# Patient Record
Sex: FEMALE | Race: WHITE
[De-identification: names, ages, dates, MRNs, and addresses within clinical notes are randomized per-mention and may not be internally consistent; named-entity substitution may affect disease eponyms.]

---

## 2021-06-25 ENCOUNTER — NON-APPOINTMENT (OUTPATIENT)
Age: 86
End: 2021-06-25

## 2021-06-25 ENCOUNTER — APPOINTMENT (OUTPATIENT)
Dept: DERMATOLOGY | Facility: CLINIC | Age: 86
End: 2021-06-25
Payer: MEDICARE

## 2021-06-25 DIAGNOSIS — D18.01 HEMANGIOMA OF SKIN AND SUBCUTANEOUS TISSUE: ICD-10-CM

## 2021-06-25 DIAGNOSIS — L82.1 OTHER SEBORRHEIC KERATOSIS: ICD-10-CM

## 2021-06-25 DIAGNOSIS — L81.4 OTHER MELANIN HYPERPIGMENTATION: ICD-10-CM

## 2021-06-25 PROCEDURE — 99204 OFFICE O/P NEW MOD 45 MIN: CPT

## 2021-06-25 RX ORDER — ROSUVASTATIN CALCIUM 5 MG/1
TABLET, FILM COATED ORAL
Refills: 0 | Status: ACTIVE | COMMUNITY

## 2021-06-25 RX ORDER — SITAGLIPTIN AND METFORMIN HYDROCHLORIDE 50; 1000 MG/1; MG/1
TABLET, FILM COATED ORAL
Refills: 0 | Status: ACTIVE | COMMUNITY

## 2021-06-25 NOTE — PHYSICAL EXAM
[Full Body Skin Exam Performed] : performed [FreeTextEntry3] : Skin examination performed of the face, neck, trunk, arms, legs; \par The patient is well, alert and oriented, pleasant and cooperative.\par Eyelids, conjunctivae, oral mucosa, digits and nails all normal.  \par No cervical adenopathy.\par \par Normal findings include:\par \par Seborrheic keratoses- multiple large lesions on back; also: prominent lesions Left lower cheek, one on R\par Angiomas\par Lentigines\par \par No lesions were suspicious for malignancy. \par \par \par

## 2021-06-25 NOTE — HISTORY OF PRESENT ILLNESS
[de-identified] : Pt. presents for skin check;\par c/o few spots of concern;  on face;  some appeared recently\par Severity:  mild  \par Modifying factors:  none\par Associated symptoms:  none\par Context:  no association with activity\par

## 2021-06-25 NOTE — ASSESSMENT
[FreeTextEntry1] : Complete skin examination is negative for malignancy; Multiple new concerns were addressed and discussed.\par Therapeutic options and their risks and benefits; along with multiple diagnostic possibilities were discussed at length;\par risks and benefits of skin biopsy and/or other further study were discussed;\par \par Continue regular exams;\par \par SKS;  discussed cosmetic removal R/B at length, including  scar, recurrence; \par \par f/u for cosmetic D&C;  Tx cheeks, @ RPP-TPP;  may add back lesions, total of YPP-CPP

## 2021-07-20 ENCOUNTER — APPOINTMENT (OUTPATIENT)
Dept: DERMATOLOGY | Facility: CLINIC | Age: 86
End: 2021-07-20
Payer: SELF-PAY

## 2021-07-20 PROCEDURE — D0052: CPT

## 2021-08-16 ENCOUNTER — APPOINTMENT (OUTPATIENT)
Dept: DERMATOLOGY | Facility: CLINIC | Age: 86
End: 2021-08-16

## 2021-09-08 ENCOUNTER — APPOINTMENT (OUTPATIENT)
Dept: DERMATOLOGY | Facility: CLINIC | Age: 86
End: 2021-09-08

## 2022-08-22 ENCOUNTER — APPOINTMENT (OUTPATIENT)
Dept: DERMATOLOGY | Facility: CLINIC | Age: 87
End: 2022-08-22

## 2022-08-22 DIAGNOSIS — Z41.1 ENCOUNTER FOR COSMETIC SURGERY: ICD-10-CM

## 2022-08-22 PROCEDURE — D0051: CPT

## 2024-01-01 ENCOUNTER — APPOINTMENT (OUTPATIENT)
Dept: ELECTROPHYSIOLOGY | Facility: CLINIC | Age: 89
End: 2024-01-01

## 2024-01-01 ENCOUNTER — INPATIENT (INPATIENT)
Facility: HOSPITAL | Age: 89
LOS: 16 days | Discharge: HOME HEALTH | DRG: 603 | End: 2024-05-02
Attending: FAMILY MEDICINE | Admitting: INTERNAL MEDICINE
Payer: MEDICARE

## 2024-01-01 ENCOUNTER — INPATIENT (INPATIENT)
Facility: HOSPITAL | Age: 89
LOS: 3 days | DRG: 871 | End: 2024-05-31
Attending: INTERNAL MEDICINE | Admitting: SURGERY
Payer: MEDICARE

## 2024-01-01 ENCOUNTER — TRANSCRIPTION ENCOUNTER (OUTPATIENT)
Age: 89
End: 2024-01-01

## 2024-01-01 ENCOUNTER — INPATIENT (INPATIENT)
Facility: HOSPITAL | Age: 89
LOS: 10 days | Discharge: HOME | DRG: 309 | End: 2024-04-08
Attending: STUDENT IN AN ORGANIZED HEALTH CARE EDUCATION/TRAINING PROGRAM | Admitting: INTERNAL MEDICINE
Payer: MEDICARE

## 2024-01-01 ENCOUNTER — APPOINTMENT (OUTPATIENT)
Dept: ELECTROPHYSIOLOGY | Facility: CLINIC | Age: 89
End: 2024-01-01
Payer: MEDICARE

## 2024-01-01 ENCOUNTER — RESULT REVIEW (OUTPATIENT)
Age: 89
End: 2024-01-01

## 2024-01-01 ENCOUNTER — APPOINTMENT (OUTPATIENT)
Dept: CARE COORDINATION | Facility: HOME HEALTH | Age: 89
End: 2024-01-01
Payer: MEDICARE

## 2024-01-01 ENCOUNTER — INPATIENT (INPATIENT)
Facility: HOSPITAL | Age: 89
LOS: 6 days | Discharge: HOME | DRG: 291 | End: 2024-03-25
Attending: HOSPITALIST | Admitting: STUDENT IN AN ORGANIZED HEALTH CARE EDUCATION/TRAINING PROGRAM
Payer: MEDICARE

## 2024-01-01 VITALS
HEART RATE: 50 BPM | SYSTOLIC BLOOD PRESSURE: 111 MMHG | WEIGHT: 145.06 LBS | RESPIRATION RATE: 18 BRPM | OXYGEN SATURATION: 98 % | HEIGHT: 63 IN | DIASTOLIC BLOOD PRESSURE: 68 MMHG | TEMPERATURE: 98 F

## 2024-01-01 VITALS
TEMPERATURE: 98 F | HEIGHT: 63 IN | SYSTOLIC BLOOD PRESSURE: 110 MMHG | WEIGHT: 139.99 LBS | DIASTOLIC BLOOD PRESSURE: 66 MMHG | HEART RATE: 89 BPM | RESPIRATION RATE: 18 BRPM | OXYGEN SATURATION: 100 %

## 2024-01-01 VITALS
HEIGHT: 63 IN | SYSTOLIC BLOOD PRESSURE: 141 MMHG | HEART RATE: 131 BPM | DIASTOLIC BLOOD PRESSURE: 88 MMHG | OXYGEN SATURATION: 100 % | RESPIRATION RATE: 12 BRPM

## 2024-01-01 VITALS
OXYGEN SATURATION: 99 % | RESPIRATION RATE: 19 BRPM | TEMPERATURE: 97 F | DIASTOLIC BLOOD PRESSURE: 45 MMHG | SYSTOLIC BLOOD PRESSURE: 102 MMHG | HEART RATE: 73 BPM

## 2024-01-01 VITALS
RESPIRATION RATE: 17 BRPM | SYSTOLIC BLOOD PRESSURE: 118 MMHG | DIASTOLIC BLOOD PRESSURE: 65 MMHG | HEART RATE: 66 BPM | HEIGHT: 63 IN | OXYGEN SATURATION: 100 % | TEMPERATURE: 94 F

## 2024-01-01 VITALS — SYSTOLIC BLOOD PRESSURE: 118 MMHG | HEART RATE: 90 BPM | DIASTOLIC BLOOD PRESSURE: 73 MMHG

## 2024-01-01 VITALS
OXYGEN SATURATION: 97 % | SYSTOLIC BLOOD PRESSURE: 128 MMHG | HEART RATE: 58 BPM | RESPIRATION RATE: 15 BRPM | DIASTOLIC BLOOD PRESSURE: 78 MMHG

## 2024-01-01 VITALS — OXYGEN SATURATION: 92 %

## 2024-01-01 VITALS — RESPIRATION RATE: 18 BRPM | OXYGEN SATURATION: 93 %
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
## 2024-01-01 DIAGNOSIS — E11.9 TYPE 2 DIABETES MELLITUS W/OUT COMPLICATIONS: ICD-10-CM

## 2024-01-01 DIAGNOSIS — I50.32 CHRONIC DIASTOLIC (CONGESTIVE) HEART FAILURE: ICD-10-CM

## 2024-01-01 DIAGNOSIS — I48.20 CHRONIC ATRIAL FIBRILLATION, UNSPECIFIED: ICD-10-CM

## 2024-01-01 DIAGNOSIS — I48.0 PAROXYSMAL ATRIAL FIBRILLATION: ICD-10-CM

## 2024-01-01 DIAGNOSIS — R53.1 WEAKNESS: ICD-10-CM

## 2024-01-01 DIAGNOSIS — E87.6 HYPOKALEMIA: ICD-10-CM

## 2024-01-01 DIAGNOSIS — R59.9 ENLARGED LYMPH NODES, UNSPECIFIED: ICD-10-CM

## 2024-01-01 DIAGNOSIS — M79.81 NONTRAUMATIC HEMATOMA OF SOFT TISSUE: ICD-10-CM

## 2024-01-01 DIAGNOSIS — L89.150 PRESSURE ULCER OF SACRAL REGION, UNSTAGEABLE: ICD-10-CM

## 2024-01-01 DIAGNOSIS — L98.429 NON-PRESSURE CHRONIC ULCER OF BACK WITH UNSPECIFIED SEVERITY: ICD-10-CM

## 2024-01-01 DIAGNOSIS — I11.0 HYPERTENSIVE HEART DISEASE WITH HEART FAILURE: ICD-10-CM

## 2024-01-01 DIAGNOSIS — Z66 DO NOT RESUSCITATE: ICD-10-CM

## 2024-01-01 DIAGNOSIS — M31.6 OTHER GIANT CELL ARTERITIS: ICD-10-CM

## 2024-01-01 DIAGNOSIS — I48.91 UNSPECIFIED ATRIAL FIBRILLATION: ICD-10-CM

## 2024-01-01 DIAGNOSIS — I48.92 UNSPECIFIED ATRIAL FLUTTER: ICD-10-CM

## 2024-01-01 DIAGNOSIS — Z86.718 PERSONAL HISTORY OF OTHER VENOUS THROMBOSIS AND EMBOLISM: ICD-10-CM

## 2024-01-01 DIAGNOSIS — J44.1 CHRONIC OBSTRUCTIVE PULMONARY DISEASE WITH (ACUTE) EXACERBATION: ICD-10-CM

## 2024-01-01 DIAGNOSIS — S31.000A UNSPECIFIED OPEN WOUND OF LOWER BACK AND PELVIS WITHOUT PENETRATION INTO RETROPERITONEUM, INITIAL ENCOUNTER: ICD-10-CM

## 2024-01-01 DIAGNOSIS — Z79.899 OTHER LONG TERM (CURRENT) DRUG THERAPY: ICD-10-CM

## 2024-01-01 DIAGNOSIS — K52.9 NONINFECTIVE GASTROENTERITIS AND COLITIS, UNSPECIFIED: ICD-10-CM

## 2024-01-01 DIAGNOSIS — Z11.52 ENCOUNTER FOR SCREENING FOR COVID-19: ICD-10-CM

## 2024-01-01 DIAGNOSIS — D69.59 OTHER SECONDARY THROMBOCYTOPENIA: ICD-10-CM

## 2024-01-01 DIAGNOSIS — A41.9 SEPSIS, UNSPECIFIED ORGANISM: ICD-10-CM

## 2024-01-01 DIAGNOSIS — E86.0 DEHYDRATION: ICD-10-CM

## 2024-01-01 DIAGNOSIS — Z79.52 LONG TERM (CURRENT) USE OF SYSTEMIC STEROIDS: ICD-10-CM

## 2024-01-01 DIAGNOSIS — Z79.01 LONG TERM (CURRENT) USE OF ANTICOAGULANTS: ICD-10-CM

## 2024-01-01 DIAGNOSIS — R62.7 ADULT FAILURE TO THRIVE: ICD-10-CM

## 2024-01-01 DIAGNOSIS — E78.5 HYPERLIPIDEMIA, UNSPECIFIED: ICD-10-CM

## 2024-01-01 DIAGNOSIS — R91.1 SOLITARY PULMONARY NODULE: ICD-10-CM

## 2024-01-01 DIAGNOSIS — E88.09 OTHER DISORDERS OF PLASMA-PROTEIN METABOLISM, NOT ELSEWHERE CLASSIFIED: ICD-10-CM

## 2024-01-01 DIAGNOSIS — R55 SYNCOPE AND COLLAPSE: ICD-10-CM

## 2024-01-01 DIAGNOSIS — T38.3X5A ADVERSE EFFECT OF INSULIN AND ORAL HYPOGLYCEMIC [ANTIDIABETIC] DRUGS, INITIAL ENCOUNTER: ICD-10-CM

## 2024-01-01 DIAGNOSIS — D72.828 OTHER ELEVATED WHITE BLOOD CELL COUNT: ICD-10-CM

## 2024-01-01 DIAGNOSIS — K52.1 TOXIC GASTROENTERITIS AND COLITIS: ICD-10-CM

## 2024-01-01 DIAGNOSIS — E43 UNSPECIFIED SEVERE PROTEIN-CALORIE MALNUTRITION: ICD-10-CM
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
## 2024-01-01 DIAGNOSIS — L03.116 CELLULITIS OF LEFT LOWER LIMB: ICD-10-CM

## 2024-01-01 DIAGNOSIS — S70.01XA CONTUSION OF RIGHT HIP, INITIAL ENCOUNTER: ICD-10-CM

## 2024-01-01 DIAGNOSIS — L89.012 PRESSURE ULCER OF RIGHT ELBOW, STAGE 2: ICD-10-CM

## 2024-01-01 DIAGNOSIS — I50.33 ACUTE ON CHRONIC DIASTOLIC (CONGESTIVE) HEART FAILURE: ICD-10-CM

## 2024-01-01 DIAGNOSIS — Z88.0 ALLERGY STATUS TO PENICILLIN: ICD-10-CM

## 2024-01-01 DIAGNOSIS — Z79.84 LONG TERM (CURRENT) USE OF ORAL HYPOGLYCEMIC DRUGS: ICD-10-CM

## 2024-01-01 DIAGNOSIS — I49.5 SICK SINUS SYNDROME: ICD-10-CM

## 2024-01-01 DIAGNOSIS — Z53.09 PROCEDURE AND TREATMENT NOT CARRIED OUT BECAUSE OF OTHER CONTRAINDICATION: ICD-10-CM

## 2024-01-01 DIAGNOSIS — I95.9 HYPOTENSION, UNSPECIFIED: ICD-10-CM

## 2024-01-01 DIAGNOSIS — I24.89 OTHER FORMS OF ACUTE ISCHEMIC HEART DISEASE: ICD-10-CM

## 2024-01-01 DIAGNOSIS — K21.9 GASTRO-ESOPHAGEAL REFLUX DISEASE WITHOUT ESOPHAGITIS: ICD-10-CM
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
## 2024-01-01 DIAGNOSIS — I50.9 HEART FAILURE, UNSPECIFIED: ICD-10-CM

## 2024-01-01 DIAGNOSIS — I96 GANGRENE, NOT ELSEWHERE CLASSIFIED: ICD-10-CM

## 2024-01-01 DIAGNOSIS — E11.9 TYPE 2 DIABETES MELLITUS WITHOUT COMPLICATIONS: ICD-10-CM

## 2024-01-01 DIAGNOSIS — D62 ACUTE POSTHEMORRHAGIC ANEMIA: ICD-10-CM

## 2024-01-01 DIAGNOSIS — K62.89 OTHER SPECIFIED DISEASES OF ANUS AND RECTUM: ICD-10-CM
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
## 2024-01-01 DIAGNOSIS — N17.9 ACUTE KIDNEY FAILURE, UNSPECIFIED: ICD-10-CM

## 2024-01-01 DIAGNOSIS — R91.8 OTHER NONSPECIFIC ABNORMAL FINDING OF LUNG FIELD: ICD-10-CM

## 2024-01-01 LAB
A1C WITH ESTIMATED AVERAGE GLUCOSE RESULT: 7.3 % — HIGH (ref 4–5.6)
A1C WITH ESTIMATED AVERAGE GLUCOSE RESULT: 8.6 % — HIGH (ref 4–5.6)
ABO RH CONFIRMATION: SIGNIFICANT CHANGE UP
ADD ON TEST-SPECIMEN IN LAB: SIGNIFICANT CHANGE UP
ALBUMIN SERPL ELPH-MCNC: 1.6 G/DL — LOW (ref 3.3–5)
ALBUMIN SERPL ELPH-MCNC: 1.6 G/DL — LOW (ref 3.3–5)
ALBUMIN SERPL ELPH-MCNC: 2 G/DL — LOW (ref 3.3–5)
ALBUMIN SERPL ELPH-MCNC: 2 G/DL — LOW (ref 3.3–5)
ALBUMIN SERPL ELPH-MCNC: 2.1 G/DL — LOW (ref 3.3–5)
ALBUMIN SERPL ELPH-MCNC: 2.4 G/DL — LOW (ref 3.3–5)
ALBUMIN SERPL ELPH-MCNC: 2.5 G/DL — LOW (ref 3.3–5)
ALBUMIN SERPL ELPH-MCNC: 2.7 G/DL — LOW (ref 3.3–5)
ALBUMIN SERPL ELPH-MCNC: 2.7 G/DL — LOW (ref 3.3–5)
ALBUMIN SERPL ELPH-MCNC: 3 G/DL — LOW (ref 3.3–5)
ALP SERPL-CCNC: 37 U/L — LOW (ref 40–120)
ALP SERPL-CCNC: 42 U/L — SIGNIFICANT CHANGE UP (ref 40–120)
ALP SERPL-CCNC: 49 U/L — SIGNIFICANT CHANGE UP (ref 40–120)
ALP SERPL-CCNC: 49 U/L — SIGNIFICANT CHANGE UP (ref 40–120)
ALP SERPL-CCNC: 51 U/L — SIGNIFICANT CHANGE UP (ref 40–120)
ALP SERPL-CCNC: 55 U/L — SIGNIFICANT CHANGE UP (ref 40–120)
ALP SERPL-CCNC: 66 U/L — SIGNIFICANT CHANGE UP (ref 40–120)
ALP SERPL-CCNC: 67 U/L — SIGNIFICANT CHANGE UP (ref 40–120)
ALP SERPL-CCNC: 69 U/L — SIGNIFICANT CHANGE UP (ref 40–120)
ALT FLD-CCNC: 23 U/L — SIGNIFICANT CHANGE UP (ref 12–78)
ALT FLD-CCNC: 32 U/L — SIGNIFICANT CHANGE UP (ref 12–78)
ALT FLD-CCNC: 34 U/L — SIGNIFICANT CHANGE UP (ref 12–78)
ALT FLD-CCNC: 35 U/L — SIGNIFICANT CHANGE UP (ref 12–78)
ALT FLD-CCNC: 36 U/L — SIGNIFICANT CHANGE UP (ref 12–78)
ALT FLD-CCNC: 41 U/L — SIGNIFICANT CHANGE UP (ref 12–78)
ALT FLD-CCNC: 43 U/L — SIGNIFICANT CHANGE UP (ref 12–78)
ALT FLD-CCNC: 46 U/L — SIGNIFICANT CHANGE UP (ref 12–78)
ALT FLD-CCNC: 48 U/L — SIGNIFICANT CHANGE UP (ref 12–78)
AMMONIA BLD-MCNC: 70 UMOL/L — HIGH (ref 11–32)
ANION GAP SERPL CALC-SCNC: 10 MMOL/L — SIGNIFICANT CHANGE UP (ref 5–17)
ANION GAP SERPL CALC-SCNC: 12 MMOL/L — SIGNIFICANT CHANGE UP (ref 5–17)
ANION GAP SERPL CALC-SCNC: 14 MMOL/L — SIGNIFICANT CHANGE UP (ref 5–17)
ANION GAP SERPL CALC-SCNC: 14 MMOL/L — SIGNIFICANT CHANGE UP (ref 5–17)
ANION GAP SERPL CALC-SCNC: 4 MMOL/L — LOW (ref 5–17)
ANION GAP SERPL CALC-SCNC: 5 MMOL/L — SIGNIFICANT CHANGE UP (ref 5–17)
ANION GAP SERPL CALC-SCNC: 5 MMOL/L — SIGNIFICANT CHANGE UP (ref 5–17)
ANION GAP SERPL CALC-SCNC: 6 MMOL/L — SIGNIFICANT CHANGE UP (ref 5–17)
ANION GAP SERPL CALC-SCNC: 7 MMOL/L — SIGNIFICANT CHANGE UP (ref 5–17)
ANION GAP SERPL CALC-SCNC: 8 MMOL/L — SIGNIFICANT CHANGE UP (ref 5–17)
ANION GAP SERPL CALC-SCNC: 9 MMOL/L — SIGNIFICANT CHANGE UP (ref 5–17)
ANISOCYTOSIS BLD QL: SIGNIFICANT CHANGE UP
ANISOCYTOSIS BLD QL: SLIGHT — SIGNIFICANT CHANGE UP
APPEARANCE UR: CLEAR — SIGNIFICANT CHANGE UP
APTT BLD: 25.3 SEC — SIGNIFICANT CHANGE UP (ref 24.5–35.6)
APTT BLD: 29.4 SEC — SIGNIFICANT CHANGE UP (ref 24.5–35.6)
APTT BLD: 31.7 SEC — SIGNIFICANT CHANGE UP (ref 24.5–35.6)
APTT BLD: 31.8 SEC — SIGNIFICANT CHANGE UP (ref 24.5–35.6)
APTT BLD: 45.5 SEC — HIGH (ref 24.5–35.6)
AST SERPL-CCNC: 15 U/L — SIGNIFICANT CHANGE UP (ref 15–37)
AST SERPL-CCNC: 18 U/L — SIGNIFICANT CHANGE UP (ref 15–37)
AST SERPL-CCNC: 34 U/L — SIGNIFICANT CHANGE UP (ref 15–37)
AST SERPL-CCNC: 36 U/L — SIGNIFICANT CHANGE UP (ref 15–37)
AST SERPL-CCNC: 38 U/L — HIGH (ref 15–37)
AST SERPL-CCNC: 44 U/L — HIGH (ref 15–37)
AST SERPL-CCNC: 49 U/L — HIGH (ref 15–37)
AST SERPL-CCNC: 63 U/L — HIGH (ref 15–37)
AST SERPL-CCNC: 90 U/L — HIGH (ref 15–37)
BACTERIA # UR AUTO: NEGATIVE /HPF — SIGNIFICANT CHANGE UP
BASE EXCESS BLDA CALC-SCNC: -8.1 MMOL/L — LOW (ref -2–3)
BASOPHILS # BLD AUTO: 0 K/UL — SIGNIFICANT CHANGE UP (ref 0–0.2)
BASOPHILS # BLD AUTO: 0.04 K/UL — SIGNIFICANT CHANGE UP (ref 0–0.2)
BASOPHILS # BLD AUTO: 0.07 K/UL — SIGNIFICANT CHANGE UP (ref 0–0.2)
BASOPHILS # BLD AUTO: 0.12 K/UL — SIGNIFICANT CHANGE UP (ref 0–0.2)
BASOPHILS # BLD AUTO: 0.16 K/UL — SIGNIFICANT CHANGE UP (ref 0–0.2)
BASOPHILS NFR BLD AUTO: 0 % — SIGNIFICANT CHANGE UP (ref 0–2)
BASOPHILS NFR BLD AUTO: 0.4 % — SIGNIFICANT CHANGE UP (ref 0–2)
BASOPHILS NFR BLD AUTO: 0.6 % — SIGNIFICANT CHANGE UP (ref 0–2)
BASOPHILS NFR BLD AUTO: 0.7 % — SIGNIFICANT CHANGE UP (ref 0–2)
BASOPHILS NFR BLD AUTO: 0.9 % — SIGNIFICANT CHANGE UP (ref 0–2)
BILIRUB DIRECT SERPL-MCNC: 0.1 MG/DL — SIGNIFICANT CHANGE UP (ref 0–0.3)
BILIRUB INDIRECT FLD-MCNC: 0.4 MG/DL — SIGNIFICANT CHANGE UP (ref 0.2–1)
BILIRUB SERPL-MCNC: 0.5 MG/DL — SIGNIFICANT CHANGE UP (ref 0.2–1.2)
BILIRUB SERPL-MCNC: 0.5 MG/DL — SIGNIFICANT CHANGE UP (ref 0.2–1.2)
BILIRUB SERPL-MCNC: 0.6 MG/DL — SIGNIFICANT CHANGE UP (ref 0.2–1.2)
BILIRUB SERPL-MCNC: 0.6 MG/DL — SIGNIFICANT CHANGE UP (ref 0.2–1.2)
BILIRUB SERPL-MCNC: 0.9 MG/DL — SIGNIFICANT CHANGE UP (ref 0.2–1.2)
BILIRUB SERPL-MCNC: 1 MG/DL — SIGNIFICANT CHANGE UP (ref 0.2–1.2)
BILIRUB SERPL-MCNC: 1.1 MG/DL — SIGNIFICANT CHANGE UP (ref 0.2–1.2)
BILIRUB SERPL-MCNC: 1.3 MG/DL — HIGH (ref 0.2–1.2)
BILIRUB SERPL-MCNC: 1.5 MG/DL — HIGH (ref 0.2–1.2)
BILIRUB UR-MCNC: NEGATIVE — SIGNIFICANT CHANGE UP
BIZARRE PLATELETS BLD QL SMEAR: PRESENT — SIGNIFICANT CHANGE UP
BIZARRE PLATELETS BLD QL SMEAR: PRESENT — SIGNIFICANT CHANGE UP
BLD GP AB SCN SERPL QL: SIGNIFICANT CHANGE UP
BLD GP AB SCN SERPL QL: SIGNIFICANT CHANGE UP
BLOOD GAS COMMENTS ARTERIAL: SIGNIFICANT CHANGE UP
BUN SERPL-MCNC: 27 MG/DL — HIGH (ref 7–23)
BUN SERPL-MCNC: 31 MG/DL — HIGH (ref 7–23)
BUN SERPL-MCNC: 31 MG/DL — HIGH (ref 7–23)
BUN SERPL-MCNC: 32 MG/DL — HIGH (ref 7–23)
BUN SERPL-MCNC: 32 MG/DL — HIGH (ref 7–23)
BUN SERPL-MCNC: 33 MG/DL — HIGH (ref 7–23)
BUN SERPL-MCNC: 34 MG/DL — HIGH (ref 7–23)
BUN SERPL-MCNC: 35 MG/DL — HIGH (ref 7–23)
BUN SERPL-MCNC: 36 MG/DL — HIGH (ref 7–23)
BUN SERPL-MCNC: 37 MG/DL — HIGH (ref 7–23)
BUN SERPL-MCNC: 38 MG/DL — HIGH (ref 7–23)
BUN SERPL-MCNC: 39 MG/DL — HIGH (ref 7–23)
BUN SERPL-MCNC: 39 MG/DL — HIGH (ref 7–23)
BUN SERPL-MCNC: 40 MG/DL — HIGH (ref 7–23)
BUN SERPL-MCNC: 40 MG/DL — HIGH (ref 7–23)
BUN SERPL-MCNC: 41 MG/DL — HIGH (ref 7–23)
BUN SERPL-MCNC: 42 MG/DL — HIGH (ref 7–23)
BUN SERPL-MCNC: 43 MG/DL — HIGH (ref 7–23)
BUN SERPL-MCNC: 44 MG/DL — HIGH (ref 7–23)
BUN SERPL-MCNC: 44 MG/DL — HIGH (ref 7–23)
BUN SERPL-MCNC: 45 MG/DL — HIGH (ref 7–23)
BUN SERPL-MCNC: 46 MG/DL — HIGH (ref 7–23)
BUN SERPL-MCNC: 48 MG/DL — HIGH (ref 7–23)
BUN SERPL-MCNC: 51 MG/DL — HIGH (ref 7–23)
BUN SERPL-MCNC: 51 MG/DL — HIGH (ref 7–23)
BUN SERPL-MCNC: 52 MG/DL — HIGH (ref 7–23)
BUN SERPL-MCNC: 53 MG/DL — HIGH (ref 7–23)
BUN SERPL-MCNC: 57 MG/DL — HIGH (ref 7–23)
BUN SERPL-MCNC: 59 MG/DL — HIGH (ref 7–23)
BUN SERPL-MCNC: 59 MG/DL — HIGH (ref 7–23)
BUN SERPL-MCNC: 68 MG/DL — HIGH (ref 7–23)
BURR CELLS BLD QL SMEAR: PRESENT — SIGNIFICANT CHANGE UP
BURR CELLS BLD QL SMEAR: PRESENT — SIGNIFICANT CHANGE UP
CALCIUM SERPL-MCNC: 7.6 MG/DL — LOW (ref 8.5–10.1)
CALCIUM SERPL-MCNC: 8 MG/DL — LOW (ref 8.5–10.1)
CALCIUM SERPL-MCNC: 8.2 MG/DL — LOW (ref 8.5–10.1)
CALCIUM SERPL-MCNC: 8.2 MG/DL — LOW (ref 8.5–10.1)
CALCIUM SERPL-MCNC: 8.3 MG/DL — LOW (ref 8.5–10.1)
CALCIUM SERPL-MCNC: 8.4 MG/DL — LOW (ref 8.5–10.1)
CALCIUM SERPL-MCNC: 8.4 MG/DL — LOW (ref 8.5–10.1)
CALCIUM SERPL-MCNC: 8.5 MG/DL — SIGNIFICANT CHANGE UP (ref 8.5–10.1)
CALCIUM SERPL-MCNC: 8.6 MG/DL — SIGNIFICANT CHANGE UP (ref 8.5–10.1)
CALCIUM SERPL-MCNC: 8.7 MG/DL — SIGNIFICANT CHANGE UP (ref 8.5–10.1)
CALCIUM SERPL-MCNC: 8.8 MG/DL — SIGNIFICANT CHANGE UP (ref 8.5–10.1)
CALCIUM SERPL-MCNC: 8.9 MG/DL — SIGNIFICANT CHANGE UP (ref 8.5–10.1)
CALCIUM SERPL-MCNC: 9 MG/DL — SIGNIFICANT CHANGE UP (ref 8.5–10.1)
CALCIUM SERPL-MCNC: 9 MG/DL — SIGNIFICANT CHANGE UP (ref 8.5–10.1)
CALCIUM SERPL-MCNC: 9.1 MG/DL — SIGNIFICANT CHANGE UP (ref 8.5–10.1)
CALCIUM SERPL-MCNC: 9.1 MG/DL — SIGNIFICANT CHANGE UP (ref 8.5–10.1)
CALCIUM SERPL-MCNC: 9.3 MG/DL — SIGNIFICANT CHANGE UP (ref 8.5–10.1)
CALCIUM SERPL-MCNC: 9.3 MG/DL — SIGNIFICANT CHANGE UP (ref 8.5–10.1)
CALCIUM SERPL-MCNC: 9.4 MG/DL — SIGNIFICANT CHANGE UP (ref 8.5–10.1)
CAST: 15 /LPF — HIGH (ref 0–4)
CAST: 2 /LPF — SIGNIFICANT CHANGE UP (ref 0–4)
CAST: 5 /LPF — HIGH (ref 0–4)
CAST: 6 /LPF — HIGH (ref 0–4)
CHLORIDE SERPL-SCNC: 103 MMOL/L — SIGNIFICANT CHANGE UP (ref 96–108)
CHLORIDE SERPL-SCNC: 103 MMOL/L — SIGNIFICANT CHANGE UP (ref 96–108)
CHLORIDE SERPL-SCNC: 104 MMOL/L — SIGNIFICANT CHANGE UP (ref 96–108)
CHLORIDE SERPL-SCNC: 105 MMOL/L — SIGNIFICANT CHANGE UP (ref 96–108)
CHLORIDE SERPL-SCNC: 106 MMOL/L — SIGNIFICANT CHANGE UP (ref 96–108)
CHLORIDE SERPL-SCNC: 107 MMOL/L — SIGNIFICANT CHANGE UP (ref 96–108)
CHLORIDE SERPL-SCNC: 108 MMOL/L — SIGNIFICANT CHANGE UP (ref 96–108)
CHLORIDE SERPL-SCNC: 109 MMOL/L — HIGH (ref 96–108)
CHLORIDE SERPL-SCNC: 110 MMOL/L — HIGH (ref 96–108)
CHLORIDE SERPL-SCNC: 110 MMOL/L — HIGH (ref 96–108)
CHLORIDE SERPL-SCNC: 111 MMOL/L — HIGH (ref 96–108)
CHLORIDE SERPL-SCNC: 112 MMOL/L — HIGH (ref 96–108)
CHLORIDE SERPL-SCNC: 113 MMOL/L — HIGH (ref 96–108)
CHLORIDE SERPL-SCNC: 113 MMOL/L — HIGH (ref 96–108)
CHOLEST SERPL-MCNC: 143 MG/DL — SIGNIFICANT CHANGE UP
CO2 SERPL-SCNC: 17 MMOL/L — LOW (ref 22–31)
CO2 SERPL-SCNC: 19 MMOL/L — LOW (ref 22–31)
CO2 SERPL-SCNC: 19 MMOL/L — LOW (ref 22–31)
CO2 SERPL-SCNC: 20 MMOL/L — LOW (ref 22–31)
CO2 SERPL-SCNC: 21 MMOL/L — LOW (ref 22–31)
CO2 SERPL-SCNC: 22 MMOL/L — SIGNIFICANT CHANGE UP (ref 22–31)
CO2 SERPL-SCNC: 23 MMOL/L — SIGNIFICANT CHANGE UP (ref 22–31)
CO2 SERPL-SCNC: 24 MMOL/L — SIGNIFICANT CHANGE UP (ref 22–31)
CO2 SERPL-SCNC: 25 MMOL/L — SIGNIFICANT CHANGE UP (ref 22–31)
CO2 SERPL-SCNC: 26 MMOL/L — SIGNIFICANT CHANGE UP (ref 22–31)
CO2 SERPL-SCNC: 26 MMOL/L — SIGNIFICANT CHANGE UP (ref 22–31)
CO2 SERPL-SCNC: 27 MMOL/L — SIGNIFICANT CHANGE UP (ref 22–31)
CO2 SERPL-SCNC: 27 MMOL/L — SIGNIFICANT CHANGE UP (ref 22–31)
CO2 SERPL-SCNC: 28 MMOL/L — SIGNIFICANT CHANGE UP (ref 22–31)
CO2 SERPL-SCNC: 28 MMOL/L — SIGNIFICANT CHANGE UP (ref 22–31)
CO2 SERPL-SCNC: 29 MMOL/L — SIGNIFICANT CHANGE UP (ref 22–31)
COLOR SPEC: SIGNIFICANT CHANGE UP
COLOR SPEC: YELLOW — SIGNIFICANT CHANGE UP
CREAT SERPL-MCNC: 0.7 MG/DL — SIGNIFICANT CHANGE UP (ref 0.5–1.3)
CREAT SERPL-MCNC: 0.72 MG/DL — SIGNIFICANT CHANGE UP (ref 0.5–1.3)
CREAT SERPL-MCNC: 0.75 MG/DL — SIGNIFICANT CHANGE UP (ref 0.5–1.3)
CREAT SERPL-MCNC: 0.8 MG/DL — SIGNIFICANT CHANGE UP (ref 0.5–1.3)
CREAT SERPL-MCNC: 0.8 MG/DL — SIGNIFICANT CHANGE UP (ref 0.5–1.3)
CREAT SERPL-MCNC: 0.81 MG/DL — SIGNIFICANT CHANGE UP (ref 0.5–1.3)
CREAT SERPL-MCNC: 0.81 MG/DL — SIGNIFICANT CHANGE UP (ref 0.5–1.3)
CREAT SERPL-MCNC: 0.84 MG/DL — SIGNIFICANT CHANGE UP (ref 0.5–1.3)
CREAT SERPL-MCNC: 0.85 MG/DL — SIGNIFICANT CHANGE UP (ref 0.5–1.3)
CREAT SERPL-MCNC: 0.88 MG/DL — SIGNIFICANT CHANGE UP (ref 0.5–1.3)
CREAT SERPL-MCNC: 0.9 MG/DL — SIGNIFICANT CHANGE UP (ref 0.5–1.3)
CREAT SERPL-MCNC: 0.91 MG/DL — SIGNIFICANT CHANGE UP (ref 0.5–1.3)
CREAT SERPL-MCNC: 0.93 MG/DL — SIGNIFICANT CHANGE UP (ref 0.5–1.3)
CREAT SERPL-MCNC: 0.94 MG/DL — SIGNIFICANT CHANGE UP (ref 0.5–1.3)
CREAT SERPL-MCNC: 0.96 MG/DL — SIGNIFICANT CHANGE UP (ref 0.5–1.3)
CREAT SERPL-MCNC: 0.97 MG/DL — SIGNIFICANT CHANGE UP (ref 0.5–1.3)
CREAT SERPL-MCNC: 0.97 MG/DL — SIGNIFICANT CHANGE UP (ref 0.5–1.3)
CREAT SERPL-MCNC: 0.99 MG/DL — SIGNIFICANT CHANGE UP (ref 0.5–1.3)
CREAT SERPL-MCNC: 1.03 MG/DL — SIGNIFICANT CHANGE UP (ref 0.5–1.3)
CREAT SERPL-MCNC: 1.04 MG/DL — SIGNIFICANT CHANGE UP (ref 0.5–1.3)
CREAT SERPL-MCNC: 1.07 MG/DL — SIGNIFICANT CHANGE UP (ref 0.5–1.3)
CREAT SERPL-MCNC: 1.09 MG/DL — SIGNIFICANT CHANGE UP (ref 0.5–1.3)
CREAT SERPL-MCNC: 1.11 MG/DL — SIGNIFICANT CHANGE UP (ref 0.5–1.3)
CREAT SERPL-MCNC: 1.13 MG/DL — SIGNIFICANT CHANGE UP (ref 0.5–1.3)
CREAT SERPL-MCNC: 1.13 MG/DL — SIGNIFICANT CHANGE UP (ref 0.5–1.3)
CREAT SERPL-MCNC: 1.15 MG/DL — SIGNIFICANT CHANGE UP (ref 0.5–1.3)
CREAT SERPL-MCNC: 1.2 MG/DL — SIGNIFICANT CHANGE UP (ref 0.5–1.3)
CREAT SERPL-MCNC: 1.21 MG/DL — SIGNIFICANT CHANGE UP (ref 0.5–1.3)
CREAT SERPL-MCNC: 1.33 MG/DL — HIGH (ref 0.5–1.3)
CREAT SERPL-MCNC: 1.4 MG/DL — HIGH (ref 0.5–1.3)
CREAT SERPL-MCNC: 1.43 MG/DL — HIGH (ref 0.5–1.3)
CREAT SERPL-MCNC: 1.49 MG/DL — HIGH (ref 0.5–1.3)
CREAT SERPL-MCNC: 1.5 MG/DL — HIGH (ref 0.5–1.3)
CREAT SERPL-MCNC: 1.51 MG/DL — HIGH (ref 0.5–1.3)
CREAT SERPL-MCNC: 1.61 MG/DL — HIGH (ref 0.5–1.3)
CREAT SERPL-MCNC: 1.63 MG/DL — HIGH (ref 0.5–1.3)
CREAT SERPL-MCNC: 1.85 MG/DL — HIGH (ref 0.5–1.3)
CRP SERPL-MCNC: 4 MG/L — SIGNIFICANT CHANGE UP
CRP SERPL-MCNC: <3 MG/L — SIGNIFICANT CHANGE UP
CULTURE RESULTS: SIGNIFICANT CHANGE UP
DIFF PNL FLD: ABNORMAL
DIGOXIN SERPL-MCNC: 1.23 NG/ML — SIGNIFICANT CHANGE UP (ref 0.8–2)
DIGOXIN SERPL-MCNC: 1.35 NG/ML — SIGNIFICANT CHANGE UP (ref 0.8–2)
DIGOXIN SERPL-MCNC: 2.32 NG/ML — HIGH (ref 0.8–2)
EGFR: 26 ML/MIN/1.73M2 — LOW
EGFR: 30 ML/MIN/1.73M2 — LOW
EGFR: 31 ML/MIN/1.73M2 — LOW
EGFR: 33 ML/MIN/1.73M2 — LOW
EGFR: 34 ML/MIN/1.73M2 — LOW
EGFR: 34 ML/MIN/1.73M2 — LOW
EGFR: 36 ML/MIN/1.73M2 — LOW
EGFR: 36 ML/MIN/1.73M2 — LOW
EGFR: 39 ML/MIN/1.73M2 — LOW
EGFR: 43 ML/MIN/1.73M2 — LOW
EGFR: 44 ML/MIN/1.73M2 — LOW
EGFR: 46 ML/MIN/1.73M2 — LOW
EGFR: 47 ML/MIN/1.73M2 — LOW
EGFR: 47 ML/MIN/1.73M2 — LOW
EGFR: 48 ML/MIN/1.73M2 — LOW
EGFR: 49 ML/MIN/1.73M2 — LOW
EGFR: 50 ML/MIN/1.73M2 — LOW
EGFR: 52 ML/MIN/1.73M2 — LOW
EGFR: 52 ML/MIN/1.73M2 — LOW
EGFR: 55 ML/MIN/1.73M2 — LOW
EGFR: 56 ML/MIN/1.73M2 — LOW
EGFR: 56 ML/MIN/1.73M2 — LOW
EGFR: 57 ML/MIN/1.73M2 — LOW
EGFR: 58 ML/MIN/1.73M2 — LOW
EGFR: 59 ML/MIN/1.73M2 — LOW
EGFR: 61 ML/MIN/1.73M2 — SIGNIFICANT CHANGE UP
EGFR: 61 ML/MIN/1.73M2 — SIGNIFICANT CHANGE UP
EGFR: 63 ML/MIN/1.73M2 — SIGNIFICANT CHANGE UP
EGFR: 66 ML/MIN/1.73M2 — SIGNIFICANT CHANGE UP
EGFR: 67 ML/MIN/1.73M2 — SIGNIFICANT CHANGE UP
EGFR: 70 ML/MIN/1.73M2 — SIGNIFICANT CHANGE UP
EGFR: 70 ML/MIN/1.73M2 — SIGNIFICANT CHANGE UP
EGFR: 71 ML/MIN/1.73M2 — SIGNIFICANT CHANGE UP
EGFR: 71 ML/MIN/1.73M2 — SIGNIFICANT CHANGE UP
EGFR: 77 ML/MIN/1.73M2 — SIGNIFICANT CHANGE UP
EGFR: 80 ML/MIN/1.73M2 — SIGNIFICANT CHANGE UP
EGFR: 83 ML/MIN/1.73M2 — SIGNIFICANT CHANGE UP
ELLIPTOCYTES BLD QL SMEAR: SIGNIFICANT CHANGE UP
ELLIPTOCYTES BLD QL SMEAR: SIGNIFICANT CHANGE UP
EOSINOPHIL # BLD AUTO: 0 K/UL — SIGNIFICANT CHANGE UP (ref 0–0.5)
EOSINOPHIL # BLD AUTO: 0.01 K/UL — SIGNIFICANT CHANGE UP (ref 0–0.5)
EOSINOPHIL # BLD AUTO: 0.13 K/UL — SIGNIFICANT CHANGE UP (ref 0–0.5)
EOSINOPHIL NFR BLD AUTO: 0 % — SIGNIFICANT CHANGE UP (ref 0–6)
EOSINOPHIL NFR BLD AUTO: 1 % — SIGNIFICANT CHANGE UP (ref 0–6)
ERYTHROCYTE [SEDIMENTATION RATE] IN BLOOD: 11 MM/HR — SIGNIFICANT CHANGE UP (ref 0–20)
ERYTHROCYTE [SEDIMENTATION RATE] IN BLOOD: 13 MM/HR — SIGNIFICANT CHANGE UP (ref 0–20)
ESTIMATED AVERAGE GLUCOSE: 163 MG/DL — HIGH (ref 68–114)
ESTIMATED AVERAGE GLUCOSE: 200 MG/DL — HIGH (ref 68–114)
FLUAV AG NPH QL: SIGNIFICANT CHANGE UP
FLUAV AG NPH QL: SIGNIFICANT CHANGE UP
FLUBV AG NPH QL: SIGNIFICANT CHANGE UP
FLUBV AG NPH QL: SIGNIFICANT CHANGE UP
GAS PNL BLDA: SIGNIFICANT CHANGE UP
GLUCOSE BLDC GLUCOMTR-MCNC: 101 MG/DL — HIGH (ref 70–99)
GLUCOSE BLDC GLUCOMTR-MCNC: 102 MG/DL — HIGH (ref 70–99)
GLUCOSE BLDC GLUCOMTR-MCNC: 104 MG/DL — HIGH (ref 70–99)
GLUCOSE BLDC GLUCOMTR-MCNC: 111 MG/DL — HIGH (ref 70–99)
GLUCOSE BLDC GLUCOMTR-MCNC: 112 MG/DL — HIGH (ref 70–99)
GLUCOSE BLDC GLUCOMTR-MCNC: 113 MG/DL — HIGH (ref 70–99)
GLUCOSE BLDC GLUCOMTR-MCNC: 115 MG/DL — HIGH (ref 70–99)
GLUCOSE BLDC GLUCOMTR-MCNC: 115 MG/DL — HIGH (ref 70–99)
GLUCOSE BLDC GLUCOMTR-MCNC: 117 MG/DL — HIGH (ref 70–99)
GLUCOSE BLDC GLUCOMTR-MCNC: 120 MG/DL — HIGH (ref 70–99)
GLUCOSE BLDC GLUCOMTR-MCNC: 124 MG/DL — HIGH (ref 70–99)
GLUCOSE BLDC GLUCOMTR-MCNC: 126 MG/DL — HIGH (ref 70–99)
GLUCOSE BLDC GLUCOMTR-MCNC: 127 MG/DL — HIGH (ref 70–99)
GLUCOSE BLDC GLUCOMTR-MCNC: 130 MG/DL — HIGH (ref 70–99)
GLUCOSE BLDC GLUCOMTR-MCNC: 131 MG/DL — HIGH (ref 70–99)
GLUCOSE BLDC GLUCOMTR-MCNC: 135 MG/DL — HIGH (ref 70–99)
GLUCOSE BLDC GLUCOMTR-MCNC: 135 MG/DL — HIGH (ref 70–99)
GLUCOSE BLDC GLUCOMTR-MCNC: 136 MG/DL — HIGH (ref 70–99)
GLUCOSE BLDC GLUCOMTR-MCNC: 138 MG/DL — HIGH (ref 70–99)
GLUCOSE BLDC GLUCOMTR-MCNC: 138 MG/DL — HIGH (ref 70–99)
GLUCOSE BLDC GLUCOMTR-MCNC: 140 MG/DL — HIGH (ref 70–99)
GLUCOSE BLDC GLUCOMTR-MCNC: 142 MG/DL — HIGH (ref 70–99)
GLUCOSE BLDC GLUCOMTR-MCNC: 142 MG/DL — HIGH (ref 70–99)
GLUCOSE BLDC GLUCOMTR-MCNC: 143 MG/DL — HIGH (ref 70–99)
GLUCOSE BLDC GLUCOMTR-MCNC: 143 MG/DL — HIGH (ref 70–99)
GLUCOSE BLDC GLUCOMTR-MCNC: 144 MG/DL — HIGH (ref 70–99)
GLUCOSE BLDC GLUCOMTR-MCNC: 145 MG/DL — HIGH (ref 70–99)
GLUCOSE BLDC GLUCOMTR-MCNC: 147 MG/DL — HIGH (ref 70–99)
GLUCOSE BLDC GLUCOMTR-MCNC: 148 MG/DL — HIGH (ref 70–99)
GLUCOSE BLDC GLUCOMTR-MCNC: 151 MG/DL — HIGH (ref 70–99)
GLUCOSE BLDC GLUCOMTR-MCNC: 152 MG/DL — HIGH (ref 70–99)
GLUCOSE BLDC GLUCOMTR-MCNC: 153 MG/DL — HIGH (ref 70–99)
GLUCOSE BLDC GLUCOMTR-MCNC: 154 MG/DL — HIGH (ref 70–99)
GLUCOSE BLDC GLUCOMTR-MCNC: 154 MG/DL — HIGH (ref 70–99)
GLUCOSE BLDC GLUCOMTR-MCNC: 156 MG/DL — HIGH (ref 70–99)
GLUCOSE BLDC GLUCOMTR-MCNC: 157 MG/DL — HIGH (ref 70–99)
GLUCOSE BLDC GLUCOMTR-MCNC: 158 MG/DL — HIGH (ref 70–99)
GLUCOSE BLDC GLUCOMTR-MCNC: 161 MG/DL — HIGH (ref 70–99)
GLUCOSE BLDC GLUCOMTR-MCNC: 162 MG/DL — HIGH (ref 70–99)
GLUCOSE BLDC GLUCOMTR-MCNC: 162 MG/DL — HIGH (ref 70–99)
GLUCOSE BLDC GLUCOMTR-MCNC: 163 MG/DL — HIGH (ref 70–99)
GLUCOSE BLDC GLUCOMTR-MCNC: 165 MG/DL — HIGH (ref 70–99)
GLUCOSE BLDC GLUCOMTR-MCNC: 166 MG/DL — HIGH (ref 70–99)
GLUCOSE BLDC GLUCOMTR-MCNC: 166 MG/DL — HIGH (ref 70–99)
GLUCOSE BLDC GLUCOMTR-MCNC: 167 MG/DL — HIGH (ref 70–99)
GLUCOSE BLDC GLUCOMTR-MCNC: 167 MG/DL — HIGH (ref 70–99)
GLUCOSE BLDC GLUCOMTR-MCNC: 169 MG/DL — HIGH (ref 70–99)
GLUCOSE BLDC GLUCOMTR-MCNC: 170 MG/DL — HIGH (ref 70–99)
GLUCOSE BLDC GLUCOMTR-MCNC: 172 MG/DL — HIGH (ref 70–99)
GLUCOSE BLDC GLUCOMTR-MCNC: 173 MG/DL — HIGH (ref 70–99)
GLUCOSE BLDC GLUCOMTR-MCNC: 174 MG/DL — HIGH (ref 70–99)
GLUCOSE BLDC GLUCOMTR-MCNC: 174 MG/DL — HIGH (ref 70–99)
GLUCOSE BLDC GLUCOMTR-MCNC: 177 MG/DL — HIGH (ref 70–99)
GLUCOSE BLDC GLUCOMTR-MCNC: 178 MG/DL — HIGH (ref 70–99)
GLUCOSE BLDC GLUCOMTR-MCNC: 179 MG/DL — HIGH (ref 70–99)
GLUCOSE BLDC GLUCOMTR-MCNC: 181 MG/DL — HIGH (ref 70–99)
GLUCOSE BLDC GLUCOMTR-MCNC: 187 MG/DL — HIGH (ref 70–99)
GLUCOSE BLDC GLUCOMTR-MCNC: 189 MG/DL — HIGH (ref 70–99)
GLUCOSE BLDC GLUCOMTR-MCNC: 190 MG/DL — HIGH (ref 70–99)
GLUCOSE BLDC GLUCOMTR-MCNC: 191 MG/DL — HIGH (ref 70–99)
GLUCOSE BLDC GLUCOMTR-MCNC: 194 MG/DL — HIGH (ref 70–99)
GLUCOSE BLDC GLUCOMTR-MCNC: 195 MG/DL — HIGH (ref 70–99)
GLUCOSE BLDC GLUCOMTR-MCNC: 195 MG/DL — HIGH (ref 70–99)
GLUCOSE BLDC GLUCOMTR-MCNC: 196 MG/DL — HIGH (ref 70–99)
GLUCOSE BLDC GLUCOMTR-MCNC: 198 MG/DL — HIGH (ref 70–99)
GLUCOSE BLDC GLUCOMTR-MCNC: 199 MG/DL — HIGH (ref 70–99)
GLUCOSE BLDC GLUCOMTR-MCNC: 203 MG/DL — HIGH (ref 70–99)
GLUCOSE BLDC GLUCOMTR-MCNC: 204 MG/DL — HIGH (ref 70–99)
GLUCOSE BLDC GLUCOMTR-MCNC: 206 MG/DL — HIGH (ref 70–99)
GLUCOSE BLDC GLUCOMTR-MCNC: 206 MG/DL — HIGH (ref 70–99)
GLUCOSE BLDC GLUCOMTR-MCNC: 207 MG/DL — HIGH (ref 70–99)
GLUCOSE BLDC GLUCOMTR-MCNC: 209 MG/DL — HIGH (ref 70–99)
GLUCOSE BLDC GLUCOMTR-MCNC: 210 MG/DL — HIGH (ref 70–99)
GLUCOSE BLDC GLUCOMTR-MCNC: 210 MG/DL — HIGH (ref 70–99)
GLUCOSE BLDC GLUCOMTR-MCNC: 212 MG/DL — HIGH (ref 70–99)
GLUCOSE BLDC GLUCOMTR-MCNC: 213 MG/DL — HIGH (ref 70–99)
GLUCOSE BLDC GLUCOMTR-MCNC: 214 MG/DL — HIGH (ref 70–99)
GLUCOSE BLDC GLUCOMTR-MCNC: 216 MG/DL — HIGH (ref 70–99)
GLUCOSE BLDC GLUCOMTR-MCNC: 218 MG/DL — HIGH (ref 70–99)
GLUCOSE BLDC GLUCOMTR-MCNC: 218 MG/DL — HIGH (ref 70–99)
GLUCOSE BLDC GLUCOMTR-MCNC: 222 MG/DL — HIGH (ref 70–99)
GLUCOSE BLDC GLUCOMTR-MCNC: 223 MG/DL — HIGH (ref 70–99)
GLUCOSE BLDC GLUCOMTR-MCNC: 224 MG/DL — HIGH (ref 70–99)
GLUCOSE BLDC GLUCOMTR-MCNC: 225 MG/DL — HIGH (ref 70–99)
GLUCOSE BLDC GLUCOMTR-MCNC: 226 MG/DL — HIGH (ref 70–99)
GLUCOSE BLDC GLUCOMTR-MCNC: 227 MG/DL — HIGH (ref 70–99)
GLUCOSE BLDC GLUCOMTR-MCNC: 227 MG/DL — HIGH (ref 70–99)
GLUCOSE BLDC GLUCOMTR-MCNC: 228 MG/DL — HIGH (ref 70–99)
GLUCOSE BLDC GLUCOMTR-MCNC: 230 MG/DL — HIGH (ref 70–99)
GLUCOSE BLDC GLUCOMTR-MCNC: 230 MG/DL — HIGH (ref 70–99)
GLUCOSE BLDC GLUCOMTR-MCNC: 231 MG/DL — HIGH (ref 70–99)
GLUCOSE BLDC GLUCOMTR-MCNC: 234 MG/DL — HIGH (ref 70–99)
GLUCOSE BLDC GLUCOMTR-MCNC: 234 MG/DL — HIGH (ref 70–99)
GLUCOSE BLDC GLUCOMTR-MCNC: 237 MG/DL — HIGH (ref 70–99)
GLUCOSE BLDC GLUCOMTR-MCNC: 238 MG/DL — HIGH (ref 70–99)
GLUCOSE BLDC GLUCOMTR-MCNC: 239 MG/DL — HIGH (ref 70–99)
GLUCOSE BLDC GLUCOMTR-MCNC: 245 MG/DL — HIGH (ref 70–99)
GLUCOSE BLDC GLUCOMTR-MCNC: 247 MG/DL — HIGH (ref 70–99)
GLUCOSE BLDC GLUCOMTR-MCNC: 249 MG/DL — HIGH (ref 70–99)
GLUCOSE BLDC GLUCOMTR-MCNC: 249 MG/DL — HIGH (ref 70–99)
GLUCOSE BLDC GLUCOMTR-MCNC: 250 MG/DL — HIGH (ref 70–99)
GLUCOSE BLDC GLUCOMTR-MCNC: 251 MG/DL — HIGH (ref 70–99)
GLUCOSE BLDC GLUCOMTR-MCNC: 252 MG/DL — HIGH (ref 70–99)
GLUCOSE BLDC GLUCOMTR-MCNC: 254 MG/DL — HIGH (ref 70–99)
GLUCOSE BLDC GLUCOMTR-MCNC: 261 MG/DL — HIGH (ref 70–99)
GLUCOSE BLDC GLUCOMTR-MCNC: 265 MG/DL — HIGH (ref 70–99)
GLUCOSE BLDC GLUCOMTR-MCNC: 268 MG/DL — HIGH (ref 70–99)
GLUCOSE BLDC GLUCOMTR-MCNC: 269 MG/DL — HIGH (ref 70–99)
GLUCOSE BLDC GLUCOMTR-MCNC: 277 MG/DL — HIGH (ref 70–99)
GLUCOSE BLDC GLUCOMTR-MCNC: 277 MG/DL — HIGH (ref 70–99)
GLUCOSE BLDC GLUCOMTR-MCNC: 281 MG/DL — HIGH (ref 70–99)
GLUCOSE BLDC GLUCOMTR-MCNC: 284 MG/DL — HIGH (ref 70–99)
GLUCOSE BLDC GLUCOMTR-MCNC: 287 MG/DL — HIGH (ref 70–99)
GLUCOSE BLDC GLUCOMTR-MCNC: 287 MG/DL — HIGH (ref 70–99)
GLUCOSE BLDC GLUCOMTR-MCNC: 289 MG/DL — HIGH (ref 70–99)
GLUCOSE BLDC GLUCOMTR-MCNC: 290 MG/DL — HIGH (ref 70–99)
GLUCOSE BLDC GLUCOMTR-MCNC: 292 MG/DL — HIGH (ref 70–99)
GLUCOSE BLDC GLUCOMTR-MCNC: 295 MG/DL — HIGH (ref 70–99)
GLUCOSE BLDC GLUCOMTR-MCNC: 307 MG/DL — HIGH (ref 70–99)
GLUCOSE BLDC GLUCOMTR-MCNC: 307 MG/DL — HIGH (ref 70–99)
GLUCOSE BLDC GLUCOMTR-MCNC: 309 MG/DL — HIGH (ref 70–99)
GLUCOSE BLDC GLUCOMTR-MCNC: 331 MG/DL — HIGH (ref 70–99)
GLUCOSE BLDC GLUCOMTR-MCNC: 340 MG/DL — HIGH (ref 70–99)
GLUCOSE BLDC GLUCOMTR-MCNC: 342 MG/DL — HIGH (ref 70–99)
GLUCOSE BLDC GLUCOMTR-MCNC: 415 MG/DL — HIGH (ref 70–99)
GLUCOSE BLDC GLUCOMTR-MCNC: 448 MG/DL — HIGH (ref 70–99)
GLUCOSE BLDC GLUCOMTR-MCNC: 450 MG/DL — CRITICAL HIGH (ref 70–99)
GLUCOSE BLDC GLUCOMTR-MCNC: 497 MG/DL — CRITICAL HIGH (ref 70–99)
GLUCOSE BLDC GLUCOMTR-MCNC: 78 MG/DL — SIGNIFICANT CHANGE UP (ref 70–99)
GLUCOSE BLDC GLUCOMTR-MCNC: 93 MG/DL — SIGNIFICANT CHANGE UP (ref 70–99)
GLUCOSE BLDC GLUCOMTR-MCNC: 93 MG/DL — SIGNIFICANT CHANGE UP (ref 70–99)
GLUCOSE BLDC GLUCOMTR-MCNC: 95 MG/DL — SIGNIFICANT CHANGE UP (ref 70–99)
GLUCOSE BLDC GLUCOMTR-MCNC: 96 MG/DL — SIGNIFICANT CHANGE UP (ref 70–99)
GLUCOSE BLDC GLUCOMTR-MCNC: 97 MG/DL — SIGNIFICANT CHANGE UP (ref 70–99)
GLUCOSE SERPL-MCNC: 109 MG/DL — HIGH (ref 70–99)
GLUCOSE SERPL-MCNC: 111 MG/DL — HIGH (ref 70–99)
GLUCOSE SERPL-MCNC: 113 MG/DL — HIGH (ref 70–99)
GLUCOSE SERPL-MCNC: 144 MG/DL — HIGH (ref 70–99)
GLUCOSE SERPL-MCNC: 146 MG/DL — HIGH (ref 70–99)
GLUCOSE SERPL-MCNC: 148 MG/DL — HIGH (ref 70–99)
GLUCOSE SERPL-MCNC: 155 MG/DL — HIGH (ref 70–99)
GLUCOSE SERPL-MCNC: 156 MG/DL — HIGH (ref 70–99)
GLUCOSE SERPL-MCNC: 160 MG/DL — HIGH (ref 70–99)
GLUCOSE SERPL-MCNC: 163 MG/DL — HIGH (ref 70–99)
GLUCOSE SERPL-MCNC: 164 MG/DL — HIGH (ref 70–99)
GLUCOSE SERPL-MCNC: 165 MG/DL — HIGH (ref 70–99)
GLUCOSE SERPL-MCNC: 169 MG/DL — HIGH (ref 70–99)
GLUCOSE SERPL-MCNC: 174 MG/DL — HIGH (ref 70–99)
GLUCOSE SERPL-MCNC: 175 MG/DL — HIGH (ref 70–99)
GLUCOSE SERPL-MCNC: 175 MG/DL — HIGH (ref 70–99)
GLUCOSE SERPL-MCNC: 180 MG/DL — HIGH (ref 70–99)
GLUCOSE SERPL-MCNC: 180 MG/DL — HIGH (ref 70–99)
GLUCOSE SERPL-MCNC: 185 MG/DL — HIGH (ref 70–99)
GLUCOSE SERPL-MCNC: 187 MG/DL — HIGH (ref 70–99)
GLUCOSE SERPL-MCNC: 189 MG/DL — HIGH (ref 70–99)
GLUCOSE SERPL-MCNC: 189 MG/DL — HIGH (ref 70–99)
GLUCOSE SERPL-MCNC: 195 MG/DL — HIGH (ref 70–99)
GLUCOSE SERPL-MCNC: 196 MG/DL — HIGH (ref 70–99)
GLUCOSE SERPL-MCNC: 203 MG/DL — HIGH (ref 70–99)
GLUCOSE SERPL-MCNC: 206 MG/DL — HIGH (ref 70–99)
GLUCOSE SERPL-MCNC: 208 MG/DL — HIGH (ref 70–99)
GLUCOSE SERPL-MCNC: 221 MG/DL — HIGH (ref 70–99)
GLUCOSE SERPL-MCNC: 224 MG/DL — HIGH (ref 70–99)
GLUCOSE SERPL-MCNC: 230 MG/DL — HIGH (ref 70–99)
GLUCOSE SERPL-MCNC: 232 MG/DL — HIGH (ref 70–99)
GLUCOSE SERPL-MCNC: 244 MG/DL — HIGH (ref 70–99)
GLUCOSE SERPL-MCNC: 317 MG/DL — HIGH (ref 70–99)
GLUCOSE SERPL-MCNC: 71 MG/DL — SIGNIFICANT CHANGE UP (ref 70–99)
GLUCOSE SERPL-MCNC: 74 MG/DL — SIGNIFICANT CHANGE UP (ref 70–99)
GLUCOSE SERPL-MCNC: 84 MG/DL — SIGNIFICANT CHANGE UP (ref 70–99)
GLUCOSE SERPL-MCNC: 93 MG/DL — SIGNIFICANT CHANGE UP (ref 70–99)
GLUCOSE UR QL: >=1000 MG/DL
GRAN CASTS # UR COMP ASSIST: PRESENT
HCO3 BLDA-SCNC: 18 MMOL/L — LOW (ref 21–28)
HCT VFR BLD CALC: 31.1 % — LOW (ref 34.5–45)
HCT VFR BLD CALC: 32.4 % — LOW (ref 34.5–45)
HCT VFR BLD CALC: 33.5 % — LOW (ref 34.5–45)
HCT VFR BLD CALC: 34 % — LOW (ref 34.5–45)
HCT VFR BLD CALC: 34.6 % — SIGNIFICANT CHANGE UP (ref 34.5–45)
HCT VFR BLD CALC: 35.6 % — SIGNIFICANT CHANGE UP (ref 34.5–45)
HCT VFR BLD CALC: 35.9 % — SIGNIFICANT CHANGE UP (ref 34.5–45)
HCT VFR BLD CALC: 36 % — SIGNIFICANT CHANGE UP (ref 34.5–45)
HCT VFR BLD CALC: 36 % — SIGNIFICANT CHANGE UP (ref 34.5–45)
HCT VFR BLD CALC: 36.5 % — SIGNIFICANT CHANGE UP (ref 34.5–45)
HCT VFR BLD CALC: 36.6 % — SIGNIFICANT CHANGE UP (ref 34.5–45)
HCT VFR BLD CALC: 36.7 % — SIGNIFICANT CHANGE UP (ref 34.5–45)
HCT VFR BLD CALC: 37.6 % — SIGNIFICANT CHANGE UP (ref 34.5–45)
HCT VFR BLD CALC: 37.9 % — SIGNIFICANT CHANGE UP (ref 34.5–45)
HCT VFR BLD CALC: 38.1 % — SIGNIFICANT CHANGE UP (ref 34.5–45)
HCT VFR BLD CALC: 38.4 % — SIGNIFICANT CHANGE UP (ref 34.5–45)
HCT VFR BLD CALC: 38.4 % — SIGNIFICANT CHANGE UP (ref 34.5–45)
HCT VFR BLD CALC: 38.7 % — SIGNIFICANT CHANGE UP (ref 34.5–45)
HCT VFR BLD CALC: 39 % — SIGNIFICANT CHANGE UP (ref 34.5–45)
HCT VFR BLD CALC: 39.1 % — SIGNIFICANT CHANGE UP (ref 34.5–45)
HCT VFR BLD CALC: 39.5 % — SIGNIFICANT CHANGE UP (ref 34.5–45)
HCT VFR BLD CALC: 39.8 % — SIGNIFICANT CHANGE UP (ref 34.5–45)
HCT VFR BLD CALC: 39.8 % — SIGNIFICANT CHANGE UP (ref 34.5–45)
HCT VFR BLD CALC: 40.9 % — SIGNIFICANT CHANGE UP (ref 34.5–45)
HCT VFR BLD CALC: 40.9 % — SIGNIFICANT CHANGE UP (ref 34.5–45)
HCT VFR BLD CALC: 41 % — SIGNIFICANT CHANGE UP (ref 34.5–45)
HCT VFR BLD CALC: 41.2 % — SIGNIFICANT CHANGE UP (ref 34.5–45)
HCT VFR BLD CALC: 41.8 % — SIGNIFICANT CHANGE UP (ref 34.5–45)
HCT VFR BLD CALC: 41.9 % — SIGNIFICANT CHANGE UP (ref 34.5–45)
HCT VFR BLD CALC: 42.8 % — SIGNIFICANT CHANGE UP (ref 34.5–45)
HCT VFR BLD CALC: 43 % — SIGNIFICANT CHANGE UP (ref 34.5–45)
HCT VFR BLD CALC: 44.6 % — SIGNIFICANT CHANGE UP (ref 34.5–45)
HCT VFR BLD CALC: 44.6 % — SIGNIFICANT CHANGE UP (ref 34.5–45)
HCT VFR BLD CALC: 47.6 % — HIGH (ref 34.5–45)
HDLC SERPL-MCNC: 46 MG/DL — LOW
HGB BLD-MCNC: 10.3 G/DL — LOW (ref 11.5–15.5)
HGB BLD-MCNC: 10.7 G/DL — LOW (ref 11.5–15.5)
HGB BLD-MCNC: 11 G/DL — LOW (ref 11.5–15.5)
HGB BLD-MCNC: 11.1 G/DL — LOW (ref 11.5–15.5)
HGB BLD-MCNC: 11.1 G/DL — LOW (ref 11.5–15.5)
HGB BLD-MCNC: 11.2 G/DL — LOW (ref 11.5–15.5)
HGB BLD-MCNC: 11.3 G/DL — LOW (ref 11.5–15.5)
HGB BLD-MCNC: 11.3 G/DL — LOW (ref 11.5–15.5)
HGB BLD-MCNC: 11.6 G/DL — SIGNIFICANT CHANGE UP (ref 11.5–15.5)
HGB BLD-MCNC: 11.6 G/DL — SIGNIFICANT CHANGE UP (ref 11.5–15.5)
HGB BLD-MCNC: 11.7 G/DL — SIGNIFICANT CHANGE UP (ref 11.5–15.5)
HGB BLD-MCNC: 11.8 G/DL — SIGNIFICANT CHANGE UP (ref 11.5–15.5)
HGB BLD-MCNC: 11.8 G/DL — SIGNIFICANT CHANGE UP (ref 11.5–15.5)
HGB BLD-MCNC: 11.9 G/DL — SIGNIFICANT CHANGE UP (ref 11.5–15.5)
HGB BLD-MCNC: 12 G/DL — SIGNIFICANT CHANGE UP (ref 11.5–15.5)
HGB BLD-MCNC: 12 G/DL — SIGNIFICANT CHANGE UP (ref 11.5–15.5)
HGB BLD-MCNC: 12.1 G/DL — SIGNIFICANT CHANGE UP (ref 11.5–15.5)
HGB BLD-MCNC: 12.1 G/DL — SIGNIFICANT CHANGE UP (ref 11.5–15.5)
HGB BLD-MCNC: 12.2 G/DL — SIGNIFICANT CHANGE UP (ref 11.5–15.5)
HGB BLD-MCNC: 12.3 G/DL — SIGNIFICANT CHANGE UP (ref 11.5–15.5)
HGB BLD-MCNC: 12.6 G/DL — SIGNIFICANT CHANGE UP (ref 11.5–15.5)
HGB BLD-MCNC: 12.9 G/DL — SIGNIFICANT CHANGE UP (ref 11.5–15.5)
HGB BLD-MCNC: 13.1 G/DL — SIGNIFICANT CHANGE UP (ref 11.5–15.5)
HGB BLD-MCNC: 13.1 G/DL — SIGNIFICANT CHANGE UP (ref 11.5–15.5)
HGB BLD-MCNC: 13.3 G/DL — SIGNIFICANT CHANGE UP (ref 11.5–15.5)
HGB BLD-MCNC: 13.4 G/DL — SIGNIFICANT CHANGE UP (ref 11.5–15.5)
HGB BLD-MCNC: 13.5 G/DL — SIGNIFICANT CHANGE UP (ref 11.5–15.5)
HGB BLD-MCNC: 13.5 G/DL — SIGNIFICANT CHANGE UP (ref 11.5–15.5)
HGB BLD-MCNC: 13.8 G/DL — SIGNIFICANT CHANGE UP (ref 11.5–15.5)
HGB BLD-MCNC: 14.2 G/DL — SIGNIFICANT CHANGE UP (ref 11.5–15.5)
HGB BLD-MCNC: 14.4 G/DL — SIGNIFICANT CHANGE UP (ref 11.5–15.5)
HGB BLD-MCNC: 15.2 G/DL — SIGNIFICANT CHANGE UP (ref 11.5–15.5)
HYALINE CASTS # UR AUTO: PRESENT
IMM GRANULOCYTES NFR BLD AUTO: 1.5 % — HIGH (ref 0–0.9)
IMM GRANULOCYTES NFR BLD AUTO: 2.4 % — HIGH (ref 0–0.9)
IMM GRANULOCYTES NFR BLD AUTO: 3.9 % — HIGH (ref 0–0.9)
IMM GRANULOCYTES NFR BLD AUTO: 4 % — HIGH (ref 0–0.9)
IMM GRANULOCYTES NFR BLD AUTO: 5.2 % — HIGH (ref 0–0.9)
IMM GRANULOCYTES NFR BLD AUTO: 5.7 % — HIGH (ref 0–0.9)
INR BLD: 0.87 RATIO — SIGNIFICANT CHANGE UP (ref 0.85–1.18)
INR BLD: 0.9 RATIO — SIGNIFICANT CHANGE UP (ref 0.85–1.18)
INR BLD: 1.34 RATIO — HIGH (ref 0.85–1.18)
INR BLD: 1.36 RATIO — HIGH (ref 0.85–1.18)
INR BLD: 1.78 RATIO — HIGH (ref 0.85–1.18)
INR BLD: 1.81 RATIO — HIGH (ref 0.85–1.18)
KETONES UR-MCNC: ABNORMAL MG/DL
KETONES UR-MCNC: NEGATIVE MG/DL — SIGNIFICANT CHANGE UP
LACTATE SERPL-SCNC: 1.9 MMOL/L — SIGNIFICANT CHANGE UP (ref 0.7–2)
LACTATE SERPL-SCNC: 2.1 MMOL/L — HIGH (ref 0.7–2)
LACTATE SERPL-SCNC: 2.3 MMOL/L — HIGH (ref 0.7–2)
LACTATE SERPL-SCNC: 2.6 MMOL/L — HIGH (ref 0.7–2)
LACTATE SERPL-SCNC: 3.1 MMOL/L — HIGH (ref 0.7–2)
LACTATE SERPL-SCNC: 3.1 MMOL/L — HIGH (ref 0.7–2)
LACTATE SERPL-SCNC: 3.5 MMOL/L — HIGH (ref 0.7–2)
LACTATE SERPL-SCNC: 4.7 MMOL/L — CRITICAL HIGH (ref 0.7–2)
LEUKOCYTE ESTERASE UR-ACNC: ABNORMAL
LEUKOCYTE ESTERASE UR-ACNC: NEGATIVE — SIGNIFICANT CHANGE UP
LIDOCAIN IGE QN: 30 U/L — SIGNIFICANT CHANGE UP (ref 13–75)
LIDOCAIN IGE QN: 52 U/L — SIGNIFICANT CHANGE UP (ref 13–75)
LIPID PNL WITH DIRECT LDL SERPL: 68 MG/DL — SIGNIFICANT CHANGE UP
LYMPHOCYTES # BLD AUTO: 0.23 K/UL — LOW (ref 1–3.3)
LYMPHOCYTES # BLD AUTO: 0.33 K/UL — LOW (ref 1–3.3)
LYMPHOCYTES # BLD AUTO: 0.38 K/UL — LOW (ref 1–3.3)
LYMPHOCYTES # BLD AUTO: 0.56 K/UL — LOW (ref 1–3.3)
LYMPHOCYTES # BLD AUTO: 0.9 K/UL — LOW (ref 1–3.3)
LYMPHOCYTES # BLD AUTO: 0.93 K/UL — LOW (ref 1–3.3)
LYMPHOCYTES # BLD AUTO: 1 % — LOW (ref 13–44)
LYMPHOCYTES # BLD AUTO: 1.02 K/UL — SIGNIFICANT CHANGE UP (ref 1–3.3)
LYMPHOCYTES # BLD AUTO: 1.11 K/UL — SIGNIFICANT CHANGE UP (ref 1–3.3)
LYMPHOCYTES # BLD AUTO: 1.23 K/UL — SIGNIFICANT CHANGE UP (ref 1–3.3)
LYMPHOCYTES # BLD AUTO: 1.36 K/UL — SIGNIFICANT CHANGE UP (ref 1–3.3)
LYMPHOCYTES # BLD AUTO: 1.6 K/UL — SIGNIFICANT CHANGE UP (ref 1–3.3)
LYMPHOCYTES # BLD AUTO: 10.4 % — LOW (ref 13–44)
LYMPHOCYTES # BLD AUTO: 11.2 % — LOW (ref 13–44)
LYMPHOCYTES # BLD AUTO: 12.3 % — LOW (ref 13–44)
LYMPHOCYTES # BLD AUTO: 13.1 % — SIGNIFICANT CHANGE UP (ref 13–44)
LYMPHOCYTES # BLD AUTO: 3 % — LOW (ref 13–44)
LYMPHOCYTES # BLD AUTO: 3 % — LOW (ref 13–44)
LYMPHOCYTES # BLD AUTO: 3.6 % — LOW (ref 13–44)
LYMPHOCYTES # BLD AUTO: 4 % — LOW (ref 13–44)
LYMPHOCYTES # BLD AUTO: 5.2 % — LOW (ref 13–44)
LYMPHOCYTES # BLD AUTO: 8 % — LOW (ref 13–44)
MACROCYTES BLD QL: SLIGHT — SIGNIFICANT CHANGE UP
MAGNESIUM SERPL-MCNC: 1.9 MG/DL — SIGNIFICANT CHANGE UP (ref 1.6–2.6)
MAGNESIUM SERPL-MCNC: 2 MG/DL — SIGNIFICANT CHANGE UP (ref 1.6–2.6)
MAGNESIUM SERPL-MCNC: 2.1 MG/DL — SIGNIFICANT CHANGE UP (ref 1.6–2.6)
MAGNESIUM SERPL-MCNC: 2.2 MG/DL — SIGNIFICANT CHANGE UP (ref 1.6–2.6)
MAGNESIUM SERPL-MCNC: 2.2 MG/DL — SIGNIFICANT CHANGE UP (ref 1.6–2.6)
MAGNESIUM SERPL-MCNC: 2.3 MG/DL — SIGNIFICANT CHANGE UP (ref 1.6–2.6)
MAGNESIUM SERPL-MCNC: 2.4 MG/DL — SIGNIFICANT CHANGE UP (ref 1.6–2.6)
MAGNESIUM SERPL-MCNC: 2.4 MG/DL — SIGNIFICANT CHANGE UP (ref 1.6–2.6)
MANUAL SMEAR VERIFICATION: SIGNIFICANT CHANGE UP
MCHC RBC-ENTMCNC: 30.2 GM/DL — LOW (ref 32–36)
MCHC RBC-ENTMCNC: 30.5 GM/DL — LOW (ref 32–36)
MCHC RBC-ENTMCNC: 30.8 GM/DL — LOW (ref 32–36)
MCHC RBC-ENTMCNC: 31.2 GM/DL — LOW (ref 32–36)
MCHC RBC-ENTMCNC: 31.2 PG — SIGNIFICANT CHANGE UP (ref 27–34)
MCHC RBC-ENTMCNC: 31.3 GM/DL — LOW (ref 32–36)
MCHC RBC-ENTMCNC: 31.3 GM/DL — LOW (ref 32–36)
MCHC RBC-ENTMCNC: 31.4 GM/DL — LOW (ref 32–36)
MCHC RBC-ENTMCNC: 31.4 GM/DL — LOW (ref 32–36)
MCHC RBC-ENTMCNC: 31.5 GM/DL — LOW (ref 32–36)
MCHC RBC-ENTMCNC: 31.5 PG — SIGNIFICANT CHANGE UP (ref 27–34)
MCHC RBC-ENTMCNC: 31.5 PG — SIGNIFICANT CHANGE UP (ref 27–34)
MCHC RBC-ENTMCNC: 31.6 PG — SIGNIFICANT CHANGE UP (ref 27–34)
MCHC RBC-ENTMCNC: 31.7 GM/DL — LOW (ref 32–36)
MCHC RBC-ENTMCNC: 31.7 GM/DL — LOW (ref 32–36)
MCHC RBC-ENTMCNC: 31.7 PG — SIGNIFICANT CHANGE UP (ref 27–34)
MCHC RBC-ENTMCNC: 31.7 PG — SIGNIFICANT CHANGE UP (ref 27–34)
MCHC RBC-ENTMCNC: 31.8 GM/DL — LOW (ref 32–36)
MCHC RBC-ENTMCNC: 31.8 PG — SIGNIFICANT CHANGE UP (ref 27–34)
MCHC RBC-ENTMCNC: 31.8 PG — SIGNIFICANT CHANGE UP (ref 27–34)
MCHC RBC-ENTMCNC: 31.9 GM/DL — LOW (ref 32–36)
MCHC RBC-ENTMCNC: 31.9 GM/DL — LOW (ref 32–36)
MCHC RBC-ENTMCNC: 31.9 PG — SIGNIFICANT CHANGE UP (ref 27–34)
MCHC RBC-ENTMCNC: 32 GM/DL — SIGNIFICANT CHANGE UP (ref 32–36)
MCHC RBC-ENTMCNC: 32 PG — SIGNIFICANT CHANGE UP (ref 27–34)
MCHC RBC-ENTMCNC: 32.1 PG — SIGNIFICANT CHANGE UP (ref 27–34)
MCHC RBC-ENTMCNC: 32.2 GM/DL — SIGNIFICANT CHANGE UP (ref 32–36)
MCHC RBC-ENTMCNC: 32.2 PG — SIGNIFICANT CHANGE UP (ref 27–34)
MCHC RBC-ENTMCNC: 32.3 GM/DL — SIGNIFICANT CHANGE UP (ref 32–36)
MCHC RBC-ENTMCNC: 32.3 PG — SIGNIFICANT CHANGE UP (ref 27–34)
MCHC RBC-ENTMCNC: 32.4 GM/DL — SIGNIFICANT CHANGE UP (ref 32–36)
MCHC RBC-ENTMCNC: 32.4 PG — SIGNIFICANT CHANGE UP (ref 27–34)
MCHC RBC-ENTMCNC: 32.5 PG — SIGNIFICANT CHANGE UP (ref 27–34)
MCHC RBC-ENTMCNC: 32.6 GM/DL — SIGNIFICANT CHANGE UP (ref 32–36)
MCHC RBC-ENTMCNC: 32.6 PG — SIGNIFICANT CHANGE UP (ref 27–34)
MCHC RBC-ENTMCNC: 32.7 PG — SIGNIFICANT CHANGE UP (ref 27–34)
MCHC RBC-ENTMCNC: 32.8 GM/DL — SIGNIFICANT CHANGE UP (ref 32–36)
MCHC RBC-ENTMCNC: 32.8 GM/DL — SIGNIFICANT CHANGE UP (ref 32–36)
MCHC RBC-ENTMCNC: 32.9 GM/DL — SIGNIFICANT CHANGE UP (ref 32–36)
MCHC RBC-ENTMCNC: 33 GM/DL — SIGNIFICANT CHANGE UP (ref 32–36)
MCHC RBC-ENTMCNC: 33.1 GM/DL — SIGNIFICANT CHANGE UP (ref 32–36)
MCV RBC AUTO: 100 FL — SIGNIFICANT CHANGE UP (ref 80–100)
MCV RBC AUTO: 100.3 FL — HIGH (ref 80–100)
MCV RBC AUTO: 100.3 FL — HIGH (ref 80–100)
MCV RBC AUTO: 100.5 FL — HIGH (ref 80–100)
MCV RBC AUTO: 100.6 FL — HIGH (ref 80–100)
MCV RBC AUTO: 100.7 FL — HIGH (ref 80–100)
MCV RBC AUTO: 100.8 FL — HIGH (ref 80–100)
MCV RBC AUTO: 101 FL — HIGH (ref 80–100)
MCV RBC AUTO: 101.3 FL — HIGH (ref 80–100)
MCV RBC AUTO: 101.5 FL — HIGH (ref 80–100)
MCV RBC AUTO: 101.7 FL — HIGH (ref 80–100)
MCV RBC AUTO: 101.9 FL — HIGH (ref 80–100)
MCV RBC AUTO: 101.9 FL — HIGH (ref 80–100)
MCV RBC AUTO: 102 FL — HIGH (ref 80–100)
MCV RBC AUTO: 102.7 FL — HIGH (ref 80–100)
MCV RBC AUTO: 103.4 FL — HIGH (ref 80–100)
MCV RBC AUTO: 103.5 FL — HIGH (ref 80–100)
MCV RBC AUTO: 104.6 FL — HIGH (ref 80–100)
MCV RBC AUTO: 105 FL — HIGH (ref 80–100)
MCV RBC AUTO: 106.1 FL — HIGH (ref 80–100)
MCV RBC AUTO: 95.1 FL — SIGNIFICANT CHANGE UP (ref 80–100)
MCV RBC AUTO: 95.4 FL — SIGNIFICANT CHANGE UP (ref 80–100)
MCV RBC AUTO: 96 FL — SIGNIFICANT CHANGE UP (ref 80–100)
MCV RBC AUTO: 96.1 FL — SIGNIFICANT CHANGE UP (ref 80–100)
MCV RBC AUTO: 96.5 FL — SIGNIFICANT CHANGE UP (ref 80–100)
MCV RBC AUTO: 97.1 FL — SIGNIFICANT CHANGE UP (ref 80–100)
MCV RBC AUTO: 98.9 FL — SIGNIFICANT CHANGE UP (ref 80–100)
MCV RBC AUTO: 98.9 FL — SIGNIFICANT CHANGE UP (ref 80–100)
MCV RBC AUTO: 99.1 FL — SIGNIFICANT CHANGE UP (ref 80–100)
MCV RBC AUTO: 99.3 FL — SIGNIFICANT CHANGE UP (ref 80–100)
MCV RBC AUTO: 99.3 FL — SIGNIFICANT CHANGE UP (ref 80–100)
MCV RBC AUTO: 99.5 FL — SIGNIFICANT CHANGE UP (ref 80–100)
MCV RBC AUTO: 99.7 FL — SIGNIFICANT CHANGE UP (ref 80–100)
MCV RBC AUTO: 99.8 FL — SIGNIFICANT CHANGE UP (ref 80–100)
METAMYELOCYTES # FLD: 1 % — HIGH (ref 0–0)
METAMYELOCYTES # FLD: 1 % — HIGH (ref 0–0)
MICROCYTES BLD QL: SLIGHT — SIGNIFICANT CHANGE UP
MONOCYTES # BLD AUTO: 0.25 K/UL — SIGNIFICANT CHANGE UP (ref 0–0.9)
MONOCYTES # BLD AUTO: 0.44 K/UL — SIGNIFICANT CHANGE UP (ref 0–0.9)
MONOCYTES # BLD AUTO: 0.62 K/UL — SIGNIFICANT CHANGE UP (ref 0–0.9)
MONOCYTES # BLD AUTO: 0.68 K/UL — SIGNIFICANT CHANGE UP (ref 0–0.9)
MONOCYTES # BLD AUTO: 0.74 K/UL — SIGNIFICANT CHANGE UP (ref 0–0.9)
MONOCYTES # BLD AUTO: 0.77 K/UL — SIGNIFICANT CHANGE UP (ref 0–0.9)
MONOCYTES # BLD AUTO: 0.83 K/UL — SIGNIFICANT CHANGE UP (ref 0–0.9)
MONOCYTES # BLD AUTO: 0.89 K/UL — SIGNIFICANT CHANGE UP (ref 0–0.9)
MONOCYTES # BLD AUTO: 0.93 K/UL — HIGH (ref 0–0.9)
MONOCYTES # BLD AUTO: 0.94 K/UL — HIGH (ref 0–0.9)
MONOCYTES # BLD AUTO: 0.97 K/UL — HIGH (ref 0–0.9)
MONOCYTES NFR BLD AUTO: 2 % — SIGNIFICANT CHANGE UP (ref 2–14)
MONOCYTES NFR BLD AUTO: 3 % — SIGNIFICANT CHANGE UP (ref 2–14)
MONOCYTES NFR BLD AUTO: 3.6 % — SIGNIFICANT CHANGE UP (ref 2–14)
MONOCYTES NFR BLD AUTO: 4 % — SIGNIFICANT CHANGE UP (ref 2–14)
MONOCYTES NFR BLD AUTO: 4 % — SIGNIFICANT CHANGE UP (ref 2–14)
MONOCYTES NFR BLD AUTO: 5.8 % — SIGNIFICANT CHANGE UP (ref 2–14)
MONOCYTES NFR BLD AUTO: 6.7 % — SIGNIFICANT CHANGE UP (ref 2–14)
MONOCYTES NFR BLD AUTO: 7 % — SIGNIFICANT CHANGE UP (ref 2–14)
MONOCYTES NFR BLD AUTO: 7.4 % — SIGNIFICANT CHANGE UP (ref 2–14)
MONOCYTES NFR BLD AUTO: 7.5 % — SIGNIFICANT CHANGE UP (ref 2–14)
MONOCYTES NFR BLD AUTO: 7.8 % — SIGNIFICANT CHANGE UP (ref 2–14)
MYELOCYTES NFR BLD: 1 % — HIGH (ref 0–0)
MYELOCYTES NFR BLD: 2 % — HIGH (ref 0–0)
NEUTROPHILS # BLD AUTO: 10.06 K/UL — HIGH (ref 1.8–7.4)
NEUTROPHILS # BLD AUTO: 10.2 K/UL — HIGH (ref 1.8–7.4)
NEUTROPHILS # BLD AUTO: 11.69 K/UL — HIGH (ref 1.8–7.4)
NEUTROPHILS # BLD AUTO: 20.48 K/UL — HIGH (ref 1.8–7.4)
NEUTROPHILS # BLD AUTO: 21.56 K/UL — HIGH (ref 1.8–7.4)
NEUTROPHILS # BLD AUTO: 22.96 K/UL — HIGH (ref 1.8–7.4)
NEUTROPHILS # BLD AUTO: 7.66 K/UL — HIGH (ref 1.8–7.4)
NEUTROPHILS # BLD AUTO: 8.24 K/UL — HIGH (ref 1.8–7.4)
NEUTROPHILS # BLD AUTO: 8.82 K/UL — HIGH (ref 1.8–7.4)
NEUTROPHILS # BLD AUTO: 9.21 K/UL — HIGH (ref 1.8–7.4)
NEUTROPHILS # BLD AUTO: 9.61 K/UL — HIGH (ref 1.8–7.4)
NEUTROPHILS NFR BLD AUTO: 73.4 % — SIGNIFICANT CHANGE UP (ref 43–77)
NEUTROPHILS NFR BLD AUTO: 74.1 % — SIGNIFICANT CHANGE UP (ref 43–77)
NEUTROPHILS NFR BLD AUTO: 77.5 % — HIGH (ref 43–77)
NEUTROPHILS NFR BLD AUTO: 79 % — HIGH (ref 43–77)
NEUTROPHILS NFR BLD AUTO: 80.2 % — HIGH (ref 43–77)
NEUTROPHILS NFR BLD AUTO: 85.9 % — HIGH (ref 43–77)
NEUTROPHILS NFR BLD AUTO: 88.2 % — HIGH (ref 43–77)
NEUTROPHILS NFR BLD AUTO: 89 % — HIGH (ref 43–77)
NEUTROPHILS NFR BLD AUTO: 90 % — HIGH (ref 43–77)
NEUTROPHILS NFR BLD AUTO: 91 % — HIGH (ref 43–77)
NEUTROPHILS NFR BLD AUTO: 92 % — HIGH (ref 43–77)
NEUTS BAND # BLD: 2 % — SIGNIFICANT CHANGE UP (ref 0–8)
NEUTS BAND # BLD: 3 % — SIGNIFICANT CHANGE UP (ref 0–8)
NITRITE UR-MCNC: NEGATIVE — SIGNIFICANT CHANGE UP
NON HDL CHOLESTEROL: 97 MG/DL — SIGNIFICANT CHANGE UP
NRBC # BLD: 0 /100 WBCS — SIGNIFICANT CHANGE UP (ref 0–0)
NRBC # BLD: 0 /100 WBCS — SIGNIFICANT CHANGE UP (ref 0–0)
NRBC # BLD: 2 /100 WBCS — HIGH (ref 0–0)
NRBC # BLD: 3 /100 WBCS — HIGH (ref 0–0)
NRBC # BLD: SIGNIFICANT CHANGE UP /100 WBCS (ref 0–0)
NT-PROBNP SERPL-SCNC: 1489 PG/ML — HIGH (ref 0–450)
NT-PROBNP SERPL-SCNC: 2948 PG/ML — HIGH (ref 0–450)
NT-PROBNP SERPL-SCNC: 3280 PG/ML — HIGH (ref 0–450)
NT-PROBNP SERPL-SCNC: 3525 PG/ML — HIGH (ref 0–450)
NT-PROBNP SERPL-SCNC: 3673 PG/ML — HIGH (ref 0–450)
NT-PROBNP SERPL-SCNC: 6173 PG/ML — HIGH (ref 0–450)
OVALOCYTES BLD QL SMEAR: SLIGHT — SIGNIFICANT CHANGE UP
OVALOCYTES BLD QL SMEAR: SLIGHT — SIGNIFICANT CHANGE UP
PCO2 BLDA: 37 MMHG — SIGNIFICANT CHANGE UP (ref 32–45)
PH BLDA: 7.29 — LOW (ref 7.35–7.45)
PH UR: 5 — SIGNIFICANT CHANGE UP (ref 5–8)
PH UR: 5.5 — SIGNIFICANT CHANGE UP (ref 5–8)
PHOSPHATE SERPL-MCNC: 2.3 MG/DL — LOW (ref 2.5–4.5)
PHOSPHATE SERPL-MCNC: 2.7 MG/DL — SIGNIFICANT CHANGE UP (ref 2.5–4.5)
PHOSPHATE SERPL-MCNC: 2.8 MG/DL — SIGNIFICANT CHANGE UP (ref 2.5–4.5)
PHOSPHATE SERPL-MCNC: 2.9 MG/DL — SIGNIFICANT CHANGE UP (ref 2.5–4.5)
PHOSPHATE SERPL-MCNC: 3.2 MG/DL — SIGNIFICANT CHANGE UP (ref 2.5–4.5)
PHOSPHATE SERPL-MCNC: 3.2 MG/DL — SIGNIFICANT CHANGE UP (ref 2.5–4.5)
PHOSPHATE SERPL-MCNC: 3.3 MG/DL — SIGNIFICANT CHANGE UP (ref 2.5–4.5)
PHOSPHATE SERPL-MCNC: 3.3 MG/DL — SIGNIFICANT CHANGE UP (ref 2.5–4.5)
PHOSPHATE SERPL-MCNC: 4 MG/DL — SIGNIFICANT CHANGE UP (ref 2.5–4.5)
PLAT MORPH BLD: NORMAL — SIGNIFICANT CHANGE UP
PLATELET # BLD AUTO: 120 K/UL — LOW (ref 150–400)
PLATELET # BLD AUTO: 122 K/UL — LOW (ref 150–400)
PLATELET # BLD AUTO: 123 K/UL — LOW (ref 150–400)
PLATELET # BLD AUTO: 128 K/UL — LOW (ref 150–400)
PLATELET # BLD AUTO: 134 K/UL — LOW (ref 150–400)
PLATELET # BLD AUTO: 144 K/UL — LOW (ref 150–400)
PLATELET # BLD AUTO: 146 K/UL — LOW (ref 150–400)
PLATELET # BLD AUTO: 146 K/UL — LOW (ref 150–400)
PLATELET # BLD AUTO: 155 K/UL — SIGNIFICANT CHANGE UP (ref 150–400)
PLATELET # BLD AUTO: 163 K/UL — SIGNIFICANT CHANGE UP (ref 150–400)
PLATELET # BLD AUTO: 165 K/UL — SIGNIFICANT CHANGE UP (ref 150–400)
PLATELET # BLD AUTO: 165 K/UL — SIGNIFICANT CHANGE UP (ref 150–400)
PLATELET # BLD AUTO: 181 K/UL — SIGNIFICANT CHANGE UP (ref 150–400)
PLATELET # BLD AUTO: 183 K/UL — SIGNIFICANT CHANGE UP (ref 150–400)
PLATELET # BLD AUTO: 185 K/UL — SIGNIFICANT CHANGE UP (ref 150–400)
PLATELET # BLD AUTO: 189 K/UL — SIGNIFICANT CHANGE UP (ref 150–400)
PLATELET # BLD AUTO: 190 K/UL — SIGNIFICANT CHANGE UP (ref 150–400)
PLATELET # BLD AUTO: 191 K/UL — SIGNIFICANT CHANGE UP (ref 150–400)
PLATELET # BLD AUTO: 191 K/UL — SIGNIFICANT CHANGE UP (ref 150–400)
PLATELET # BLD AUTO: 195 K/UL — SIGNIFICANT CHANGE UP (ref 150–400)
PLATELET # BLD AUTO: 199 K/UL — SIGNIFICANT CHANGE UP (ref 150–400)
PLATELET # BLD AUTO: 201 K/UL — SIGNIFICANT CHANGE UP (ref 150–400)
PLATELET # BLD AUTO: 202 K/UL — SIGNIFICANT CHANGE UP (ref 150–400)
PLATELET # BLD AUTO: 203 K/UL — SIGNIFICANT CHANGE UP (ref 150–400)
PLATELET # BLD AUTO: 203 K/UL — SIGNIFICANT CHANGE UP (ref 150–400)
PLATELET # BLD AUTO: 208 K/UL — SIGNIFICANT CHANGE UP (ref 150–400)
PLATELET # BLD AUTO: 211 K/UL — SIGNIFICANT CHANGE UP (ref 150–400)
PLATELET # BLD AUTO: 212 K/UL — SIGNIFICANT CHANGE UP (ref 150–400)
PLATELET # BLD AUTO: 225 K/UL — SIGNIFICANT CHANGE UP (ref 150–400)
PLATELET # BLD AUTO: 226 K/UL — SIGNIFICANT CHANGE UP (ref 150–400)
PLATELET # BLD AUTO: 229 K/UL — SIGNIFICANT CHANGE UP (ref 150–400)
PLATELET # BLD AUTO: 247 K/UL — SIGNIFICANT CHANGE UP (ref 150–400)
PLATELET # BLD AUTO: 280 K/UL — SIGNIFICANT CHANGE UP (ref 150–400)
PLATELET # BLD AUTO: SIGNIFICANT CHANGE UP K/UL (ref 150–400)
PO2 BLDA: 224 MMHG — HIGH (ref 83–108)
POIKILOCYTOSIS BLD QL AUTO: SIGNIFICANT CHANGE UP
POIKILOCYTOSIS BLD QL AUTO: SIGNIFICANT CHANGE UP
POIKILOCYTOSIS BLD QL AUTO: SLIGHT — SIGNIFICANT CHANGE UP
POLYCHROMASIA BLD QL SMEAR: SLIGHT — SIGNIFICANT CHANGE UP
POLYCHROMASIA BLD QL SMEAR: SLIGHT — SIGNIFICANT CHANGE UP
POTASSIUM SERPL-MCNC: 3.1 MMOL/L — LOW (ref 3.5–5.3)
POTASSIUM SERPL-MCNC: 3.2 MMOL/L — LOW (ref 3.5–5.3)
POTASSIUM SERPL-MCNC: 3.3 MMOL/L — LOW (ref 3.5–5.3)
POTASSIUM SERPL-MCNC: 3.4 MMOL/L — LOW (ref 3.5–5.3)
POTASSIUM SERPL-MCNC: 3.5 MMOL/L — SIGNIFICANT CHANGE UP (ref 3.5–5.3)
POTASSIUM SERPL-MCNC: 3.6 MMOL/L — SIGNIFICANT CHANGE UP (ref 3.5–5.3)
POTASSIUM SERPL-MCNC: 3.7 MMOL/L — SIGNIFICANT CHANGE UP (ref 3.5–5.3)
POTASSIUM SERPL-MCNC: 3.8 MMOL/L — SIGNIFICANT CHANGE UP (ref 3.5–5.3)
POTASSIUM SERPL-MCNC: 3.9 MMOL/L — SIGNIFICANT CHANGE UP (ref 3.5–5.3)
POTASSIUM SERPL-MCNC: 4 MMOL/L — SIGNIFICANT CHANGE UP (ref 3.5–5.3)
POTASSIUM SERPL-MCNC: 4 MMOL/L — SIGNIFICANT CHANGE UP (ref 3.5–5.3)
POTASSIUM SERPL-MCNC: 4.3 MMOL/L — SIGNIFICANT CHANGE UP (ref 3.5–5.3)
POTASSIUM SERPL-MCNC: 4.4 MMOL/L — SIGNIFICANT CHANGE UP (ref 3.5–5.3)
POTASSIUM SERPL-MCNC: 4.5 MMOL/L — SIGNIFICANT CHANGE UP (ref 3.5–5.3)
POTASSIUM SERPL-MCNC: 4.5 MMOL/L — SIGNIFICANT CHANGE UP (ref 3.5–5.3)
POTASSIUM SERPL-MCNC: 4.7 MMOL/L — SIGNIFICANT CHANGE UP (ref 3.5–5.3)
POTASSIUM SERPL-MCNC: 4.7 MMOL/L — SIGNIFICANT CHANGE UP (ref 3.5–5.3)
POTASSIUM SERPL-MCNC: 4.8 MMOL/L — SIGNIFICANT CHANGE UP (ref 3.5–5.3)
POTASSIUM SERPL-MCNC: 4.9 MMOL/L — SIGNIFICANT CHANGE UP (ref 3.5–5.3)
POTASSIUM SERPL-MCNC: 5.1 MMOL/L — SIGNIFICANT CHANGE UP (ref 3.5–5.3)
POTASSIUM SERPL-MCNC: 5.3 MMOL/L — SIGNIFICANT CHANGE UP (ref 3.5–5.3)
POTASSIUM SERPL-SCNC: 3.1 MMOL/L — LOW (ref 3.5–5.3)
POTASSIUM SERPL-SCNC: 3.2 MMOL/L — LOW (ref 3.5–5.3)
POTASSIUM SERPL-SCNC: 3.3 MMOL/L — LOW (ref 3.5–5.3)
POTASSIUM SERPL-SCNC: 3.4 MMOL/L — LOW (ref 3.5–5.3)
POTASSIUM SERPL-SCNC: 3.5 MMOL/L — SIGNIFICANT CHANGE UP (ref 3.5–5.3)
POTASSIUM SERPL-SCNC: 3.6 MMOL/L — SIGNIFICANT CHANGE UP (ref 3.5–5.3)
POTASSIUM SERPL-SCNC: 3.7 MMOL/L — SIGNIFICANT CHANGE UP (ref 3.5–5.3)
POTASSIUM SERPL-SCNC: 3.8 MMOL/L — SIGNIFICANT CHANGE UP (ref 3.5–5.3)
POTASSIUM SERPL-SCNC: 3.9 MMOL/L — SIGNIFICANT CHANGE UP (ref 3.5–5.3)
POTASSIUM SERPL-SCNC: 4 MMOL/L — SIGNIFICANT CHANGE UP (ref 3.5–5.3)
POTASSIUM SERPL-SCNC: 4 MMOL/L — SIGNIFICANT CHANGE UP (ref 3.5–5.3)
POTASSIUM SERPL-SCNC: 4.3 MMOL/L — SIGNIFICANT CHANGE UP (ref 3.5–5.3)
POTASSIUM SERPL-SCNC: 4.4 MMOL/L — SIGNIFICANT CHANGE UP (ref 3.5–5.3)
POTASSIUM SERPL-SCNC: 4.5 MMOL/L — SIGNIFICANT CHANGE UP (ref 3.5–5.3)
POTASSIUM SERPL-SCNC: 4.5 MMOL/L — SIGNIFICANT CHANGE UP (ref 3.5–5.3)
POTASSIUM SERPL-SCNC: 4.7 MMOL/L — SIGNIFICANT CHANGE UP (ref 3.5–5.3)
POTASSIUM SERPL-SCNC: 4.7 MMOL/L — SIGNIFICANT CHANGE UP (ref 3.5–5.3)
POTASSIUM SERPL-SCNC: 4.8 MMOL/L — SIGNIFICANT CHANGE UP (ref 3.5–5.3)
POTASSIUM SERPL-SCNC: 4.9 MMOL/L — SIGNIFICANT CHANGE UP (ref 3.5–5.3)
POTASSIUM SERPL-SCNC: 5.1 MMOL/L — SIGNIFICANT CHANGE UP (ref 3.5–5.3)
POTASSIUM SERPL-SCNC: 5.3 MMOL/L — SIGNIFICANT CHANGE UP (ref 3.5–5.3)
PROT SERPL-MCNC: 4.3 GM/DL — LOW (ref 6–8.3)
PROT SERPL-MCNC: 4.9 GM/DL — LOW (ref 6–8.3)
PROT SERPL-MCNC: 5 GM/DL — LOW (ref 6–8.3)
PROT SERPL-MCNC: 5 GM/DL — LOW (ref 6–8.3)
PROT SERPL-MCNC: 5.2 GM/DL — LOW (ref 6–8.3)
PROT SERPL-MCNC: 5.7 GM/DL — LOW (ref 6–8.3)
PROT SERPL-MCNC: 5.9 GM/DL — LOW (ref 6–8.3)
PROT SERPL-MCNC: 6 GM/DL — SIGNIFICANT CHANGE UP (ref 6–8.3)
PROT SERPL-MCNC: 6.1 GM/DL — SIGNIFICANT CHANGE UP (ref 6–8.3)
PROT UR-MCNC: 100 MG/DL
PROT UR-MCNC: 30 MG/DL
PROT UR-MCNC: 30 MG/DL
PROT UR-MCNC: SIGNIFICANT CHANGE UP MG/DL
PROTHROM AB SERPL-ACNC: 10.2 SEC — SIGNIFICANT CHANGE UP (ref 9.5–13)
PROTHROM AB SERPL-ACNC: 15 SEC — HIGH (ref 9.5–13)
PROTHROM AB SERPL-ACNC: 15.2 SEC — HIGH (ref 9.5–13)
PROTHROM AB SERPL-ACNC: 19.8 SEC — HIGH (ref 9.5–13)
PROTHROM AB SERPL-ACNC: 20.1 SEC — HIGH (ref 9.5–13)
PROTHROM AB SERPL-ACNC: 9.9 SEC — SIGNIFICANT CHANGE UP (ref 9.5–13)
RBC # BLD: 3.26 M/UL — LOW (ref 3.8–5.2)
RBC # BLD: 3.37 M/UL — LOW (ref 3.8–5.2)
RBC # BLD: 3.43 M/UL — LOW (ref 3.8–5.2)
RBC # BLD: 3.47 M/UL — LOW (ref 3.8–5.2)
RBC # BLD: 3.5 M/UL — LOW (ref 3.8–5.2)
RBC # BLD: 3.5 M/UL — LOW (ref 3.8–5.2)
RBC # BLD: 3.55 M/UL — LOW (ref 3.8–5.2)
RBC # BLD: 3.58 M/UL — LOW (ref 3.8–5.2)
RBC # BLD: 3.59 M/UL — LOW (ref 3.8–5.2)
RBC # BLD: 3.62 M/UL — LOW (ref 3.8–5.2)
RBC # BLD: 3.67 M/UL — LOW (ref 3.8–5.2)
RBC # BLD: 3.69 M/UL — LOW (ref 3.8–5.2)
RBC # BLD: 3.73 M/UL — LOW (ref 3.8–5.2)
RBC # BLD: 3.74 M/UL — LOW (ref 3.8–5.2)
RBC # BLD: 3.76 M/UL — LOW (ref 3.8–5.2)
RBC # BLD: 3.78 M/UL — LOW (ref 3.8–5.2)
RBC # BLD: 3.82 M/UL — SIGNIFICANT CHANGE UP (ref 3.8–5.2)
RBC # BLD: 3.91 M/UL — SIGNIFICANT CHANGE UP (ref 3.8–5.2)
RBC # BLD: 3.95 M/UL — SIGNIFICANT CHANGE UP (ref 3.8–5.2)
RBC # BLD: 4.01 M/UL — SIGNIFICANT CHANGE UP (ref 3.8–5.2)
RBC # BLD: 4.01 M/UL — SIGNIFICANT CHANGE UP (ref 3.8–5.2)
RBC # BLD: 4.02 M/UL — SIGNIFICANT CHANGE UP (ref 3.8–5.2)
RBC # BLD: 4.04 M/UL — SIGNIFICANT CHANGE UP (ref 3.8–5.2)
RBC # BLD: 4.2 M/UL — SIGNIFICANT CHANGE UP (ref 3.8–5.2)
RBC # BLD: 4.22 M/UL — SIGNIFICANT CHANGE UP (ref 3.8–5.2)
RBC # BLD: 4.22 M/UL — SIGNIFICANT CHANGE UP (ref 3.8–5.2)
RBC # BLD: 4.25 M/UL — SIGNIFICANT CHANGE UP (ref 3.8–5.2)
RBC # BLD: 4.3 M/UL — SIGNIFICANT CHANGE UP (ref 3.8–5.2)
RBC # BLD: 4.49 M/UL — SIGNIFICANT CHANGE UP (ref 3.8–5.2)
RBC # BLD: 4.51 M/UL — SIGNIFICANT CHANGE UP (ref 3.8–5.2)
RBC # BLD: 4.73 M/UL — SIGNIFICANT CHANGE UP (ref 3.8–5.2)
RBC # FLD: 19 % — HIGH (ref 10.3–14.5)
RBC # FLD: 19.2 % — HIGH (ref 10.3–14.5)
RBC # FLD: 19.2 % — HIGH (ref 10.3–14.5)
RBC # FLD: 19.3 % — HIGH (ref 10.3–14.5)
RBC # FLD: 19.4 % — HIGH (ref 10.3–14.5)
RBC # FLD: 20.1 % — HIGH (ref 10.3–14.5)
RBC # FLD: 20.2 % — HIGH (ref 10.3–14.5)
RBC # FLD: 20.2 % — HIGH (ref 10.3–14.5)
RBC # FLD: 20.3 % — HIGH (ref 10.3–14.5)
RBC # FLD: 20.3 % — HIGH (ref 10.3–14.5)
RBC # FLD: 20.4 % — HIGH (ref 10.3–14.5)
RBC # FLD: 20.4 % — HIGH (ref 10.3–14.5)
RBC # FLD: 20.5 % — HIGH (ref 10.3–14.5)
RBC # FLD: 20.5 % — HIGH (ref 10.3–14.5)
RBC # FLD: 20.7 % — HIGH (ref 10.3–14.5)
RBC # FLD: 21.6 % — HIGH (ref 10.3–14.5)
RBC # FLD: 22.2 % — HIGH (ref 10.3–14.5)
RBC # FLD: 22.3 % — HIGH (ref 10.3–14.5)
RBC # FLD: 22.3 % — HIGH (ref 10.3–14.5)
RBC # FLD: 22.4 % — HIGH (ref 10.3–14.5)
RBC # FLD: 22.4 % — HIGH (ref 10.3–14.5)
RBC # FLD: 22.5 % — HIGH (ref 10.3–14.5)
RBC # FLD: 22.6 % — HIGH (ref 10.3–14.5)
RBC # FLD: 23 % — HIGH (ref 10.3–14.5)
RBC BLD AUTO: ABNORMAL
RBC CASTS # UR COMP ASSIST: 1 /HPF — SIGNIFICANT CHANGE UP (ref 0–4)
RBC CASTS # UR COMP ASSIST: 1 /HPF — SIGNIFICANT CHANGE UP (ref 0–4)
RBC CASTS # UR COMP ASSIST: 23 /HPF — HIGH (ref 0–4)
RBC CASTS # UR COMP ASSIST: 6 /HPF — HIGH (ref 0–4)
RSV RNA NPH QL NAA+NON-PROBE: SIGNIFICANT CHANGE UP
RSV RNA NPH QL NAA+NON-PROBE: SIGNIFICANT CHANGE UP
SAO2 % BLDA: 99 % — HIGH (ref 94–98)
SARS-COV-2 RNA SPEC QL NAA+PROBE: SIGNIFICANT CHANGE UP
SARS-COV-2 RNA SPEC QL NAA+PROBE: SIGNIFICANT CHANGE UP
SCHISTOCYTES BLD QL AUTO: SLIGHT — SIGNIFICANT CHANGE UP
SODIUM SERPL-SCNC: 132 MMOL/L — LOW (ref 135–145)
SODIUM SERPL-SCNC: 134 MMOL/L — LOW (ref 135–145)
SODIUM SERPL-SCNC: 135 MMOL/L — SIGNIFICANT CHANGE UP (ref 135–145)
SODIUM SERPL-SCNC: 136 MMOL/L — SIGNIFICANT CHANGE UP (ref 135–145)
SODIUM SERPL-SCNC: 137 MMOL/L — SIGNIFICANT CHANGE UP (ref 135–145)
SODIUM SERPL-SCNC: 137 MMOL/L — SIGNIFICANT CHANGE UP (ref 135–145)
SODIUM SERPL-SCNC: 138 MMOL/L — SIGNIFICANT CHANGE UP (ref 135–145)
SODIUM SERPL-SCNC: 139 MMOL/L — SIGNIFICANT CHANGE UP (ref 135–145)
SODIUM SERPL-SCNC: 140 MMOL/L — SIGNIFICANT CHANGE UP (ref 135–145)
SODIUM SERPL-SCNC: 141 MMOL/L — SIGNIFICANT CHANGE UP (ref 135–145)
SODIUM SERPL-SCNC: 142 MMOL/L — SIGNIFICANT CHANGE UP (ref 135–145)
SP GR SPEC: 1.01 — SIGNIFICANT CHANGE UP (ref 1–1.03)
SP GR SPEC: 1.02 — SIGNIFICANT CHANGE UP (ref 1–1.03)
SP GR SPEC: 1.02 — SIGNIFICANT CHANGE UP (ref 1–1.03)
SP GR SPEC: 1.03 — SIGNIFICANT CHANGE UP (ref 1–1.03)
SPECIMEN SOURCE: SIGNIFICANT CHANGE UP
SQUAMOUS # UR AUTO: 1 /HPF — SIGNIFICANT CHANGE UP (ref 0–5)
SQUAMOUS # UR AUTO: 5 /HPF — SIGNIFICANT CHANGE UP (ref 0–5)
STOMATOCYTES BLD QL SMEAR: SLIGHT — SIGNIFICANT CHANGE UP
SURGICAL PATHOLOGY STUDY: SIGNIFICANT CHANGE UP
T3 SERPL-MCNC: 48 NG/DL — LOW (ref 80–200)
T4 FREE SERPL-MCNC: 1.53 NG/DL — HIGH (ref 0.76–1.46)
TOXIC GRANULES BLD QL SMEAR: PRESENT — SIGNIFICANT CHANGE UP
TRIGL SERPL-MCNC: 175 MG/DL — HIGH
TROPONIN I, HIGH SENSITIVITY RESULT: 16.5 NG/L — SIGNIFICANT CHANGE UP
TROPONIN I, HIGH SENSITIVITY RESULT: 20.47 NG/L — SIGNIFICANT CHANGE UP
TROPONIN I, HIGH SENSITIVITY RESULT: 23.79 NG/L — SIGNIFICANT CHANGE UP
TROPONIN I, HIGH SENSITIVITY RESULT: 51.55 NG/L — SIGNIFICANT CHANGE UP
TROPONIN I, HIGH SENSITIVITY RESULT: 91.68 NG/L — HIGH
TROPONIN I, HIGH SENSITIVITY RESULT: 93.78 NG/L — HIGH
TSH SERPL-MCNC: 0.9 UU/ML — SIGNIFICANT CHANGE UP (ref 0.34–4.82)
TSH SERPL-MCNC: 1.24 UU/ML — SIGNIFICANT CHANGE UP (ref 0.34–4.82)
TSH SERPL-MCNC: 1.32 UU/ML — SIGNIFICANT CHANGE UP (ref 0.34–4.82)
UROBILINOGEN FLD QL: 0.2 MG/DL — SIGNIFICANT CHANGE UP (ref 0.2–1)
VARIANT LYMPHS # BLD: 1 % — SIGNIFICANT CHANGE UP (ref 0–6)
VARIANT LYMPHS # BLD: 2 % — SIGNIFICANT CHANGE UP (ref 0–6)
WBC # BLD: 10.37 K/UL — SIGNIFICANT CHANGE UP (ref 3.8–10.5)
WBC # BLD: 10.42 K/UL — SIGNIFICANT CHANGE UP (ref 3.8–10.5)
WBC # BLD: 10.48 K/UL — SIGNIFICANT CHANGE UP (ref 3.8–10.5)
WBC # BLD: 10.63 K/UL — HIGH (ref 3.8–10.5)
WBC # BLD: 10.72 K/UL — HIGH (ref 3.8–10.5)
WBC # BLD: 10.98 K/UL — HIGH (ref 3.8–10.5)
WBC # BLD: 10.99 K/UL — HIGH (ref 3.8–10.5)
WBC # BLD: 11.01 K/UL — HIGH (ref 3.8–10.5)
WBC # BLD: 11.06 K/UL — HIGH (ref 3.8–10.5)
WBC # BLD: 11.17 K/UL — HIGH (ref 3.8–10.5)
WBC # BLD: 11.21 K/UL — HIGH (ref 3.8–10.5)
WBC # BLD: 11.51 K/UL — HIGH (ref 3.8–10.5)
WBC # BLD: 11.72 K/UL — HIGH (ref 3.8–10.5)
WBC # BLD: 12.29 K/UL — HIGH (ref 3.8–10.5)
WBC # BLD: 12.33 K/UL — HIGH (ref 3.8–10.5)
WBC # BLD: 12.4 K/UL — HIGH (ref 3.8–10.5)
WBC # BLD: 12.42 K/UL — HIGH (ref 3.8–10.5)
WBC # BLD: 12.43 K/UL — HIGH (ref 3.8–10.5)
WBC # BLD: 12.47 K/UL — HIGH (ref 3.8–10.5)
WBC # BLD: 12.57 K/UL — HIGH (ref 3.8–10.5)
WBC # BLD: 12.75 K/UL — HIGH (ref 3.8–10.5)
WBC # BLD: 12.97 K/UL — HIGH (ref 3.8–10.5)
WBC # BLD: 13.06 K/UL — HIGH (ref 3.8–10.5)
WBC # BLD: 13.55 K/UL — HIGH (ref 3.8–10.5)
WBC # BLD: 16.85 K/UL — HIGH (ref 3.8–10.5)
WBC # BLD: 22.5 K/UL — HIGH (ref 3.8–10.5)
WBC # BLD: 23.44 K/UL — HIGH (ref 3.8–10.5)
WBC # BLD: 26.04 K/UL — HIGH (ref 3.8–10.5)
WBC # BLD: 26.7 K/UL — HIGH (ref 3.8–10.5)
WBC # BLD: 27.16 K/UL — HIGH (ref 3.8–10.5)
WBC # BLD: 8.02 K/UL — SIGNIFICANT CHANGE UP (ref 3.8–10.5)
WBC # BLD: 8.79 K/UL — SIGNIFICANT CHANGE UP (ref 3.8–10.5)
WBC # BLD: 9.33 K/UL — SIGNIFICANT CHANGE UP (ref 3.8–10.5)
WBC # BLD: 9.67 K/UL — SIGNIFICANT CHANGE UP (ref 3.8–10.5)
WBC # FLD AUTO: 10.37 K/UL — SIGNIFICANT CHANGE UP (ref 3.8–10.5)
WBC # FLD AUTO: 10.42 K/UL — SIGNIFICANT CHANGE UP (ref 3.8–10.5)
WBC # FLD AUTO: 10.48 K/UL — SIGNIFICANT CHANGE UP (ref 3.8–10.5)
WBC # FLD AUTO: 10.63 K/UL — HIGH (ref 3.8–10.5)
WBC # FLD AUTO: 10.72 K/UL — HIGH (ref 3.8–10.5)
WBC # FLD AUTO: 10.98 K/UL — HIGH (ref 3.8–10.5)
WBC # FLD AUTO: 10.99 K/UL — HIGH (ref 3.8–10.5)
WBC # FLD AUTO: 11.01 K/UL — HIGH (ref 3.8–10.5)
WBC # FLD AUTO: 11.06 K/UL — HIGH (ref 3.8–10.5)
WBC # FLD AUTO: 11.17 K/UL — HIGH (ref 3.8–10.5)
WBC # FLD AUTO: 11.21 K/UL — HIGH (ref 3.8–10.5)
WBC # FLD AUTO: 11.51 K/UL — HIGH (ref 3.8–10.5)
WBC # FLD AUTO: 11.72 K/UL — HIGH (ref 3.8–10.5)
WBC # FLD AUTO: 12.29 K/UL — HIGH (ref 3.8–10.5)
WBC # FLD AUTO: 12.33 K/UL — HIGH (ref 3.8–10.5)
WBC # FLD AUTO: 12.4 K/UL — HIGH (ref 3.8–10.5)
WBC # FLD AUTO: 12.42 K/UL — HIGH (ref 3.8–10.5)
WBC # FLD AUTO: 12.43 K/UL — HIGH (ref 3.8–10.5)
WBC # FLD AUTO: 12.47 K/UL — HIGH (ref 3.8–10.5)
WBC # FLD AUTO: 12.57 K/UL — HIGH (ref 3.8–10.5)
WBC # FLD AUTO: 12.75 K/UL — HIGH (ref 3.8–10.5)
WBC # FLD AUTO: 12.97 K/UL — HIGH (ref 3.8–10.5)
WBC # FLD AUTO: 13.06 K/UL — HIGH (ref 3.8–10.5)
WBC # FLD AUTO: 13.55 K/UL — HIGH (ref 3.8–10.5)
WBC # FLD AUTO: 16.85 K/UL — HIGH (ref 3.8–10.5)
WBC # FLD AUTO: 22.5 K/UL — HIGH (ref 3.8–10.5)
WBC # FLD AUTO: 23.44 K/UL — HIGH (ref 3.8–10.5)
WBC # FLD AUTO: 26.04 K/UL — HIGH (ref 3.8–10.5)
WBC # FLD AUTO: 26.7 K/UL — HIGH (ref 3.8–10.5)
WBC # FLD AUTO: 27.16 K/UL — HIGH (ref 3.8–10.5)
WBC # FLD AUTO: 8.02 K/UL — SIGNIFICANT CHANGE UP (ref 3.8–10.5)
WBC # FLD AUTO: 8.79 K/UL — SIGNIFICANT CHANGE UP (ref 3.8–10.5)
WBC # FLD AUTO: 9.33 K/UL — SIGNIFICANT CHANGE UP (ref 3.8–10.5)
WBC # FLD AUTO: 9.67 K/UL — SIGNIFICANT CHANGE UP (ref 3.8–10.5)
WBC UR QL: 1 /HPF — SIGNIFICANT CHANGE UP (ref 0–5)
WBC UR QL: 1 /HPF — SIGNIFICANT CHANGE UP (ref 0–5)
WBC UR QL: 4 /HPF — SIGNIFICANT CHANGE UP (ref 0–5)
WBC UR QL: 4 /HPF — SIGNIFICANT CHANGE UP (ref 0–5)

## 2024-01-01 PROCEDURE — 99223 1ST HOSP IP/OBS HIGH 75: CPT

## 2024-01-01 PROCEDURE — C9113: CPT

## 2024-01-01 PROCEDURE — 99233 SBSQ HOSP IP/OBS HIGH 50: CPT

## 2024-01-01 PROCEDURE — 99223 1ST HOSP IP/OBS HIGH 75: CPT | Mod: 25

## 2024-01-01 PROCEDURE — 93010 ELECTROCARDIOGRAM REPORT: CPT

## 2024-01-01 PROCEDURE — 74177 CT ABD & PELVIS W/CONTRAST: CPT | Mod: MC

## 2024-01-01 PROCEDURE — 86850 RBC ANTIBODY SCREEN: CPT

## 2024-01-01 PROCEDURE — 93306 TTE W/DOPPLER COMPLETE: CPT

## 2024-01-01 PROCEDURE — 85025 COMPLETE CBC W/AUTO DIFF WBC: CPT

## 2024-01-01 PROCEDURE — 99291 CRITICAL CARE FIRST HOUR: CPT

## 2024-01-01 PROCEDURE — 83036 HEMOGLOBIN GLYCOSYLATED A1C: CPT

## 2024-01-01 PROCEDURE — 71045 X-RAY EXAM CHEST 1 VIEW: CPT | Mod: 26

## 2024-01-01 PROCEDURE — 99232 SBSQ HOSP IP/OBS MODERATE 35: CPT

## 2024-01-01 PROCEDURE — 85027 COMPLETE CBC AUTOMATED: CPT

## 2024-01-01 PROCEDURE — 93005 ELECTROCARDIOGRAM TRACING: CPT

## 2024-01-01 PROCEDURE — 83735 ASSAY OF MAGNESIUM: CPT

## 2024-01-01 PROCEDURE — 99285 EMERGENCY DEPT VISIT HI MDM: CPT | Mod: FS

## 2024-01-01 PROCEDURE — 73590 X-RAY EXAM OF LOWER LEG: CPT | Mod: 26,LT

## 2024-01-01 PROCEDURE — 71250 CT THORAX DX C-: CPT | Mod: MC

## 2024-01-01 PROCEDURE — 99204 OFFICE O/P NEW MOD 45 MIN: CPT

## 2024-01-01 PROCEDURE — 80162 ASSAY OF DIGOXIN TOTAL: CPT

## 2024-01-01 PROCEDURE — 84484 ASSAY OF TROPONIN QUANT: CPT

## 2024-01-01 PROCEDURE — 82803 BLOOD GASES ANY COMBINATION: CPT

## 2024-01-01 PROCEDURE — 70450 CT HEAD/BRAIN W/O DYE: CPT | Mod: 26

## 2024-01-01 PROCEDURE — 97530 THERAPEUTIC ACTIVITIES: CPT | Mod: GP

## 2024-01-01 PROCEDURE — 88304 TISSUE EXAM BY PATHOLOGIST: CPT | Mod: 26

## 2024-01-01 PROCEDURE — 93970 EXTREMITY STUDY: CPT | Mod: 26

## 2024-01-01 PROCEDURE — 84443 ASSAY THYROID STIM HORMONE: CPT

## 2024-01-01 PROCEDURE — 80048 BASIC METABOLIC PNL TOTAL CA: CPT

## 2024-01-01 PROCEDURE — 99239 HOSP IP/OBS DSCHRG MGMT >30: CPT

## 2024-01-01 PROCEDURE — 84439 ASSAY OF FREE THYROXINE: CPT

## 2024-01-01 PROCEDURE — 93306 TTE W/DOPPLER COMPLETE: CPT | Mod: 26

## 2024-01-01 PROCEDURE — 73521 X-RAY EXAM HIPS BI 2 VIEWS: CPT | Mod: 26

## 2024-01-01 PROCEDURE — 73552 X-RAY EXAM OF FEMUR 2/>: CPT | Mod: 26,50

## 2024-01-01 PROCEDURE — 71045 X-RAY EXAM CHEST 1 VIEW: CPT | Mod: 26,76

## 2024-01-01 PROCEDURE — 86140 C-REACTIVE PROTEIN: CPT

## 2024-01-01 PROCEDURE — 84100 ASSAY OF PHOSPHORUS: CPT

## 2024-01-01 PROCEDURE — 99285 EMERGENCY DEPT VISIT HI MDM: CPT

## 2024-01-01 PROCEDURE — 82962 GLUCOSE BLOOD TEST: CPT

## 2024-01-01 PROCEDURE — 85730 THROMBOPLASTIN TIME PARTIAL: CPT

## 2024-01-01 PROCEDURE — 74177 CT ABD & PELVIS W/CONTRAST: CPT | Mod: 26,MC

## 2024-01-01 PROCEDURE — 99497 ADVNCD CARE PLAN 30 MIN: CPT | Mod: 25

## 2024-01-01 PROCEDURE — 93970 EXTREMITY STUDY: CPT

## 2024-01-01 PROCEDURE — 86900 BLOOD TYPING SEROLOGIC ABO: CPT

## 2024-01-01 PROCEDURE — 74176 CT ABD & PELVIS W/O CONTRAST: CPT | Mod: MC

## 2024-01-01 PROCEDURE — 74177 CT ABD & PELVIS W/CONTRAST: CPT | Mod: 26

## 2024-01-01 PROCEDURE — 81001 URINALYSIS AUTO W/SCOPE: CPT

## 2024-01-01 PROCEDURE — 71275 CT ANGIOGRAPHY CHEST: CPT | Mod: MC

## 2024-01-01 PROCEDURE — 85652 RBC SED RATE AUTOMATED: CPT

## 2024-01-01 PROCEDURE — 87086 URINE CULTURE/COLONY COUNT: CPT

## 2024-01-01 PROCEDURE — 70450 CT HEAD/BRAIN W/O DYE: CPT | Mod: MC

## 2024-01-01 PROCEDURE — 36415 COLL VENOUS BLD VENIPUNCTURE: CPT

## 2024-01-01 PROCEDURE — P9047: CPT

## 2024-01-01 PROCEDURE — 80061 LIPID PANEL: CPT

## 2024-01-01 PROCEDURE — 97608 NEG PRS WND THER NDME>50SQCM: CPT

## 2024-01-01 PROCEDURE — 80076 HEPATIC FUNCTION PANEL: CPT

## 2024-01-01 PROCEDURE — 97162 PT EVAL MOD COMPLEX 30 MIN: CPT | Mod: GP

## 2024-01-01 PROCEDURE — 99222 1ST HOSP IP/OBS MODERATE 55: CPT

## 2024-01-01 PROCEDURE — 82140 ASSAY OF AMMONIA: CPT

## 2024-01-01 PROCEDURE — 71275 CT ANGIOGRAPHY CHEST: CPT | Mod: 26

## 2024-01-01 PROCEDURE — 80053 COMPREHEN METABOLIC PANEL: CPT

## 2024-01-01 PROCEDURE — 87040 BLOOD CULTURE FOR BACTERIA: CPT

## 2024-01-01 PROCEDURE — 97116 GAIT TRAINING THERAPY: CPT | Mod: GP

## 2024-01-01 PROCEDURE — 88304 TISSUE EXAM BY PATHOLOGIST: CPT

## 2024-01-01 PROCEDURE — 85610 PROTHROMBIN TIME: CPT

## 2024-01-01 PROCEDURE — 92960 CARDIOVERSION ELECTRIC EXT: CPT

## 2024-01-01 PROCEDURE — 97163 PT EVAL HIGH COMPLEX 45 MIN: CPT | Mod: GP

## 2024-01-01 PROCEDURE — 83880 ASSAY OF NATRIURETIC PEPTIDE: CPT

## 2024-01-01 PROCEDURE — 99221 1ST HOSP IP/OBS SF/LOW 40: CPT

## 2024-01-01 PROCEDURE — 71250 CT THORAX DX C-: CPT | Mod: 26

## 2024-01-01 PROCEDURE — 83605 ASSAY OF LACTIC ACID: CPT

## 2024-01-01 PROCEDURE — 94002 VENT MGMT INPAT INIT DAY: CPT

## 2024-01-01 PROCEDURE — 36600 WITHDRAWAL OF ARTERIAL BLOOD: CPT

## 2024-01-01 PROCEDURE — 99214 OFFICE O/P EST MOD 30 MIN: CPT

## 2024-01-01 PROCEDURE — 82040 ASSAY OF SERUM ALBUMIN: CPT

## 2024-01-01 PROCEDURE — 76705 ECHO EXAM OF ABDOMEN: CPT | Mod: 26

## 2024-01-01 PROCEDURE — 84480 ASSAY TRIIODOTHYRONINE (T3): CPT

## 2024-01-01 PROCEDURE — 76705 ECHO EXAM OF ABDOMEN: CPT

## 2024-01-01 PROCEDURE — 99292 CRITICAL CARE ADDL 30 MIN: CPT

## 2024-01-01 PROCEDURE — 99495 TRANSJ CARE MGMT MOD F2F 14D: CPT

## 2024-01-01 PROCEDURE — 94760 N-INVAS EAR/PLS OXIMETRY 1: CPT

## 2024-01-01 PROCEDURE — 71045 X-RAY EXAM CHEST 1 VIEW: CPT

## 2024-01-01 PROCEDURE — 86901 BLOOD TYPING SEROLOGIC RH(D): CPT

## 2024-01-01 PROCEDURE — 74176 CT ABD & PELVIS W/O CONTRAST: CPT | Mod: 26

## 2024-01-01 RX ORDER — RIVAROXABAN 15 MG-20MG
15 KIT ORAL
Refills: 0 | Status: DISCONTINUED | OUTPATIENT
Start: 2024-01-01 | End: 2024-01-01

## 2024-01-01 RX ORDER — PANTOPRAZOLE SODIUM 20 MG/1
40 TABLET, DELAYED RELEASE ORAL
Refills: 0 | Status: DISCONTINUED | OUTPATIENT
Start: 2024-01-01 | End: 2024-01-01

## 2024-01-01 RX ORDER — INSULIN LISPRO 100/ML
3 VIAL (ML) SUBCUTANEOUS
Refills: 0 | Status: DISCONTINUED | OUTPATIENT
Start: 2024-01-01 | End: 2024-01-01

## 2024-01-01 RX ORDER — ATORVASTATIN CALCIUM 80 MG/1
40 TABLET, FILM COATED ORAL AT BEDTIME
Refills: 0 | Status: DISCONTINUED | OUTPATIENT
Start: 2024-01-01 | End: 2024-01-01

## 2024-01-01 RX ORDER — TRAMADOL HYDROCHLORIDE 50 MG/1
0.5 TABLET ORAL
Qty: 11 | Refills: 0
Start: 2024-01-01 | End: 2024-01-01

## 2024-01-01 RX ORDER — TIMOLOL MALEATE 5 MG/ML
0.5 SOLUTION OPHTHALMIC
Refills: 0 | Status: ACTIVE | COMMUNITY
Start: 2024-01-01

## 2024-01-01 RX ORDER — DEXTROSE 50 % IN WATER 50 %
25 SYRINGE (ML) INTRAVENOUS ONCE
Refills: 0 | Status: DISCONTINUED | OUTPATIENT
Start: 2024-01-01 | End: 2024-01-01

## 2024-01-01 RX ORDER — ACETAMINOPHEN 500 MG
1000 TABLET ORAL ONCE
Refills: 0 | Status: COMPLETED | OUTPATIENT
Start: 2024-01-01 | End: 2024-01-01

## 2024-01-01 RX ORDER — METOPROLOL TARTRATE 50 MG
2.5 TABLET ORAL ONCE
Refills: 0 | Status: COMPLETED | OUTPATIENT
Start: 2024-01-01 | End: 2024-01-01

## 2024-01-01 RX ORDER — SODIUM CHLORIDE 9 MG/ML
500 INJECTION INTRAMUSCULAR; INTRAVENOUS; SUBCUTANEOUS ONCE
Refills: 0 | Status: COMPLETED | OUTPATIENT
Start: 2024-01-01 | End: 2024-01-01

## 2024-01-01 RX ORDER — MULTIVIT-MIN/FERROUS GLUCONATE 9 MG/15 ML
1 LIQUID (ML) ORAL
Qty: 0 | Refills: 0 | DISCHARGE
Start: 2024-01-01

## 2024-01-01 RX ORDER — FOLIC ACID 0.8 MG
1 TABLET ORAL DAILY
Refills: 0 | Status: DISCONTINUED | OUTPATIENT
Start: 2024-01-01 | End: 2024-01-01

## 2024-01-01 RX ORDER — DEXTROSE 50 % IN WATER 50 %
12.5 SYRINGE (ML) INTRAVENOUS ONCE
Refills: 0 | Status: DISCONTINUED | OUTPATIENT
Start: 2024-01-01 | End: 2024-01-01

## 2024-01-01 RX ORDER — SODIUM CHLORIDE 9 MG/ML
1000 INJECTION, SOLUTION INTRAVENOUS
Refills: 0 | Status: DISCONTINUED | OUTPATIENT
Start: 2024-01-01 | End: 2024-01-01

## 2024-01-01 RX ORDER — ALBUMIN HUMAN 25 %
50 VIAL (ML) INTRAVENOUS ONCE
Refills: 0 | Status: COMPLETED | OUTPATIENT
Start: 2024-01-01 | End: 2024-01-01

## 2024-01-01 RX ORDER — FUROSEMIDE 40 MG
40 TABLET ORAL DAILY
Refills: 0 | Status: DISCONTINUED | OUTPATIENT
Start: 2024-01-01 | End: 2024-01-01

## 2024-01-01 RX ORDER — SERTRALINE 25 MG/1
25 TABLET, FILM COATED ORAL DAILY
Refills: 0 | Status: DISCONTINUED | OUTPATIENT
Start: 2024-01-01 | End: 2024-01-01

## 2024-01-01 RX ORDER — MIDODRINE HYDROCHLORIDE 2.5 MG/1
5 TABLET ORAL ONCE
Refills: 0 | Status: COMPLETED | OUTPATIENT
Start: 2024-01-01 | End: 2024-01-01

## 2024-01-01 RX ORDER — DEXTROSE 50 % IN WATER 50 %
15 SYRINGE (ML) INTRAVENOUS ONCE
Refills: 0 | Status: DISCONTINUED | OUTPATIENT
Start: 2024-01-01 | End: 2024-01-01

## 2024-01-01 RX ORDER — HEPARIN SODIUM 5000 [USP'U]/ML
4100 INJECTION INTRAVENOUS; SUBCUTANEOUS EVERY 6 HOURS
Refills: 0 | Status: DISCONTINUED | OUTPATIENT
Start: 2024-01-01 | End: 2024-01-01

## 2024-01-01 RX ORDER — AMIODARONE HYDROCHLORIDE 400 MG/1
400 TABLET ORAL DAILY
Refills: 0 | Status: DISCONTINUED | OUTPATIENT
Start: 2024-01-01 | End: 2024-01-01

## 2024-01-01 RX ORDER — FUROSEMIDE 40 MG
20 TABLET ORAL DAILY
Refills: 0 | Status: COMPLETED | OUTPATIENT
Start: 2024-01-01 | End: 2024-01-01

## 2024-01-01 RX ORDER — GLUCAGON INJECTION, SOLUTION 0.5 MG/.1ML
1 INJECTION, SOLUTION SUBCUTANEOUS ONCE
Refills: 0 | Status: DISCONTINUED | OUTPATIENT
Start: 2024-01-01 | End: 2024-01-01

## 2024-01-01 RX ORDER — POTASSIUM CHLORIDE 20 MEQ
1 PACKET (EA) ORAL
Refills: 0 | DISCHARGE

## 2024-01-01 RX ORDER — ENOXAPARIN SODIUM 100 MG/ML
70 INJECTION SUBCUTANEOUS EVERY 12 HOURS
Refills: 0 | Status: DISCONTINUED | OUTPATIENT
Start: 2024-01-01 | End: 2024-01-01

## 2024-01-01 RX ORDER — AMIODARONE HYDROCHLORIDE 400 MG/1
200 TABLET ORAL DAILY
Refills: 0 | Status: DISCONTINUED | OUTPATIENT
Start: 2024-01-01 | End: 2024-01-01

## 2024-01-01 RX ORDER — DILTIAZEM HCL 120 MG
30 CAPSULE, EXT RELEASE 24 HR ORAL EVERY 6 HOURS
Refills: 0 | Status: DISCONTINUED | OUTPATIENT
Start: 2024-01-01 | End: 2024-01-01

## 2024-01-01 RX ORDER — FOLIC ACID 1 MG/1
1 TABLET ORAL DAILY
Qty: 30 | Refills: 1 | Status: ACTIVE | COMMUNITY
Start: 2024-01-01

## 2024-01-01 RX ORDER — SODIUM CHLORIDE 9 MG/ML
1500 INJECTION INTRAMUSCULAR; INTRAVENOUS; SUBCUTANEOUS ONCE
Refills: 0 | Status: COMPLETED | OUTPATIENT
Start: 2024-01-01 | End: 2024-01-01

## 2024-01-01 RX ORDER — TRAMADOL HYDROCHLORIDE 50 MG/1
25 TABLET ORAL EVERY 6 HOURS
Refills: 0 | Status: DISCONTINUED | OUTPATIENT
Start: 2024-01-01 | End: 2024-01-01

## 2024-01-01 RX ORDER — HEPARIN SODIUM 5000 [USP'U]/ML
INJECTION INTRAVENOUS; SUBCUTANEOUS
Qty: 25000 | Refills: 0 | Status: DISCONTINUED | OUTPATIENT
Start: 2024-01-01 | End: 2024-01-01

## 2024-01-01 RX ORDER — METFORMIN HYDROCHLORIDE 850 MG/1
1 TABLET ORAL
Qty: 30 | Refills: 0 | DISCHARGE
Start: 2024-01-01 | End: 2024-01-01

## 2024-01-01 RX ORDER — AMIODARONE HYDROCHLORIDE 400 MG/1
1 TABLET ORAL
Qty: 30 | Refills: 0
Start: 2024-01-01 | End: 2024-01-01

## 2024-01-01 RX ORDER — PHENYLEPHRINE HYDROCHLORIDE 10 MG/ML
0.3 INJECTION INTRAVENOUS
Qty: 40 | Refills: 0 | Status: DISCONTINUED | OUTPATIENT
Start: 2024-01-01 | End: 2024-01-01

## 2024-01-01 RX ORDER — SODIUM CHLORIDE 9 MG/ML
1000 INJECTION INTRAMUSCULAR; INTRAVENOUS; SUBCUTANEOUS ONCE
Refills: 0 | Status: COMPLETED | OUTPATIENT
Start: 2024-01-01 | End: 2024-01-01

## 2024-01-01 RX ORDER — TIMOLOL 0.5 %
1 DROPS OPHTHALMIC (EYE) DAILY
Refills: 0 | Status: DISCONTINUED | OUTPATIENT
Start: 2024-01-01 | End: 2024-01-01

## 2024-01-01 RX ORDER — TRAMADOL HYDROCHLORIDE 50 MG/1
0.5 TABLET ORAL
Qty: 0 | Refills: 0 | DISCHARGE
Start: 2024-01-01

## 2024-01-01 RX ORDER — METOPROLOL TARTRATE 50 MG
50 TABLET ORAL
Refills: 0 | Status: DISCONTINUED | OUTPATIENT
Start: 2024-01-01 | End: 2024-01-01

## 2024-01-01 RX ORDER — SERTRALINE 25 MG/1
1 TABLET, FILM COATED ORAL
Refills: 0 | DISCHARGE

## 2024-01-01 RX ORDER — AMIODARONE HYDROCHLORIDE 400 MG/1
200 TABLET ORAL
Refills: 0 | Status: DISCONTINUED | OUTPATIENT
Start: 2024-01-01 | End: 2024-01-01

## 2024-01-01 RX ORDER — TORSEMIDE 20 MG/1
20 TABLET ORAL
Qty: 90 | Refills: 1 | Status: ACTIVE | COMMUNITY
Start: 2024-01-01

## 2024-01-01 RX ORDER — ROSUVASTATIN CALCIUM 5 MG/1
1 TABLET ORAL
Refills: 0 | DISCHARGE

## 2024-01-01 RX ORDER — METOPROLOL TARTRATE 50 MG
50 TABLET ORAL DAILY
Refills: 0 | Status: DISCONTINUED | OUTPATIENT
Start: 2024-01-01 | End: 2024-01-01

## 2024-01-01 RX ORDER — INSULIN LISPRO 100/ML
VIAL (ML) SUBCUTANEOUS
Refills: 0 | Status: DISCONTINUED | OUTPATIENT
Start: 2024-01-01 | End: 2024-01-01

## 2024-01-01 RX ORDER — DILTIAZEM HCL 120 MG
10 CAPSULE, EXT RELEASE 24 HR ORAL ONCE
Refills: 0 | Status: COMPLETED | OUTPATIENT
Start: 2024-01-01 | End: 2024-01-01

## 2024-01-01 RX ORDER — PANTOPRAZOLE SODIUM 20 MG/1
1 TABLET, DELAYED RELEASE ORAL
Refills: 0 | DISCHARGE

## 2024-01-01 RX ORDER — DILTIAZEM HCL 120 MG
5 CAPSULE, EXT RELEASE 24 HR ORAL ONCE
Refills: 0 | Status: COMPLETED | OUTPATIENT
Start: 2024-01-01 | End: 2024-01-01

## 2024-01-01 RX ORDER — DILTIAZEM HCL 120 MG
30 CAPSULE, EXT RELEASE 24 HR ORAL ONCE
Refills: 0 | Status: COMPLETED | OUTPATIENT
Start: 2024-01-01 | End: 2024-01-01

## 2024-01-01 RX ORDER — POTASSIUM CHLORIDE 750 MG/1
10 CAPSULE, EXTENDED RELEASE ORAL TWICE DAILY
Refills: 0 | Status: ACTIVE | COMMUNITY
Start: 2024-01-01

## 2024-01-01 RX ORDER — DILTIAZEM HCL 120 MG
240 CAPSULE, EXT RELEASE 24 HR ORAL DAILY
Refills: 0 | Status: DISCONTINUED | OUTPATIENT
Start: 2024-01-01 | End: 2024-01-01

## 2024-01-01 RX ORDER — LIDOCAINE 4 G/100G
1 CREAM TOPICAL
Qty: 0 | Refills: 0 | DISCHARGE
Start: 2024-01-01

## 2024-01-01 RX ORDER — INSULIN GLARGINE 100 [IU]/ML
9 INJECTION, SOLUTION SUBCUTANEOUS AT BEDTIME
Refills: 0 | Status: DISCONTINUED | OUTPATIENT
Start: 2024-01-01 | End: 2024-01-01

## 2024-01-01 RX ORDER — CEFEPIME 1 G/1
2000 INJECTION, POWDER, FOR SOLUTION INTRAMUSCULAR; INTRAVENOUS EVERY 12 HOURS
Refills: 0 | Status: DISCONTINUED | OUTPATIENT
Start: 2024-01-01 | End: 2024-01-01

## 2024-01-01 RX ORDER — HEPARIN SODIUM 5000 [USP'U]/ML
3000 INJECTION INTRAVENOUS; SUBCUTANEOUS EVERY 6 HOURS
Refills: 0 | Status: DISCONTINUED | OUTPATIENT
Start: 2024-01-01 | End: 2024-01-01

## 2024-01-01 RX ORDER — POLYETHYLENE GLYCOL 3350 17 G/17G
17 POWDER, FOR SOLUTION ORAL
Refills: 0 | Status: DISCONTINUED | OUTPATIENT
Start: 2024-01-01 | End: 2024-01-01

## 2024-01-01 RX ORDER — ACETAMINOPHEN 500 MG
2 TABLET ORAL
Qty: 0 | Refills: 0 | DISCHARGE
Start: 2024-01-01

## 2024-01-01 RX ORDER — METOPROLOL TARTRATE 50 MG
5 TABLET ORAL ONCE
Refills: 0 | Status: COMPLETED | OUTPATIENT
Start: 2024-01-01 | End: 2024-01-01

## 2024-01-01 RX ORDER — HEPARIN SODIUM 5000 [USP'U]/ML
5000 INJECTION INTRAVENOUS; SUBCUTANEOUS EVERY 8 HOURS
Refills: 0 | Status: DISCONTINUED | OUTPATIENT
Start: 2024-01-01 | End: 2024-01-01

## 2024-01-01 RX ORDER — HYDROCORTISONE 20 MG
100 TABLET ORAL ONCE
Refills: 0 | Status: COMPLETED | OUTPATIENT
Start: 2024-01-01 | End: 2024-01-01

## 2024-01-01 RX ORDER — AMIODARONE HYDROCHLORIDE 400 MG/1
400 TABLET ORAL EVERY 12 HOURS
Refills: 0 | Status: COMPLETED | OUTPATIENT
Start: 2024-01-01 | End: 2024-01-01

## 2024-01-01 RX ORDER — DILTIAZEM HCL 120 MG
1 CAPSULE, EXT RELEASE 24 HR ORAL
Refills: 0 | DISCHARGE

## 2024-01-01 RX ORDER — MULTIVIT-MIN/FERROUS GLUCONATE 9 MG/15 ML
1 LIQUID (ML) ORAL DAILY
Refills: 0 | Status: DISCONTINUED | OUTPATIENT
Start: 2024-01-01 | End: 2024-01-01

## 2024-01-01 RX ORDER — SODIUM HYPOCHLORITE 0.125 %
1 SOLUTION, NON-ORAL MISCELLANEOUS DAILY
Refills: 0 | Status: DISCONTINUED | OUTPATIENT
Start: 2024-01-01 | End: 2024-01-01

## 2024-01-01 RX ORDER — LIDOCAINE 4 G/100G
1 CREAM TOPICAL
Qty: 2 | Refills: 0
Start: 2024-01-01 | End: 2024-01-01

## 2024-01-01 RX ORDER — DIGOXIN 250 MCG
1 TABLET ORAL
Qty: 30 | Refills: 0
Start: 2024-01-01

## 2024-01-01 RX ORDER — CHLORHEXIDINE GLUCONATE 213 G/1000ML
1 SOLUTION TOPICAL
Refills: 0 | Status: DISCONTINUED | OUTPATIENT
Start: 2024-01-01 | End: 2024-01-01

## 2024-01-01 RX ORDER — FOLIC ACID 0.8 MG
1 TABLET ORAL
Refills: 0 | DISCHARGE

## 2024-01-01 RX ORDER — ASCORBIC ACID 60 MG
1 TABLET,CHEWABLE ORAL
Qty: 0 | Refills: 0 | DISCHARGE
Start: 2024-01-01

## 2024-01-01 RX ORDER — METOPROLOL TARTRATE 50 MG
5 TABLET ORAL EVERY 6 HOURS
Refills: 0 | Status: DISCONTINUED | OUTPATIENT
Start: 2024-01-01 | End: 2024-01-01

## 2024-01-01 RX ORDER — DAPAGLIFLOZIN 10 MG/1
10 TABLET, FILM COATED ORAL EVERY 24 HOURS
Refills: 0 | Status: DISCONTINUED | OUTPATIENT
Start: 2024-01-01 | End: 2024-01-01

## 2024-01-01 RX ORDER — METOPROLOL TARTRATE 50 MG
100 TABLET ORAL
Refills: 0 | Status: DISCONTINUED | OUTPATIENT
Start: 2024-01-01 | End: 2024-01-01

## 2024-01-01 RX ORDER — ATORVASTATIN CALCIUM 80 MG/1
80 TABLET, FILM COATED ORAL AT BEDTIME
Refills: 0 | Status: DISCONTINUED | OUTPATIENT
Start: 2024-01-01 | End: 2024-01-01

## 2024-01-01 RX ORDER — SODIUM CHLORIDE 9 MG/ML
1000 INJECTION, SOLUTION INTRAVENOUS ONCE
Refills: 0 | Status: COMPLETED | OUTPATIENT
Start: 2024-01-01 | End: 2024-01-01

## 2024-01-01 RX ORDER — HEPARIN SODIUM 5000 [USP'U]/ML
4100 INJECTION INTRAVENOUS; SUBCUTANEOUS ONCE
Refills: 0 | Status: COMPLETED | OUTPATIENT
Start: 2024-01-01 | End: 2024-01-01

## 2024-01-01 RX ORDER — TIMOLOL 0.5 %
1 DROPS OPHTHALMIC (EYE)
Refills: 0 | DISCHARGE

## 2024-01-01 RX ORDER — ACETAMINOPHEN 500 MG
650 TABLET ORAL EVERY 6 HOURS
Refills: 0 | Status: DISCONTINUED | OUTPATIENT
Start: 2024-01-01 | End: 2024-01-01

## 2024-01-01 RX ORDER — ONDANSETRON 8 MG/1
4 TABLET, FILM COATED ORAL EVERY 6 HOURS
Refills: 0 | Status: DISCONTINUED | OUTPATIENT
Start: 2024-01-01 | End: 2024-01-01

## 2024-01-01 RX ORDER — METRONIDAZOLE 500 MG
500 TABLET ORAL ONCE
Refills: 0 | Status: COMPLETED | OUTPATIENT
Start: 2024-01-01 | End: 2024-01-01

## 2024-01-01 RX ORDER — INSULIN GLARGINE 100 [IU]/ML
10 INJECTION, SOLUTION SUBCUTANEOUS
Qty: 0 | Refills: 0 | DISCHARGE
Start: 2024-01-01

## 2024-01-01 RX ORDER — LANOLIN ALCOHOL/MO/W.PET/CERES
3 CREAM (GRAM) TOPICAL AT BEDTIME
Refills: 0 | Status: DISCONTINUED | OUTPATIENT
Start: 2024-01-01 | End: 2024-01-01

## 2024-01-01 RX ORDER — POTASSIUM CHLORIDE 20 MEQ
10 PACKET (EA) ORAL
Refills: 0 | Status: COMPLETED | OUTPATIENT
Start: 2024-01-01 | End: 2024-01-01

## 2024-01-01 RX ORDER — POTASSIUM CHLORIDE 20 MEQ
40 PACKET (EA) ORAL EVERY 4 HOURS
Refills: 0 | Status: COMPLETED | OUTPATIENT
Start: 2024-01-01 | End: 2024-01-01

## 2024-01-01 RX ORDER — METOPROLOL TARTRATE 50 MG
25 TABLET ORAL
Refills: 0 | Status: DISCONTINUED | OUTPATIENT
Start: 2024-01-01 | End: 2024-01-01

## 2024-01-01 RX ORDER — CEFEPIME 1 G/1
2000 INJECTION, POWDER, FOR SOLUTION INTRAMUSCULAR; INTRAVENOUS ONCE
Refills: 0 | Status: DISCONTINUED | OUTPATIENT
Start: 2024-01-01 | End: 2024-01-01

## 2024-01-01 RX ORDER — AMIODARONE HYDROCHLORIDE 400 MG/1
TABLET ORAL
Refills: 0 | Status: DISCONTINUED | OUTPATIENT
Start: 2024-01-01 | End: 2024-01-01

## 2024-01-01 RX ORDER — POTASSIUM PHOSPHATE, MONOBASIC POTASSIUM PHOSPHATE, DIBASIC 236; 224 MG/ML; MG/ML
15 INJECTION, SOLUTION INTRAVENOUS ONCE
Refills: 0 | Status: COMPLETED | OUTPATIENT
Start: 2024-01-01 | End: 2024-01-01

## 2024-01-01 RX ORDER — NOREPINEPHRINE BITARTRATE/D5W 8 MG/250ML
0.1 PLASTIC BAG, INJECTION (ML) INTRAVENOUS
Qty: 8 | Refills: 0 | Status: DISCONTINUED | OUTPATIENT
Start: 2024-01-01 | End: 2024-01-01

## 2024-01-01 RX ORDER — AMIODARONE HYDROCHLORIDE 400 MG/1
150 TABLET ORAL ONCE
Refills: 0 | Status: COMPLETED | OUTPATIENT
Start: 2024-01-01 | End: 2024-01-01

## 2024-01-01 RX ORDER — SODIUM CHLORIDE 9 MG/ML
500 INJECTION, SOLUTION INTRAVENOUS ONCE
Refills: 0 | Status: COMPLETED | OUTPATIENT
Start: 2024-01-01 | End: 2024-01-01

## 2024-01-01 RX ORDER — INSULIN GLARGINE 100 [IU]/ML
7 INJECTION, SOLUTION SUBCUTANEOUS ONCE
Refills: 0 | Status: COMPLETED | OUTPATIENT
Start: 2024-01-01 | End: 2024-01-01

## 2024-01-01 RX ORDER — DEXTROSE 10 % IN WATER 10 %
125 INTRAVENOUS SOLUTION INTRAVENOUS ONCE
Refills: 0 | Status: DISCONTINUED | OUTPATIENT
Start: 2024-01-01 | End: 2024-01-01

## 2024-01-01 RX ORDER — BUMETANIDE 0.25 MG/ML
0.5 INJECTION INTRAMUSCULAR; INTRAVENOUS DAILY
Refills: 0 | Status: DISCONTINUED | OUTPATIENT
Start: 2024-01-01 | End: 2024-01-01

## 2024-01-01 RX ORDER — HYDROCORTISONE 20 MG
100 TABLET ORAL EVERY 8 HOURS
Refills: 0 | Status: DISCONTINUED | OUTPATIENT
Start: 2024-01-01 | End: 2024-01-01

## 2024-01-01 RX ORDER — INSULIN LISPRO 100/ML
VIAL (ML) SUBCUTANEOUS AT BEDTIME
Refills: 0 | Status: DISCONTINUED | OUTPATIENT
Start: 2024-01-01 | End: 2024-01-01

## 2024-01-01 RX ORDER — POLYETHYLENE GLYCOL 3350 17 G/17G
17 POWDER, FOR SOLUTION ORAL
Qty: 255 | Refills: 0
Start: 2024-01-01 | End: 2024-01-01

## 2024-01-01 RX ORDER — METOPROLOL TARTRATE 50 MG
25 TABLET ORAL EVERY 8 HOURS
Refills: 0 | Status: DISCONTINUED | OUTPATIENT
Start: 2024-01-01 | End: 2024-01-01

## 2024-01-01 RX ORDER — INSULIN GLARGINE 100 [IU]/ML
5 INJECTION, SOLUTION SUBCUTANEOUS AT BEDTIME
Refills: 0 | Status: DISCONTINUED | OUTPATIENT
Start: 2024-01-01 | End: 2024-01-01

## 2024-01-01 RX ORDER — RIVAROXABAN 15 MG/1
15 TABLET, FILM COATED ORAL
Qty: 90 | Refills: 2 | Status: ACTIVE | COMMUNITY
Start: 2024-01-01 | End: 1900-01-01

## 2024-01-01 RX ORDER — DIGOXIN 250 MCG
62.5 TABLET ORAL EVERY OTHER DAY
Refills: 0 | Status: DISCONTINUED | OUTPATIENT
Start: 2024-01-01 | End: 2024-01-01

## 2024-01-01 RX ORDER — METOPROLOL TARTRATE 50 MG
75 TABLET ORAL
Refills: 0 | Status: DISCONTINUED | OUTPATIENT
Start: 2024-01-01 | End: 2024-01-01

## 2024-01-01 RX ORDER — COLLAGENASE CLOSTRIDIUM HIST. 250 UNIT/G
1 OINTMENT (GRAM) TOPICAL
Qty: 1 | Refills: 6
Start: 2024-01-01

## 2024-01-01 RX ORDER — FUROSEMIDE 40 MG
40 TABLET ORAL ONCE
Refills: 0 | Status: COMPLETED | OUTPATIENT
Start: 2024-01-01 | End: 2024-01-01

## 2024-01-01 RX ORDER — METOPROLOL TARTRATE 50 MG
1 TABLET ORAL
Refills: 0 | DISCHARGE

## 2024-01-01 RX ORDER — DILTIAZEM HCL 120 MG
180 CAPSULE, EXT RELEASE 24 HR ORAL DAILY
Refills: 0 | Status: DISCONTINUED | OUTPATIENT
Start: 2024-01-01 | End: 2024-01-01

## 2024-01-01 RX ORDER — FENTANYL CITRATE 50 UG/ML
25 INJECTION INTRAVENOUS
Refills: 0 | Status: DISCONTINUED | OUTPATIENT
Start: 2024-01-01 | End: 2024-01-01

## 2024-01-01 RX ORDER — TRAMADOL HYDROCHLORIDE 50 MG/1
50 TABLET ORAL ONCE
Refills: 0 | Status: DISCONTINUED | OUTPATIENT
Start: 2024-01-01 | End: 2024-01-01

## 2024-01-01 RX ORDER — SODIUM CHLORIDE 9 MG/ML
250 INJECTION INTRAMUSCULAR; INTRAVENOUS; SUBCUTANEOUS ONCE
Refills: 0 | Status: COMPLETED | OUTPATIENT
Start: 2024-01-01 | End: 2024-01-01

## 2024-01-01 RX ORDER — INSULIN LISPRO 100/ML
4 VIAL (ML) SUBCUTANEOUS
Refills: 0 | Status: DISCONTINUED | OUTPATIENT
Start: 2024-01-01 | End: 2024-01-01

## 2024-01-01 RX ORDER — ZINC SULFATE TAB 220 MG (50 MG ZINC EQUIVALENT) 220 (50 ZN) MG
1 TAB ORAL
Qty: 0 | Refills: 0 | DISCHARGE
Start: 2024-01-01

## 2024-01-01 RX ORDER — POLYETHYLENE GLYCOL 3350 17 G/17G
17 POWDER, FOR SOLUTION ORAL DAILY
Refills: 0 | Status: DISCONTINUED | OUTPATIENT
Start: 2024-01-01 | End: 2024-01-01

## 2024-01-01 RX ORDER — SODIUM CHLORIDE 9 MG/ML
1000 INJECTION INTRAMUSCULAR; INTRAVENOUS; SUBCUTANEOUS
Refills: 0 | Status: DISCONTINUED | OUTPATIENT
Start: 2024-01-01 | End: 2024-01-01

## 2024-01-01 RX ORDER — AMIODARONE HYDROCHLORIDE 200 MG/1
200 TABLET ORAL TWICE DAILY
Refills: 0 | Status: DISCONTINUED | COMMUNITY
Start: 2024-01-01 | End: 2024-01-01

## 2024-01-01 RX ORDER — CHLORHEXIDINE GLUCONATE 213 G/1000ML
15 SOLUTION TOPICAL EVERY 12 HOURS
Refills: 0 | Status: DISCONTINUED | OUTPATIENT
Start: 2024-01-01 | End: 2024-01-01

## 2024-01-01 RX ORDER — CEFAZOLIN SODIUM 1 G
1000 VIAL (EA) INJECTION ONCE
Refills: 0 | Status: COMPLETED | OUTPATIENT
Start: 2024-01-01 | End: 2024-01-01

## 2024-01-01 RX ORDER — CEFEPIME 1 G/1
2000 INJECTION, POWDER, FOR SOLUTION INTRAMUSCULAR; INTRAVENOUS ONCE
Refills: 0 | Status: COMPLETED | OUTPATIENT
Start: 2024-01-01 | End: 2024-01-01

## 2024-01-01 RX ORDER — DIGOXIN 250 MCG
500 TABLET ORAL ONCE
Refills: 0 | Status: COMPLETED | OUTPATIENT
Start: 2024-01-01 | End: 2024-01-01

## 2024-01-01 RX ORDER — SODIUM CHLORIDE 9 MG/ML
10 INJECTION INTRAMUSCULAR; INTRAVENOUS; SUBCUTANEOUS
Refills: 0 | Status: DISCONTINUED | OUTPATIENT
Start: 2024-01-01 | End: 2024-01-01

## 2024-01-01 RX ORDER — AMIODARONE HYDROCHLORIDE 400 MG/1
2 TABLET ORAL
Refills: 0 | DISCHARGE

## 2024-01-01 RX ORDER — COLLAGENASE CLOSTRIDIUM HIST. 250 UNIT/G
1 OINTMENT (GRAM) TOPICAL DAILY
Refills: 0 | Status: DISCONTINUED | OUTPATIENT
Start: 2024-01-01 | End: 2024-01-01

## 2024-01-01 RX ORDER — METOPROLOL TARTRATE 50 MG
5 TABLET ORAL ONCE
Refills: 0 | Status: DISCONTINUED | OUTPATIENT
Start: 2024-01-01 | End: 2024-01-01

## 2024-01-01 RX ORDER — PREDNISONE 10 MG/1
10 TABLET ORAL
Refills: 0 | Status: ACTIVE | COMMUNITY
Start: 2024-01-01

## 2024-01-01 RX ORDER — CYCLOBENZAPRINE HYDROCHLORIDE 10 MG/1
5 TABLET, FILM COATED ORAL
Refills: 0 | Status: DISCONTINUED | OUTPATIENT
Start: 2024-01-01 | End: 2024-01-01

## 2024-01-01 RX ORDER — ALBUMIN HUMAN 25 %
50 VIAL (ML) INTRAVENOUS
Refills: 0 | Status: COMPLETED | OUTPATIENT
Start: 2024-01-01 | End: 2024-01-01

## 2024-01-01 RX ORDER — MORPHINE SULFATE 50 MG/1
2 CAPSULE, EXTENDED RELEASE ORAL EVERY 4 HOURS
Refills: 0 | Status: DISCONTINUED | OUTPATIENT
Start: 2024-01-01 | End: 2024-01-01

## 2024-01-01 RX ORDER — DIGOXIN 125 UG/1
125 TABLET ORAL DAILY
Qty: 9 | Refills: 0 | Status: DISCONTINUED | COMMUNITY
Start: 2024-01-01 | End: 2024-01-01

## 2024-01-01 RX ORDER — HEPARIN SODIUM 5000 [USP'U]/ML
3800 INJECTION INTRAVENOUS; SUBCUTANEOUS ONCE
Refills: 0 | Status: DISCONTINUED | OUTPATIENT
Start: 2024-01-01 | End: 2024-01-01

## 2024-01-01 RX ORDER — ASCORBIC ACID 60 MG
500 TABLET,CHEWABLE ORAL DAILY
Refills: 0 | Status: DISCONTINUED | OUTPATIENT
Start: 2024-01-01 | End: 2024-01-01

## 2024-01-01 RX ORDER — POLYETHYLENE GLYCOL 3350 17 G/17G
17 POWDER, FOR SOLUTION ORAL
Qty: 0 | Refills: 0 | DISCHARGE
Start: 2024-01-01

## 2024-01-01 RX ORDER — DAPAGLIFLOZIN 10 MG/1
10 TABLET, FILM COATED ORAL DAILY
Refills: 0 | Status: ACTIVE | COMMUNITY
Start: 2024-01-01

## 2024-01-01 RX ORDER — NOREPINEPHRINE BITARTRATE/D5W 8 MG/250ML
0.05 PLASTIC BAG, INJECTION (ML) INTRAVENOUS
Qty: 8 | Refills: 0 | Status: DISCONTINUED | OUTPATIENT
Start: 2024-01-01 | End: 2024-01-01

## 2024-01-01 RX ORDER — HEPARIN SODIUM 5000 [USP'U]/ML
3800 INJECTION INTRAVENOUS; SUBCUTANEOUS EVERY 6 HOURS
Refills: 0 | Status: DISCONTINUED | OUTPATIENT
Start: 2024-01-01 | End: 2024-01-01

## 2024-01-01 RX ORDER — DILTIAZEM HCL 120 MG
1 CAPSULE, EXT RELEASE 24 HR ORAL
Qty: 30 | Refills: 0
Start: 2024-01-01

## 2024-01-01 RX ORDER — IPRATROPIUM BROMIDE 42 UG/1
0.06 SPRAY NASAL
Refills: 0 | Status: ACTIVE | COMMUNITY
Start: 2024-01-01

## 2024-01-01 RX ORDER — DIGOXIN 250 MCG
250 TABLET ORAL ONCE
Refills: 0 | Status: COMPLETED | OUTPATIENT
Start: 2024-01-01 | End: 2024-01-01

## 2024-01-01 RX ORDER — DILTIAZEM HCL 120 MG
60 CAPSULE, EXT RELEASE 24 HR ORAL ONCE
Refills: 0 | Status: COMPLETED | OUTPATIENT
Start: 2024-01-01 | End: 2024-01-01

## 2024-01-01 RX ORDER — ZINC SULFATE TAB 220 MG (50 MG ZINC EQUIVALENT) 220 (50 ZN) MG
1 TAB ORAL
Qty: 5 | Refills: 0
Start: 2024-01-01 | End: 2024-01-01

## 2024-01-01 RX ORDER — CHOLECALCIFEROL (VITAMIN D3) 125 MCG
1 CAPSULE ORAL
Refills: 0 | DISCHARGE

## 2024-01-01 RX ORDER — VANCOMYCIN HCL 1 G
1000 VIAL (EA) INTRAVENOUS ONCE
Refills: 0 | Status: COMPLETED | OUTPATIENT
Start: 2024-01-01 | End: 2024-01-01

## 2024-01-01 RX ORDER — AMIODARONE HYDROCHLORIDE 400 MG/1
1 TABLET ORAL
Qty: 0 | Refills: 0 | DISCHARGE
Start: 2024-01-01

## 2024-01-01 RX ORDER — DIGOXIN 250 MCG
125 TABLET ORAL DAILY
Refills: 0 | Status: DISCONTINUED | OUTPATIENT
Start: 2024-01-01 | End: 2024-01-01

## 2024-01-01 RX ORDER — BUMETANIDE 0.25 MG/ML
0.5 INJECTION INTRAMUSCULAR; INTRAVENOUS ONCE
Refills: 0 | Status: DISCONTINUED | OUTPATIENT
Start: 2024-01-01 | End: 2024-01-01

## 2024-01-01 RX ORDER — SITAGLIPTIN 50 MG/1
1 TABLET, FILM COATED ORAL
Qty: 30 | Refills: 0
Start: 2024-01-01 | End: 2024-01-01

## 2024-01-01 RX ORDER — HEPARIN SODIUM 5000 [USP'U]/ML
6000 INJECTION INTRAVENOUS; SUBCUTANEOUS EVERY 6 HOURS
Refills: 0 | Status: DISCONTINUED | OUTPATIENT
Start: 2024-01-01 | End: 2024-01-01

## 2024-01-01 RX ORDER — PANTOPRAZOLE 40 MG/1
40 TABLET, DELAYED RELEASE ORAL DAILY
Qty: 30 | Refills: 0 | Status: ACTIVE | COMMUNITY
Start: 2024-01-01

## 2024-01-01 RX ORDER — INSULIN GLARGINE 100 [IU]/ML
12 INJECTION, SOLUTION SUBCUTANEOUS AT BEDTIME
Refills: 0 | Status: DISCONTINUED | OUTPATIENT
Start: 2024-01-01 | End: 2024-01-01

## 2024-01-01 RX ORDER — HYDROCORTISONE 20 MG
100 TABLET ORAL THREE TIMES A DAY
Refills: 0 | Status: DISCONTINUED | OUTPATIENT
Start: 2024-01-01 | End: 2024-01-01

## 2024-01-01 RX ORDER — INSULIN LISPRO 100/ML
VIAL (ML) SUBCUTANEOUS EVERY 6 HOURS
Refills: 0 | Status: DISCONTINUED | OUTPATIENT
Start: 2024-01-01 | End: 2024-01-01

## 2024-01-01 RX ORDER — LEVOFLOXACIN 5 MG/ML
1 INJECTION, SOLUTION INTRAVENOUS
Refills: 0 | DISCHARGE
Start: 2024-01-01

## 2024-01-01 RX ORDER — IPRATROPIUM BROMIDE 21 MCG
2 AEROSOL, SPRAY (ML) NASAL
Refills: 0 | DISCHARGE

## 2024-01-01 RX ORDER — PROPOFOL 10 MG/ML
10 INJECTION, EMULSION INTRAVENOUS
Qty: 1000 | Refills: 0 | Status: DISCONTINUED | OUTPATIENT
Start: 2024-01-01 | End: 2024-01-01

## 2024-01-01 RX ORDER — POTASSIUM CHLORIDE 20 MEQ
40 PACKET (EA) ORAL ONCE
Refills: 0 | Status: COMPLETED | OUTPATIENT
Start: 2024-01-01 | End: 2024-01-01

## 2024-01-01 RX ORDER — PHENYLEPHRINE HYDROCHLORIDE 10 MG/ML
1000 INJECTION INTRAVENOUS ONCE
Refills: 0 | Status: COMPLETED | OUTPATIENT
Start: 2024-01-01 | End: 2024-01-01

## 2024-01-01 RX ORDER — ROSUVASTATIN CALCIUM 40 MG/1
40 TABLET, FILM COATED ORAL DAILY
Refills: 0 | Status: ACTIVE | COMMUNITY
Start: 2024-01-01

## 2024-01-01 RX ORDER — DILTIAZEM HCL 120 MG
120 CAPSULE, EXT RELEASE 24 HR ORAL DAILY
Refills: 0 | Status: DISCONTINUED | OUTPATIENT
Start: 2024-01-01 | End: 2024-01-01

## 2024-01-01 RX ORDER — SENNA PLUS 8.6 MG/1
2 TABLET ORAL AT BEDTIME
Refills: 0 | Status: DISCONTINUED | OUTPATIENT
Start: 2024-01-01 | End: 2024-01-01

## 2024-01-01 RX ORDER — INSULIN LISPRO 100/ML
3 VIAL (ML) SUBCUTANEOUS
Qty: 0 | Refills: 0 | DISCHARGE
Start: 2024-01-01

## 2024-01-01 RX ORDER — DILTIAZEM HYDROCHLORIDE 120 MG/1
120 CAPSULE, EXTENDED RELEASE ORAL DAILY
Qty: 90 | Refills: 0 | Status: ACTIVE | COMMUNITY
Start: 2024-01-01 | End: 1900-01-01

## 2024-01-01 RX ORDER — LIDOCAINE 4 G/100G
1 CREAM TOPICAL DAILY
Refills: 0 | Status: DISCONTINUED | OUTPATIENT
Start: 2024-01-01 | End: 2024-01-01

## 2024-01-01 RX ORDER — ZINC SULFATE TAB 220 MG (50 MG ZINC EQUIVALENT) 220 (50 ZN) MG
220 TAB ORAL DAILY
Refills: 0 | Status: DISCONTINUED | OUTPATIENT
Start: 2024-01-01 | End: 2024-01-01

## 2024-01-01 RX ORDER — ONDANSETRON 8 MG/1
4 TABLET, FILM COATED ORAL EVERY 8 HOURS
Refills: 0 | Status: DISCONTINUED | OUTPATIENT
Start: 2024-01-01 | End: 2024-01-01

## 2024-01-01 RX ORDER — FUROSEMIDE 40 MG
1 TABLET ORAL
Qty: 0 | Refills: 0 | DISCHARGE
Start: 2024-01-01

## 2024-01-01 RX ORDER — VALACYCLOVIR 500 MG/1
1 TABLET, FILM COATED ORAL
Refills: 0 | DISCHARGE
Start: 2024-01-01 | End: 2024-01-01

## 2024-01-01 RX ORDER — PANTOPRAZOLE SODIUM 20 MG/1
40 TABLET, DELAYED RELEASE ORAL DAILY
Refills: 0 | Status: DISCONTINUED | OUTPATIENT
Start: 2024-01-01 | End: 2024-01-01

## 2024-01-01 RX ORDER — DILTIAZEM HCL 120 MG
5 CAPSULE, EXT RELEASE 24 HR ORAL
Qty: 125 | Refills: 0 | Status: DISCONTINUED | OUTPATIENT
Start: 2024-01-01 | End: 2024-01-01

## 2024-01-01 RX ORDER — METFORMIN HYDROCHLORIDE 850 MG/1
1 TABLET ORAL
Qty: 30 | Refills: 0
Start: 2024-01-01 | End: 2024-01-01

## 2024-01-01 RX ORDER — DAPAGLIFLOZIN 10 MG/1
1 TABLET, FILM COATED ORAL
Refills: 0 | DISCHARGE

## 2024-01-01 RX ORDER — DAPAGLIFLOZIN 10 MG/1
10 TABLET, FILM COATED ORAL DAILY
Refills: 0 | Status: DISCONTINUED | OUTPATIENT
Start: 2024-01-01 | End: 2024-01-01

## 2024-01-01 RX ORDER — HYDROCORTISONE 1 %
1 OINTMENT (GRAM) TOPICAL
Qty: 0 | Refills: 0 | DISCHARGE
Start: 2024-01-01

## 2024-01-01 RX ORDER — METOPROLOL TARTRATE 50 MG
1 TABLET ORAL
Qty: 60 | Refills: 0
Start: 2024-01-01 | End: 2024-01-01

## 2024-01-01 RX ORDER — HYDROCORTISONE 1 %
1 OINTMENT (GRAM) TOPICAL DAILY
Refills: 0 | Status: DISCONTINUED | OUTPATIENT
Start: 2024-01-01 | End: 2024-01-01

## 2024-01-01 RX ORDER — FUROSEMIDE 40 MG
1 TABLET ORAL
Qty: 30 | Refills: 0
Start: 2024-01-01 | End: 2024-01-01

## 2024-01-01 RX ORDER — AMIODARONE HYDROCHLORIDE 400 MG/1
1 TABLET ORAL
Qty: 60 | Refills: 0
Start: 2024-01-01

## 2024-01-01 RX ORDER — RIVAROXABAN 15 MG-20MG
1 KIT ORAL
Refills: 0 | DISCHARGE

## 2024-01-01 RX ORDER — DILTIAZEM HYDROCHLORIDE 180 MG/1
180 CAPSULE, EXTENDED RELEASE ORAL DAILY
Refills: 0 | Status: DISCONTINUED | COMMUNITY
Start: 2024-01-01 | End: 2024-01-01

## 2024-01-01 RX ADMIN — Medication 3: at 12:30

## 2024-01-01 RX ADMIN — RIVAROXABAN 15 MILLIGRAM(S): KIT at 16:56

## 2024-01-01 RX ADMIN — AMIODARONE HYDROCHLORIDE 200 MILLIGRAM(S): 400 TABLET ORAL at 10:42

## 2024-01-01 RX ADMIN — AMIODARONE HYDROCHLORIDE 200 MILLIGRAM(S): 400 TABLET ORAL at 09:31

## 2024-01-01 RX ADMIN — Medication 650 MILLIGRAM(S): at 05:48

## 2024-01-01 RX ADMIN — RIVAROXABAN 15 MILLIGRAM(S): KIT at 17:13

## 2024-01-01 RX ADMIN — Medication 1 TABLET(S): at 10:08

## 2024-01-01 RX ADMIN — Medication 20 MILLIGRAM(S): at 09:16

## 2024-01-01 RX ADMIN — INSULIN GLARGINE 12 UNIT(S): 100 INJECTION, SOLUTION SUBCUTANEOUS at 18:40

## 2024-01-01 RX ADMIN — Medication 100 MILLIGRAM(S): at 05:24

## 2024-01-01 RX ADMIN — HEPARIN SODIUM 5000 UNIT(S): 5000 INJECTION INTRAVENOUS; SUBCUTANEOUS at 13:50

## 2024-01-01 RX ADMIN — SODIUM CHLORIDE 1000 MILLILITER(S): 9 INJECTION, SOLUTION INTRAVENOUS at 06:30

## 2024-01-01 RX ADMIN — HEPARIN SODIUM 5000 UNIT(S): 5000 INJECTION INTRAVENOUS; SUBCUTANEOUS at 21:34

## 2024-01-01 RX ADMIN — Medication 1 TABLET(S): at 09:20

## 2024-01-01 RX ADMIN — Medication 125 MICROGRAM(S): at 10:11

## 2024-01-01 RX ADMIN — ZINC SULFATE TAB 220 MG (50 MG ZINC EQUIVALENT) 220 MILLIGRAM(S): 220 (50 ZN) TAB at 15:34

## 2024-01-01 RX ADMIN — Medication 20 MILLIGRAM(S): at 11:44

## 2024-01-01 RX ADMIN — Medication 4: at 16:51

## 2024-01-01 RX ADMIN — AMIODARONE HYDROCHLORIDE 200 MILLIGRAM(S): 400 TABLET ORAL at 10:25

## 2024-01-01 RX ADMIN — Medication 100 MILLIEQUIVALENT(S): at 11:34

## 2024-01-01 RX ADMIN — AMIODARONE HYDROCHLORIDE 200 MILLIGRAM(S): 400 TABLET ORAL at 10:49

## 2024-01-01 RX ADMIN — Medication 3 MILLIGRAM(S): at 19:54

## 2024-01-01 RX ADMIN — Medication 1 DROP(S): at 09:45

## 2024-01-01 RX ADMIN — Medication 100 MILLIGRAM(S): at 09:14

## 2024-01-01 RX ADMIN — DAPAGLIFLOZIN 10 MILLIGRAM(S): 10 TABLET, FILM COATED ORAL at 10:36

## 2024-01-01 RX ADMIN — Medication 30 MILLIGRAM(S): at 09:46

## 2024-01-01 RX ADMIN — DAPAGLIFLOZIN 10 MILLIGRAM(S): 10 TABLET, FILM COATED ORAL at 09:27

## 2024-01-01 RX ADMIN — ENOXAPARIN SODIUM 70 MILLIGRAM(S): 100 INJECTION SUBCUTANEOUS at 10:49

## 2024-01-01 RX ADMIN — Medication 1 MILLIGRAM(S): at 09:11

## 2024-01-01 RX ADMIN — HEPARIN SODIUM 1300 UNIT(S)/HR: 5000 INJECTION INTRAVENOUS; SUBCUTANEOUS at 13:42

## 2024-01-01 RX ADMIN — HEPARIN SODIUM 800 UNIT(S)/HR: 5000 INJECTION INTRAVENOUS; SUBCUTANEOUS at 19:26

## 2024-01-01 RX ADMIN — Medication 1 MILLIGRAM(S): at 10:25

## 2024-01-01 RX ADMIN — MORPHINE SULFATE 2 MILLIGRAM(S): 50 CAPSULE, EXTENDED RELEASE ORAL at 10:22

## 2024-01-01 RX ADMIN — DAPAGLIFLOZIN 10 MILLIGRAM(S): 10 TABLET, FILM COATED ORAL at 09:05

## 2024-01-01 RX ADMIN — Medication 25 MILLIGRAM(S): at 05:48

## 2024-01-01 RX ADMIN — Medication 40 MILLIEQUIVALENT(S): at 14:18

## 2024-01-01 RX ADMIN — PHENYLEPHRINE HYDROCHLORIDE 7.62 MICROGRAM(S)/KG/MIN: 10 INJECTION INTRAVENOUS at 17:18

## 2024-01-01 RX ADMIN — Medication 5 MILLIGRAM(S): at 01:35

## 2024-01-01 RX ADMIN — ZINC SULFATE TAB 220 MG (50 MG ZINC EQUIVALENT) 220 MILLIGRAM(S): 220 (50 ZN) TAB at 09:59

## 2024-01-01 RX ADMIN — Medication 30 MILLIGRAM(S): at 09:31

## 2024-01-01 RX ADMIN — PANTOPRAZOLE SODIUM 40 MILLIGRAM(S): 20 TABLET, DELAYED RELEASE ORAL at 05:44

## 2024-01-01 RX ADMIN — Medication 2: at 13:14

## 2024-01-01 RX ADMIN — AMIODARONE HYDROCHLORIDE 200 MILLIGRAM(S): 400 TABLET ORAL at 22:01

## 2024-01-01 RX ADMIN — Medication 10 MILLIGRAM(S): at 09:42

## 2024-01-01 RX ADMIN — Medication 400 MILLIGRAM(S): at 04:40

## 2024-01-01 RX ADMIN — Medication 1 MILLIGRAM(S): at 10:05

## 2024-01-01 RX ADMIN — Medication 1 TABLET(S): at 09:12

## 2024-01-01 RX ADMIN — AMIODARONE HYDROCHLORIDE 200 MILLIGRAM(S): 400 TABLET ORAL at 10:36

## 2024-01-01 RX ADMIN — Medication 20 MILLIGRAM(S): at 09:17

## 2024-01-01 RX ADMIN — Medication 1 MILLIGRAM(S): at 09:58

## 2024-01-01 RX ADMIN — DAPAGLIFLOZIN 10 MILLIGRAM(S): 10 TABLET, FILM COATED ORAL at 09:59

## 2024-01-01 RX ADMIN — SODIUM CHLORIDE 50 MILLILITER(S): 9 INJECTION INTRAMUSCULAR; INTRAVENOUS; SUBCUTANEOUS at 10:47

## 2024-01-01 RX ADMIN — Medication 1: at 08:07

## 2024-01-01 RX ADMIN — Medication 17.5 MILLIGRAM(S): at 09:42

## 2024-01-01 RX ADMIN — Medication 1 MILLIGRAM(S): at 09:30

## 2024-01-01 RX ADMIN — Medication 3 MILLIGRAM(S): at 22:17

## 2024-01-01 RX ADMIN — PANTOPRAZOLE SODIUM 40 MILLIGRAM(S): 20 TABLET, DELAYED RELEASE ORAL at 05:48

## 2024-01-01 RX ADMIN — PANTOPRAZOLE SODIUM 40 MILLIGRAM(S): 20 TABLET, DELAYED RELEASE ORAL at 05:34

## 2024-01-01 RX ADMIN — Medication 1: at 08:00

## 2024-01-01 RX ADMIN — Medication 20 MILLIGRAM(S): at 10:35

## 2024-01-01 RX ADMIN — Medication 1 MILLIGRAM(S): at 09:13

## 2024-01-01 RX ADMIN — ATORVASTATIN CALCIUM 80 MILLIGRAM(S): 80 TABLET, FILM COATED ORAL at 20:27

## 2024-01-01 RX ADMIN — Medication 1 MILLIGRAM(S): at 10:37

## 2024-01-01 RX ADMIN — Medication 1 APPLICATION(S): at 10:21

## 2024-01-01 RX ADMIN — ZINC SULFATE TAB 220 MG (50 MG ZINC EQUIVALENT) 220 MILLIGRAM(S): 220 (50 ZN) TAB at 10:41

## 2024-01-01 RX ADMIN — Medication 100 MILLIGRAM(S): at 09:27

## 2024-01-01 RX ADMIN — Medication 500 MILLIGRAM(S): at 09:42

## 2024-01-01 RX ADMIN — Medication 4: at 17:22

## 2024-01-01 RX ADMIN — AMIODARONE HYDROCHLORIDE 400 MILLIGRAM(S): 400 TABLET ORAL at 05:48

## 2024-01-01 RX ADMIN — Medication 10 MILLIGRAM(S): at 10:36

## 2024-01-01 RX ADMIN — ATORVASTATIN CALCIUM 40 MILLIGRAM(S): 80 TABLET, FILM COATED ORAL at 21:44

## 2024-01-01 RX ADMIN — Medication 3 UNIT(S): at 09:00

## 2024-01-01 RX ADMIN — ATORVASTATIN CALCIUM 40 MILLIGRAM(S): 80 TABLET, FILM COATED ORAL at 22:17

## 2024-01-01 RX ADMIN — SODIUM CHLORIDE 100 MILLILITER(S): 9 INJECTION, SOLUTION INTRAVENOUS at 06:48

## 2024-01-01 RX ADMIN — INSULIN GLARGINE 9 UNIT(S): 100 INJECTION, SOLUTION SUBCUTANEOUS at 21:34

## 2024-01-01 RX ADMIN — AMIODARONE HYDROCHLORIDE 200 MILLIGRAM(S): 400 TABLET ORAL at 10:32

## 2024-01-01 RX ADMIN — Medication 3 UNIT(S): at 08:37

## 2024-01-01 RX ADMIN — Medication 2: at 17:21

## 2024-01-01 RX ADMIN — Medication 1 APPLICATION(S): at 11:55

## 2024-01-01 RX ADMIN — AMIODARONE HYDROCHLORIDE 400 MILLIGRAM(S): 400 TABLET ORAL at 10:05

## 2024-01-01 RX ADMIN — Medication 3 UNIT(S): at 09:01

## 2024-01-01 RX ADMIN — Medication 1 MILLIGRAM(S): at 09:37

## 2024-01-01 RX ADMIN — Medication 4 UNIT(S): at 17:13

## 2024-01-01 RX ADMIN — Medication 3 UNIT(S): at 12:37

## 2024-01-01 RX ADMIN — Medication 500 MILLIGRAM(S): at 09:32

## 2024-01-01 RX ADMIN — CHLORHEXIDINE GLUCONATE 15 MILLILITER(S): 213 SOLUTION TOPICAL at 10:40

## 2024-01-01 RX ADMIN — DAPAGLIFLOZIN 10 MILLIGRAM(S): 10 TABLET, FILM COATED ORAL at 09:21

## 2024-01-01 RX ADMIN — RIVAROXABAN 15 MILLIGRAM(S): KIT at 17:09

## 2024-01-01 RX ADMIN — Medication 500 MILLIGRAM(S): at 10:37

## 2024-01-01 RX ADMIN — Medication 1: at 06:15

## 2024-01-01 RX ADMIN — ATORVASTATIN CALCIUM 40 MILLIGRAM(S): 80 TABLET, FILM COATED ORAL at 20:39

## 2024-01-01 RX ADMIN — Medication 4: at 21:52

## 2024-01-01 RX ADMIN — AMIODARONE HYDROCHLORIDE 200 MILLIGRAM(S): 400 TABLET ORAL at 21:22

## 2024-01-01 RX ADMIN — Medication 3 UNIT(S): at 12:12

## 2024-01-01 RX ADMIN — Medication 1 MILLIGRAM(S): at 10:35

## 2024-01-01 RX ADMIN — Medication 20 MILLIGRAM(S): at 09:46

## 2024-01-01 RX ADMIN — TRAMADOL HYDROCHLORIDE 25 MILLIGRAM(S): 50 TABLET ORAL at 11:58

## 2024-01-01 RX ADMIN — Medication 17.5 MILLIGRAM(S): at 09:12

## 2024-01-01 RX ADMIN — RIVAROXABAN 15 MILLIGRAM(S): KIT at 17:34

## 2024-01-01 RX ADMIN — PANTOPRAZOLE SODIUM 40 MILLIGRAM(S): 20 TABLET, DELAYED RELEASE ORAL at 05:42

## 2024-01-01 RX ADMIN — Medication 25 MILLIGRAM(S): at 23:38

## 2024-01-01 RX ADMIN — Medication 4: at 17:43

## 2024-01-01 RX ADMIN — Medication 3 UNIT(S): at 17:33

## 2024-01-01 RX ADMIN — Medication 3 UNIT(S): at 09:28

## 2024-01-01 RX ADMIN — HEPARIN SODIUM 5000 UNIT(S): 5000 INJECTION INTRAVENOUS; SUBCUTANEOUS at 14:13

## 2024-01-01 RX ADMIN — PHENYLEPHRINE HYDROCHLORIDE 1000 MICROGRAM(S): 10 INJECTION INTRAVENOUS at 05:55

## 2024-01-01 RX ADMIN — ZINC SULFATE TAB 220 MG (50 MG ZINC EQUIVALENT) 220 MILLIGRAM(S): 220 (50 ZN) TAB at 10:43

## 2024-01-01 RX ADMIN — LIDOCAINE 1 PATCH: 4 CREAM TOPICAL at 09:13

## 2024-01-01 RX ADMIN — INSULIN GLARGINE 7 UNIT(S): 100 INJECTION, SOLUTION SUBCUTANEOUS at 22:42

## 2024-01-01 RX ADMIN — MIDODRINE HYDROCHLORIDE 5 MILLIGRAM(S): 2.5 TABLET ORAL at 23:41

## 2024-01-01 RX ADMIN — HEPARIN SODIUM 5000 UNIT(S): 5000 INJECTION INTRAVENOUS; SUBCUTANEOUS at 15:04

## 2024-01-01 RX ADMIN — PANTOPRAZOLE SODIUM 40 MILLIGRAM(S): 20 TABLET, DELAYED RELEASE ORAL at 06:19

## 2024-01-01 RX ADMIN — Medication 1 TABLET(S): at 10:36

## 2024-01-01 RX ADMIN — HEPARIN SODIUM 5000 UNIT(S): 5000 INJECTION INTRAVENOUS; SUBCUTANEOUS at 21:16

## 2024-01-01 RX ADMIN — AMIODARONE HYDROCHLORIDE 200 MILLIGRAM(S): 400 TABLET ORAL at 09:46

## 2024-01-01 RX ADMIN — Medication 1 DROP(S): at 09:21

## 2024-01-01 RX ADMIN — Medication 3 UNIT(S): at 12:51

## 2024-01-01 RX ADMIN — Medication 1: at 14:15

## 2024-01-01 RX ADMIN — Medication 3 MILLIGRAM(S): at 21:37

## 2024-01-01 RX ADMIN — Medication 120 MILLIGRAM(S): at 09:14

## 2024-01-01 RX ADMIN — Medication 1 MILLIGRAM(S): at 09:02

## 2024-01-01 RX ADMIN — Medication 180 MILLIGRAM(S): at 09:31

## 2024-01-01 RX ADMIN — ATORVASTATIN CALCIUM 80 MILLIGRAM(S): 80 TABLET, FILM COATED ORAL at 22:17

## 2024-01-01 RX ADMIN — Medication 30 MILLIGRAM(S): at 10:01

## 2024-01-01 RX ADMIN — ENOXAPARIN SODIUM 70 MILLIGRAM(S): 100 INJECTION SUBCUTANEOUS at 10:25

## 2024-01-01 RX ADMIN — Medication 1 MILLIGRAM(S): at 10:41

## 2024-01-01 RX ADMIN — Medication 3 UNIT(S): at 11:55

## 2024-01-01 RX ADMIN — Medication 3 UNIT(S): at 12:31

## 2024-01-01 RX ADMIN — Medication 3 UNIT(S): at 12:20

## 2024-01-01 RX ADMIN — PANTOPRAZOLE SODIUM 40 MILLIGRAM(S): 20 TABLET, DELAYED RELEASE ORAL at 05:31

## 2024-01-01 RX ADMIN — PHENYLEPHRINE HYDROCHLORIDE 7.62 MICROGRAM(S)/KG/MIN: 10 INJECTION INTRAVENOUS at 21:26

## 2024-01-01 RX ADMIN — Medication 3 UNIT(S): at 07:54

## 2024-01-01 RX ADMIN — Medication 10 MILLIGRAM(S): at 16:56

## 2024-01-01 RX ADMIN — HEPARIN SODIUM 5000 UNIT(S): 5000 INJECTION INTRAVENOUS; SUBCUTANEOUS at 05:37

## 2024-01-01 RX ADMIN — Medication 1 TABLET(S): at 10:41

## 2024-01-01 RX ADMIN — Medication 40 MILLIEQUIVALENT(S): at 16:09

## 2024-01-01 RX ADMIN — Medication 4: at 06:04

## 2024-01-01 RX ADMIN — Medication 1 TABLET(S): at 10:42

## 2024-01-01 RX ADMIN — Medication 1 MILLIGRAM(S): at 09:24

## 2024-01-01 RX ADMIN — Medication 180 MILLIGRAM(S): at 09:11

## 2024-01-01 RX ADMIN — Medication 2: at 12:32

## 2024-01-01 RX ADMIN — Medication 2: at 12:42

## 2024-01-01 RX ADMIN — HEPARIN SODIUM 800 UNIT(S)/HR: 5000 INJECTION INTRAVENOUS; SUBCUTANEOUS at 17:27

## 2024-01-01 RX ADMIN — Medication 2: at 05:34

## 2024-01-01 RX ADMIN — Medication 30 MILLIGRAM(S): at 09:58

## 2024-01-01 RX ADMIN — Medication 30 MILLIGRAM(S): at 17:56

## 2024-01-01 RX ADMIN — PANTOPRAZOLE SODIUM 40 MILLIGRAM(S): 20 TABLET, DELAYED RELEASE ORAL at 10:33

## 2024-01-01 RX ADMIN — Medication 1 MILLIGRAM(S): at 10:49

## 2024-01-01 RX ADMIN — Medication 3 UNIT(S): at 17:13

## 2024-01-01 RX ADMIN — ATORVASTATIN CALCIUM 80 MILLIGRAM(S): 80 TABLET, FILM COATED ORAL at 19:54

## 2024-01-01 RX ADMIN — Medication 100 MILLIGRAM(S): at 23:54

## 2024-01-01 RX ADMIN — Medication 1 MILLIGRAM(S): at 09:16

## 2024-01-01 RX ADMIN — Medication 5 MILLIGRAM(S): at 15:22

## 2024-01-01 RX ADMIN — Medication 30 MILLIGRAM(S): at 09:41

## 2024-01-01 RX ADMIN — Medication 4: at 17:12

## 2024-01-01 RX ADMIN — HEPARIN SODIUM 5000 UNIT(S): 5000 INJECTION INTRAVENOUS; SUBCUTANEOUS at 13:31

## 2024-01-01 RX ADMIN — AMIODARONE HYDROCHLORIDE 200 MILLIGRAM(S): 400 TABLET ORAL at 21:59

## 2024-01-01 RX ADMIN — Medication 6: at 08:21

## 2024-01-01 RX ADMIN — SERTRALINE 25 MILLIGRAM(S): 25 TABLET, FILM COATED ORAL at 09:30

## 2024-01-01 RX ADMIN — Medication 3 UNIT(S): at 17:22

## 2024-01-01 RX ADMIN — SODIUM CHLORIDE 75 MILLILITER(S): 9 INJECTION, SOLUTION INTRAVENOUS at 14:18

## 2024-01-01 RX ADMIN — Medication 2: at 22:15

## 2024-01-01 RX ADMIN — Medication 2.5 MILLIGRAM(S): at 23:03

## 2024-01-01 RX ADMIN — DAPAGLIFLOZIN 10 MILLIGRAM(S): 10 TABLET, FILM COATED ORAL at 10:11

## 2024-01-01 RX ADMIN — Medication 3 UNIT(S): at 12:09

## 2024-01-01 RX ADMIN — Medication 3 UNIT(S): at 13:14

## 2024-01-01 RX ADMIN — Medication 50 MILLILITER(S): at 17:12

## 2024-01-01 RX ADMIN — Medication 3 MILLIGRAM(S): at 21:43

## 2024-01-01 RX ADMIN — SERTRALINE 25 MILLIGRAM(S): 25 TABLET, FILM COATED ORAL at 09:02

## 2024-01-01 RX ADMIN — Medication 1 DROP(S): at 09:32

## 2024-01-01 RX ADMIN — Medication 1 MILLIGRAM(S): at 09:47

## 2024-01-01 RX ADMIN — Medication 100 MILLIGRAM(S): at 11:23

## 2024-01-01 RX ADMIN — Medication 500 MILLIGRAM(S): at 10:31

## 2024-01-01 RX ADMIN — Medication 10 MILLIGRAM(S): at 11:01

## 2024-01-01 RX ADMIN — ATORVASTATIN CALCIUM 80 MILLIGRAM(S): 80 TABLET, FILM COATED ORAL at 22:01

## 2024-01-01 RX ADMIN — Medication 40 MILLIGRAM(S): at 14:23

## 2024-01-01 RX ADMIN — Medication 3: at 12:41

## 2024-01-01 RX ADMIN — Medication 180 MILLIGRAM(S): at 09:59

## 2024-01-01 RX ADMIN — Medication 1: at 12:35

## 2024-01-01 RX ADMIN — Medication 125 MICROGRAM(S): at 11:01

## 2024-01-01 RX ADMIN — Medication 1 DROP(S): at 10:43

## 2024-01-01 RX ADMIN — Medication 500 MILLIGRAM(S): at 09:12

## 2024-01-01 RX ADMIN — Medication 4: at 17:29

## 2024-01-01 RX ADMIN — SERTRALINE 25 MILLIGRAM(S): 25 TABLET, FILM COATED ORAL at 10:26

## 2024-01-01 RX ADMIN — INSULIN GLARGINE 5 UNIT(S): 100 INJECTION, SOLUTION SUBCUTANEOUS at 21:43

## 2024-01-01 RX ADMIN — Medication 1: at 22:00

## 2024-01-01 RX ADMIN — Medication 1 DROP(S): at 09:02

## 2024-01-01 RX ADMIN — Medication 2: at 23:22

## 2024-01-01 RX ADMIN — AMIODARONE HYDROCHLORIDE 200 MILLIGRAM(S): 400 TABLET ORAL at 20:31

## 2024-01-01 RX ADMIN — AMIODARONE HYDROCHLORIDE 200 MILLIGRAM(S): 400 TABLET ORAL at 09:02

## 2024-01-01 RX ADMIN — INSULIN GLARGINE 9 UNIT(S): 100 INJECTION, SOLUTION SUBCUTANEOUS at 21:17

## 2024-01-01 RX ADMIN — Medication 180 MILLIGRAM(S): at 09:42

## 2024-01-01 RX ADMIN — Medication 1 DROP(S): at 10:51

## 2024-01-01 RX ADMIN — Medication 6: at 12:25

## 2024-01-01 RX ADMIN — ATORVASTATIN CALCIUM 80 MILLIGRAM(S): 80 TABLET, FILM COATED ORAL at 21:54

## 2024-01-01 RX ADMIN — AMIODARONE HYDROCHLORIDE 618 MILLIGRAM(S): 400 TABLET ORAL at 03:49

## 2024-01-01 RX ADMIN — CEFEPIME 2000 MILLIGRAM(S): 1 INJECTION, POWDER, FOR SOLUTION INTRAMUSCULAR; INTRAVENOUS at 21:28

## 2024-01-01 RX ADMIN — ATORVASTATIN CALCIUM 40 MILLIGRAM(S): 80 TABLET, FILM COATED ORAL at 21:30

## 2024-01-01 RX ADMIN — PANTOPRAZOLE SODIUM 40 MILLIGRAM(S): 20 TABLET, DELAYED RELEASE ORAL at 05:17

## 2024-01-01 RX ADMIN — Medication 3 MILLIGRAM(S): at 22:42

## 2024-01-01 RX ADMIN — Medication 1 MILLIGRAM(S): at 09:31

## 2024-01-01 RX ADMIN — PANTOPRAZOLE SODIUM 40 MILLIGRAM(S): 20 TABLET, DELAYED RELEASE ORAL at 06:08

## 2024-01-01 RX ADMIN — Medication 2: at 17:13

## 2024-01-01 RX ADMIN — LIDOCAINE 1 PATCH: 4 CREAM TOPICAL at 01:39

## 2024-01-01 RX ADMIN — Medication 4: at 12:29

## 2024-01-01 RX ADMIN — SERTRALINE 25 MILLIGRAM(S): 25 TABLET, FILM COATED ORAL at 09:12

## 2024-01-01 RX ADMIN — Medication 3: at 16:37

## 2024-01-01 RX ADMIN — Medication 1 MILLIGRAM(S): at 09:57

## 2024-01-01 RX ADMIN — Medication 1: at 21:48

## 2024-01-01 RX ADMIN — SODIUM CHLORIDE 250 MILLILITER(S): 9 INJECTION INTRAMUSCULAR; INTRAVENOUS; SUBCUTANEOUS at 22:11

## 2024-01-01 RX ADMIN — INSULIN GLARGINE 9 UNIT(S): 100 INJECTION, SOLUTION SUBCUTANEOUS at 22:42

## 2024-01-01 RX ADMIN — Medication 3 UNIT(S): at 17:03

## 2024-01-01 RX ADMIN — Medication 100 MILLIGRAM(S): at 13:42

## 2024-01-01 RX ADMIN — Medication 17.5 MILLIGRAM(S): at 09:33

## 2024-01-01 RX ADMIN — AMIODARONE HYDROCHLORIDE 200 MILLIGRAM(S): 400 TABLET ORAL at 10:11

## 2024-01-01 RX ADMIN — SERTRALINE 25 MILLIGRAM(S): 25 TABLET, FILM COATED ORAL at 09:27

## 2024-01-01 RX ADMIN — Medication 1 DROP(S): at 09:39

## 2024-01-01 RX ADMIN — Medication 1 TABLET(S): at 10:20

## 2024-01-01 RX ADMIN — Medication 1 MILLIGRAM(S): at 09:20

## 2024-01-01 RX ADMIN — Medication 0.25 MILLIGRAM(S): at 01:13

## 2024-01-01 RX ADMIN — Medication 40 MILLIEQUIVALENT(S): at 12:34

## 2024-01-01 RX ADMIN — Medication 17.5 MILLIGRAM(S): at 09:03

## 2024-01-01 RX ADMIN — Medication 4 UNIT(S): at 07:48

## 2024-01-01 RX ADMIN — POLYETHYLENE GLYCOL 3350 17 GRAM(S): 17 POWDER, FOR SOLUTION ORAL at 10:49

## 2024-01-01 RX ADMIN — AMIODARONE HYDROCHLORIDE 618 MILLIGRAM(S): 400 TABLET ORAL at 06:55

## 2024-01-01 RX ADMIN — Medication 4: at 16:56

## 2024-01-01 RX ADMIN — INSULIN GLARGINE 9 UNIT(S): 100 INJECTION, SOLUTION SUBCUTANEOUS at 22:17

## 2024-01-01 RX ADMIN — Medication 1 DROP(S): at 12:28

## 2024-01-01 RX ADMIN — Medication 500 MILLIGRAM(S): at 15:34

## 2024-01-01 RX ADMIN — LIDOCAINE 1 PATCH: 4 CREAM TOPICAL at 19:38

## 2024-01-01 RX ADMIN — ATORVASTATIN CALCIUM 40 MILLIGRAM(S): 80 TABLET, FILM COATED ORAL at 21:16

## 2024-01-01 RX ADMIN — Medication 100 MILLIGRAM(S): at 21:33

## 2024-01-01 RX ADMIN — Medication 100 MILLIGRAM(S): at 22:18

## 2024-01-01 RX ADMIN — HEPARIN SODIUM 5000 UNIT(S): 5000 INJECTION INTRAVENOUS; SUBCUTANEOUS at 06:07

## 2024-01-01 RX ADMIN — LIDOCAINE 1 PATCH: 4 CREAM TOPICAL at 21:02

## 2024-01-01 RX ADMIN — SERTRALINE 25 MILLIGRAM(S): 25 TABLET, FILM COATED ORAL at 10:08

## 2024-01-01 RX ADMIN — Medication 2: at 21:01

## 2024-01-01 RX ADMIN — INSULIN GLARGINE 9 UNIT(S): 100 INJECTION, SOLUTION SUBCUTANEOUS at 21:00

## 2024-01-01 RX ADMIN — PANTOPRAZOLE SODIUM 40 MILLIGRAM(S): 20 TABLET, DELAYED RELEASE ORAL at 05:09

## 2024-01-01 RX ADMIN — Medication 1 TABLET(S): at 09:58

## 2024-01-01 RX ADMIN — Medication 650 MILLIGRAM(S): at 06:11

## 2024-01-01 RX ADMIN — RIVAROXABAN 15 MILLIGRAM(S): KIT at 17:19

## 2024-01-01 RX ADMIN — MORPHINE SULFATE 2 MILLIGRAM(S): 50 CAPSULE, EXTENDED RELEASE ORAL at 14:45

## 2024-01-01 RX ADMIN — AMIODARONE HYDROCHLORIDE 200 MILLIGRAM(S): 400 TABLET ORAL at 22:07

## 2024-01-01 RX ADMIN — Medication 180 MILLIGRAM(S): at 09:03

## 2024-01-01 RX ADMIN — SODIUM CHLORIDE 75 MILLILITER(S): 9 INJECTION, SOLUTION INTRAVENOUS at 00:34

## 2024-01-01 RX ADMIN — Medication 650 MILLIGRAM(S): at 19:59

## 2024-01-01 RX ADMIN — HEPARIN SODIUM 4100 UNIT(S): 5000 INJECTION INTRAVENOUS; SUBCUTANEOUS at 17:30

## 2024-01-01 RX ADMIN — Medication 30 MILLIGRAM(S): at 10:35

## 2024-01-01 RX ADMIN — INSULIN GLARGINE 9 UNIT(S): 100 INJECTION, SOLUTION SUBCUTANEOUS at 21:40

## 2024-01-01 RX ADMIN — Medication 1: at 08:06

## 2024-01-01 RX ADMIN — Medication 30 MILLIGRAM(S): at 10:24

## 2024-01-01 RX ADMIN — Medication 6: at 17:14

## 2024-01-01 RX ADMIN — Medication 500 MILLIGRAM(S): at 09:31

## 2024-01-01 RX ADMIN — ATORVASTATIN CALCIUM 80 MILLIGRAM(S): 80 TABLET, FILM COATED ORAL at 22:04

## 2024-01-01 RX ADMIN — Medication 3 MILLIGRAM(S): at 21:17

## 2024-01-01 RX ADMIN — Medication 5 MG/HR: at 05:33

## 2024-01-01 RX ADMIN — Medication 60 MILLIGRAM(S): at 14:44

## 2024-01-01 RX ADMIN — PANTOPRAZOLE SODIUM 40 MILLIGRAM(S): 20 TABLET, DELAYED RELEASE ORAL at 05:36

## 2024-01-01 RX ADMIN — CEFEPIME 2000 MILLIGRAM(S): 1 INJECTION, POWDER, FOR SOLUTION INTRAMUSCULAR; INTRAVENOUS at 22:14

## 2024-01-01 RX ADMIN — ZINC SULFATE TAB 220 MG (50 MG ZINC EQUIVALENT) 220 MILLIGRAM(S): 220 (50 ZN) TAB at 10:26

## 2024-01-01 RX ADMIN — POTASSIUM PHOSPHATE, MONOBASIC POTASSIUM PHOSPHATE, DIBASIC 62.5 MILLIMOLE(S): 236; 224 INJECTION, SOLUTION INTRAVENOUS at 17:12

## 2024-01-01 RX ADMIN — Medication 180 MILLIGRAM(S): at 11:01

## 2024-01-01 RX ADMIN — Medication 100 MILLIGRAM(S): at 14:27

## 2024-01-01 RX ADMIN — Medication 3 MILLIGRAM(S): at 21:44

## 2024-01-01 RX ADMIN — Medication 5 MILLIGRAM(S): at 11:25

## 2024-01-01 RX ADMIN — Medication 3 UNIT(S): at 16:51

## 2024-01-01 RX ADMIN — Medication 1: at 21:53

## 2024-01-01 RX ADMIN — POLYETHYLENE GLYCOL 3350 17 GRAM(S): 17 POWDER, FOR SOLUTION ORAL at 10:41

## 2024-01-01 RX ADMIN — SERTRALINE 25 MILLIGRAM(S): 25 TABLET, FILM COATED ORAL at 09:20

## 2024-01-01 RX ADMIN — PANTOPRAZOLE SODIUM 40 MILLIGRAM(S): 20 TABLET, DELAYED RELEASE ORAL at 05:14

## 2024-01-01 RX ADMIN — CEFEPIME 2000 MILLIGRAM(S): 1 INJECTION, POWDER, FOR SOLUTION INTRAMUSCULAR; INTRAVENOUS at 21:39

## 2024-01-01 RX ADMIN — RIVAROXABAN 15 MILLIGRAM(S): KIT at 17:29

## 2024-01-01 RX ADMIN — Medication 1 DROP(S): at 09:43

## 2024-01-01 RX ADMIN — Medication 500 MILLIGRAM(S): at 10:42

## 2024-01-01 RX ADMIN — Medication 2: at 11:46

## 2024-01-01 RX ADMIN — Medication 10 MILLIGRAM(S): at 17:26

## 2024-01-01 RX ADMIN — Medication 1 MILLIGRAM(S): at 09:03

## 2024-01-01 RX ADMIN — Medication 250 MICROGRAM(S): at 12:35

## 2024-01-01 RX ADMIN — Medication 4: at 16:52

## 2024-01-01 RX ADMIN — Medication 30 MILLIGRAM(S): at 11:01

## 2024-01-01 RX ADMIN — RIVAROXABAN 15 MILLIGRAM(S): KIT at 17:14

## 2024-01-01 RX ADMIN — ATORVASTATIN CALCIUM 80 MILLIGRAM(S): 80 TABLET, FILM COATED ORAL at 21:40

## 2024-01-01 RX ADMIN — AMIODARONE HYDROCHLORIDE 200 MILLIGRAM(S): 400 TABLET ORAL at 19:55

## 2024-01-01 RX ADMIN — Medication 2: at 21:35

## 2024-01-01 RX ADMIN — AMIODARONE HYDROCHLORIDE 400 MILLIGRAM(S): 400 TABLET ORAL at 17:28

## 2024-01-01 RX ADMIN — SERTRALINE 25 MILLIGRAM(S): 25 TABLET, FILM COATED ORAL at 09:03

## 2024-01-01 RX ADMIN — Medication 10 MILLIGRAM(S): at 09:25

## 2024-01-01 RX ADMIN — AMIODARONE HYDROCHLORIDE 400 MILLIGRAM(S): 400 TABLET ORAL at 17:07

## 2024-01-01 RX ADMIN — HEPARIN SODIUM 5000 UNIT(S): 5000 INJECTION INTRAVENOUS; SUBCUTANEOUS at 21:00

## 2024-01-01 RX ADMIN — Medication 1 DROP(S): at 09:47

## 2024-01-01 RX ADMIN — PANTOPRAZOLE SODIUM 40 MILLIGRAM(S): 20 TABLET, DELAYED RELEASE ORAL at 05:39

## 2024-01-01 RX ADMIN — LIDOCAINE 1 PATCH: 4 CREAM TOPICAL at 19:43

## 2024-01-01 RX ADMIN — Medication 25 MILLIGRAM(S): at 21:53

## 2024-01-01 RX ADMIN — Medication 100 MILLIGRAM(S): at 14:05

## 2024-01-01 RX ADMIN — PANTOPRAZOLE SODIUM 40 MILLIGRAM(S): 20 TABLET, DELAYED RELEASE ORAL at 06:09

## 2024-01-01 RX ADMIN — Medication 1 TABLET(S): at 09:47

## 2024-01-01 RX ADMIN — Medication 1 TABLET(S): at 15:33

## 2024-01-01 RX ADMIN — Medication 1 DROP(S): at 11:00

## 2024-01-01 RX ADMIN — Medication 5 MILLIGRAM(S): at 12:29

## 2024-01-01 RX ADMIN — SERTRALINE 25 MILLIGRAM(S): 25 TABLET, FILM COATED ORAL at 09:42

## 2024-01-01 RX ADMIN — Medication 1: at 08:04

## 2024-01-01 RX ADMIN — PANTOPRAZOLE SODIUM 40 MILLIGRAM(S): 20 TABLET, DELAYED RELEASE ORAL at 06:03

## 2024-01-01 RX ADMIN — Medication 40 MILLIEQUIVALENT(S): at 15:34

## 2024-01-01 RX ADMIN — DAPAGLIFLOZIN 10 MILLIGRAM(S): 10 TABLET, FILM COATED ORAL at 09:17

## 2024-01-01 RX ADMIN — RIVAROXABAN 15 MILLIGRAM(S): KIT at 16:54

## 2024-01-01 RX ADMIN — Medication 4: at 12:38

## 2024-01-01 RX ADMIN — Medication 30 MILLIGRAM(S): at 11:58

## 2024-01-01 RX ADMIN — MORPHINE SULFATE 2 MILLIGRAM(S): 50 CAPSULE, EXTENDED RELEASE ORAL at 14:28

## 2024-01-01 RX ADMIN — Medication 50 MILLILITER(S): at 01:39

## 2024-01-01 RX ADMIN — HEPARIN SODIUM 5000 UNIT(S): 5000 INJECTION INTRAVENOUS; SUBCUTANEOUS at 06:09

## 2024-01-01 RX ADMIN — Medication 5 MG/HR: at 18:03

## 2024-01-01 RX ADMIN — ATORVASTATIN CALCIUM 80 MILLIGRAM(S): 80 TABLET, FILM COATED ORAL at 22:11

## 2024-01-01 RX ADMIN — AMIODARONE HYDROCHLORIDE 200 MILLIGRAM(S): 400 TABLET ORAL at 09:27

## 2024-01-01 RX ADMIN — Medication 1 MILLIGRAM(S): at 10:32

## 2024-01-01 RX ADMIN — Medication 650 MILLIGRAM(S): at 20:00

## 2024-01-01 RX ADMIN — ONDANSETRON 4 MILLIGRAM(S): 8 TABLET, FILM COATED ORAL at 11:44

## 2024-01-01 RX ADMIN — AMIODARONE HYDROCHLORIDE 200 MILLIGRAM(S): 400 TABLET ORAL at 09:24

## 2024-01-01 RX ADMIN — Medication 3 UNIT(S): at 13:11

## 2024-01-01 RX ADMIN — RIVAROXABAN 15 MILLIGRAM(S): KIT at 17:28

## 2024-01-01 RX ADMIN — Medication 1 MILLIGRAM(S): at 11:01

## 2024-01-01 RX ADMIN — AMIODARONE HYDROCHLORIDE 200 MILLIGRAM(S): 400 TABLET ORAL at 09:58

## 2024-01-01 RX ADMIN — AMIODARONE HYDROCHLORIDE 200 MILLIGRAM(S): 400 TABLET ORAL at 09:17

## 2024-01-01 RX ADMIN — ENOXAPARIN SODIUM 70 MILLIGRAM(S): 100 INJECTION SUBCUTANEOUS at 21:32

## 2024-01-01 RX ADMIN — Medication 2: at 21:50

## 2024-01-01 RX ADMIN — SODIUM CHLORIDE 500 MILLILITER(S): 9 INJECTION INTRAMUSCULAR; INTRAVENOUS; SUBCUTANEOUS at 14:21

## 2024-01-01 RX ADMIN — Medication 3: at 17:15

## 2024-01-01 RX ADMIN — Medication 3 MILLIGRAM(S): at 21:58

## 2024-01-01 RX ADMIN — Medication 500 MILLIGRAM(S): at 09:59

## 2024-01-01 RX ADMIN — SERTRALINE 25 MILLIGRAM(S): 25 TABLET, FILM COATED ORAL at 10:42

## 2024-01-01 RX ADMIN — RIVAROXABAN 15 MILLIGRAM(S): KIT at 16:48

## 2024-01-01 RX ADMIN — HEPARIN SODIUM 5000 UNIT(S): 5000 INJECTION INTRAVENOUS; SUBCUTANEOUS at 13:16

## 2024-01-01 RX ADMIN — Medication 20 MILLIGRAM(S): at 09:02

## 2024-01-01 RX ADMIN — Medication 3 MILLIGRAM(S): at 21:23

## 2024-01-01 RX ADMIN — Medication 180 MILLIGRAM(S): at 10:32

## 2024-01-01 RX ADMIN — AMIODARONE HYDROCHLORIDE 200 MILLIGRAM(S): 400 TABLET ORAL at 11:34

## 2024-01-01 RX ADMIN — Medication 100 MILLIGRAM(S): at 05:31

## 2024-01-01 RX ADMIN — Medication 1 DROP(S): at 09:19

## 2024-01-01 RX ADMIN — Medication 3 UNIT(S): at 08:05

## 2024-01-01 RX ADMIN — Medication 1 MILLIGRAM(S): at 09:59

## 2024-01-01 RX ADMIN — AMIODARONE HYDROCHLORIDE 400 MILLIGRAM(S): 400 TABLET ORAL at 05:39

## 2024-01-01 RX ADMIN — Medication 1 MILLIGRAM(S): at 09:42

## 2024-01-01 RX ADMIN — AMIODARONE HYDROCHLORIDE 400 MILLIGRAM(S): 400 TABLET ORAL at 18:56

## 2024-01-01 RX ADMIN — SERTRALINE 25 MILLIGRAM(S): 25 TABLET, FILM COATED ORAL at 09:58

## 2024-01-01 RX ADMIN — Medication 3: at 17:09

## 2024-01-01 RX ADMIN — Medication 1 DROP(S): at 09:14

## 2024-01-01 RX ADMIN — ATORVASTATIN CALCIUM 40 MILLIGRAM(S): 80 TABLET, FILM COATED ORAL at 21:18

## 2024-01-01 RX ADMIN — AMIODARONE HYDROCHLORIDE 200 MILLIGRAM(S): 400 TABLET ORAL at 09:12

## 2024-01-01 RX ADMIN — Medication 30 MILLIGRAM(S): at 09:14

## 2024-01-01 RX ADMIN — ATORVASTATIN CALCIUM 40 MILLIGRAM(S): 80 TABLET, FILM COATED ORAL at 21:01

## 2024-01-01 RX ADMIN — ATORVASTATIN CALCIUM 40 MILLIGRAM(S): 80 TABLET, FILM COATED ORAL at 22:42

## 2024-01-01 RX ADMIN — LIDOCAINE 1 PATCH: 4 CREAM TOPICAL at 21:30

## 2024-01-01 RX ADMIN — LIDOCAINE 1 PATCH: 4 CREAM TOPICAL at 12:11

## 2024-01-01 RX ADMIN — Medication 1 DROP(S): at 13:07

## 2024-01-01 RX ADMIN — SODIUM CHLORIDE 75 MILLILITER(S): 9 INJECTION, SOLUTION INTRAVENOUS at 16:49

## 2024-01-01 RX ADMIN — PANTOPRAZOLE SODIUM 40 MILLIGRAM(S): 20 TABLET, DELAYED RELEASE ORAL at 10:47

## 2024-01-01 RX ADMIN — Medication 3 UNIT(S): at 07:58

## 2024-01-01 RX ADMIN — Medication 2: at 11:52

## 2024-01-01 RX ADMIN — Medication 3 UNIT(S): at 13:13

## 2024-01-01 RX ADMIN — Medication 20 MILLIGRAM(S): at 13:08

## 2024-01-01 RX ADMIN — ATORVASTATIN CALCIUM 80 MILLIGRAM(S): 80 TABLET, FILM COATED ORAL at 21:27

## 2024-01-01 RX ADMIN — TRAMADOL HYDROCHLORIDE 25 MILLIGRAM(S): 50 TABLET ORAL at 11:18

## 2024-01-01 RX ADMIN — ENOXAPARIN SODIUM 70 MILLIGRAM(S): 100 INJECTION SUBCUTANEOUS at 09:39

## 2024-01-01 RX ADMIN — Medication 40 MILLIEQUIVALENT(S): at 10:29

## 2024-01-01 RX ADMIN — Medication 3 UNIT(S): at 17:28

## 2024-01-01 RX ADMIN — Medication 2: at 16:55

## 2024-01-01 RX ADMIN — PANTOPRAZOLE SODIUM 40 MILLIGRAM(S): 20 TABLET, DELAYED RELEASE ORAL at 06:04

## 2024-01-01 RX ADMIN — Medication 1000 MILLIGRAM(S): at 05:15

## 2024-01-01 RX ADMIN — Medication 180 MILLIGRAM(S): at 09:41

## 2024-01-01 RX ADMIN — Medication 30 MILLIGRAM(S): at 10:58

## 2024-01-01 RX ADMIN — Medication 2: at 17:07

## 2024-01-01 RX ADMIN — LIDOCAINE 1 PATCH: 4 CREAM TOPICAL at 09:31

## 2024-01-01 RX ADMIN — Medication 3 UNIT(S): at 09:58

## 2024-01-01 RX ADMIN — DAPAGLIFLOZIN 10 MILLIGRAM(S): 10 TABLET, FILM COATED ORAL at 09:46

## 2024-01-01 RX ADMIN — Medication 20 MILLIGRAM(S): at 09:59

## 2024-01-01 RX ADMIN — Medication 12: at 17:28

## 2024-01-01 RX ADMIN — Medication 20 MILLIGRAM(S): at 09:47

## 2024-01-01 RX ADMIN — Medication 1 MILLIGRAM(S): at 11:59

## 2024-01-01 RX ADMIN — Medication 4: at 19:27

## 2024-01-01 RX ADMIN — Medication 2: at 17:33

## 2024-01-01 RX ADMIN — Medication 30 MILLIGRAM(S): at 23:43

## 2024-01-01 RX ADMIN — Medication 3: at 17:14

## 2024-01-01 RX ADMIN — INSULIN GLARGINE 9 UNIT(S): 100 INJECTION, SOLUTION SUBCUTANEOUS at 21:43

## 2024-01-01 RX ADMIN — Medication 40 MILLIGRAM(S): at 09:13

## 2024-01-01 RX ADMIN — Medication 40 MILLIEQUIVALENT(S): at 21:43

## 2024-01-01 RX ADMIN — Medication 3 UNIT(S): at 08:16

## 2024-01-01 RX ADMIN — ATORVASTATIN CALCIUM 80 MILLIGRAM(S): 80 TABLET, FILM COATED ORAL at 21:23

## 2024-01-01 RX ADMIN — Medication 20 MILLIGRAM(S): at 09:27

## 2024-01-01 RX ADMIN — INSULIN GLARGINE 9 UNIT(S): 100 INJECTION, SOLUTION SUBCUTANEOUS at 21:23

## 2024-01-01 RX ADMIN — TRAMADOL HYDROCHLORIDE 50 MILLIGRAM(S): 50 TABLET ORAL at 15:57

## 2024-01-01 RX ADMIN — PHENYLEPHRINE HYDROCHLORIDE 7.62 MICROGRAM(S)/KG/MIN: 10 INJECTION INTRAVENOUS at 07:37

## 2024-01-01 RX ADMIN — Medication 3 UNIT(S): at 17:32

## 2024-01-01 RX ADMIN — Medication 500 MILLIGRAM(S): at 10:49

## 2024-01-01 RX ADMIN — PANTOPRAZOLE SODIUM 40 MILLIGRAM(S): 20 TABLET, DELAYED RELEASE ORAL at 05:15

## 2024-01-01 RX ADMIN — Medication 1: at 22:04

## 2024-01-01 RX ADMIN — INSULIN GLARGINE 9 UNIT(S): 100 INJECTION, SOLUTION SUBCUTANEOUS at 21:37

## 2024-01-01 RX ADMIN — Medication 6: at 17:32

## 2024-01-01 RX ADMIN — Medication 650 MILLIGRAM(S): at 14:00

## 2024-01-01 RX ADMIN — ATORVASTATIN CALCIUM 80 MILLIGRAM(S): 80 TABLET, FILM COATED ORAL at 20:31

## 2024-01-01 RX ADMIN — SODIUM CHLORIDE 1500 MILLILITER(S): 9 INJECTION INTRAMUSCULAR; INTRAVENOUS; SUBCUTANEOUS at 14:03

## 2024-01-01 RX ADMIN — Medication 2: at 17:45

## 2024-01-01 RX ADMIN — AMIODARONE HYDROCHLORIDE 400 MILLIGRAM(S): 400 TABLET ORAL at 06:57

## 2024-01-01 RX ADMIN — CEFEPIME 2000 MILLIGRAM(S): 1 INJECTION, POWDER, FOR SOLUTION INTRAMUSCULAR; INTRAVENOUS at 10:42

## 2024-01-01 RX ADMIN — RIVAROXABAN 15 MILLIGRAM(S): KIT at 17:11

## 2024-01-01 RX ADMIN — Medication 3 MILLIGRAM(S): at 21:34

## 2024-01-01 RX ADMIN — SERTRALINE 25 MILLIGRAM(S): 25 TABLET, FILM COATED ORAL at 09:16

## 2024-01-01 RX ADMIN — AMIODARONE HYDROCHLORIDE 200 MILLIGRAM(S): 400 TABLET ORAL at 09:21

## 2024-01-01 RX ADMIN — Medication 2: at 08:25

## 2024-01-01 RX ADMIN — RIVAROXABAN 15 MILLIGRAM(S): KIT at 18:22

## 2024-01-01 RX ADMIN — AMIODARONE HYDROCHLORIDE 400 MILLIGRAM(S): 400 TABLET ORAL at 17:31

## 2024-01-01 RX ADMIN — Medication 1 DROP(S): at 12:36

## 2024-01-01 RX ADMIN — RIVAROXABAN 15 MILLIGRAM(S): KIT at 17:07

## 2024-01-01 RX ADMIN — ZINC SULFATE TAB 220 MG (50 MG ZINC EQUIVALENT) 220 MILLIGRAM(S): 220 (50 ZN) TAB at 09:46

## 2024-01-01 RX ADMIN — Medication 1 TABLET(S): at 10:32

## 2024-01-01 RX ADMIN — Medication 30 MILLIGRAM(S): at 13:08

## 2024-01-01 RX ADMIN — ATORVASTATIN CALCIUM 40 MILLIGRAM(S): 80 TABLET, FILM COATED ORAL at 21:34

## 2024-01-01 RX ADMIN — Medication 650 MILLIGRAM(S): at 14:36

## 2024-01-01 RX ADMIN — Medication 2: at 12:05

## 2024-01-01 RX ADMIN — HEPARIN SODIUM 5000 UNIT(S): 5000 INJECTION INTRAVENOUS; SUBCUTANEOUS at 20:40

## 2024-01-01 RX ADMIN — PANTOPRAZOLE SODIUM 40 MILLIGRAM(S): 20 TABLET, DELAYED RELEASE ORAL at 06:00

## 2024-01-01 RX ADMIN — Medication 10 MILLIGRAM(S): at 09:46

## 2024-01-01 RX ADMIN — Medication 1 MILLIGRAM(S): at 09:46

## 2024-01-01 RX ADMIN — Medication 100 MILLIGRAM(S): at 21:29

## 2024-01-01 RX ADMIN — Medication 1 TABLET(S): at 09:02

## 2024-01-01 RX ADMIN — ATORVASTATIN CALCIUM 80 MILLIGRAM(S): 80 TABLET, FILM COATED ORAL at 21:52

## 2024-01-01 RX ADMIN — SODIUM CHLORIDE 75 MILLILITER(S): 9 INJECTION, SOLUTION INTRAVENOUS at 18:15

## 2024-01-01 RX ADMIN — POLYETHYLENE GLYCOL 3350 17 GRAM(S): 17 POWDER, FOR SOLUTION ORAL at 17:13

## 2024-01-01 RX ADMIN — ZINC SULFATE TAB 220 MG (50 MG ZINC EQUIVALENT) 220 MILLIGRAM(S): 220 (50 ZN) TAB at 09:02

## 2024-01-01 RX ADMIN — Medication 1: at 20:28

## 2024-01-01 RX ADMIN — Medication 20 MILLIGRAM(S): at 09:37

## 2024-01-01 RX ADMIN — Medication 2: at 17:34

## 2024-01-01 RX ADMIN — Medication 100 MILLIEQUIVALENT(S): at 12:13

## 2024-01-01 RX ADMIN — Medication 10 MILLIGRAM(S): at 09:31

## 2024-01-01 RX ADMIN — Medication 100 MILLIEQUIVALENT(S): at 14:08

## 2024-01-01 RX ADMIN — RIVAROXABAN 15 MILLIGRAM(S): KIT at 17:42

## 2024-01-01 RX ADMIN — PANTOPRAZOLE SODIUM 40 MILLIGRAM(S): 20 TABLET, DELAYED RELEASE ORAL at 05:20

## 2024-01-01 RX ADMIN — Medication 1 TABLET(S): at 09:31

## 2024-01-01 RX ADMIN — Medication 1 DROP(S): at 11:22

## 2024-01-01 RX ADMIN — Medication 15 MILLIGRAM(S): at 10:33

## 2024-01-01 RX ADMIN — ZINC SULFATE TAB 220 MG (50 MG ZINC EQUIVALENT) 220 MILLIGRAM(S): 220 (50 ZN) TAB at 09:32

## 2024-01-01 RX ADMIN — ATORVASTATIN CALCIUM 80 MILLIGRAM(S): 80 TABLET, FILM COATED ORAL at 21:59

## 2024-01-01 RX ADMIN — AMIODARONE HYDROCHLORIDE 200 MILLIGRAM(S): 400 TABLET ORAL at 09:32

## 2024-01-01 RX ADMIN — Medication 3 UNIT(S): at 08:20

## 2024-01-01 RX ADMIN — RIVAROXABAN 15 MILLIGRAM(S): KIT at 17:22

## 2024-01-01 RX ADMIN — Medication 1 TABLET(S): at 09:29

## 2024-01-01 RX ADMIN — Medication 1 DROP(S): at 10:00

## 2024-01-01 RX ADMIN — INSULIN GLARGINE 12 UNIT(S): 100 INJECTION, SOLUTION SUBCUTANEOUS at 21:54

## 2024-01-01 RX ADMIN — AMIODARONE HYDROCHLORIDE 400 MILLIGRAM(S): 400 TABLET ORAL at 05:42

## 2024-01-01 RX ADMIN — ATORVASTATIN CALCIUM 40 MILLIGRAM(S): 80 TABLET, FILM COATED ORAL at 21:17

## 2024-01-01 RX ADMIN — ATORVASTATIN CALCIUM 40 MILLIGRAM(S): 80 TABLET, FILM COATED ORAL at 21:43

## 2024-01-01 RX ADMIN — PANTOPRAZOLE SODIUM 40 MILLIGRAM(S): 20 TABLET, DELAYED RELEASE ORAL at 05:43

## 2024-01-01 RX ADMIN — Medication 50 MILLILITER(S): at 08:34

## 2024-01-01 RX ADMIN — Medication 2: at 17:42

## 2024-01-01 RX ADMIN — HEPARIN SODIUM 5000 UNIT(S): 5000 INJECTION INTRAVENOUS; SUBCUTANEOUS at 14:09

## 2024-01-01 RX ADMIN — Medication 40 MILLIGRAM(S): at 10:31

## 2024-01-01 RX ADMIN — Medication 2: at 12:12

## 2024-01-01 RX ADMIN — Medication 25 MILLIGRAM(S): at 13:10

## 2024-01-01 RX ADMIN — ATORVASTATIN CALCIUM 40 MILLIGRAM(S): 80 TABLET, FILM COATED ORAL at 21:52

## 2024-01-01 RX ADMIN — Medication 1: at 08:21

## 2024-01-01 RX ADMIN — Medication 20 MILLIGRAM(S): at 09:03

## 2024-01-01 RX ADMIN — Medication 400 MILLIGRAM(S): at 06:48

## 2024-01-01 RX ADMIN — Medication 125 MICROGRAM(S): at 09:46

## 2024-01-01 RX ADMIN — CHLORHEXIDINE GLUCONATE 1 APPLICATION(S): 213 SOLUTION TOPICAL at 05:30

## 2024-01-01 RX ADMIN — Medication 1 DROP(S): at 11:01

## 2024-01-01 RX ADMIN — Medication 2: at 17:22

## 2024-01-01 RX ADMIN — RIVAROXABAN 15 MILLIGRAM(S): KIT at 17:31

## 2024-01-01 RX ADMIN — SERTRALINE 25 MILLIGRAM(S): 25 TABLET, FILM COATED ORAL at 10:50

## 2024-01-01 RX ADMIN — Medication 180 MILLIGRAM(S): at 09:46

## 2024-01-01 RX ADMIN — Medication 3 UNIT(S): at 09:22

## 2024-01-01 RX ADMIN — Medication 50 MILLILITER(S): at 17:52

## 2024-01-01 RX ADMIN — SODIUM CHLORIDE 50 MILLILITER(S): 9 INJECTION, SOLUTION INTRAVENOUS at 15:41

## 2024-01-01 RX ADMIN — AMIODARONE HYDROCHLORIDE 200 MILLIGRAM(S): 400 TABLET ORAL at 09:14

## 2024-01-01 RX ADMIN — Medication 500 MICROGRAM(S): at 00:26

## 2024-01-01 RX ADMIN — Medication 1 APPLICATION(S): at 10:51

## 2024-01-01 RX ADMIN — Medication 1 MILLIGRAM(S): at 09:32

## 2024-01-01 RX ADMIN — Medication 50 MILLILITER(S): at 09:29

## 2024-01-01 RX ADMIN — CEFEPIME 2000 MILLIGRAM(S): 1 INJECTION, POWDER, FOR SOLUTION INTRAMUSCULAR; INTRAVENOUS at 10:50

## 2024-01-01 RX ADMIN — Medication 3 UNIT(S): at 17:12

## 2024-01-01 RX ADMIN — LIDOCAINE 1 PATCH: 4 CREAM TOPICAL at 19:39

## 2024-01-01 RX ADMIN — SERTRALINE 25 MILLIGRAM(S): 25 TABLET, FILM COATED ORAL at 09:32

## 2024-01-01 RX ADMIN — Medication 20 MILLIGRAM(S): at 14:58

## 2024-01-01 RX ADMIN — Medication 1 DROP(S): at 09:31

## 2024-01-01 RX ADMIN — Medication 1 DROP(S): at 11:37

## 2024-01-01 RX ADMIN — Medication 2: at 23:37

## 2024-01-01 RX ADMIN — AMIODARONE HYDROCHLORIDE 200 MILLIGRAM(S): 400 TABLET ORAL at 09:41

## 2024-01-01 RX ADMIN — Medication 100 MILLIGRAM(S): at 21:44

## 2024-01-01 RX ADMIN — Medication 1 DROP(S): at 09:58

## 2024-01-01 RX ADMIN — Medication 2: at 12:41

## 2024-01-01 RX ADMIN — Medication 4: at 17:13

## 2024-01-01 RX ADMIN — Medication 50 MILLILITER(S): at 17:31

## 2024-01-01 RX ADMIN — POLYETHYLENE GLYCOL 3350 17 GRAM(S): 17 POWDER, FOR SOLUTION ORAL at 10:21

## 2024-01-01 RX ADMIN — Medication 2: at 11:34

## 2024-01-01 RX ADMIN — Medication 30 MILLIGRAM(S): at 09:15

## 2024-01-01 RX ADMIN — Medication 10 MILLIGRAM(S): at 09:15

## 2024-01-01 RX ADMIN — Medication 1 MILLIGRAM(S): at 10:01

## 2024-01-01 RX ADMIN — PANTOPRAZOLE SODIUM 40 MILLIGRAM(S): 20 TABLET, DELAYED RELEASE ORAL at 05:47

## 2024-01-01 RX ADMIN — Medication 3 UNIT(S): at 08:52

## 2024-01-01 RX ADMIN — PANTOPRAZOLE SODIUM 40 MILLIGRAM(S): 20 TABLET, DELAYED RELEASE ORAL at 12:32

## 2024-01-01 RX ADMIN — Medication 15 MILLIGRAM(S): at 09:31

## 2024-01-01 RX ADMIN — Medication 1 DROP(S): at 10:42

## 2024-01-01 RX ADMIN — AMIODARONE HYDROCHLORIDE 200 MILLIGRAM(S): 400 TABLET ORAL at 22:11

## 2024-01-01 RX ADMIN — AMIODARONE HYDROCHLORIDE 400 MILLIGRAM(S): 400 TABLET ORAL at 13:13

## 2024-01-01 RX ADMIN — Medication 10 MILLIGRAM(S): at 17:31

## 2024-01-01 RX ADMIN — Medication 20 MILLIGRAM(S): at 09:58

## 2024-01-01 RX ADMIN — Medication 30 MILLIGRAM(S): at 09:57

## 2024-01-01 RX ADMIN — Medication 3 MILLIGRAM(S): at 21:30

## 2024-01-01 RX ADMIN — Medication 1 DROP(S): at 09:28

## 2024-01-01 RX ADMIN — ONDANSETRON 4 MILLIGRAM(S): 8 TABLET, FILM COATED ORAL at 02:27

## 2024-01-01 RX ADMIN — Medication 180 MILLIGRAM(S): at 10:42

## 2024-01-01 RX ADMIN — Medication 1 DROP(S): at 10:09

## 2024-01-01 RX ADMIN — PANTOPRAZOLE SODIUM 40 MILLIGRAM(S): 20 TABLET, DELAYED RELEASE ORAL at 06:15

## 2024-01-01 RX ADMIN — SODIUM CHLORIDE 666.67 MILLILITER(S): 9 INJECTION INTRAMUSCULAR; INTRAVENOUS; SUBCUTANEOUS at 14:28

## 2024-01-01 RX ADMIN — Medication 40 MILLIGRAM(S): at 09:29

## 2024-01-01 RX ADMIN — CEFEPIME 2000 MILLIGRAM(S): 1 INJECTION, POWDER, FOR SOLUTION INTRAMUSCULAR; INTRAVENOUS at 09:39

## 2024-01-01 RX ADMIN — Medication 25 MILLIGRAM(S): at 13:50

## 2024-01-01 RX ADMIN — Medication 650 MILLIGRAM(S): at 11:21

## 2024-01-01 RX ADMIN — Medication 1 MILLIGRAM(S): at 13:08

## 2024-01-01 RX ADMIN — Medication 100 MILLIGRAM(S): at 10:33

## 2024-01-01 RX ADMIN — ATORVASTATIN CALCIUM 40 MILLIGRAM(S): 80 TABLET, FILM COATED ORAL at 21:23

## 2024-01-01 RX ADMIN — ZINC SULFATE TAB 220 MG (50 MG ZINC EQUIVALENT) 220 MILLIGRAM(S): 220 (50 ZN) TAB at 09:31

## 2024-01-01 RX ADMIN — Medication 3 UNIT(S): at 18:00

## 2024-01-01 RX ADMIN — Medication 20 MILLIGRAM(S): at 09:15

## 2024-01-01 RX ADMIN — Medication 17.5 MILLIGRAM(S): at 09:20

## 2024-01-01 RX ADMIN — Medication 100 MILLIGRAM(S): at 13:19

## 2024-01-01 RX ADMIN — Medication 1 MILLIGRAM(S): at 10:42

## 2024-01-01 RX ADMIN — ATORVASTATIN CALCIUM 80 MILLIGRAM(S): 80 TABLET, FILM COATED ORAL at 22:07

## 2024-01-01 RX ADMIN — ZINC SULFATE TAB 220 MG (50 MG ZINC EQUIVALENT) 220 MILLIGRAM(S): 220 (50 ZN) TAB at 10:07

## 2024-01-01 RX ADMIN — Medication 100 MILLIEQUIVALENT(S): at 14:11

## 2024-01-01 RX ADMIN — Medication 1: at 08:08

## 2024-01-01 RX ADMIN — ATORVASTATIN CALCIUM 40 MILLIGRAM(S): 80 TABLET, FILM COATED ORAL at 21:37

## 2024-01-01 RX ADMIN — Medication 10 MILLIGRAM(S): at 18:57

## 2024-01-01 RX ADMIN — AMIODARONE HYDROCHLORIDE 200 MILLIGRAM(S): 400 TABLET ORAL at 10:20

## 2024-01-01 RX ADMIN — Medication 50 MILLIGRAM(S): at 10:01

## 2024-01-01 RX ADMIN — Medication 50 MILLILITER(S): at 13:26

## 2024-01-01 RX ADMIN — Medication 650 MILLIGRAM(S): at 13:28

## 2024-01-01 RX ADMIN — HEPARIN SODIUM 5000 UNIT(S): 5000 INJECTION INTRAVENOUS; SUBCUTANEOUS at 05:43

## 2024-01-01 RX ADMIN — Medication 100 MILLIEQUIVALENT(S): at 13:12

## 2024-01-01 RX ADMIN — ONDANSETRON 4 MILLIGRAM(S): 8 TABLET, FILM COATED ORAL at 17:00

## 2024-01-01 RX ADMIN — Medication 1 DROP(S): at 11:13

## 2024-01-01 RX ADMIN — Medication 2: at 12:44

## 2024-01-01 RX ADMIN — Medication 20 MILLIGRAM(S): at 18:22

## 2024-01-01 RX ADMIN — PANTOPRAZOLE SODIUM 40 MILLIGRAM(S): 20 TABLET, DELAYED RELEASE ORAL at 05:23

## 2024-01-01 RX ADMIN — Medication 1: at 08:16

## 2024-01-01 RX ADMIN — Medication 100 MILLIGRAM(S): at 09:37

## 2024-01-01 RX ADMIN — Medication 1: at 07:48

## 2024-01-01 RX ADMIN — Medication 30 MILLIGRAM(S): at 09:17

## 2024-01-01 RX ADMIN — AMIODARONE HYDROCHLORIDE 200 MILLIGRAM(S): 400 TABLET ORAL at 10:08

## 2024-01-01 RX ADMIN — Medication 3 UNIT(S): at 12:38

## 2024-01-01 RX ADMIN — Medication 5 MILLIGRAM(S): at 14:04

## 2024-01-01 RX ADMIN — AMIODARONE HYDROCHLORIDE 200 MILLIGRAM(S): 400 TABLET ORAL at 09:57

## 2024-01-01 RX ADMIN — Medication 4: at 12:51

## 2024-01-01 RX ADMIN — Medication 125 MICROGRAM(S): at 09:25

## 2024-01-01 RX ADMIN — SERTRALINE 25 MILLIGRAM(S): 25 TABLET, FILM COATED ORAL at 09:46

## 2024-01-01 RX ADMIN — Medication 1 MILLIGRAM(S): at 10:30

## 2024-01-01 RX ADMIN — Medication 3 UNIT(S): at 08:45

## 2024-01-01 RX ADMIN — Medication 1: at 08:29

## 2024-01-01 RX ADMIN — INSULIN GLARGINE 9 UNIT(S): 100 INJECTION, SOLUTION SUBCUTANEOUS at 21:48

## 2024-01-01 RX ADMIN — Medication 20 MILLIGRAM(S): at 10:42

## 2024-01-01 RX ADMIN — Medication 1 DROP(S): at 09:18

## 2024-01-01 RX ADMIN — Medication 1: at 16:49

## 2024-01-01 RX ADMIN — AMIODARONE HYDROCHLORIDE 200 MILLIGRAM(S): 400 TABLET ORAL at 09:30

## 2024-01-01 RX ADMIN — Medication 1 TABLET(S): at 10:47

## 2024-01-01 RX ADMIN — AMIODARONE HYDROCHLORIDE 200 MILLIGRAM(S): 400 TABLET ORAL at 11:01

## 2024-01-01 RX ADMIN — Medication 500 MILLIGRAM(S): at 10:21

## 2024-01-01 RX ADMIN — Medication 1 MILLIGRAM(S): at 09:17

## 2024-01-01 RX ADMIN — ATORVASTATIN CALCIUM 80 MILLIGRAM(S): 80 TABLET, FILM COATED ORAL at 23:10

## 2024-01-01 RX ADMIN — Medication 30 MILLIGRAM(S): at 11:54

## 2024-01-01 RX ADMIN — Medication 4: at 16:55

## 2024-01-01 RX ADMIN — Medication 1 MILLIGRAM(S): at 10:21

## 2024-01-01 RX ADMIN — RIVAROXABAN 15 MILLIGRAM(S): KIT at 17:39

## 2024-01-01 RX ADMIN — INSULIN GLARGINE 9 UNIT(S): 100 INJECTION, SOLUTION SUBCUTANEOUS at 21:21

## 2024-01-01 RX ADMIN — Medication 1 MILLIGRAM(S): at 09:27

## 2024-01-01 RX ADMIN — ATORVASTATIN CALCIUM 80 MILLIGRAM(S): 80 TABLET, FILM COATED ORAL at 23:38

## 2024-01-01 RX ADMIN — MORPHINE SULFATE 2 MILLIGRAM(S): 50 CAPSULE, EXTENDED RELEASE ORAL at 10:45

## 2024-01-01 RX ADMIN — Medication 125 MICROGRAM(S): at 06:19

## 2024-01-01 RX ADMIN — CHLORHEXIDINE GLUCONATE 1 APPLICATION(S): 213 SOLUTION TOPICAL at 05:53

## 2024-01-01 RX ADMIN — SODIUM CHLORIDE 333.33 MILLILITER(S): 9 INJECTION INTRAMUSCULAR; INTRAVENOUS; SUBCUTANEOUS at 20:41

## 2024-01-01 RX ADMIN — Medication 1 TABLET(S): at 09:32

## 2024-01-01 RX ADMIN — Medication 20 MILLIGRAM(S): at 09:31

## 2024-01-01 RX ADMIN — SODIUM CHLORIDE 1000 MILLILITER(S): 9 INJECTION, SOLUTION INTRAVENOUS at 21:25

## 2024-01-01 RX ADMIN — Medication 30 MILLIGRAM(S): at 09:25

## 2024-01-01 RX ADMIN — Medication 100 MILLIGRAM(S): at 21:54

## 2024-01-01 RX ADMIN — AMIODARONE HYDROCHLORIDE 200 MILLIGRAM(S): 400 TABLET ORAL at 22:17

## 2024-01-01 RX ADMIN — Medication 1: at 21:28

## 2024-01-01 RX ADMIN — Medication 500 MILLIGRAM(S): at 09:47

## 2024-01-01 RX ADMIN — Medication 25 MILLIGRAM(S): at 06:57

## 2024-01-01 RX ADMIN — Medication 100 MILLIGRAM(S): at 05:34

## 2024-01-01 RX ADMIN — Medication 3 MILLIGRAM(S): at 21:50

## 2024-01-01 RX ADMIN — Medication 650 MILLIGRAM(S): at 12:19

## 2024-01-01 RX ADMIN — Medication 100 MILLIGRAM(S): at 09:03

## 2024-01-01 RX ADMIN — CEFEPIME 2000 MILLIGRAM(S): 1 INJECTION, POWDER, FOR SOLUTION INTRAMUSCULAR; INTRAVENOUS at 21:32

## 2024-01-01 RX ADMIN — RIVAROXABAN 15 MILLIGRAM(S): KIT at 16:50

## 2024-01-01 RX ADMIN — AMIODARONE HYDROCHLORIDE 400 MILLIGRAM(S): 400 TABLET ORAL at 17:13

## 2024-01-01 RX ADMIN — Medication 1: at 12:34

## 2024-01-01 RX ADMIN — Medication 5 MILLIGRAM(S): at 17:13

## 2024-01-01 RX ADMIN — Medication 4: at 12:21

## 2024-01-01 RX ADMIN — Medication 1 DROP(S): at 11:10

## 2024-01-01 RX ADMIN — ZINC SULFATE TAB 220 MG (50 MG ZINC EQUIVALENT) 220 MILLIGRAM(S): 220 (50 ZN) TAB at 10:50

## 2024-01-01 RX ADMIN — Medication 4 UNIT(S): at 17:43

## 2024-01-01 RX ADMIN — BUMETANIDE 0.5 MILLIGRAM(S): 0.25 INJECTION INTRAMUSCULAR; INTRAVENOUS at 05:48

## 2024-01-01 RX ADMIN — Medication 100 MILLIGRAM(S): at 21:01

## 2024-01-01 RX ADMIN — Medication 3 UNIT(S): at 16:52

## 2024-01-01 RX ADMIN — Medication 2: at 13:15

## 2024-01-01 RX ADMIN — Medication 3: at 16:27

## 2024-01-01 RX ADMIN — Medication 3 UNIT(S): at 17:48

## 2024-01-01 RX ADMIN — Medication 1 TABLET(S): at 09:43

## 2024-01-01 RX ADMIN — Medication 500 MILLIGRAM(S): at 10:41

## 2024-01-01 RX ADMIN — SODIUM CHLORIDE 500 MILLILITER(S): 9 INJECTION, SOLUTION INTRAVENOUS at 19:16

## 2024-01-01 RX ADMIN — BUMETANIDE 0.5 MILLIGRAM(S): 0.25 INJECTION INTRAMUSCULAR; INTRAVENOUS at 10:29

## 2024-01-01 RX ADMIN — AMIODARONE HYDROCHLORIDE 200 MILLIGRAM(S): 400 TABLET ORAL at 09:03

## 2024-01-01 RX ADMIN — Medication 240 MILLIGRAM(S): at 09:24

## 2024-01-01 RX ADMIN — Medication 3 UNIT(S): at 11:48

## 2024-01-01 RX ADMIN — CEFEPIME 2000 MILLIGRAM(S): 1 INJECTION, POWDER, FOR SOLUTION INTRAMUSCULAR; INTRAVENOUS at 14:05

## 2024-01-01 RX ADMIN — Medication 100 MILLIGRAM(S): at 09:30

## 2024-01-01 RX ADMIN — PHENYLEPHRINE HYDROCHLORIDE 7.62 MICROGRAM(S)/KG/MIN: 10 INJECTION INTRAVENOUS at 04:36

## 2024-01-01 RX ADMIN — PANTOPRAZOLE SODIUM 40 MILLIGRAM(S): 20 TABLET, DELAYED RELEASE ORAL at 10:20

## 2024-01-01 RX ADMIN — Medication 250 MILLIGRAM(S): at 14:29

## 2024-01-01 RX ADMIN — Medication 1 DROP(S): at 12:02

## 2024-01-01 RX ADMIN — PHENYLEPHRINE HYDROCHLORIDE 7.62 MICROGRAM(S)/KG/MIN: 10 INJECTION INTRAVENOUS at 03:42

## 2024-01-01 RX ADMIN — Medication 25 MILLIGRAM(S): at 14:15

## 2024-01-01 RX ADMIN — Medication 400 MILLIGRAM(S): at 23:27

## 2024-01-01 RX ADMIN — SERTRALINE 25 MILLIGRAM(S): 25 TABLET, FILM COATED ORAL at 10:32

## 2024-01-01 RX ADMIN — Medication 3 UNIT(S): at 08:50

## 2024-01-01 RX ADMIN — ENOXAPARIN SODIUM 70 MILLIGRAM(S): 100 INJECTION SUBCUTANEOUS at 21:40

## 2024-01-01 RX ADMIN — Medication 2: at 13:11

## 2024-01-01 RX ADMIN — RIVAROXABAN 15 MILLIGRAM(S): KIT at 17:44

## 2024-01-01 RX ADMIN — Medication 1 DROP(S): at 10:35

## 2024-01-01 RX ADMIN — Medication 1 DROP(S): at 09:59

## 2024-01-01 RX ADMIN — LIDOCAINE 1 PATCH: 4 CREAM TOPICAL at 10:31

## 2024-01-01 RX ADMIN — INSULIN GLARGINE 5 UNIT(S): 100 INJECTION, SOLUTION SUBCUTANEOUS at 21:01

## 2024-01-01 RX ADMIN — Medication 2: at 12:14

## 2024-01-01 RX ADMIN — Medication 40 MILLIEQUIVALENT(S): at 18:34

## 2024-01-01 RX ADMIN — MORPHINE SULFATE 2 MILLIGRAM(S): 50 CAPSULE, EXTENDED RELEASE ORAL at 10:50

## 2024-01-01 RX ADMIN — Medication 4 UNIT(S): at 12:24

## 2024-01-01 RX ADMIN — Medication 100 MILLIGRAM(S): at 09:16

## 2024-01-01 RX ADMIN — Medication 3 MILLIGRAM(S): at 20:39

## 2024-01-01 RX ADMIN — ONDANSETRON 4 MILLIGRAM(S): 8 TABLET, FILM COATED ORAL at 22:51

## 2024-01-01 RX ADMIN — SODIUM CHLORIDE 500 MILLILITER(S): 9 INJECTION, SOLUTION INTRAVENOUS at 17:20

## 2024-01-01 RX ADMIN — HEPARIN SODIUM 5000 UNIT(S): 5000 INJECTION INTRAVENOUS; SUBCUTANEOUS at 21:43

## 2024-01-01 RX ADMIN — PANTOPRAZOLE SODIUM 40 MILLIGRAM(S): 20 TABLET, DELAYED RELEASE ORAL at 05:59

## 2024-01-01 RX ADMIN — Medication 100 MILLIGRAM(S): at 09:59

## 2024-01-01 RX ADMIN — Medication 17.5 MILLIGRAM(S): at 10:36

## 2024-01-01 RX ADMIN — Medication 1 DROP(S): at 10:36

## 2024-01-01 RX ADMIN — Medication 20 MILLIGRAM(S): at 09:30

## 2024-01-01 RX ADMIN — POLYETHYLENE GLYCOL 3350 17 GRAM(S): 17 POWDER, FOR SOLUTION ORAL at 09:21

## 2024-01-01 RX ADMIN — SODIUM CHLORIDE 500 MILLILITER(S): 9 INJECTION INTRAMUSCULAR; INTRAVENOUS; SUBCUTANEOUS at 22:20

## 2024-01-01 RX ADMIN — Medication 100 MILLIGRAM(S): at 13:08

## 2024-01-01 RX ADMIN — PANTOPRAZOLE SODIUM 40 MILLIGRAM(S): 20 TABLET, DELAYED RELEASE ORAL at 06:07

## 2024-01-01 RX ADMIN — Medication 40 MILLIGRAM(S): at 10:05

## 2024-01-01 RX ADMIN — Medication 100 MILLIEQUIVALENT(S): at 12:45

## 2024-01-01 RX ADMIN — Medication 4 UNIT(S): at 12:28

## 2024-01-01 RX ADMIN — POLYETHYLENE GLYCOL 3350 17 GRAM(S): 17 POWDER, FOR SOLUTION ORAL at 10:32

## 2024-01-01 RX ADMIN — Medication 1 DROP(S): at 09:33

## 2024-01-01 RX ADMIN — Medication 4: at 17:34

## 2024-01-01 RX ADMIN — SODIUM CHLORIDE 500 MILLILITER(S): 9 INJECTION INTRAMUSCULAR; INTRAVENOUS; SUBCUTANEOUS at 00:31

## 2024-01-01 RX ADMIN — Medication 1: at 08:40

## 2024-01-01 RX ADMIN — Medication 1 DROP(S): at 09:15

## 2024-01-01 RX ADMIN — Medication 1 MILLIGRAM(S): at 10:07

## 2024-01-01 RX ADMIN — Medication 1: at 12:21

## 2024-01-01 RX ADMIN — Medication 1 MILLIGRAM(S): at 09:14

## 2024-01-01 RX ADMIN — Medication 4 UNIT(S): at 08:13

## 2024-01-01 RX ADMIN — AMIODARONE HYDROCHLORIDE 200 MILLIGRAM(S): 400 TABLET ORAL at 11:59

## 2024-01-01 RX ADMIN — Medication 17.5 MILLIGRAM(S): at 10:08

## 2024-01-01 RX ADMIN — Medication 3 UNIT(S): at 16:50

## 2024-01-01 RX ADMIN — Medication 100 MILLIGRAM(S): at 21:28

## 2024-01-01 RX ADMIN — Medication 500 MILLIGRAM(S): at 10:07

## 2024-01-01 RX ADMIN — Medication 500 MILLIGRAM(S): at 09:03

## 2024-01-01 RX ADMIN — PROPOFOL 4.06 MICROGRAM(S)/KG/MIN: 10 INJECTION, EMULSION INTRAVENOUS at 06:43

## 2024-01-01 RX ADMIN — AMIODARONE HYDROCHLORIDE 200 MILLIGRAM(S): 400 TABLET ORAL at 09:47

## 2024-01-01 RX ADMIN — AMIODARONE HYDROCHLORIDE 200 MILLIGRAM(S): 400 TABLET ORAL at 09:42

## 2024-01-01 RX ADMIN — Medication 2: at 09:01

## 2024-01-01 RX ADMIN — INSULIN GLARGINE 12 UNIT(S): 100 INJECTION, SOLUTION SUBCUTANEOUS at 23:09

## 2024-01-01 RX ADMIN — Medication 4: at 17:04

## 2024-01-01 RX ADMIN — SERTRALINE 25 MILLIGRAM(S): 25 TABLET, FILM COATED ORAL at 10:37

## 2024-01-01 RX ADMIN — Medication 20 MILLIGRAM(S): at 13:31

## 2024-01-01 RX ADMIN — DAPAGLIFLOZIN 10 MILLIGRAM(S): 10 TABLET, FILM COATED ORAL at 09:12

## 2024-01-01 RX ADMIN — Medication 100 MILLIGRAM(S): at 21:52

## 2024-01-01 RX ADMIN — Medication 3 MILLIGRAM(S): at 21:01

## 2024-01-01 RX ADMIN — Medication 1000 MILLIGRAM(S): at 22:18

## 2024-01-01 RX ADMIN — Medication 20 MILLIGRAM(S): at 05:15

## 2024-01-01 RX ADMIN — POLYETHYLENE GLYCOL 3350 17 GRAM(S): 17 POWDER, FOR SOLUTION ORAL at 09:13

## 2024-01-01 RX ADMIN — ATORVASTATIN CALCIUM 80 MILLIGRAM(S): 80 TABLET, FILM COATED ORAL at 21:53

## 2024-01-01 RX ADMIN — Medication 500 MILLIGRAM(S): at 09:20

## 2024-01-01 RX ADMIN — SERTRALINE 25 MILLIGRAM(S): 25 TABLET, FILM COATED ORAL at 09:37

## 2024-01-01 RX ADMIN — AMIODARONE HYDROCHLORIDE 400 MILLIGRAM(S): 400 TABLET ORAL at 06:06

## 2024-01-01 RX ADMIN — INSULIN GLARGINE 9 UNIT(S): 100 INJECTION, SOLUTION SUBCUTANEOUS at 21:50

## 2024-01-01 RX ADMIN — ENOXAPARIN SODIUM 70 MILLIGRAM(S): 100 INJECTION SUBCUTANEOUS at 21:28

## 2024-01-01 RX ADMIN — CEFEPIME 2000 MILLIGRAM(S): 1 INJECTION, POWDER, FOR SOLUTION INTRAMUSCULAR; INTRAVENOUS at 10:26

## 2024-01-01 RX ADMIN — Medication 2: at 18:00

## 2024-01-01 RX ADMIN — Medication 3 MILLIGRAM(S): at 21:03

## 2024-01-01 RX ADMIN — Medication 2: at 23:34

## 2024-01-01 RX ADMIN — Medication 5 MILLIGRAM(S): at 14:15

## 2024-01-01 RX ADMIN — AMIODARONE HYDROCHLORIDE 200 MILLIGRAM(S): 400 TABLET ORAL at 21:53

## 2024-01-01 RX ADMIN — CHLORHEXIDINE GLUCONATE 1 APPLICATION(S): 213 SOLUTION TOPICAL at 05:24

## 2024-01-01 RX ADMIN — AMIODARONE HYDROCHLORIDE 200 MILLIGRAM(S): 400 TABLET ORAL at 09:16

## 2024-01-01 RX ADMIN — INSULIN GLARGINE 9 UNIT(S): 100 INJECTION, SOLUTION SUBCUTANEOUS at 21:18

## 2024-01-01 RX ADMIN — Medication 6: at 17:33

## 2024-01-01 RX ADMIN — Medication 500 MILLIGRAM(S): at 09:30

## 2024-01-01 RX ADMIN — Medication 100 MILLIGRAM(S): at 21:32

## 2024-01-01 RX ADMIN — Medication 3 UNIT(S): at 12:28

## 2024-01-01 RX ADMIN — Medication 1: at 08:39

## 2024-01-01 RX ADMIN — Medication 100 MILLIGRAM(S): at 22:15

## 2024-01-01 RX ADMIN — PANTOPRAZOLE SODIUM 40 MILLIGRAM(S): 20 TABLET, DELAYED RELEASE ORAL at 06:40

## 2024-01-01 RX ADMIN — Medication 100 MILLIGRAM(S): at 09:47

## 2024-01-01 RX ADMIN — Medication 3 UNIT(S): at 16:54

## 2024-01-01 RX ADMIN — Medication 1000 MILLIGRAM(S): at 00:00

## 2024-01-01 RX ADMIN — MORPHINE SULFATE 2 MILLIGRAM(S): 50 CAPSULE, EXTENDED RELEASE ORAL at 11:05

## 2024-03-18 NOTE — ED PROVIDER NOTE - CARE PLAN
Principal Discharge DX:	Generalized weakness  Secondary Diagnosis:	Atrial fibrillation and flutter  Secondary Diagnosis:	Lower extremity edema   1

## 2024-03-18 NOTE — ED ADULT NURSE NOTE - NSFALLHARMRISKINTERV_ED_ALL_ED
Assistance OOB with selected safe patient handling equipment if applicable/Assistance with ambulation/Communicate risk of Fall with Harm to all staff, patient, and family/Monitor gait and stability/Provide visual cue: red socks, yellow wristband, yellow gown, etc/Reinforce activity limits and safety measures with patient and family/Bed in lowest position, wheels locked, appropriate side rails in place/Call bell, personal items and telephone in reach/Instruct patient to call for assistance before getting out of bed/chair/stretcher/Non-slip footwear applied when patient is off stretcher/[place] to call system/Physically safe environment - no spills, clutter or unnecessary equipment/Purposeful Proactive Rounding/Room/bathroom lighting operational, light cord in reach

## 2024-03-18 NOTE — ED ADULT NURSE REASSESSMENT NOTE - NS ED NURSE REASSESS COMMENT FT1
MD Cowan aware of patient's current HR in the 120's to 140's, a-fib. /67. Per MD, administer a 250 cc NS bolus stat and monitor BP and HR. Notify MD after bolus complete and repeat VS obtained. Patient asymptomatic, resting comfortably in bed, son-in-law at bedside, will continue to monitor.

## 2024-03-18 NOTE — ED PROVIDER NOTE - OBJECTIVE STATEMENT
89 y/o female with PMHx of temporal arteritis presents to the ED c/o weakness and lower extremity edema since starting steroids (Prednisone) for temporal arteritis, diagnosed at [facility] hospitalization ~2 weeks ago for symptoms including left eye vision changes. Today was unable to get out of bed and unable to walk, previously ambulatory with walker. Denies falls, headstrike, fever, chills, urinary complaints.

## 2024-03-18 NOTE — ED ADULT NURSE REASSESSMENT NOTE - NS ED NURSE REASSESS COMMENT FT1
Received patient AxOx4, son-in-law at bedside, supportive and actively involved with patient's healthcare. Patient with A-fib on the monitor to the 130's, all other VS stable. Cardizem 10 mg IVP administered, patient tolerated well with improvement noted to HR. Denies chest pain or palpitations. Patient and son-in-law updated on plan of care, purewick applied, comfort and safety measures maintained. Stretcher locked and in lowest position, comfort and safety measures maintained, will continue to monitor.

## 2024-03-18 NOTE — PHARMACOTHERAPY INTERVENTION NOTE - COMMENTS
Medication history complete, reviewed medication with patients family at bedside and list provided and confirmed with DrFirst.

## 2024-03-18 NOTE — ED ADULT NURSE REASSESSMENT NOTE - NS ED NURSE REASSESS COMMENT FT1
Patient with large hematoma to right hip and upper thigh. Per patient and son-in-law, patient fell two nights ago trying to get up and put hearing aides away. Per son, Patient able to ambulate with walker after the incident. Patient denies pain to area. MD Sexton aware and at bedside. Awaiting new orders. Will continue to monitor.

## 2024-03-18 NOTE — H&P ADULT - ASSESSMENT
89 y/o F w/ PMH of temporal arteritis (on steroids), a-fib (on xarelto), DM2 p/w weakness. Patient has been having difficulty walking due to B/L LE edema    *A-fib w/ RVR  -On AC   -Troponin negative x 2  -CT: Fat attenuation within L ventricle compatible w/ prior infarct  -F/u Cardio   -TSH = 0.9   -K / Mg WNL   -Given that BP is borderline, will start Digoxin now (renally dosed)     *R hip bruising / Hematoma s/p fall   -Will get CT abd / pelvis for further evaluation  -Trend H/H   -PT consult  -Fall precautions     *B/L LE edema  -Denies previous h/o CHF, but patient is on torsemide.   -ProBNP = 3280  -Cardio consult   -No respiratory distress  -US of LEs - no DVT     *Lymphadenopathy / Pulm nodules   CT Chest: Right paratracheal and right hilar lymphadenopathy, likely neoplastic. RLL 5mm solid nodule RUL 4mm ill-defined nodule. Trace R pleural effusion vs thickening   -Pulm consult  -Onc consult      *Thrombocytopenia   -Patient is on Eliquis, will f/u heme recommendations for whether patient continue in the setting of thrombocytopenia     *Renal cysts  -F/u outpatient     *DM2  -Humalog ISS  -HbA1c    *Temporal arterities  -On steroids     *Advance Directives  -Denies having any advance care directives in place     *DVT ppx   -On xarelto     >75 mins required for admission       89 y/o F w/ PMH of temporal arteritis (on steroids), a-fib (on xarelto), DM2 p/w weakness. Patient has been having difficulty walking due to B/L LE edema    *A-fib w/ RVR  -On Xarelto   -Troponin negative x 2  -CT: Fat attenuation within L ventricle compatible w/ prior infarct  -F/u Cardio   -TSH = 0.9   -K / Mg WNL   -Given that BP is borderline, will start Digoxin now (renally dosed) and f/u cardio recommendations     *R hip bruising / Hematoma s/p fall   -Will get CT abd / pelvis for further evaluation  -Trend H/H   -PT consult  -Fall precautions     *B/L LE edema  -Denies previous h/o CHF, but patient is on torsemide.   -ProBNP = 3280  -Cardio consult   -No respiratory distress  -US of LEs - no DVT     *Lymphadenopathy / Pulm nodules   CT Chest: Right paratracheal and right hilar lymphadenopathy, likely neoplastic. RLL 5mm solid nodule RUL 4mm ill-defined nodule. Trace R pleural effusion vs thickening   -Pulm consult  -Onc consult      *Thrombocytopenia   -Patient is on Eliquis, will f/u heme recommendations for whether patient continue in the setting of thrombocytopenia     *Renal cysts  -F/u outpatient     *DM2  -Humalog ISS  -HbA1c    *Temporal arterities  -On steroids     *Advance Directives  -Denies having any advance care directives in place     *DVT ppx   -On xarelto     >75 mins required for admission

## 2024-03-18 NOTE — ED PROVIDER NOTE - CLINICAL SUMMARY MEDICAL DECISION MAKING FREE TEXT BOX
89 y/o female with generalized weakness, worsening on chronic steroids now unable to get out of bed and walk. Screening EKG, CXR, labs, urine, and admit. 89 y/o female with generalized weakness, worsening on chronic steroids now unable to get out of bed and walk. Screening EKG, CXR, labs, urine, and admit.    Raheem WINTER: patient with afib RVR; per son- patient with hx of same; on xeralto for anticoagulation. [facility] report from [place] on 3/12/24 reviewed with 99% burden of afib with average heart rate: 135; patient pending work up at this time.       Raheem DO: patient to be admitted to medicine- Dr. Fulton aware.

## 2024-03-18 NOTE — ED ADULT NURSE NOTE - OBJECTIVE STATEMENT
Pt is 87yo F, A&Ox3 who presents to ED with c/o weakness. Pt also reports she has been unable to ambulate these last few days due to weeping edema. Pt denies SOB, chest pain, dizziness. hx of afib. B/L lower extremities pitting edema noted. Pt does not appear in distress at this time.

## 2024-03-18 NOTE — ED ADULT TRIAGE NOTE - CHIEF COMPLAINT QUOTE
Pt BIBEMS from home, c/o generalized weakness and inability to ambulate x2-3 weeks secondary to b/l weeping pitting edema. Denies recent falls or other complaints.

## 2024-03-18 NOTE — H&P ADULT - HISTORY OF PRESENT ILLNESS
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 Patient has been having difficulty walking due to B/L LE edema. States that LE edema has been worsening since she was started on steroids for temporal arterities about 1 month ago. States that yesterday patient had a fall where she lost her balance and fell on R side, and sustained R hip bruising / hematoma. Today patient had another fall, but family member helped her to a chair before she hit the floor. Patient denies CP, SOB, cough, runny nose, sore throat, nausea, vomiting, abdominal pain, fever, chills      PSH: Denies     Social Hx: Denies tobacco / etoh / drugs     Family Hx: Denies pertinent hx

## 2024-03-18 NOTE — ED PROVIDER NOTE - CARDIAC, MLM
Normal rate, regular rhythm.  Heart sounds S1, S2.  No murmurs, rubs or gallops. Irregularly irregular.  Heart sounds S1, S2.  No murmurs, rubs or gallops.

## 2024-03-19 NOTE — PHYSICAL THERAPY INITIAL EVALUATION ADULT - CRITERIA FOR SKILLED THERAPEUTIC INTERVENTIONS
will attempt completion of Eval 2 later date; pt currently with elevated HR: 120's-150's; further course of PT intervention pending

## 2024-03-19 NOTE — CONSULT NOTE ADULT - SUBJECTIVE AND OBJECTIVE BOX
89 y/o Female EP consulted for At. FIB with RVR. (on both Xarelto and Amiodarone 400 mgs daily.  HPI:  Pt has been complaining of weakness for 2-3 weeks, difficulty ambulating and lower leg edema.  The day prior to admission pt lost her balance and hurt her right hip.  CT showed hematoma of right lateral hip.  Today she continues to be in At. FIB with RVR and was given Digoxin 500 ucg.    PMH of temporal arteritis (on steroids), a-fib (on xarelto), DM2 . Patient has been having difficulty walking due to B/L LE edema. States that LE edema has been worsening since she was started on steroids for temporal arteritis about 1 month ago.a. Today patient had another fall, but family member helped her to a chair before she hit the floor. Patient denies CP, SOB, cough, runny nose, sore throat, nausea, vomiting, abdominal pain, fever, chills      PSH: Denies     Social Hx: Denies tobacco / etoh / drugs     Family Hx: Denies pertinent hx  (18 Mar 2024 23:17)            MEDICATIONS  (STANDING):  aMIOdarone    Tablet 400 milliGRAM(s) Oral daily  atorvastatin 80 milliGRAM(s) Oral at bedtime  buMETAnide Injectable 0.5 milliGRAM(s) IV Push once  dextrose 5%. 1000 milliLiter(s) (100 mL/Hr) IV Continuous <Continuous>  dextrose 5%. 1000 milliLiter(s) (50 mL/Hr) IV Continuous <Continuous>  dextrose 50% Injectable 25 Gram(s) IV Push once  dextrose 50% Injectable 12.5 Gram(s) IV Push once  dextrose 50% Injectable 25 Gram(s) IV Push once  folic acid 1 milliGRAM(s) Oral daily  glucagon  Injectable 1 milliGRAM(s) IntraMuscular once  insulin lispro (ADMELOG) corrective regimen sliding scale   SubCutaneous three times a day before meals  insulin lispro (ADMELOG) corrective regimen sliding scale   SubCutaneous at bedtime  metoprolol tartrate 25 milliGRAM(s) Oral every 8 hours  pantoprazole    Tablet 40 milliGRAM(s) Oral before breakfast  predniSONE   Tablet 40 milliGRAM(s) Oral daily  rivaroxaban 15 milliGRAM(s) Oral with dinner  sodium chloride 0.9% Bolus 500 milliLiter(s) IV Bolus once  timolol 0.5% Solution 1 Drop(s) Both EYES daily    MEDICATIONS  (PRN):  dextrose Oral Gel 15 Gram(s) Oral once PRN Blood Glucose LESS THAN 70 milliGRAM(s)/deciliter  metoprolol tartrate Injectable 5 milliGRAM(s) IV Push every 6 hours PRN HR >120 after PO meds      Allergies    penicillin (Swelling)    Intolerances        SOCIAL HISTORY: Denies tobacco, etoh abuse or illicit drug use    FAMILY HISTORY:      Vital Signs Last 24 Hrs  T(C): 36.3 (19 Mar 2024 08:21), Max: 36.6 (18 Mar 2024 19:37)  T(F): 97.3 (19 Mar 2024 08:21), Max: 97.8 (18 Mar 2024 19:37)  HR: 145 (19 Mar 2024 13:57) (95 - 145)  BP: 97/73 (19 Mar 2024 13:57) (91/54 - 128/71)  BP(mean): 88 (18 Mar 2024 17:45) (82 - 88)  RR: 18 (19 Mar 2024 08:21) (15 - 22)  SpO2: 99% (19 Mar 2024 08:21) (95% - 100%)    Parameters below as of 19 Mar 2024 08:21  Patient On (Oxygen Delivery Method): room air        REVIEW OF SYSTEMS:    CONSTITUTIONAL:  As per HPI.  HEENT:  Eyes:  No diplopia or blurred vision. ENT:  No earache, sore throat or runny nose.  CARDIOVASCULAR:  No pressure, squeezing, strangling, tightness, heaviness or aching about the chest, neck, axilla or epigastrium.  RESPIRATORY:  No cough, shortness of breath, PND or orthopnea.  GASTROINTESTINAL:  No nausea, vomiting or diarrhea.  GENITOURINARY:  No dysuria, frequency or urgency.  MUSCULOSKELETAL:  As per HPI.  SKIN:  No change in skin, hair or nails.  NEUROLOGIC:  No paresthesias, fasciculations, seizures or weakness.  PSYCHIATRIC:  No disorder of thought or mood.  ENDOCRINE:  No heat or cold intolerance, polyuria or polydipsia.  HEMATOLOGICAL:  No easy bruising or bleedings:  .     PHYSICAL EXAMINATION:    GENERAL APPEARANCE:  Pt. is not currently dyspneic, in no distress. Pt. is alert, oriented, and pleasant.  HEENT:  Pupils are normal and react normally. No icterus. Mucous membranes well colored.  NECK:  Supple. No lymphadenopathy. Jugular venous pressure not elevated. Carotids equal.   HEART:   The cardiac impulse has a normal quality. There are no murmurs, rubs or gallops noted  CHEST:  Chest is clear to auscultation. Normal respiratory effort.  ABDOMEN:  Soft and nontender.   EXTREMITIES:  There is no edema.   SKIN:  No rash or significant lesions are noted.    I&O's Summary      LABS:                        11.8   10.99 )-----------( 120      ( 19 Mar 2024 07:42 )             35.9     03-19    139  |  109<H>  |  32<H>  ----------------------------<  180<H>  4.4   |  22  |  1.50<H>    Ca    8.7      19 Mar 2024 10:29  Mg     2.4     03-19    TPro  4.9<L>  /  Alb  2.5<L>  /  TBili  0.9  /  DBili  x   /  AST  15  /  ALT  23  /  AlkPhos  37<L>  03-19    LIVER FUNCTIONS - ( 19 Mar 2024 07:34 )  Alb: 2.5 g/dL / Pro: 4.9 gm/dL / ALK PHOS: 37 U/L / ALT: 23 U/L / AST: 15 U/L / GGT: x           PT/INR - ( 19 Mar 2024 07:42 )   PT: 15.0 sec;   INR: 1.34 ratio         PTT - ( 19 Mar 2024 07:42 )  PTT:25.3 sec      Urinalysis Basic - ( 19 Mar 2024 10:29 )    Color: x / Appearance: x / SG: x / pH: x  Gluc: 180 mg/dL / Ketone: x  / Bili: x / Urobili: x   Blood: x / Protein: x / Nitrite: x   Leuk Esterase: x / RBC: x / WBC x   Sq Epi: x / Non Sq Epi: x / Bacteria: x          EKG:   AT [facility] with VR with possible anterior infarction age undetermined.     TELEMETRY:  AT [facility] with average VR presently 120 to 140 BPM, but rates go to 160 BPM.    CARDIAC TESTS:    RADIOLOGY & ADDITIONAL STUDIES:

## 2024-03-19 NOTE — PHYSICAL THERAPY INITIAL EVALUATION ADULT - ACTIVE RANGE OF MOTION EXAMINATION, REHAB EVAL
Bilat LE hip/knee flex limited due to edema / discomfort with ROM/no Active ROM deficits were identified

## 2024-03-19 NOTE — CONSULT NOTE ADULT - ASSESSMENT
87 y/o F w/ PMH of temporal arteritis (on steroids), A-fib (on Xarelto), DM2 p/w difficulty ambulating, s/p fall, scans noted incidental findings of right paratracheal and right hilar lymphadenopathy and     # Lymphadenopathy / Pulm nodules     -CT Chest-3/18/24: Right paratracheal and right hilar lymphadenopathy, likely neoplastic. RLL 5mm solid nodule RUL 4mm ill-defined nodule.   -No previous records available  -CT-head & A/P with no acute pathology  -unclear if this is reactive or due to underlying malignancy, will need further w/u, including biopsy.  -Pulmonary consult    # Thrombocytopenia in setting of right hip subcutaneous hematoma    -plts-120K/ul <--128K/ul  -No previous records available for baseline, suspect due to right hip hematoma  -on Eliquis for A-fib, in absence of active bleeding, hgb stable and plts remains > 50 K/ul  -Monitor CBC daily  -Transfuse PRBC if Hbg continues to drop and as clinically indicated, if becomes symptomatic or hemodynamically unstable.  -Transfuse platelets only if plts drops < 10K/ul, or 20 K/ul with evidence of active bleeding.         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    # Lymphadenopathy / Pulm nodules     -CT Chest 3/18/24: Right paratracheal and right hilar lymphadenopathy, likely neoplastic. RLL 5mm solid nodule RUL 4mm ill-defined nodule.   -No previous records available  -CT-head & A/P with no acute pathology  -unclear if this is reactive or due to underlying malignancy, will need further w/u, including biopsy.  -Pulmonary consult    # Thrombocytopenia in setting of right hip subcutaneous hematoma    -plts-120K/ul <--128K/ul  -No previous records available for baseline, suspect due to right hip hematoma  -on Eliquis for A-fib, and would continue in absence of active bleeding, hgb stable and plts remains > 50 K/ul  -Monitor CBC daily  -Transfuse PRBC if Hbg continues to drop and as clinically indicated, if becomes symptomatic or hemodynamically unstable.  -Transfuse platelets only if plts drops < 10K/ul, or 20 K/ul with evidence of active bleeding.         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    # Lymphadenopathy / Pulm nodules     -CT Chest 3/18/24: Right paratracheal and right hilar lymphadenopathy, likely neoplastic. RLL 5mm solid nodule RUL 4mm ill-defined nodule.   -No previous records available  -CT-head & A/P with no acute pathology  -unclear if this is reactive or due to underlying malignancy, will need further w/u, including biopsy.  -patient will discuss with family and decide if she would like to pursue biopsy. If patient decides to pursue w/u, IR consult will be appreciated.  -Pulmonary consult    # Thrombocytopenia in setting of right hip subcutaneous hematoma    -plts-120K/ul <--128K/ul  -No previous records available for baseline, suspect due to right hip hematoma  -on Eliquis for A-fib, and would continue in absence of active bleeding, hgb stable and plts remains > 50 K/ul  -Monitor CBC daily  -Transfuse PRBC if Hbg continues to drop and as clinically indicated, if becomes symptomatic or hemodynamically unstable.  -Transfuse platelets only if plts drops < 10K/ul, or 20 K/ul with evidence of active bleeding.

## 2024-03-19 NOTE — PROGRESS NOTE ADULT - SUBJECTIVE AND OBJECTIVE BOX
Chief Complaint: Generalized weakness, fall on R side, B/L leg swelling    Interval Hx: No new complaints. Hematoma stable. Generally weak. B/L LE swelling unchanged. Hematoma stable. Remains in afib, rapid at times. Cardiology following. Hematology Oncology consulted given findings of pulmonary nodules and R paratracheal and hilar LN. Patient to discuss with her family re possibly pursuing biopsy.     ROS: Multi system review is comprehensively negative x 10 systems except as above    Vitals:  Afebrile  BP 90s-120s/50s-70s  HR 90s-110s  RR 16-18  O2 % on RA    Exam:  Gen: No acute distress  HEENT: NCAT PERRL EOMI MMM clear oropharynx  Neck: Supple, no JVD, no LAD, no thyromegaly  CVS: s1 s2 normal, irregular, rate ~100 bpm  Chest: Normal resp effort, lungs CTA B/L  Abd: +BS, non distended, soft, nontender  Ext: R hip area with extensive hematoma, compartment soft, intact pulses, normal cap refill  Skin: Warm, dry  Mood: Calm, pleasant  Neuro: Awake and alert, follows commands, answers questions appropriately    Labs:             POCT glucose 120s-150s               11.8   10.99 )--------( 120                  35.9       139  |  109  |  32  ----------------------<  180  4.4   |   22   |  1.50    Ca  8.7     Mg  2.4         TPro  4.9  /  Alb  2.5  /  TBili  0.9  /  DBili  x   /  AST  15  /  ALT  23  /  AlkPhos  37    PT: 15.0 sec;   INR: 1.34 ratio   PTT: 25.3 sec    Troponin negative  ProBNP 3280    A1c 8.6   TSH WNL    Imaging:  CT abd, pelvis WO 3/19: Liver normal. Normal caliber bile ducts. Gallbladder dependent hyperattenuating sludge and/or small stones. Normal spleen. Fatty atrophy of pancreas. Normal adrenals. Right upper pole and left parapelvic renal cysts. No renal stones or hydronephrosis. Small posterior diverticulum. Calcified uterine fibroid. No bowel obstruction. Moderate colonic fecal load. Appendix is normal. No ascites. Atherosclerotic changes. No lymphadenopathy. 5.7 x 4.3 x 9.0 cm right lateral hip subcutaneous hematoma. Additional infiltration of the subcutaneous fat of the bilateral lateral abdominal wall. Degenerative changes. No evidence of acute fracture.    CT chest WO 3/18: Patent trachea and bronchi. Emphysema. Of the left lower lobe calcified granuloma. Right lower lobe 5 mm solid nodule. Right upper lobe sub-4 mm ill-defined nodule. Trace right pleural effusion versus thickening. Small hiatal hernia. Right paratracheal and right hilar lymphadenopathy, for example, largest lymph node approximately 3.5 cm in short axis in the right paratracheal station. Normal heart size. No pericardial effusion. 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 Coronary artery calcifications.     CT head WO 3/18: There is moderate diffuse parenchymal volume loss. There are areas of low attenuation in the periventricular white matter likely related to mild chronic microvascular ischemic changes. There is no acute intracranial hemorrhage, parenchymal mass, mass effect or midline shift. There is no acute territorial infarct. There is no hydrocephalus. Partial empty sella noted. The cranium is intact. The visualized paranasal sinuses are well-aerated.    US venous duplex B/L LE 3/18: No evidence of deep venous thrombosis in the right lower extremity. No evidence of deep vein thrombosis in the visualized deep veins of the left lower extremity above the knee. The deep veins of the left calf were not seen. Extensive bilateral calf subcutaneous edema.    XR chest 3/18: Normal heart size. Atherosclerotic aorta. Increased right paratracheal soft tissue density may represent prominent vasculature.     Cardiac Testing:  EKG 3/18: rate 128. Atrial fibrillation with rapid ventricular response with premature ventricular or aberrantly conducted complexes    Meds:  MEDICATIONS  (STANDING):  aMIOdarone    Tablet 400 milliGRAM(s) Oral daily  atorvastatin 80 milliGRAM(s) Oral at bedtime  folic acid 1 milliGRAM(s) Oral daily  glucagon  Injectable 1 milliGRAM(s) IntraMuscular once  insulin lispro (ADMELOG) corrective regimen sliding scale   SubCutaneous three times a day before meals  insulin lispro (ADMELOG) corrective regimen sliding scale   SubCutaneous at bedtime  metoprolol tartrate 25 milliGRAM(s) Oral every 8 hours  pantoprazole    Tablet 40 milliGRAM(s) Oral before breakfast  predniSONE   Tablet 40 milliGRAM(s) Oral daily  rivaroxaban 15 milliGRAM(s) Oral with dinner  timolol 0.5% Solution 1 Drop(s) Both EYES daily    MEDICATIONS  (PRN):  dextrose Oral Gel 15 Gram(s) Oral once PRN Blood Glucose LESS THAN 70 milliGRAM(s)/deciliter  metoprolol tartrate Injectable 5 milliGRAM(s) IV Push every 6 hours PRN HR >120 after PO meds

## 2024-03-19 NOTE — CONSULT NOTE ADULT - ASSESSMENT
88 year old male with at Fib with RVR (at home drugs included amiodarone 400 mgs daily for the past 3 weeks, Xarelto 15 mgs  and Metoprolol 50 mgs daily).  In hospital meds Amiodarone 400 mgs daily, Metoprolol 25 mgs q 8 hours  and Lopressor 5 mgs IVP q 6 hours.  She has temporal arteritis (on steroids for about 1 month),  This early morning received Digoxin 500 ucgs and last night was given Diltiazem 10 mgs IVP Serum Cr is 1.50 today (Dig Level is 1.35) Platelet 120, GFR 34.  Pt has acute HF BNP 3280.      Pt was on Farxiga will need to be continued to be held prior to DC CV  Echo was performed and awaiting results   Continue with didougiees   Continue with Xarelto  88 year old male with at Fib with RVR (at home drugs included amiodarone 400 mgs daily for the past 3 weeks, Xarelto 15 mgs  and Metoprolol 50 mgs daily).  In hospital meds Amiodarone 400 mgs daily, Metoprolol 25 mgs q 8 hours  and Lopressor 5 mgs IVP q 6 hours.  She has temporal arteritis (on steroids for about 1 month),  This early morning received Digoxin 500 ucgs and last night was given Diltiazem 10 mgs IVP Serum Cr is 1.50 today (Dig Level is 1.35) Platelet 120, GFR 34.  Pt has acute HF BNP 3280.      Pt was on Farxiga will need to be continued to be held prior to DC CV  To have DC CV with Dr. Mendoza on Thursday  Echo was performed and awaiting results   Continue with chel   Continue with Xarelto   will increase Amiodarone 400 mgs bid

## 2024-03-19 NOTE — PHYSICAL THERAPY INITIAL EVALUATION ADULT - GENERAL OBSERVATIONS, REHAB EVAL
bladder suction; HM; pt rec'd in bed supine; LE's edematous / elevated; 's-150's; RN Viola present / aware

## 2024-03-19 NOTE — PROGRESS NOTE ADULT - ASSESSMENT
87 y/o woman with type II DM, atrial fibrillation on Xarelto, temporal arteritis on steroids, presented for further evaluation of difficulty ambulating and B/L LE edema. Patient has been having difficulty walking due to B/L LE edema. States that LE edema has been worsening since she was started on steroids for temporal arterities about 1 month ago. On day prior to presentation, she lost her balance and fell on her right side causing extensive bruising and R hip area subcutaneous hematoma. She has otherwise been in her usual state of health. In the ED she was noted to have rapid afib in addition to this hematoma. She was given diltiazem and digoxin, IV fluid hydration and admitted to Medicine.     Fall, generalized weakness  PT consult requested  - F/u PT eval     Traumatic hematoma in the area of the R hip  Related to recent fall. On Eliquis for a-fib. Neurovascularly intact. Hgb 11.8. Plt 120.   - Continue to monitor site  - Continue to trend CBC    Thrombocytopenia  Plts-120K/ul <--128K/ul. No previous records available for baseline Suspect due to right hip hematoma  - Continue to trend CBC  - Transfuse platelets only if Plts drops < 10K/ul, or 20 K/ul with evidence of active bleeding.    Rapid afib  Known hx of afib. In RVR in setting of mechanical fall, traumatic R hip hematoma. Appreciate Cardiology input.  - Continue rate control  - Continue on Xarelto but will hold if hematoma increases and/or Hgb down-trends  - Continue on tele    Pulmonary nodules with R paratracheal and R hilar lymphadenopathy   As noted on CT chest. RLL 5mm solid nodule and RUL 4mm ill-defined nodule. LN enlargement R paratracheal and R hilar. No previous records available. CT head & abd/pelvis with no acute pathology. Unclear if this is reactive or due to underlying malignancy. Appreciate input from Hematology Oncology. Patient will discuss with family and decide if she would like to pursue biopsy.   - F/u with patient tomorrow to see if she wishes to pursue biopsy     Type II DM  Suboptimally controlled. A1c 8.6. POCT glucose 120s-150s.  - Continue on insulin correctional scale  - Continue to monitor glucose TID AC and HS    Temporal arteritis  Follows with [facility] in [place]. On prednisone, planned to taper on Thursday 3/21/  - Continue prednisone, taper on Thursday as planned  - Outpatient follow up with rheumatologist

## 2024-03-19 NOTE — PHYSICAL THERAPY INITIAL EVALUATION ADULT - MODALITIES TREATMENT COMMENTS
pt left in bed supine post Eval; bed alarm on; bladder suction, HM in place; LE's elevated; RN Viola present; christopher well; denied pain

## 2024-03-19 NOTE — CONSULT NOTE ADULT - SUBJECTIVE AND OBJECTIVE BOX
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 Patient has been having difficulty walking due to B/L LE edema. States that LE edema has been worsening since she was started on steroids for temporal arterities about 1 month ago. States that yesterday patient had a fall where she lost her balance and fell on R side, and sustained R hip bruising / hematoma. Today patient had another fall, but family member helped her to a chair before she hit the floor. Patient denies CP, SOB, cough, runny nose, sore throat, nausea, vomiting, abdominal pain, fever, chills      PSH: Denies   Social Hx: Denies tobacco / etoh / drugs   Family Hx: Denies pertinent hx  (18 Mar 2024 23:17)    Cardiology consulted for afib RVR  Patient denies any CP or SOB. She is switching providers to [facility] and requested Dr. Small  Denies any past CAD or heart failure  Denies any past CV and ablations.    MEDICATIONS  (STANDING):  aMIOdarone    Tablet 400 milliGRAM(s) Oral daily  atorvastatin 80 milliGRAM(s) Oral at bedtime  dextrose 5%. 1000 milliLiter(s) (50 mL/Hr) IV Continuous <Continuous>  dextrose 5%. 1000 milliLiter(s) (100 mL/Hr) IV Continuous <Continuous>  dextrose 50% Injectable 25 Gram(s) IV Push once  dextrose 50% Injectable 12.5 Gram(s) IV Push once  dextrose 50% Injectable 25 Gram(s) IV Push once  folic acid 1 milliGRAM(s) Oral daily  glucagon  Injectable 1 milliGRAM(s) IntraMuscular once  insulin lispro (ADMELOG) corrective regimen sliding scale   SubCutaneous three times a day before meals  insulin lispro (ADMELOG) corrective regimen sliding scale   SubCutaneous at bedtime  pantoprazole    Tablet 40 milliGRAM(s) Oral before breakfast  predniSONE   Tablet 40 milliGRAM(s) Oral daily  rivaroxaban 15 milliGRAM(s) Oral with dinner  timolol 0.5% Solution 1 Drop(s) Both EYES daily  torsemide 10 milliGRAM(s) Oral daily    MEDICATIONS  (PRN):  dextrose Oral Gel 15 Gram(s) Oral once PRN Blood Glucose LESS THAN 70 milliGRAM(s)/deciliter  metoprolol tartrate Injectable 5 milliGRAM(s) IV Push every 6 hours PRN HR >120 after PO meds    HOME MEDICATIONS:  amiodarone 200 mg oral tablet: 2 tab(s) orally once a day x 3 weeks then 1 tab daily ***pt started approx 3 days ago*** (18 Mar 2024 16:53)  clindamycin 150 mg oral capsule: 1 cap(s) orally 2 times a day x 10 days ***course not complete-unknown if pt still taking*** (18 Mar 2024 16:53)  Farxiga 10 mg oral tablet: 1 tab(s) orally once a day (18 Mar 2024 16:53)  folic acid 1 mg oral tablet: 1 tab(s) orally once a day (18 Mar 2024 16:53)  ipratropium 21 mcg/inh (0.03%) nasal spray: 2 spray(s) intranasally once a day (18 Mar 2024 16:53)  metoprolol succinate 50 mg oral tablet, extended release: 1 tab(s) orally once a day (18 Mar 2024 16:53)  pantoprazole 40 mg oral delayed release tablet: 1 tab(s) orally once a day (18 Mar 2024 16:53)  potassium chloride 10 mEq oral tablet, extended release: 1 tab(s) orally 2 times a day (18 Mar 2024 16:53)  predniSONE 20 mg oral tablet: 2 tab(s) orally once a day ***pt on a taper- unknown taper- pt has been on 40mg for 3 weeks and will possibly be tapered down at next appt*** (18 Mar 2024 16:52)  rosuvastatin 40 mg oral tablet: 1 tab(s) orally once a day (18 Mar 2024 16:53)  timolol maleate 0.5% ophthalmic solution: 1 drop(s) in each eye once a day (18 Mar 2024 16:56)  torsemide 5 mg oral tablet: 2 tab(s) orally once a day (18 Mar 2024 16:56)  valACYclovir 1 g oral tablet: 1 tab(s) orally 3 times a day x 10 days ***Course Complete*** (18 Mar 2024 16:56)  Xarelto 15 mg oral tablet: 1 tab(s) orally once a day (18 Mar 2024 16:56)    PHYSICAL EXAM:  T(C): 36.3 (19 Mar 2024 02:02), Max: 36.6 (18 Mar 2024 15:17)  T(F): 97.3 (19 Mar 2024 02:02), Max: 97.8 (18 Mar 2024 15:17)  HR: 120 (19 Mar 2024 02:02) (89 - 143)  BP: 108/59 (19 Mar 2024 02:02) (98/74 - 128/71)  BP(mean): 88 (18 Mar 2024 17:45) (82 - 88)  RR: 17 (19 Mar 2024 02:02) (15 - 22)  SpO2: 95% (19 Mar 2024 02:02) (95% - 100%)    Parameters below as of 19 Mar 2024 02:02  Patient On (Oxygen Delivery Method): room air    Constitutional: NAD, awake and alert  HEENT: PERR, EOMI, Normal Hearing, MMM  Neck: Soft and supple, No LAD, No JVD  Respiratory: Breath sounds are clear bilaterally, No wheezing, rales or rhonchi  Cardiovascular: S1 and S2, IR IR  Gastrointestinal: Bowel Sounds present, soft, nontender, nondistended, no guarding, no rebound  Extremities: + pitting peripheral edema  Vascular: 2+ peripheral pulses  Neurological: A/O x 3, no focal deficits  Musculoskeletal: 5/5 strength b/l upper and lower extremities  Skin: No rashes    =======================================    INTERPRETATION OF TELEMETRY: Afib rates up to 140    ECG:     ========================================    LABS:                        11.8   10.99 )-----------( 120      ( 19 Mar 2024 07:42 )             35.9     03-19    142  |  108  |  34<H>  ----------------------------<  165<H>  4.0   |  27  |  1.40<H>    Ca    8.9      19 Mar 2024 07:34  Mg     2.4     03-18    TPro  4.9<L>  /  Alb  2.5<L>  /  TBili  0.9  /  DBili  x   /  AST  15  /  ALT  23  /  AlkPhos  37<L>  03-19    PT/INR - ( 19 Mar 2024 07:42 )   PT: 15.0 sec;   INR: 1.34 ratio       PTT - ( 19 Mar 2024 07:42 )  PTT:25.3 sec    BNP 3280    RADIOLOGY & ADDITIONAL STUDIES:    < from: CT Abdomen and Pelvis No Cont (03.19.24 @ 01:08) >  Subcutaneous hematoma overlyingthe right lateral hip.    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   This was discussed with Dr. Maciel at 5:02 PM on 3/20/2024.  Few nonspecific very small lung nodules.    < from: CT Head No Cont (03.18.24 @ 16:33) >  No acute intracranial hemorrhage or acute territorial infarct.  If   symptoms persist, follow-up MRI exam recommended.    < from: US Duplex Venous Lower Ext Complete, Bilateral (03.18.24 @ 16:28) >  No evidence of deep venous thrombosis in the right lower extremity.  No evidence of deep vein thrombosis in the visualized deep veins of the   left lower extremity above the knee, as detailed above. The deep veins of   the left calf were not seen.  Extensive bilateral calf subcutaneous edema.

## 2024-03-19 NOTE — CONSULT NOTE ADULT - SUBJECTIVE AND OBJECTIVE BOX
HPI:  89 y/o F w/ PMH of temporal arteritis (on steroids), A-fib (on Xarelto), DM2 p/w difficulty ambulating and LE edema. Patient has been having difficulty walking due to B/L LE edema. Stated that LE edema has been worsening since she was started on steroids for temporal arterities about 1 month ago. s/p fall where she lost her balance and fell on R side, and sustained R hip bruising / hematoma.     PAST MEDICAL & SURGICAL HISTORY:    Temporal arteritis    A-fib    FAMILY HISTORY:    unknown    MEDICATIONS  (STANDING):    aMIOdarone    Tablet 400 milliGRAM(s) Oral daily  atorvastatin 80 milliGRAM(s) Oral at bedtime  buMETAnide Injectable 0.5 milliGRAM(s) IV Push once  dextrose 5%. 1000 milliLiter(s) (100 mL/Hr) IV Continuous <Continuous>  dextrose 5%. 1000 milliLiter(s) (50 mL/Hr) IV Continuous <Continuous>  dextrose 50% Injectable 25 Gram(s) IV Push once  dextrose 50% Injectable 12.5 Gram(s) IV Push once  dextrose 50% Injectable 25 Gram(s) IV Push once  folic acid 1 milliGRAM(s) Oral daily  glucagon  Injectable 1 milliGRAM(s) IntraMuscular once  insulin lispro (ADMELOG) corrective regimen sliding scale   SubCutaneous three times a day before meals  insulin lispro (ADMELOG) corrective regimen sliding scale   SubCutaneous at bedtime  metoprolol tartrate 25 milliGRAM(s) Oral every 8 hours  pantoprazole    Tablet 40 milliGRAM(s) Oral before breakfast  predniSONE   Tablet 40 milliGRAM(s) Oral daily  rivaroxaban 15 milliGRAM(s) Oral with dinner  timolol 0.5% Solution 1 Drop(s) Both EYES daily    MEDICATIONS  (PRN):    dextrose Oral Gel 15 Gram(s) Oral once PRN Blood Glucose LESS THAN 70 milliGRAM(s)/deciliter  metoprolol tartrate Injectable 5 milliGRAM(s) IV Push every 6 hours PRN HR >120 after PO meds    Allergies    penicillin (Swelling)    REVIEW OF SYSTEM:    Constitutional, Eyes, ENT, Cardiovascular, Respiratory, Gastrointestinal, Genitourinary, Musculoskeletal, Integumentary, Neurological, Psychiatric, Endocrine, Heme/Lymph and Allergic/Immunologic review of systems are otherwise negative except as noted in HPI.     Vital Signs Last 24 Hrs    T(C): 36.3 (19 Mar 2024 02:02), Max: 36.6 (18 Mar 2024 15:17)  T(F): 97.3 (19 Mar 2024 02:02), Max: 97.8 (18 Mar 2024 15:17)  HR: 120 (19 Mar 2024 02:02) (89 - 143)  BP: 108/59 (19 Mar 2024 02:02) (98/74 - 128/71)  BP(mean): 88 (18 Mar 2024 17:45) (82 - 88)  RR: 17 (19 Mar 2024 02:02) (15 - 22)  SpO2: 95% (19 Mar 2024 02:02) (95% - 100%)    Parameters below as of 19 Mar 2024 02:02  Patient On (Oxygen Delivery Method): room air    PHYSICAL EXAM:    Constitutional: no acute distress  Eyes: no conjunctival infection, anicteric.   ENT: pharynx is unremarkable  Neck: supple without JVD  Pulmonary: clear to auscultation bilaterally   Cardiac: RRR  Vascular: no calf tenderness, venous stasis changes, varices  Abdomen: normoactive bowel sounds, soft and nontender, no hepatosplenomegaly or masses appreciated  Lymphatic: no peripheral adenopathy appreciated  Musculoskeletal: full range of motion and no deformities appreciated  Skin: normal appearance, no rash/erythema  Neurology: awake, alert, oriented; no focal deficits    LABS:    CBC Full  -  ( 19 Mar 2024 07:42 )  WBC Count : 10.99 K/uL  RBC Count : 3.74 M/uL  Hemoglobin : 11.8 g/dL  Hematocrit : 35.9 %  Platelet Count - Automated : 120 K/uL  Mean Cell Volume : 96.0 fl  Mean Cell Hemoglobin : 31.6 pg  Mean Cell Hemoglobin Concentration : 32.9 gm/dL  Auto Neutrophil # : 8.82 K/uL  Auto Lymphocyte # : 1.23 K/uL  Auto Monocyte # : 0.74 K/uL  Auto Eosinophil # : 0.00 K/uL  Auto Basophil # : 0.04 K/uL  Auto Neutrophil % : 80.2 %  Auto Lymphocyte % : 11.2 %  Auto Monocyte % : 6.7 %  Auto Eosinophil % : 0.0 %  Auto Basophil % : 0.4 %    03-19    142  |  108  |  34<H>  ----------------------------<  165<H>  4.0   |  27  |  1.40<H>    Ca    8.9      19 Mar 2024 07:34  Mg     2.4     03-18    TPro  4.9<L>  /  Alb  2.5<L>  /  TBili  0.9  /  DBili  x   /  AST  15  /  ALT  23  /  AlkPhos  37<L>  03-19    PT/INR - ( 19 Mar 2024 07:42 )   PT: 15.0 sec;   INR: 1.34 ratio     PTT - ( 19 Mar 2024 07:42 )  PTT:25.3 sec    Urinalysis Basic - ( 19 Mar 2024 07:34 )    Color: x / Appearance: x / SG: x / pH: x  Gluc: 165 mg/dL / Ketone: x  / Bili: x / Urobili: x   Blood: x / Protein: x / Nitrite: x   Leuk Esterase: x / RBC: x / WBC x   Sq Epi: x / Non Sq Epi: x / Bacteria: x    RADIOLOGY & ADDITIONAL STUDIES:    EXAM:  CT ABDOMEN AND PELVIS     PROCEDURE DATE:  03/19/2024      INTERPRETATION:  CLINICAL INFORMATION: Right hip presenting/hematoma   status post fall.    COMPARISON: None.    CONTRAST/COMPLICATIONS:  IV Contrast: NONE  Oral Contrast: NONE  Complications: None reported at time of study completion    PROCEDURE:  CT of the Abdomen and Pelvis was performed.  Sagittal and coronal reformats were performed.    FINDINGS:  LOWER CHEST: Emphysema. Right lower lobe subsegmental atelectasis. 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  BILE DUCTS: Normal caliber.  GALLBLADDER: Dependent hyperattenuating sludge and/or small stones.  SPLEEN: Within normal limits.  PANCREAS: Fatty atrophy.  ADRENALS: Within normal limits.  KIDNEYS/URETERS: Right upper pole and left parapelvic renal cysts. No   renal stones or hydronephrosis.    BLADDER: Small posterior diverticulum.  REPRODUCTIVE ORGANS: Calcified uterine fibroid.    BOWEL: No bowel obstruction. Moderate colonic fecal load. Appendix is   normal.  PERITONEUM: No ascites.  VESSELS: Atherosclerotic changes.  RETROPERITONEUM/LYMPH NODES: No lymphadenopathy.  ABDOMINAL WALL: 5.7 x 4.3 x 9.0 cm right lateral hip subcutaneous   hematoma. Additional infiltration of the subcutaneous fat of the   bilateral lateral abdominal wall.  BONES: Degenerative changes. No evidence of acute fracture.    IMPRESSION:  Subcutaneous hematoma overlying the right lateral hip.      EXAM:  CT CHEST       PROCEDURE DATE:  03/18/2024      INTERPRETATION:  INDICATION: Abnormal chest x-ray    TECHNIQUE: Helical acquisition images of the chest without intravenous   contrast. Maximum intensity projection images were generated.    COMPARISON: Same-day chest x-ray, no prior chest CT.    FINDINGS:    LUNGS/AIRWAYS/PLEURA: Patent trachea and bronchi. Emphysema. Of the left   lower lobe calcified granuloma. Right lower lobe 5 mm solid nodule   (2-66). Right upper lobe sub-4 mm ill-defined nodule (2-31). Trace right   pleural effusion versus thickening.    LYMPH NODES/MEDIASTINUM: Small hiatal hernia. Right paratracheal and   right hilar lymphadenopathy, for example, largest lymph node  approximately 3.5 cm in short axis in the right paratracheal station   (2-34).    HEART/VASCULATURE: Normal heart size. No pericardial effusion. Fat   attenuation within the left ventricular apical myocardium compatible with   a prior infarct. Coronary artery calcifications.    UPPER ABDOMEN: Fat replaced pancreas. Partially included right renal   cysts.    BONES/SOFT TISSUES: Unremarkable.    IMPRESSION:    Right paratracheal and right hilar lymphadenopathy, likely neoplastic.     Few nonspecific very small lung nodules.    EXAM:  CT BRAIN       PROCEDURE DATE:  03/18/2024      INTERPRETATION:  CLINICAL STATEMENT: Pain.    TECHNIQUE: CT of the head was performed without IV contrast.    COMPARISON: None.    FINDINGS:  Thereis moderate diffuse parenchymal volume loss.    There are areas of low attenuation in the periventricular white matter   likely related to mild chronic microvascular ischemic changes.    There is no acute intracranial hemorrhage, parenchymal mass, mass effect   or midline shift. There is no acute territorial infarct. There is no   hydrocephalus. Partial empty sella noted    The cranium is intact. The visualized paranasal sinuses are well-aerated.    IMPRESSION:    No acute intracranial hemorrhage or acute territorial infarct.  If   symptoms persist, follow-up MRI exam recommended.               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 Patient has been having difficulty walking due to B/L LE edema. Stated that LE edema has been worsening since she was started on steroids for temporal arteritis about 1 month ago. s/p fall where she lost her balance and fell on R side, and sustained R hip bruising / hematoma.     3/19/24: Seen at bedside, along with Dr. Espinoza; awake, alert in no acute distress.     PAST MEDICAL & SURGICAL HISTORY:    Temporal arteritis    A-fib    FAMILY HISTORY:    unknown    MEDICATIONS  (STANDING):    aMIOdarone    Tablet 400 milliGRAM(s) Oral daily  atorvastatin 80 milliGRAM(s) Oral at bedtime  buMETAnide Injectable 0.5 milliGRAM(s) IV Push once  dextrose 5%. 1000 milliLiter(s) (100 mL/Hr) IV Continuous <Continuous>  dextrose 5%. 1000 milliLiter(s) (50 mL/Hr) IV Continuous <Continuous>  dextrose 50% Injectable 25 Gram(s) IV Push once  dextrose 50% Injectable 12.5 Gram(s) IV Push once  dextrose 50% Injectable 25 Gram(s) IV Push once  folic acid 1 milliGRAM(s) Oral daily  glucagon  Injectable 1 milliGRAM(s) IntraMuscular once  insulin lispro (ADMELOG) corrective regimen sliding scale   SubCutaneous three times a day before meals  insulin lispro (ADMELOG) corrective regimen sliding scale   SubCutaneous at bedtime  metoprolol tartrate 25 milliGRAM(s) Oral every 8 hours  pantoprazole    Tablet 40 milliGRAM(s) Oral before breakfast  predniSONE   Tablet 40 milliGRAM(s) Oral daily  rivaroxaban 15 milliGRAM(s) Oral with dinner  timolol 0.5% Solution 1 Drop(s) Both EYES daily    MEDICATIONS  (PRN):    dextrose Oral Gel 15 Gram(s) Oral once PRN Blood Glucose LESS THAN 70 milliGRAM(s)/deciliter  metoprolol tartrate Injectable 5 milliGRAM(s) IV Push every 6 hours PRN HR >120 after PO meds    Allergies    penicillin (Swelling)    REVIEW OF SYSTEM:    Constitutional, Eyes, ENT, Cardiovascular, Respiratory, Gastrointestinal, Genitourinary, Musculoskeletal, Integumentary, Neurological, Psychiatric, Endocrine, Heme/Lymph and Allergic/Immunologic review of systems are otherwise negative except as noted in HPI.     Vital Signs Last 24 Hrs    T(C): 36.3 (19 Mar 2024 02:02), Max: 36.6 (18 Mar 2024 15:17)  T(F): 97.3 (19 Mar 2024 02:02), Max: 97.8 (18 Mar 2024 15:17)  HR: 120 (19 Mar 2024 02:02) (89 - 143)  BP: 108/59 (19 Mar 2024 02:02) (98/74 - 128/71)  BP(mean): 88 (18 Mar 2024 17:45) (82 - 88)  RR: 17 (19 Mar 2024 02:02) (15 - 22)  SpO2: 95% (19 Mar 2024 02:02) (95% - 100%)    Parameters below as of 19 Mar 2024 02:02  Patient On (Oxygen Delivery Method): room air    PHYSICAL EXAM:    Constitutional: no acute distress  Eyes: no conjunctival infection, anicteric.   ENT: pharynx is unremarkable  Neck: supple without JVD  Pulmonary: clear to auscultation bilaterally   Cardiac: RRR  Vascular: no calf tenderness, venous stasis changes, varices  Abdomen: normoactive bowel sounds, soft and nontender, no hepatosplenomegaly or masses appreciated  Lymphatic: no peripheral adenopathy appreciated  Musculoskeletal: full range of motion and no deformities appreciated  Skin: normal appearance, no rash/erythema  Neurology: awake, alert, oriented; no focal deficits    LABS:    CBC Full  -  ( 19 Mar 2024 07:42 )  WBC Count : 10.99 K/uL  RBC Count : 3.74 M/uL  Hemoglobin : 11.8 g/dL  Hematocrit : 35.9 %  Platelet Count - Automated : 120 K/uL  Mean Cell Volume : 96.0 fl  Mean Cell Hemoglobin : 31.6 pg  Mean Cell Hemoglobin Concentration : 32.9 gm/dL  Auto Neutrophil # : 8.82 K/uL  Auto Lymphocyte # : 1.23 K/uL  Auto Monocyte # : 0.74 K/uL  Auto Eosinophil # : 0.00 K/uL  Auto Basophil # : 0.04 K/uL  Auto Neutrophil % : 80.2 %  Auto Lymphocyte % : 11.2 %  Auto Monocyte % : 6.7 %  Auto Eosinophil % : 0.0 %  Auto Basophil % : 0.4 %    03-19    142  |  108  |  34<H>  ----------------------------<  165<H>  4.0   |  27  |  1.40<H>    Ca    8.9      19 Mar 2024 07:34  Mg     2.4     03-18    TPro  4.9<L>  /  Alb  2.5<L>  /  TBili  0.9  /  DBili  x   /  AST  15  /  ALT  23  /  AlkPhos  37<L>  03-19    PT/INR - ( 19 Mar 2024 07:42 )   PT: 15.0 sec;   INR: 1.34 ratio     PTT - ( 19 Mar 2024 07:42 )  PTT:25.3 sec    Urinalysis Basic - ( 19 Mar 2024 07:34 )    Color: x / Appearance: x / SG: x / pH: x  Gluc: 165 mg/dL / Ketone: x  / Bili: x / Urobili: x   Blood: x / Protein: x / Nitrite: x   Leuk Esterase: x / RBC: x / WBC x   Sq Epi: x / Non Sq Epi: x / Bacteria: x    RADIOLOGY & ADDITIONAL STUDIES:    EXAM:  CT ABDOMEN AND PELVIS     PROCEDURE DATE:  03/19/2024      INTERPRETATION:  CLINICAL INFORMATION: Right hip presenting/hematoma   status post fall.    COMPARISON: None.    CONTRAST/COMPLICATIONS:  IV Contrast: NONE  Oral Contrast: NONE  Complications: None reported at time of study completion    PROCEDURE:  CT of the Abdomen and Pelvis was performed.  Sagittal and coronal reformats were performed.    FINDINGS:  LOWER CHEST: Emphysema. Right lower lobe subsegmental atelectasis. 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  BILE DUCTS: Normal caliber.  GALLBLADDER: Dependent hyperattenuating sludge and/or small stones.  SPLEEN: Within normal limits.  PANCREAS: Fatty atrophy.  ADRENALS: Within normal limits.  KIDNEYS/URETERS: Right upper pole and left parapelvic renal cysts. No   renal stones or hydronephrosis.    BLADDER: Small posterior diverticulum.  REPRODUCTIVE ORGANS: Calcified uterine fibroid.    BOWEL: No bowel obstruction. Moderate colonic fecal load. Appendix is   normal.  PERITONEUM: No ascites.  VESSELS: Atherosclerotic changes.  RETROPERITONEUM/LYMPH NODES: No lymphadenopathy.  ABDOMINAL WALL: 5.7 x 4.3 x 9.0 cm right lateral hip subcutaneous   hematoma. Additional infiltration of the subcutaneous fat of the   bilateral lateral abdominal wall.  BONES: Degenerative changes. No evidence of acute fracture.    IMPRESSION:  Subcutaneous hematoma overlying the right lateral hip.      EXAM:  CT CHEST       PROCEDURE DATE:  03/18/2024      INTERPRETATION:  INDICATION: Abnormal chest x-ray    TECHNIQUE: Helical acquisition images of the chest without intravenous   contrast. Maximum intensity projection images were generated.    COMPARISON: Same-day chest x-ray, no prior chest CT.    FINDINGS:    LUNGS/AIRWAYS/PLEURA: Patent trachea and bronchi. Emphysema. Of the left   lower lobe calcified granuloma. Right lower lobe 5 mm solid nodule   (2-66). Right upper lobe sub-4 mm ill-defined nodule (2-31). Trace right   pleural effusion versus thickening.    LYMPH NODES/MEDIASTINUM: Small hiatal hernia. Right paratracheal and   right hilar lymphadenopathy, for example, largest lymph node  approximately 3.5 cm in short axis in the right paratracheal station   (2-34).    HEART/VASCULATURE: Normal heart size. No pericardial effusion. Fat   attenuation within the left ventricular apical myocardium compatible with   a prior infarct. Coronary artery calcifications.    UPPER ABDOMEN: Fat replaced pancreas. Partially included right renal   cysts.    BONES/SOFT TISSUES: Unremarkable.    IMPRESSION:    Right paratracheal and right hilar lymphadenopathy, likely neoplastic.     Few nonspecific very small lung nodules.    EXAM:  CT BRAIN       PROCEDURE DATE:  03/18/2024      INTERPRETATION:  CLINICAL STATEMENT: Pain.    TECHNIQUE: CT of the head was performed without IV contrast.    COMPARISON: None.    FINDINGS:  Thereis moderate diffuse parenchymal volume loss.    There are areas of low attenuation in the periventricular white matter   likely related to mild chronic microvascular ischemic changes.    There is no acute intracranial hemorrhage, parenchymal mass, mass effect   or midline shift. There is no acute territorial infarct. There is no   hydrocephalus. Partial empty sella noted    The cranium is intact. The visualized paranasal sinuses are well-aerated.    IMPRESSION:    No acute intracranial hemorrhage or acute territorial infarct.  If   symptoms persist, follow-up MRI exam recommended.               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 Patient has been having difficulty walking due to B/L LE edema. Stated that LE edema has been worsening since she was started on steroids for temporal arteritis about 1 month ago. s/p fall where she lost her balance and fell on R side, and sustained R hip bruising / hematoma.     3/19/24: Seen at bedside, along with Dr. Espinoza; awake, alert in no acute distress.     PAST MEDICAL & SURGICAL HISTORY:    Temporal arteritis    A-fib    FAMILY HISTORY:    unknown    MEDICATIONS  (STANDING):    aMIOdarone    Tablet 400 milliGRAM(s) Oral daily  atorvastatin 80 milliGRAM(s) Oral at bedtime  buMETAnide Injectable 0.5 milliGRAM(s) IV Push once  dextrose 5%. 1000 milliLiter(s) (100 mL/Hr) IV Continuous <Continuous>  dextrose 5%. 1000 milliLiter(s) (50 mL/Hr) IV Continuous <Continuous>  dextrose 50% Injectable 25 Gram(s) IV Push once  dextrose 50% Injectable 12.5 Gram(s) IV Push once  dextrose 50% Injectable 25 Gram(s) IV Push once  folic acid 1 milliGRAM(s) Oral daily  glucagon  Injectable 1 milliGRAM(s) IntraMuscular once  insulin lispro (ADMELOG) corrective regimen sliding scale   SubCutaneous three times a day before meals  insulin lispro (ADMELOG) corrective regimen sliding scale   SubCutaneous at bedtime  metoprolol tartrate 25 milliGRAM(s) Oral every 8 hours  pantoprazole    Tablet 40 milliGRAM(s) Oral before breakfast  predniSONE   Tablet 40 milliGRAM(s) Oral daily  rivaroxaban 15 milliGRAM(s) Oral with dinner  timolol 0.5% Solution 1 Drop(s) Both EYES daily    MEDICATIONS  (PRN):    dextrose Oral Gel 15 Gram(s) Oral once PRN Blood Glucose LESS THAN 70 milliGRAM(s)/deciliter  metoprolol tartrate Injectable 5 milliGRAM(s) IV Push every 6 hours PRN HR >120 after PO meds    Allergies    penicillin (Swelling)    REVIEW OF SYSTEM:    Constitutional, Eyes, ENT, Cardiovascular, Respiratory, Gastrointestinal, Genitourinary, Musculoskeletal, Integumentary, Neurological, Psychiatric, Endocrine, Heme/Lymph and Allergic/Immunologic review of systems are otherwise negative except as noted in HPI.     Vital Signs Last 24 Hrs    T(C): 36.3 (19 Mar 2024 02:02), Max: 36.6 (18 Mar 2024 15:17)  T(F): 97.3 (19 Mar 2024 02:02), Max: 97.8 (18 Mar 2024 15:17)  HR: 120 (19 Mar 2024 02:02) (89 - 143)  BP: 108/59 (19 Mar 2024 02:02) (98/74 - 128/71)  BP(mean): 88 (18 Mar 2024 17:45) (82 - 88)  RR: 17 (19 Mar 2024 02:02) (15 - 22)  SpO2: 95% (19 Mar 2024 02:02) (95% - 100%)    Parameters below as of 19 Mar 2024 02:02  Patient On (Oxygen Delivery Method): room air    PHYSICAL EXAM:    Constitutional: no acute distress  Eyes: no conjunctival infection, anicteric.   ENT: pharynx is unremarkable  Neck: supple without JVD  Pulmonary: clear to auscultation bilaterally   Cardiac: RRR  Vascular: no calf tenderness, + 2 b/l extremities edema  Abdomen: normoactive bowel sounds, soft and nontender, no hepatosplenomegaly or masses appreciated  Lymphatic: no peripheral adenopathy appreciated  Musculoskeletal: full range of motion and no deformities appreciated  Skin: normal appearance, no rash/erythema  Neurology: awake, alert, oriented; no focal deficits    LABS:    CBC Full  -  ( 19 Mar 2024 07:42 )  WBC Count : 10.99 K/uL  RBC Count : 3.74 M/uL  Hemoglobin : 11.8 g/dL  Hematocrit : 35.9 %  Platelet Count - Automated : 120 K/uL  Mean Cell Volume : 96.0 fl  Mean Cell Hemoglobin : 31.6 pg  Mean Cell Hemoglobin Concentration : 32.9 gm/dL  Auto Neutrophil # : 8.82 K/uL  Auto Lymphocyte # : 1.23 K/uL  Auto Monocyte # : 0.74 K/uL  Auto Eosinophil # : 0.00 K/uL  Auto Basophil # : 0.04 K/uL  Auto Neutrophil % : 80.2 %  Auto Lymphocyte % : 11.2 %  Auto Monocyte % : 6.7 %  Auto Eosinophil % : 0.0 %  Auto Basophil % : 0.4 %    03-19    142  |  108  |  34<H>  ----------------------------<  165<H>  4.0   |  27  |  1.40<H>    Ca    8.9      19 Mar 2024 07:34  Mg     2.4     03-18    TPro  4.9<L>  /  Alb  2.5<L>  /  TBili  0.9  /  DBili  x   /  AST  15  /  ALT  23  /  AlkPhos  37<L>  03-19    PT/INR - ( 19 Mar 2024 07:42 )   PT: 15.0 sec;   INR: 1.34 ratio     PTT - ( 19 Mar 2024 07:42 )  PTT:25.3 sec    Urinalysis Basic - ( 19 Mar 2024 07:34 )    Color: x / Appearance: x / SG: x / pH: x  Gluc: 165 mg/dL / Ketone: x  / Bili: x / Urobili: x   Blood: x / Protein: x / Nitrite: x   Leuk Esterase: x / RBC: x / WBC x   Sq Epi: x / Non Sq Epi: x / Bacteria: x    RADIOLOGY & ADDITIONAL STUDIES:    EXAM:  CT ABDOMEN AND PELVIS     PROCEDURE DATE:  03/19/2024      INTERPRETATION:  CLINICAL INFORMATION: Right hip presenting/hematoma   status post fall.    COMPARISON: None.    CONTRAST/COMPLICATIONS:  IV Contrast: NONE  Oral Contrast: NONE  Complications: None reported at time of study completion    PROCEDURE:  CT of the Abdomen and Pelvis was performed.  Sagittal and coronal reformats were performed.    FINDINGS:  LOWER CHEST: Emphysema. Right lower lobe subsegmental atelectasis. 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  BILE DUCTS: Normal caliber.  GALLBLADDER: Dependent hyperattenuating sludge and/or small stones.  SPLEEN: Within normal limits.  PANCREAS: Fatty atrophy.  ADRENALS: Within normal limits.  KIDNEYS/URETERS: Right upper pole and left parapelvic renal cysts. No   renal stones or hydronephrosis.    BLADDER: Small posterior diverticulum.  REPRODUCTIVE ORGANS: Calcified uterine fibroid.    BOWEL: No bowel obstruction. Moderate colonic fecal load. Appendix is   normal.  PERITONEUM: No ascites.  VESSELS: Atherosclerotic changes.  RETROPERITONEUM/LYMPH NODES: No lymphadenopathy.  ABDOMINAL WALL: 5.7 x 4.3 x 9.0 cm right lateral hip subcutaneous   hematoma. Additional infiltration of the subcutaneous fat of the   bilateral lateral abdominal wall.  BONES: Degenerative changes. No evidence of acute fracture.    IMPRESSION:  Subcutaneous hematoma overlying the right lateral hip.      EXAM:  CT CHEST       PROCEDURE DATE:  03/18/2024      INTERPRETATION:  INDICATION: Abnormal chest x-ray    TECHNIQUE: Helical acquisition images of the chest without intravenous   contrast. Maximum intensity projection images were generated.    COMPARISON: Same-day chest x-ray, no prior chest CT.    FINDINGS:    LUNGS/AIRWAYS/PLEURA: Patent trachea and bronchi. Emphysema. Of the left   lower lobe calcified granuloma. Right lower lobe 5 mm solid nodule   (2-66). Right upper lobe sub-4 mm ill-defined nodule (2-31). Trace right   pleural effusion versus thickening.    LYMPH NODES/MEDIASTINUM: Small hiatal hernia. Right paratracheal and   right hilar lymphadenopathy, for example, largest lymph node  approximately 3.5 cm in short axis in the right paratracheal station   (2-34).    HEART/VASCULATURE: Normal heart size. No pericardial effusion. Fat   attenuation within the left ventricular apical myocardium compatible with   a prior infarct. Coronary artery calcifications.    UPPER ABDOMEN: Fat replaced pancreas. Partially included right renal   cysts.    BONES/SOFT TISSUES: Unremarkable.    IMPRESSION:    Right paratracheal and right hilar lymphadenopathy, likely neoplastic.     Few nonspecific very small lung nodules.    EXAM:  CT BRAIN       PROCEDURE DATE:  03/18/2024      INTERPRETATION:  CLINICAL STATEMENT: Pain.    TECHNIQUE: CT of the head was performed without IV contrast.    COMPARISON: None.    FINDINGS:  Thereis moderate diffuse parenchymal volume loss.    There are areas of low attenuation in the periventricular white matter   likely related to mild chronic microvascular ischemic changes.    There is no acute intracranial hemorrhage, parenchymal mass, mass effect   or midline shift. There is no acute territorial infarct. There is no   hydrocephalus. Partial empty sella noted    The cranium is intact. The visualized paranasal sinuses are well-aerated.    IMPRESSION:    No acute intracranial hemorrhage or acute territorial infarct.  If   symptoms persist, follow-up MRI exam recommended.

## 2024-03-19 NOTE — CONSULT NOTE ADULT - ASSESSMENT
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 Patient has been having difficulty walking due to B/L LE edema. States that LE edema has been worsening since she was started on steroids for temporal arterities about 1 month ago. States that yesterday patient had a fall where she lost her balance and fell on R side, and sustained R hip bruising / hematoma. Today patient had another fall, but family member helped her to a chair before she hit the floor. Patient denies CP, SOB, cough, runny nose, sore throat, nausea, vomiting, abdominal pain, fever, chills    #Afib with RVR  #Acute on likely chronic HF exacerbation (unknown EF)  #Temporal Arteritis - on chronic steroids  #DMT2  #Hyperlipidemia    - Monitor on tele, HR uncontrolled  - DC digoxin  - Continue with amiodarone for now, may consider reloading however patient given IV dig 500mcg this Am, will hold off for now  - Continue Metoprolol - change to lopressor 25mg Q8 w/ IV PRN if HR >120 after PO  - Continue Xarelto for stroke ppx  - Continue atorvastatin   - Check echocardiogram  - Check TFTs, BMP, Mag, lipid panel  - Hold PO torsemide. Given IV Bumex x1    Will follow

## 2024-03-19 NOTE — ED ADULT NURSE REASSESSMENT NOTE - NS ED NURSE REASSESS COMMENT FT1
Clarified orders for digoxin 500 mcg IV Push with MD Sexton. OK to administer as ordered per MD. Cardiac monitoring maintained. Will continue to monitor.

## 2024-03-20 NOTE — PROGRESS NOTE ADULT - SUBJECTIVE AND OBJECTIVE BOX
Chief Complaint: Generalized weakness, fall on R side, B/L leg swelling    Interval Hx: Patient seen and examined. Resting comfortably in bed. No new symptoms. Generally weak. B/L LE swelling improved. Hematoma stable. Remains in afib, fairly rate-controlled. No chest complaints. Cardiology and Cardiac EP following. For DCCV tomorrow if patient does not spontaneously convert to SR. Hematology Oncology following as well re findings of pulmonary nodules and R paratracheal and hilar LN. Patient and family wish to pursue further diagnostic testing. Discussed with Cardiac EP Dr. Mendoza that if patient does undergo DCCV, he would recommend uninterrupted AC for 21 days and thus would defer to diagnostic testing until then if feasible.     ROS: Multi system review is comprehensively negative x 10 systems except as above    Vitals:  T(F): 98.4 (20 Mar 2024 08:51), Max: 98.4 (20 Mar 2024 08:51)  HR: 100 (20 Mar 2024 13:48) (94 - 115)  BP: 110/51 (20 Mar 2024 13:48) (87/50 - 110/51)  RR: 18 (20 Mar 2024 08:51) (16 - 18)  SpO2: 95% (20 Mar 2024 08:51) (95% - 98%) on room air    Exam:  Gen: No acute distress  HEENT: NCAT PERRL EOMI MMM clear oropharynx  Neck: Supple, no JVD, no LAD, no thyromegaly  CVS: s1 s2 normal, irregular, rate ~100 bpm  Chest: Normal resp effort, lungs CTA B/L  Abd: +BS, non distended, soft, nontender  Ext: R hip area with extensive hematoma, compartment soft, intact pulses, normal cap refill  Skin: Warm, dry  Mood: Calm, pleasant  Neuro: Awake and alert, follows commands, answers questions appropriately    Labs:             POCT glucose 190s-200s               10.3   10.63 )--------( 123                  31.1       139  |  110  |  35  ----------------------<  196  4.3   |   24   |  1.33    Ca  8.2     Mg  2.3    Phos 2.9          TPro  4.9  /  Alb  2.5  /  TBili  0.9  /  DBili  x   /  AST  15  /  ALT  23  /  AlkPhos  37    PT: 15.0 sec;   INR: 1.34 ratio   PTT: 25.3 sec    Troponin negative  ProBNP 3280    A1c 8.6   TSH WNL   ESR 11  CRP <3    Imaging:  CT abd, pelvis WO 3/19: Liver normal. Normal caliber bile ducts. Gallbladder dependent hyperattenuating sludge and/or small stones. Normal spleen. Fatty atrophy of pancreas. Normal adrenals. Right upper pole and left parapelvic renal cysts. No renal stones or hydronephrosis. Small posterior diverticulum. Calcified uterine fibroid. No bowel obstruction. Moderate colonic fecal load. Appendix is normal. No ascites. Atherosclerotic changes. No lymphadenopathy. 5.7 x 4.3 x 9.0 cm right lateral hip subcutaneous hematoma. Additional infiltration of the subcutaneous fat of the bilateral lateral abdominal wall. Degenerative changes. No evidence of acute fracture.    CT chest WO 3/18: Patent trachea and bronchi. Emphysema. Of the left lower lobe calcified granuloma. Right lower lobe 5 mm solid nodule. Right upper lobe sub-4 mm ill-defined nodule. Trace right pleural effusion versus thickening. Small hiatal hernia. Right paratracheal and right hilar lymphadenopathy, for example, largest lymph node approximately 3.5 cm in short axis in the right paratracheal station. Normal heart size. No pericardial effusion. 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 Coronary artery calcifications.     CT head WO 3/18: There is moderate diffuse parenchymal volume loss. There are areas of low attenuation in the periventricular white matter likely related to mild chronic microvascular ischemic changes. There is no acute intracranial hemorrhage, parenchymal mass, mass effect or midline shift. There is no acute territorial infarct. There is no hydrocephalus. Partial empty sella noted. The cranium is intact. The visualized paranasal sinuses are well-aerated.    US venous duplex B/L LE 3/18: No evidence of deep venous thrombosis in the right lower extremity. No evidence of deep vein thrombosis in the visualized deep veins of the left lower extremity above the knee. The deep veins of the left calf were not seen. Extensive bilateral calf subcutaneous edema.    XR chest 3/18: Normal heart size. Atherosclerotic aorta. Increased right paratracheal soft tissue density may represent prominent vasculature.     Cardiac Testing:  Tele 3/19-20: AT fib with VR  bpm    EKG 3/19: Rate 110. Atrial fibrillation with rapid ventricular response with premature ventricular or aberrantly conducted complexes. Nonspecific repolarization abnormalities.    EKG 3/18: rate 128. Atrial fibrillation with rapid ventricular response with premature ventricular or aberrantly conducted complexes    Meds:  MEDICATIONS  (STANDING):  aMIOdarone    Tablet 400 milliGRAM(s) Oral every 12 hours  atorvastatin 80 milliGRAM(s) Oral at bedtime  folic acid 1 milliGRAM(s) Oral daily  insulin lispro (ADMELOG) corrective regimen sliding scale   SubCutaneous three times a day before meals  insulin lispro (ADMELOG) corrective regimen sliding scale   SubCutaneous at bedtime  metoprolol tartrate 25 milliGRAM(s) Oral every 8 hours  pantoprazole    Tablet 40 milliGRAM(s) Oral before breakfast  predniSONE   Tablet 40 milliGRAM(s) Oral daily  rivaroxaban 15 milliGRAM(s) Oral with dinner  timolol 0.5% Solution 1 Drop(s) Both EYES daily    MEDICATIONS  (PRN):  dextrose Oral Gel 15 Gram(s) Oral once PRN Blood Glucose LESS THAN 70 milliGRAM(s)/deciliter  metoprolol tartrate Injectable 5 milliGRAM(s) IV Push every 6 hours PRN HR >120 after PO meds

## 2024-03-20 NOTE — DISCHARGE NOTE NURSING/CASE MANAGEMENT/SOCIAL WORK - PATIENT PORTAL LINK FT
You can access the FollowMyHealth Patient Portal offered by [facility] by registering at the following website: [email or web address]. By joining [employer]’s FollowMyHealth portal, you will also be able to view your health information using other applications (apps) compatible with our system.

## 2024-03-20 NOTE — DISCHARGE NOTE NURSING/CASE MANAGEMENT/SOCIAL WORK - NSDCPEFALRISK_GEN_ALL_CORE
For information on Fall & Injury Prevention, visit: [email or web address] OR  [email or web address] OR  https://www.cdc.gov/steadi/patient.html

## 2024-03-20 NOTE — PROGRESS NOTE ADULT - ASSESSMENT
87 y/o Female EP consulted for At. FIB with RVR. (on both Xarelto and Amiodarone 400 mgs daily.  on 3/19/2024 Amiodarone 400 mgs was increased to BID.  Pt has been complaining of weakness for 2-3 weeks, difficulty ambulating and lower leg edema.  The day prior to admission pt lost her balance and hurt her right hip.  CT showed hematoma of right lateral hip, she was given Digoxin 500 ucg.    PMH of temporal arteritis (on steroids), a-fib (on xarelto), DM2 . Patient has been having difficulty walking due to B/L LE edema. States that LE edema has been worsening since she was started on steroids for temporal arteritis about 1 month ago.       Pt was on Farxigao which has been with impending  DC CV  To have DC CV with Dr. Mendoza on 3/21/2024  Continue with diureses   Xarelto uninterrupted   Tolerating Amiodarone 400 mgs bid with meals   87 y/o Female EP consulted for At. FIB with RVR. (on both Xarelto and Amiodarone 400 mgs daily.  on 3/19/2024 Amiodarone 400 mgs was increased to BID.  Pt has been complaining of weakness for 2-3 weeks, difficulty ambulating and lower leg edema.  The day prior to admission pt lost her balance and hurt her right hip.  CT showed hematoma of right lateral hip, she was given Digoxin 500 ucg.    PMH of temporal arteritis (on steroids), a-fib (on xarelto), DM2 . Patient has been having difficulty walking due to B/L LE edema. States that LE edema has been worsening since she was started on steroids for temporal arteritis about 1 month ago.       Pt was on Farxiga which has been with impending  DC CV  To have DC CV with Dr. Mendoza on 3/21/2024  Continue with dijean pierre   Xarelto uninterrupted as it has been according to her son  Tolerating Amiodarone 400 mgs bid with meals

## 2024-03-20 NOTE — PROGRESS NOTE ADULT - SUBJECTIVE AND OBJECTIVE BOX
89 y/o Female EP consulted for At. FIB with RVR. (on both Xarelto and Amiodarone 400 mgs daily.  HPI:  Pt has been complaining of weakness for 2-3 weeks, difficulty ambulating and lower leg edema.  The day prior to admission pt lost her balance and hurt her right hip.  CT showed hematoma of right lateral hip.  Today she continues to be in At. FIB with RVR and was given Digoxin 500 ucg.    PMH of temporal arteritis (on steroids), a-fib (on xarelto), DM2 . Patient has been having difficulty walking due to B/L LE edema. States that LE edema has been worsening since she was started on steroids for temporal arteritis about 1 month ago.a. Today patient had another fall, but family member helped her to a chair before she hit the floor. Patient denies CP, SOB, cough, runny nose, sore throat, nausea, vomiting, abdominal pain, fever, chills      TELE:  Remians in AT Fib with VR  BPM    MEDICATIONS  (STANDING):  aMIOdarone    Tablet   Oral   aMIOdarone    Tablet 400 milliGRAM(s) Oral every 12 hours  atorvastatin 80 milliGRAM(s) Oral at bedtime  buMETAnide Injectable 0.5 milliGRAM(s) IV Push once  dextrose 5%. 1000 milliLiter(s) (100 mL/Hr) IV Continuous <Continuous>  dextrose 5%. 1000 milliLiter(s) (50 mL/Hr) IV Continuous <Continuous>  dextrose 50% Injectable 25 Gram(s) IV Push once  dextrose 50% Injectable 12.5 Gram(s) IV Push once  dextrose 50% Injectable 25 Gram(s) IV Push once  folic acid 1 milliGRAM(s) Oral daily  glucagon  Injectable 1 milliGRAM(s) IntraMuscular once  insulin lispro (ADMELOG) corrective regimen sliding scale   SubCutaneous three times a day before meals  insulin lispro (ADMELOG) corrective regimen sliding scale   SubCutaneous at bedtime  metoprolol tartrate 25 milliGRAM(s) Oral every 8 hours  pantoprazole    Tablet 40 milliGRAM(s) Oral before breakfast  predniSONE   Tablet 40 milliGRAM(s) Oral daily  rivaroxaban 15 milliGRAM(s) Oral with dinner  timolol 0.5% Solution 1 Drop(s) Both EYES daily    MEDICATIONS  (PRN):  dextrose Oral Gel 15 Gram(s) Oral once PRN Blood Glucose LESS THAN 70 milliGRAM(s)/deciliter  metoprolol tartrate Injectable 5 milliGRAM(s) IV Push every 6 hours PRN HR >120 after PO meds      Allergies    penicillin (Swelling)    Intolerances        Vital Signs Last 24 Hrs  T(C): 36.9 (20 Mar 2024 08:51), Max: 36.9 (20 Mar 2024 08:51)  T(F): 98.4 (20 Mar 2024 08:51), Max: 98.4 (20 Mar 2024 08:51)  HR: 104 (20 Mar 2024 08:51) (94 - 145)  BP: 95/48 (20 Mar 2024 08:51) (87/50 - 120/50)  BP(mean): --  RR: 18 (20 Mar 2024 08:51) (16 - 18)  SpO2: 95% (20 Mar 2024 08:51) (95% - 98%)    Parameters below as of 20 Mar 2024 08:51  Patient On (Oxygen Delivery Method): room air        PHYSICAL EXAMINATION:    GENERAL APPEARANCE:  Pt. is not currently dyspneic, in no distress. Pt. is alert, oriented, and pleasant.  HEENT:  Pupils are normal and react normally. No icterus. Mucous membranes well colored.  NECK:  Supple. No lymphadenopathy. Jugular venous pressure not elevated. Carotids equal.   HEART:   The cardiac impulse has a normal quality. There are no murmurs, rubs or gallops noted  CHEST:  Chest is clear to auscultation. Normal respiratory effort.  ABDOMEN:  Soft and nontender.   EXTREMITIES:  There is no edema.   SKIN:  No rash or significant lesions are noted.    LABS:                        10.3   10.63 )-----------( 123      ( 20 Mar 2024 06:56 )             31.1     03-20    139  |  110<H>  |  35<H>  ----------------------------<  196<H>  4.3   |  24  |  1.33<H>    Ca    8.2<L>      20 Mar 2024 06:56  Phos  2.9     03-20  Mg     2.3     03-20    TPro  4.9<L>  /  Alb  2.5<L>  /  TBili  0.9  /  DBili  x   /  AST  15  /  ALT  23  /  AlkPhos  37<L>  03-19    PT/INR - ( 19 Mar 2024 07:42 )   PT: 15.0 sec;   INR: 1.34 ratio         PTT - ( 19 Mar 2024 07:42 )  PTT:25.3 sec  Urinalysis Basic - ( 20 Mar 2024 06:56 )    Color: x / Appearance: x / SG: x / pH: x  Gluc: 196 mg/dL / Ketone: x  / Bili: x / Urobili: x   Blood: x / Protein: x / Nitrite: x   Leuk Esterase: x / RBC: x / WBC x   Sq Epi: x / Non Sq Epi: x / Bacteria: x            RADIOLOGY & ADDITIONAL TESTS:     89 y/o Female EP consulted for At. FIB with RVR. (on both Xarelto and Amiodarone 400 mgs daily.  HPI:  Pt has been complaining of weakness for 2-3 weeks, difficulty ambulating and lower leg edema.  The day prior to admission pt lost her balance and hurt her right hip.  CT showed hematoma of right lateral hip.  Today she continues to be in At. FIB with RVR and was given Digoxin 500 ucg.    PMH of temporal arteritis (on steroids), a-fib (on xarelto), DM2 . Patient has been having difficulty walking due to B/L LE edema. States that LE edema has been worsening since she was started on steroids for temporal arteritis about 1 month ago.a. Today patient had another fall, but family member helped her to a chair before she hit the floor. Patient denies CP, SOB, cough, runny nose, sore throat, nausea, vomiting, abdominal pain, fever, chills      TELE:  Remains in AT Fib with VR  BPM    MEDICATIONS  (STANDING):  aMIOdarone    Tablet   Oral   aMIOdarone    Tablet 400 milliGRAM(s) Oral every 12 hours  atorvastatin 80 milliGRAM(s) Oral at bedtime  buMETAnide Injectable 0.5 milliGRAM(s) IV Push once  dextrose 5%. 1000 milliLiter(s) (100 mL/Hr) IV Continuous <Continuous>  dextrose 5%. 1000 milliLiter(s) (50 mL/Hr) IV Continuous <Continuous>  dextrose 50% Injectable 25 Gram(s) IV Push once  dextrose 50% Injectable 12.5 Gram(s) IV Push once  dextrose 50% Injectable 25 Gram(s) IV Push once  folic acid 1 milliGRAM(s) Oral daily  glucagon  Injectable 1 milliGRAM(s) IntraMuscular once  insulin lispro (ADMELOG) corrective regimen sliding scale   SubCutaneous three times a day before meals  insulin lispro (ADMELOG) corrective regimen sliding scale   SubCutaneous at bedtime  metoprolol tartrate 25 milliGRAM(s) Oral every 8 hours  pantoprazole    Tablet 40 milliGRAM(s) Oral before breakfast  predniSONE   Tablet 40 milliGRAM(s) Oral daily  rivaroxaban 15 milliGRAM(s) Oral with dinner  timolol 0.5% Solution 1 Drop(s) Both EYES daily    MEDICATIONS  (PRN):  dextrose Oral Gel 15 Gram(s) Oral once PRN Blood Glucose LESS THAN 70 milliGRAM(s)/deciliter  metoprolol tartrate Injectable 5 milliGRAM(s) IV Push every 6 hours PRN HR >120 after PO meds      Allergies    penicillin (Swelling)    Intolerances        Vital Signs Last 24 Hrs  T(C): 36.9 (20 Mar 2024 08:51), Max: 36.9 (20 Mar 2024 08:51)  T(F): 98.4 (20 Mar 2024 08:51), Max: 98.4 (20 Mar 2024 08:51)  HR: 104 (20 Mar 2024 08:51) (94 - 145)  BP: 95/48 (20 Mar 2024 08:51) (87/50 - 120/50)  BP(mean): --  RR: 18 (20 Mar 2024 08:51) (16 - 18)  SpO2: 95% (20 Mar 2024 08:51) (95% - 98%)    Parameters below as of 20 Mar 2024 08:51  Patient On (Oxygen Delivery Method): room air        PHYSICAL EXAMINATION:    GENERAL APPEARANCE:  Pt. is not currently dyspneic, in no distress. Pt. is alert, oriented, and pleasant.  HEENT:  Pupils are normal and react normally. No icterus. Mucous membranes well colored.  NECK:  Supple. No lymphadenopathy. Jugular venous pressure not elevated. Carotids equal.   HEART:   The cardiac impulse has a normal quality. There are no murmurs, rubs or gallops noted  CHEST:  Chest is clear to auscultation. Normal respiratory effort.  ABDOMEN:  Soft and nontender.   EXTREMITIES:  There is no edema.   SKIN:  No rash or significant lesions are noted.    LABS:                        10.3   10.63 )-----------( 123      ( 20 Mar 2024 06:56 )             31.1     03-20    139  |  110<H>  |  35<H>  ----------------------------<  196<H>  4.3   |  24  |  1.33<H>    Ca    8.2<L>      20 Mar 2024 06:56  Phos  2.9     03-20  Mg     2.3     03-20    TPro  4.9<L>  /  Alb  2.5<L>  /  TBili  0.9  /  DBili  x   /  AST  15  /  ALT  23  /  AlkPhos  37<L>  03-19    PT/INR - ( 19 Mar 2024 07:42 )   PT: 15.0 sec;   INR: 1.34 ratio         PTT - ( 19 Mar 2024 07:42 )  PTT:25.3 sec  Urinalysis Basic - ( 20 Mar 2024 06:56 )    Color: x / Appearance: x / SG: x / pH: x  Gluc: 196 mg/dL / Ketone: x  / Bili: x / Urobili: x   Blood: x / Protein: x / Nitrite: x   Leuk Esterase: x / RBC: x / WBC x   Sq Epi: x / Non Sq Epi: x / Bacteria: x            RADIOLOGY & ADDITIONAL TESTS:

## 2024-03-20 NOTE — PROGRESS NOTE ADULT - ASSESSMENT
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 Patient has been having difficulty walking due to B/L LE edema. States that LE edema has been worsening since she was started on steroids for temporal arterities about 1 month ago. On day prior to presentation, she lost her balance and fell on her right side causing extensive bruising and R hip area subcutaneous hematoma. She has otherwise been in her usual state of health. In the ED she was noted to have rapid afib in addition to this hematoma. She was given diltiazem and digoxin, IV fluid hydration and admitted to Medicine.     Fall, generalized weakness  PT consult appreciated. Recommended MARIO vs home with home PT.   - Continue restorative PT sessions     Traumatic hematoma in the area of the R hip  Related to recent fall. On Xarelto for afib. Neurovascularly intact. Hgb ~10. Plt ~120.   - Continue to monitor site  - Continue to trend CBC    Thrombocytopenia  Plts-120K/ul <--128K/ul. No previous records available for baseline Suspect due to right hip hematoma  - Continue to trend CBC  - Transfuse platelets only if Plts drops < 10K/ul, or 20 K/ul with evidence of active bleeding.    Rapid afib  Known hx of afib. In RVR in setting of mechanical fall, traumatic R hip hematoma. Appreciate Cardiology and Cardiac EP input.  - Continue amiodarone and metoprolol  - Continue on Xarelto  - Continue on tele  - Plan for DCCV tomorrow with Dr. Mendoza if patient does not convert to SR before then    Pulmonary nodules with R paratracheal and R hilar lymphadenopathy   As noted on CT chest. RLL 5mm solid nodule and RUL 4mm ill-defined nodule. LN enlargement R paratracheal and R hilar. No previous records available. CT head & abd/pelvis with no acute pathology. Unclear if this is reactive or due to underlying malignancy. Appreciate input from Hematology Oncology. Patient and family wish to pursue further diagnostic testing. Discussed with Cardiac EP Dr. Mendoza that if patient does undergo DCCV, he would recommend uninterrupted AC for 21 days and thus would defer to diagnostic testing until then if feasible.   - Plan for further diagnostic testing after 21 days of uninterrupted AC assuming she undergoes DCCV tomorrow    - Outpatient follow up with Hematology/Oncology, Pulmonary Medicine    Type II DM  Suboptimally controlled. A1c 8.6. POCT glucose 190-200s. Will be NPO > MN for DCCV. If still hyperglycemic, may add Lantus   - Continue on insulin correctional scale  - Continue to monitor glucose TID AC and HS    Temporal arteritis  Follows with [facility] in [place]. On prednisone, planned to taper on Thursday 3/21 per patient's rheumatologist. ESR WNL. CRP WNL.   - Continue prednisone, taper to 30mg tomorrow Thursday as planned  - Outpatient follow up with rheumatologist       DVT px: On Xarelto for afib  Code status: Full  Dispo: Anticipate DC in 24-48 hrs pending afib management outcome, PT evals, further dispo discussions with patient/family

## 2024-03-20 NOTE — DISCHARGE NOTE NURSING/CASE MANAGEMENT/SOCIAL WORK - NSDCFUADDAPPT_GEN_ALL_CORE_FT
[facility]...
CARDIOLOGY FOLLOW UP APPOINTMENT: 3/27/24 @2PM at [facility] with MING Grier  [facility] [address] [place] [address]   [phone]

## 2024-03-20 NOTE — PROGRESS NOTE ADULT - ASSESSMENT
87 y/o F w/ PMH of temporal arteritis (on steroids), a-fib (on xarelto), DM2 p/w difficulty ambulating and LE edema. Patient has been having difficulty walking due to B/L LE edema. States that LE edema has been worsening since she was started on steroids for temporal arterities about 1 month ago. States that yesterday patient had a fall where she lost her balance and fell on R side, and sustained R hip bruising / hematoma. Today patient had another fall, but family member helped her to a chair before she hit the floor. Patient denies CP, SOB, cough, runny nose, sore throat, nausea, vomiting, abdominal pain, fever, chills    #Afib with RVR  #Acute on likely chronic HF exacerbation (unknown EF)  #Temporal Arteritis - on chronic steroids  #DMT2  #Hyperlipidemia    - Monitor on tele, HR uncontrolled  - DC digoxin  - Continue with amiodarone for now, may consider reloading however patient given IV dig 500mcg this Am, will hold off for now  - Continue Metoprolol - change to lopressor 25mg Q8 w/ IV PRN if HR >120 after PO  - Continue Xarelto for stroke ppx  - Continue atorvastatin   - Check echocardiogram  - Check TFTs, BMP, Mag, lipid panel  - Hold PO torsemide. Given IV Bumex x1    Will follow   87 y/o F w/ PMH of temporal arteritis (on steroids), a-fib (on xarelto), DM2 p/w difficulty ambulating and LE edema. Patient has been having difficulty walking due to B/L LE edema. States that LE edema has been worsening since she was started on steroids for temporal arterities about 1 month ago. States that yesterday patient had a fall where she lost her balance and fell on R side, and sustained R hip bruising / hematoma. Today patient had another fall, but family member helped her to a chair before she hit the floor. Patient denies CP, SOB, cough, runny nose, sore throat, nausea, vomiting, abdominal pain, fever, chills    #Afib with RVR  #Acute on likely chronic HF exacerbation (unknown EF)  #Temporal Arteritis - on chronic steroids  #DMT2  #Hyperlipidemia    - Monitor on tele, HR still rapid  - DC digoxin  - Continue with amiodarone - reloading  - Continue Metoprolol - change to lopressor 25mg Q8 w/ IV PRN if HR >120 after PO  - Continue Xarelto for stroke ppx  - Continue atorvastatin   - Echocardiogram reviewed.  - IV Bumex x1  - Plan for CV tomorrow    Will follow  Case d/w Dr. Small

## 2024-03-20 NOTE — PROGRESS NOTE ADULT - SUBJECTIVE AND OBJECTIVE BOX
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 Patient has been having difficulty walking due to B/L LE edema. States that LE edema has been worsening since she was started on steroids for temporal arterities about 1 month ago. States that yesterday patient had a fall where she lost her balance and fell on R side, and sustained R hip bruising / hematoma. Today patient had another fall, but family member helped her to a chair before she hit the floor. Patient denies CP, SOB, cough, runny nose, sore throat, nausea, vomiting, abdominal pain, fever, chills      PSH: Denies   Social Hx: Denies tobacco / etoh / drugs   Family Hx: Denies pertinent hx  (18 Mar 2024 23:17)    Cardiology consulted for afib RVR  Patient denies any CP or SOB. She is switching providers to [place] and requested Dr. Small  Denies any past CAD or heart failure  Denies any past CV and ablations.    3/20. feeling well. D/w Daughter, has not seen cardiologist in past up until recently this year while admitted to  hospital    MEDICATIONS  (STANDING):  aMIOdarone    Tablet 400 milliGRAM(s) Oral daily  atorvastatin 80 milliGRAM(s) Oral at bedtime  dextrose 5%. 1000 milliLiter(s) (50 mL/Hr) IV Continuous <Continuous>  dextrose 5%. 1000 milliLiter(s) (100 mL/Hr) IV Continuous <Continuous>  dextrose 50% Injectable 25 Gram(s) IV Push once  dextrose 50% Injectable 12.5 Gram(s) IV Push once  dextrose 50% Injectable 25 Gram(s) IV Push once  folic acid 1 milliGRAM(s) Oral daily  glucagon  Injectable 1 milliGRAM(s) IntraMuscular once  insulin lispro (ADMELOG) corrective regimen sliding scale   SubCutaneous three times a day before meals  insulin lispro (ADMELOG) corrective regimen sliding scale   SubCutaneous at bedtime  pantoprazole    Tablet 40 milliGRAM(s) Oral before breakfast  predniSONE   Tablet 40 milliGRAM(s) Oral daily  rivaroxaban 15 milliGRAM(s) Oral with dinner  timolol 0.5% Solution 1 Drop(s) Both EYES daily  torsemide 10 milliGRAM(s) Oral daily    MEDICATIONS  (PRN):  dextrose Oral Gel 15 Gram(s) Oral once PRN Blood Glucose LESS THAN 70 milliGRAM(s)/deciliter  metoprolol tartrate Injectable 5 milliGRAM(s) IV Push every 6 hours PRN HR >120 after PO meds    HOME MEDICATIONS:  amiodarone 200 mg oral tablet: 2 tab(s) orally once a day x 3 weeks then 1 tab daily ***pt started approx 3 days ago*** (18 Mar 2024 16:53)  clindamycin 150 mg oral capsule: 1 cap(s) orally 2 times a day x 10 days ***course not complete-unknown if pt still taking*** (18 Mar 2024 16:53)  Farxiga 10 mg oral tablet: 1 tab(s) orally once a day (18 Mar 2024 16:53)  folic acid 1 mg oral tablet: 1 tab(s) orally once a day (18 Mar 2024 16:53)  ipratropium 21 mcg/inh (0.03%) nasal spray: 2 spray(s) intranasally once a day (18 Mar 2024 16:53)  metoprolol succinate 50 mg oral tablet, extended release: 1 tab(s) orally once a day (18 Mar 2024 16:53)  pantoprazole 40 mg oral delayed release tablet: 1 tab(s) orally once a day (18 Mar 2024 16:53)  potassium chloride 10 mEq oral tablet, extended release: 1 tab(s) orally 2 times a day (18 Mar 2024 16:53)  predniSONE 20 mg oral tablet: 2 tab(s) orally once a day ***pt on a taper- unknown taper- pt has been on 40mg for 3 weeks and will possibly be tapered down at next appt*** (18 Mar 2024 16:52)  rosuvastatin 40 mg oral tablet: 1 tab(s) orally once a day (18 Mar 2024 16:53)  timolol maleate 0.5% ophthalmic solution: 1 drop(s) in each eye once a day (18 Mar 2024 16:56)  torsemide 5 mg oral tablet: 2 tab(s) orally once a day (18 Mar 2024 16:56)  valACYclovir 1 g oral tablet: 1 tab(s) orally 3 times a day x 10 days ***Course Complete*** (18 Mar 2024 16:56)  Xarelto 15 mg oral tablet: 1 tab(s) orally once a day (18 Mar 2024 16:56)    PHYSICAL EXAM:  T(C): 36.3 (19 Mar 2024 02:02), Max: 36.6 (18 Mar 2024 15:17)  T(F): 97.3 (19 Mar 2024 02:02), Max: 97.8 (18 Mar 2024 15:17)  HR: 120 (19 Mar 2024 02:02) (89 - 143)  BP: 108/59 (19 Mar 2024 02:02) (98/74 - 128/71)  BP(mean): 88 (18 Mar 2024 17:45) (82 - 88)  RR: 17 (19 Mar 2024 02:02) (15 - 22)  SpO2: 95% (19 Mar 2024 02:02) (95% - 100%)    Parameters below as of 19 Mar 2024 02:02  Patient On (Oxygen Delivery Method): room air    Constitutional: NAD, awake and alert  HEENT: PERR, EOMI, Normal Hearing, MMM  Neck: Soft and supple, No LAD, No JVD  Respiratory: Breath sounds are clear bilaterally, No wheezing, rales or rhonchi  Cardiovascular: S1 and S2, IR IR  Gastrointestinal: Bowel Sounds present, soft, nontender, nondistended, no guarding, no rebound  Extremities: + pitting peripheral edema  Vascular: 2+ peripheral pulses  Neurological: A/O x 3, no focal deficits  Musculoskeletal: 5/5 strength b/l upper and lower extremities  Skin: No rashes    =======================================    INTERPRETATION OF TELEMETRY: Afib rates up to 140    ECG:     ========================================    LABS:                        11.8   10.99 )-----------( 120      ( 19 Mar 2024 07:42 )             35.9     03-19    142  |  108  |  34<H>  ----------------------------<  165<H>  4.0   |  27  |  1.40<H>    Ca    8.9      19 Mar 2024 07:34  Mg     2.4     03-18    TPro  4.9<L>  /  Alb  2.5<L>  /  TBili  0.9  /  DBili  x   /  AST  15  /  ALT  23  /  AlkPhos  37<L>  03-19    PT/INR - ( 19 Mar 2024 07:42 )   PT: 15.0 sec;   INR: 1.34 ratio       PTT - ( 19 Mar 2024 07:42 )  PTT:25.3 sec    BNP 3280    RADIOLOGY & ADDITIONAL STUDIES:    < from: CT Abdomen and Pelvis No Cont (03.19.24 @ 01:08) >  Subcutaneous hematoma overlyingthe right lateral hip.    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   This was discussed with Dr. Maciel at 5:02 PM on 3/20/2024.  Few nonspecific very small lung nodules.    < from: CT Head No Cont (03.18.24 @ 16:33) >  No acute intracranial hemorrhage or acute territorial infarct.  If   symptoms persist, follow-up MRI exam recommended.    < from: US Duplex Venous Lower Ext Complete, Bilateral (03.18.24 @ 16:28) >  No evidence of deep venous thrombosis in the right lower extremity.  No evidence of deep vein thrombosis in the visualized deep veins of the   left lower extremity above the knee, as detailed above. The deep veins of   the left calf were not seen.  Extensive bilateral calf subcutaneous edema.   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 Patient has been having difficulty walking due to B/L LE edema. States that LE edema has been worsening since she was started on steroids for temporal arterities about 1 month ago. States that yesterday patient had a fall where she lost her balance and fell on R side, and sustained R hip bruising / hematoma. Today patient had another fall, but family member helped her to a chair before she hit the floor. Patient denies CP, SOB, cough, runny nose, sore throat, nausea, vomiting, abdominal pain, fever, chills      PSH: Denies   Social Hx: Denies tobacco / etoh / drugs   Family Hx: Denies pertinent hx  (18 Mar 2024 23:17)    Cardiology consulted for afib RVR  Patient denies any CP or SOB. She is switching providers to [place] and requested Dr. Small  Denies any past CAD or heart failure  Denies any past CV and ablations.    3/20. feeling well. D/w Daughter, has not seen cardiologist in past up until recently this year while admitted to  hospital    MEDICATIONS  (STANDING):  aMIOdarone    Tablet   Oral   aMIOdarone    Tablet 400 milliGRAM(s) Oral every 12 hours  atorvastatin 80 milliGRAM(s) Oral at bedtime  buMETAnide Injectable 0.5 milliGRAM(s) IV Push once  dextrose 5%. 1000 milliLiter(s) (100 mL/Hr) IV Continuous <Continuous>  dextrose 5%. 1000 milliLiter(s) (50 mL/Hr) IV Continuous <Continuous>  dextrose 50% Injectable 25 Gram(s) IV Push once  dextrose 50% Injectable 12.5 Gram(s) IV Push once  dextrose 50% Injectable 25 Gram(s) IV Push once  folic acid 1 milliGRAM(s) Oral daily  glucagon  Injectable 1 milliGRAM(s) IntraMuscular once  insulin lispro (ADMELOG) corrective regimen sliding scale   SubCutaneous three times a day before meals  insulin lispro (ADMELOG) corrective regimen sliding scale   SubCutaneous at bedtime  metoprolol tartrate 25 milliGRAM(s) Oral every 8 hours  pantoprazole    Tablet 40 milliGRAM(s) Oral before breakfast  predniSONE   Tablet 40 milliGRAM(s) Oral daily  rivaroxaban 15 milliGRAM(s) Oral with dinner  timolol 0.5% Solution 1 Drop(s) Both EYES daily    MEDICATIONS  (PRN):  dextrose Oral Gel 15 Gram(s) Oral once PRN Blood Glucose LESS THAN 70 milliGRAM(s)/deciliter  metoprolol tartrate Injectable 5 milliGRAM(s) IV Push every 6 hours PRN HR >120 after PO meds    HOME MEDICATIONS:  amiodarone 200 mg oral tablet: 2 tab(s) orally once a day x 3 weeks then 1 tab daily ***pt started approx 3 days ago*** (18 Mar 2024 16:53)  clindamycin 150 mg oral capsule: 1 cap(s) orally 2 times a day x 10 days ***course not complete-unknown if pt still taking*** (18 Mar 2024 16:53)  Farxiga 10 mg oral tablet: 1 tab(s) orally once a day (18 Mar 2024 16:53)  folic acid 1 mg oral tablet: 1 tab(s) orally once a day (18 Mar 2024 16:53)  ipratropium 21 mcg/inh (0.03%) nasal spray: 2 spray(s) intranasally once a day (18 Mar 2024 16:53)  metoprolol succinate 50 mg oral tablet, extended release: 1 tab(s) orally once a day (18 Mar 2024 16:53)  pantoprazole 40 mg oral delayed release tablet: 1 tab(s) orally once a day (18 Mar 2024 16:53)  potassium chloride 10 mEq oral tablet, extended release: 1 tab(s) orally 2 times a day (18 Mar 2024 16:53)  predniSONE 20 mg oral tablet: 2 tab(s) orally once a day ***pt on a taper- unknown taper- pt has been on 40mg for 3 weeks and will possibly be tapered down at next appt*** (18 Mar 2024 16:52)  rosuvastatin 40 mg oral tablet: 1 tab(s) orally once a day (18 Mar 2024 16:53)  timolol maleate 0.5% ophthalmic solution: 1 drop(s) in each eye once a day (18 Mar 2024 16:56)  torsemide 5 mg oral tablet: 2 tab(s) orally once a day (18 Mar 2024 16:56)  valACYclovir 1 g oral tablet: 1 tab(s) orally 3 times a day x 10 days ***Course Complete*** (18 Mar 2024 16:56)  Xarelto 15 mg oral tablet: 1 tab(s) orally once a day (18 Mar 2024 16:56)    PHYSICAL EXAM:  Vital Signs Last 24 Hrs  T(C): 36.9 (03-20-24 @ 08:51), Max: 36.9 (03-20-24 @ 08:51)  T(F): 98.4 (03-20-24 @ 08:51), Max: 98.4 (03-20-24 @ 08:51)  HR: 104 (03-20-24 @ 08:51) (94 - 145)  BP: 95/48 (03-20-24 @ 08:51) (87/50 - 120/50)  RR: 18 (03-20-24 @ 08:51) (16 - 18)  SpO2: 95% (03-20-24 @ 08:51) (95% - 98%)    Constitutional: NAD, awake and alert  HEENT: PERR, EOMI, Normal Hearing, MMM  Neck: Soft and supple, No LAD, No JVD  Respiratory: Breath sounds are clear bilaterally, No wheezing, rales or rhonchi  Cardiovascular: S1 and S2, IR IR  Gastrointestinal: Bowel Sounds present, soft, nontender, nondistended, no guarding, no rebound  Extremities: + pitting peripheral edema  Vascular: 2+ peripheral pulses  Neurological: A/O x 3, no focal deficits  Musculoskeletal: 5/5 strength b/l upper and lower extremities  Skin: No rashes    =======================================    INTERPRETATION OF TELEMETRY: Afib rates up to 140    ECG:  < from: 12 Lead ECG (03.19.24 @ 07:05) >  Diagnosis Line Atrial fibrillation with rapid ventricular response with premature ventricular or aberrantly conducted complexes  Nonspecific repolarization abnormalities  Abnormal ECG  When compared with ECG of 18-MAR-2024 15:22,  No significant change was found    ========================================    LABS: All Labs Reviewed:                        10.3   10.63 )-----------( 123      ( 20 Mar 2024 06:56 )             31.1     03-20    139  |  110<H>  |  35<H>  ----------------------------<  196<H>  4.3   |  24  |  1.33<H>    Ca    8.2<L>      20 Mar 2024 06:56  Phos  2.9     03-20  Mg     2.3     03-20    TPro  4.9<L>  /  Alb  2.5<L>  /  TBili  0.9  /  DBili  x   /  AST  15  /  ALT  23  /  AlkPhos  37<L>  03-19    PT/INR - ( 19 Mar 2024 07:42 )   PT: 15.0 sec;   INR: 1.34 ratio      PTT - ( 19 Mar 2024 07:42 )  PTT:25.3 sec    BNP 3280    CARDIAC TESTING:    < from: TTE Echo Complete w/o Contrast w/ Doppler (03.19.24 @ 09:41) >   Left ventricle systolic function appears preserved in the presence of a   cardiac arrhythmia. Left ventricle size and structure are within normal   limitations. Visual estimation of left ventricle ejection fraction is>50 %.   The left atrium is moderately dilated.   The right atrium appears moderately dilated.   The right ventricle exhibits mild dilation, mild diffuse hypokinesis, and   mild depression of contractility.   Mild aortic sclerosis is present with normalvalvular opening.   Minimal mitral annular calcification is present.   Mild (1+) mitral regurgitation is present.   Moderate to severe (3+) tricuspid valve regurgitation is present.   Mild pulmonic valvular regurgitation (1+) is present.    RADIOLOGY & ADDITIONAL STUDIES:    < from: CT Abdomen and Pelvis No Cont (03.19.24 @ 01:08) >  Subcutaneous hematoma overlyingthe right lateral hip.    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   This was discussed with Dr. Maciel at 5:02 PM on 3/20/2024.  Few nonspecific very small lung nodules.    < from: CT Head No Cont (03.18.24 @ 16:33) >  No acute intracranial hemorrhage or acute territorial infarct.  If   symptoms persist, follow-up MRI exam recommended.    < from: US Duplex Venous Lower Ext Complete, Bilateral (03.18.24 @ 16:28) >  No evidence of deep venous thrombosis in the right lower extremity.  No evidence of deep vein thrombosis in the visualized deep veins of the   left lower extremity above the knee, as detailed above. The deep veins of   the left calf were not seen.  Extensive bilateral calf subcutaneous edema.

## 2024-03-21 NOTE — PROCEDURE NOTE - ADDITIONAL PROCEDURE DETAILS
After First shock single beat of sinus then recurrent AF.   After second shock 4 beats SR then AF.   Unsuccessful cardioversion.

## 2024-03-21 NOTE — PROGRESS NOTE ADULT - REASON FOR ADMISSION
Fall, R lateral hip area hematoma  Rapid afib  Right paratracheal and right hilar lymphadenopathy Fall, R lateral hip area hematoma  Atrial fibrillation with RVR  Acute on chronic diastolic CHF  Right paratracheal and right hilar lymphadenopathy

## 2024-03-21 NOTE — PROCEDURAL SAFETY CHECKLIST WITH OR WITHOUT SEDATION - NSPOSTCOMMENTFT_GEN_ALL_CORE
Brief @ 1236  Time Out @ 1237   Anesthesia start @1238  Cardioversion @1239 200J, 2nd cardioversion @1240 200J Anesthesia end @ 1245. Findings - No conversion, pt remains afib rhythm.

## 2024-03-21 NOTE — PROGRESS NOTE ADULT - SUBJECTIVE AND OBJECTIVE BOX
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 Patient has been having difficulty walking due to B/L LE edema. States that LE edema has been worsening since she was started on steroids for temporal arterities about 1 month ago. States that yesterday patient had a fall where she lost her balance and fell on R side, and sustained R hip bruising / hematoma. Today patient had another fall, but family member helped her to a chair before she hit the floor. Patient denies CP, SOB, cough, runny nose, sore throat, nausea, vomiting, abdominal pain, fever, chills      PSH: Denies     Social Hx: Denies tobacco / etoh / drugs     Family Hx: Denies pertinent hx  (18 Mar 2024 23:17)      Subjective: 88yFemale    EKG:  TELE:    MEDICATIONS  (STANDING):  aMIOdarone    Tablet   Oral   aMIOdarone    Tablet 400 milliGRAM(s) Oral every 12 hours  atorvastatin 80 milliGRAM(s) Oral at bedtime  buMETAnide Injectable 0.5 milliGRAM(s) IV Push daily  dextrose 5%. 1000 milliLiter(s) (100 mL/Hr) IV Continuous <Continuous>  dextrose 5%. 1000 milliLiter(s) (50 mL/Hr) IV Continuous <Continuous>  dextrose 50% Injectable 25 Gram(s) IV Push once  dextrose 50% Injectable 12.5 Gram(s) IV Push once  dextrose 50% Injectable 25 Gram(s) IV Push once  folic acid 1 milliGRAM(s) Oral daily  glucagon  Injectable 1 milliGRAM(s) IntraMuscular once  insulin lispro (ADMELOG) corrective regimen sliding scale   SubCutaneous three times a day before meals  insulin lispro (ADMELOG) corrective regimen sliding scale   SubCutaneous at bedtime  metoprolol tartrate 25 milliGRAM(s) Oral every 8 hours  pantoprazole    Tablet 40 milliGRAM(s) Oral before breakfast  potassium chloride    Tablet ER 40 milliEquivalent(s) Oral every 4 hours  predniSONE   Tablet 30 milliGRAM(s) Oral daily  rivaroxaban 15 milliGRAM(s) Oral with dinner  timolol 0.5% Solution 1 Drop(s) Both EYES daily    MEDICATIONS  (PRN):  dextrose Oral Gel 15 Gram(s) Oral once PRN Blood Glucose LESS THAN 70 milliGRAM(s)/deciliter  metoprolol tartrate Injectable 5 milliGRAM(s) IV Push every 6 hours PRN HR >120 after PO meds      Allergies    penicillin (Swelling)    Intolerances        Vital Signs Last 24 Hrs  T(C): 36.2 (21 Mar 2024 12:17), Max: 36.5 (20 Mar 2024 20:45)  T(F): 97.2 (21 Mar 2024 12:17), Max: 97.7 (20 Mar 2024 20:45)  HR: 90 (21 Mar 2024 13:15) (67 - 109)  BP: 105/80 (21 Mar 2024 13:15) (88/52 - 113/53)  BP(mean): 58 (21 Mar 2024 12:55) (58 - 58)  RR: 18 (21 Mar 2024 13:15) (17 - 20)  SpO2: 100% (21 Mar 2024 13:15) (96% - 100%)    Parameters below as of 21 Mar 2024 13:15  Patient On (Oxygen Delivery Method): room air        PHYSICAL EXAMINATION:    GENERAL APPEARANCE:  Pt. is not currently dyspneic, in no distress. Pt. is alert, oriented, and pleasant.  HEENT:  Pupils are normal and react normally. No icterus. Mucous membranes well colored.  NECK:  Supple. No lymphadenopathy. Jugular venous pressure not elevated. Carotids equal.   HEART:   The cardiac impulse has a normal quality. There are no murmurs, rubs or gallops noted  CHEST:  Chest is clear to auscultation. Normal respiratory effort.  ABDOMEN:  Soft and nontender.   EXTREMITIES:  There is no edema.   SKIN:  No rash or significant lesions are noted.    LABS:                        10.7   10.42 )-----------( 134      ( 21 Mar 2024 06:53 )             32.4     03-21    136  |  107  |  33<H>  ----------------------------<  195<H>  3.6   |  22  |  1.21    Ca    8.3<L>      21 Mar 2024 06:53  Phos  2.9     03-20  Mg     2.3     03-20        Urinalysis Basic - ( 21 Mar 2024 06:53 )    Color: x / Appearance: x / SG: x / pH: x  Gluc: 195 mg/dL / Ketone: x  / Bili: x / Urobili: x   Blood: x / Protein: x / Nitrite: x   Leuk Esterase: x / RBC: x / WBC x   Sq Epi: x / Non Sq Epi: x / Bacteria: x            RADIOLOGY & ADDITIONAL TESTS:

## 2024-03-21 NOTE — PROGRESS NOTE ADULT - SUBJECTIVE AND OBJECTIVE BOX
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 Patient has been having difficulty walking due to B/L LE edema. States that LE edema has been worsening since she was started on steroids for temporal arterities about 1 month ago. States that yesterday patient had a fall where she lost her balance and fell on R side, and sustained R hip bruising / hematoma. Today patient had another fall, but family member helped her to a chair before she hit the floor. Patient denies CP, SOB, cough, runny nose, sore throat, nausea, vomiting, abdominal pain, fever, chills      PSH: Denies   Social Hx: Denies tobacco / etoh / drugs   Family Hx: Denies pertinent hx  (18 Mar 2024 23:17)    Cardiology consulted for afib RVR  Patient denies any CP or SOB. She is switching providers to [place] and requested Dr. Small  Denies any past CAD or heart failure  Denies any past CV and ablations.    3/20. feeling well. D/w Daughter, has not seen cardiologist in past up until recently this year while admitted to [facility]  3/21. No complaints, no overnight events. plan for CV today, c/w gentle diuresis    MEDICATIONS  (STANDING):  aMIOdarone    Tablet   Oral   aMIOdarone    Tablet 400 milliGRAM(s) Oral every 12 hours  atorvastatin 80 milliGRAM(s) Oral at bedtime  buMETAnide Injectable 0.5 milliGRAM(s) IV Push daily  dextrose 5%. 1000 milliLiter(s) (100 mL/Hr) IV Continuous <Continuous>  dextrose 5%. 1000 milliLiter(s) (50 mL/Hr) IV Continuous <Continuous>  dextrose 50% Injectable 25 Gram(s) IV Push once  dextrose 50% Injectable 12.5 Gram(s) IV Push once  dextrose 50% Injectable 25 Gram(s) IV Push once  folic acid 1 milliGRAM(s) Oral daily  glucagon  Injectable 1 milliGRAM(s) IntraMuscular once  insulin lispro (ADMELOG) corrective regimen sliding scale   SubCutaneous three times a day before meals  insulin lispro (ADMELOG) corrective regimen sliding scale   SubCutaneous at bedtime  metoprolol tartrate 25 milliGRAM(s) Oral every 8 hours  pantoprazole    Tablet 40 milliGRAM(s) Oral before breakfast  potassium chloride    Tablet ER 40 milliEquivalent(s) Oral every 4 hours  predniSONE   Tablet 40 milliGRAM(s) Oral daily  rivaroxaban 15 milliGRAM(s) Oral with dinner  timolol 0.5% Solution 1 Drop(s) Both EYES daily    MEDICATIONS  (PRN):  dextrose Oral Gel 15 Gram(s) Oral once PRN Blood Glucose LESS THAN 70 milliGRAM(s)/deciliter  metoprolol tartrate Injectable 5 milliGRAM(s) IV Push every 6 hours PRN HR >120 after PO meds    HOME MEDICATIONS:  amiodarone 200 mg oral tablet: 2 tab(s) orally once a day x 3 weeks then 1 tab daily ***pt started approx 3 days ago*** (18 Mar 2024 16:53)  clindamycin 150 mg oral capsule: 1 cap(s) orally 2 times a day x 10 days ***course not complete-unknown if pt still taking*** (18 Mar 2024 16:53)  Farxiga 10 mg oral tablet: 1 tab(s) orally once a day (18 Mar 2024 16:53)  folic acid 1 mg oral tablet: 1 tab(s) orally once a day (18 Mar 2024 16:53)  ipratropium 21 mcg/inh (0.03%) nasal spray: 2 spray(s) intranasally once a day (18 Mar 2024 16:53)  metoprolol succinate 50 mg oral tablet, extended release: 1 tab(s) orally once a day (18 Mar 2024 16:53)  pantoprazole 40 mg oral delayed release tablet: 1 tab(s) orally once a day (18 Mar 2024 16:53)  potassium chloride 10 mEq oral tablet, extended release: 1 tab(s) orally 2 times a day (18 Mar 2024 16:53)  predniSONE 20 mg oral tablet: 2 tab(s) orally once a day ***pt on a taper- unknown taper- pt has been on 40mg for 3 weeks and will possibly be tapered down at next appt*** (18 Mar 2024 16:52)  rosuvastatin 40 mg oral tablet: 1 tab(s) orally once a day (18 Mar 2024 16:53)  timolol maleate 0.5% ophthalmic solution: 1 drop(s) in each eye once a day (18 Mar 2024 16:56)  torsemide 5 mg oral tablet: 2 tab(s) orally once a day (18 Mar 2024 16:56)  valACYclovir 1 g oral tablet: 1 tab(s) orally 3 times a day x 10 days ***Course Complete*** (18 Mar 2024 16:56)  Xarelto 15 mg oral tablet: 1 tab(s) orally once a day (18 Mar 2024 16:56)    PHYSICAL EXAM:  Vital Signs Last 24 Hrs  T(C): 36.3 (21 Mar 2024 08:13), Max: 36.9 (20 Mar 2024 08:51)  T(F): 97.3 (21 Mar 2024 08:13), Max: 98.4 (20 Mar 2024 08:51)  HR: 67 (21 Mar 2024 08:13) (67 - 104)  BP: 113/53 (21 Mar 2024 08:13) (88/55 - 113/53)  BP(mean): --  RR: 18 (21 Mar 2024 08:13) (17 - 18)  SpO2: 98% (21 Mar 2024 08:13) (95% - 98%)    Parameters below as of 21 Mar 2024 08:13  Patient On (Oxygen Delivery Method): room air        Constitutional: NAD, awake and alert  HEENT: PERR, EOMI, Normal Hearing, MMM  Neck: Soft and supple, No LAD, No JVD  Respiratory: Breath sounds are clear bilaterally, No wheezing, rales or rhonchi  Cardiovascular: S1 and S2, IR IR  Gastrointestinal: Bowel Sounds present, soft, nontender, nondistended, no guarding, no rebound  Extremities: + pitting peripheral edema  Vascular: 2+ peripheral pulses  Neurological: A/O x 3, no focal deficits  Musculoskeletal: 5/5 strength b/l upper and lower extremities  Skin: No rashes    =======================================    INTERPRETATION OF TELEMETRY: Afib rates up to 140    ECG:  < from: 12 Lead ECG (03.19.24 @ 07:05) >  Diagnosis Line Atrial fibrillation with rapid ventricular response with premature ventricular or aberrantly conducted complexes  Nonspecific repolarization abnormalities  Abnormal ECG  When compared with ECG of 18-MAR-2024 15:22,  No significant change was found    ========================================    LABS: All Labs Reviewed:                        10.7   10.42 )-----------( 134      ( 21 Mar 2024 06:53 )             32.4     03-21    136  |  107  |  33<H>  ----------------------------<  195<H>  3.6   |  22  |  1.21    Ca    8.3<L>      21 Mar 2024 06:53  Phos  2.9     03-20  Mg     2.3     03-20    Troponin: 23.79 ng/L, Troponin: 20.47 ng/L    BNP 3280    CARDIAC TESTING:    < from: TTE Echo Complete w/o Contrast w/ Doppler (03.19.24 @ 09:41) >   Left ventricle systolic function appears preserved in the presence of a   cardiac arrhythmia. Left ventricle size and structure are within normal   limitations. Visual estimation of left ventricle ejection fraction is>50 %.   The left atrium is moderately dilated.   The right atrium appears moderately dilated.   The right ventricle exhibits mild dilation, mild diffuse hypokinesis, and   mild depression of contractility.   Mild aortic sclerosis is present with normalvalvular opening.   Minimal mitral annular calcification is present.   Mild (1+) mitral regurgitation is present.   Moderate to severe (3+) tricuspid valve regurgitation is present.   Mild pulmonic valvular regurgitation (1+) is present.    RADIOLOGY & ADDITIONAL STUDIES:    < from: CT Abdomen and Pelvis No Cont (03.19.24 @ 01:08) >  Subcutaneous hematoma overlyingthe right lateral hip.    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   This was discussed with Dr. Maciel at 5:02 PM on 3/20/2024.  Few nonspecific very small lung nodules.    < from: CT Head No Cont (03.18.24 @ 16:33) >  No acute intracranial hemorrhage or acute territorial infarct.  If   symptoms persist, follow-up MRI exam recommended.    < from: US Duplex Venous Lower Ext Complete, Bilateral (03.18.24 @ 16:28) >  No evidence of deep venous thrombosis in the right lower extremity.  No evidence of deep vein thrombosis in the visualized deep veins of the   left lower extremity above the knee, as detailed above. The deep veins of   the left calf were not seen.  Extensive bilateral calf subcutaneous edema.

## 2024-03-21 NOTE — PROGRESS NOTE ADULT - ASSESSMENT
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 Patient has been having difficulty walking due to B/L LE edema. States that LE edema has been worsening since she was started on steroids for temporal arterities about 1 month ago. On day prior to presentation, she lost her balance and fell on her right side causing extensive bruising and R hip area subcutaneous hematoma. She has otherwise been in her usual state of health. In the ED she was noted to have rapid afib in addition to this hematoma. She was given diltiazem and digoxin, IV fluid hydration and admitted to Medicine.     Fall, generalized weakness  PT consult appreciated. Recommended MARIO vs home with home PT.   - Continue restorative PT sessions     Traumatic hematoma in the area of the R hip  Related to recent fall. On Xarelto for afib. Neurovascularly intact. Hgb ~10. Plt ~120.   - Continue to monitor site  - Continue to trend CBC    Thrombocytopenia  Plts-120K/ul <--128K/ul. No previous records available for baseline Suspect due to right hip hematoma  - Continue to trend CBC  - Transfuse platelets only if Plts drops < 10K/ul, or 20 K/ul with evidence of active bleeding.    Rapid afib  Known hx of afib. In RVR in setting of mechanical fall, traumatic R hip hematoma. Appreciate Cardiology and Cardiac EP input. Went for DCCV this AM, unsuccessful. Remains in afib, though heart rates are better than upon admission, now 80s-90s, on metoprolol and amiodarone load.   - Continue metoprolol  - Continue amiodarone load  - Continue on Xarelto  - F/u with Cardiac EP    Pulmonary nodules with R paratracheal and R hilar lymphadenopathy   As noted on CT chest. RLL 5mm solid nodule and RUL 4mm ill-defined nodule. LN enlargement R paratracheal and R hilar. No previous records available. CT head & abd/pelvis with no acute pathology. Unclear if this is reactive or due to underlying malignancy. Appreciate input from Hematology Oncology. Patient and family wish to pursue further diagnostic testing. Discussed with Cardiac EP Dr. Mendoza that if patient does undergo DCCV, he would recommend uninterrupted AC for 21 days and thus would defer to diagnostic testing until then if feasible.   - Plan for further diagnostic testing after 21 days of uninterrupted AC assuming she undergoes DCCV tomorrow    - Outpatient follow up with Hematology/Oncology, Pulmonary Medicine    Type II DM  Suboptimally controlled. A1c 8.6. POCT glucose 150s-180s  - Continue on insulin correctional scale  - Continue to monitor glucose TID AC and HS    Hx of temporal arteritis  Follows with [facility] in [place]. On prednisone. ESR WNL. CRP WNL. Tapered prednisone to 30mg today 3/21 per patient's rheumatologist.    - Continue prednisone 30mg daily now  - Outpatient follow up with rheumatologist       DVT px: On Xarelto for afib  Code status: Full  Dispo: Anticipate DC in 24-48 hrs pending afib management outcome, PT eval, further dispo discussions with patient/family 87 y/o woman with type II DM, atrial fibrillation on Xarelto, temporal arteritis on steroids, presented for further evaluation of difficulty ambulating and B/L LE edema. Patient has been having difficulty walking due to B/L LE edema. States that LE edema has been worsening since she was started on steroids for temporal arteritis about 1 month ago. On day prior to presentation, she lost her balance and fell on her right side causing extensive bruising and R hip area subcutaneous hematoma. She has otherwise been in her usual state of health. In the ED she was noted to have rapid afib and acute on chronic diastolic CHF in addition to this hematoma. She was given diltiazem and digoxin, IV fluid hydration and admitted to Medicine.     Fall, generalized weakness  PT consult appreciated. Recommended MARIO vs home with home PT.   - Continue restorative PT sessions     Traumatic hematoma in the area of the R hip  Related to recent fall. On Xarelto for afib. Neurovascularly intact. Hgb ~10. Plt ~120.   - Continue to monitor site  - Continue to trend CBC    Thrombocytopenia  Plts-120K/ul <--128K/ul. No previous records available for baseline Suspect due to right hip hematoma  - Continue to trend CBC  - Transfuse platelets only if Plts drops < 10K/ul, or 20 K/ul with evidence of active bleeding.    Atrial fibrillation with RVR  Known hx of afib. In RVR in setting of mechanical fall, traumatic R hip hematoma. Appreciate Cardiology and Cardiac EP input. Went for DCCV this AM, unsuccessful. Remains in afib, though heart rates are better than upon admission, now 80s-90s, on metoprolol and amiodarone load.   - Continue metoprolol  - Continue amiodarone load  - Continue on Xarelto  - F/u with Cardiac EP    Acute on chronic diastolic CHF  In the setting of rapid afib upon presentation, traumatic R hip hematoma. Troponin negative  ProBNP 3280. Echo done. LA moderately dilated. LV size and structure normal. LVEF preserved. RA moderately dilated. RV mildly dilated with mild diffuse hypokinesis. Mild MR. Mod to severe TR. Appreciate Cardiology input. On gentle diuresis with IV Bumex.  - Continue Iv Bumex 0.5mg daily  - Continue metoprolol, currently tartrate, will consider switching to succinate once HR optimized and after further discussion with Cardiology  - Strict Is and Os, daily wt  - Trend Cr and lytes    Pulmonary nodules with R paratracheal and R hilar lymphadenopathy   As noted incidentally on CT chest. RLL 5mm solid nodule and RUL 4mm ill-defined nodule. LN enlargement R paratracheal and R hilar. No previous records available. CT head & abd/pelvis with no acute pathology. Unclear if this is reactive or due to underlying malignancy. Appreciate input from Hematology Oncology. Patient and family wish to pursue further diagnostic testing. Discussed with Cardiac EP Dr. Mendoza that if patient does undergo DCCV, he would recommend uninterrupted AC for 21 days and thus would defer to diagnostic testing until then if feasible.   - Plan for further diagnostic testing after 21 days of uninterrupted AC assuming she undergoes DCCV tomorrow    - Outpatient follow up with Hematology/Oncology, Pulmonary Medicine    Type II DM  Suboptimally controlled. A1c 8.6. POCT glucose 150s-180s  - Continue on insulin correctional scale  - Continue to monitor glucose TID AC and HS    Hx of temporal arteritis  Follows with [facility] in [place]. On prednisone. ESR WNL. CRP WNL. Tapered prednisone to 30mg today 3/21 per patient's rheumatologist.    - Continue prednisone 30mg daily now  - Outpatient follow up with rheumatologist       DVT px: On Xarelto for afib  Code status: Full  Dispo: Anticipate DC in 24-48 hrs pending afib management outcome, PT eval, further dispo discussions with patient/family

## 2024-03-21 NOTE — CDI QUERY NOTE - NSCDIOTHERTXTBX_GEN_ALL_CORE_HH
Clinical documentation indicates that this patient has acute on chronic  CHF.  Please include more specific documentation of the  type  of Heart Failure in your Progress Note and/or Discharge Summary.    -Acute ___ CHF _ diastolic _HFpEF _HFrEF _systolic  -Acute on chronic ___ CHF _ diastolic _HFpEF _HFrEF _systolic  -Chronic ___ CHF -diastolic _HFpEF _HFrEF _systolic  -Other (please specify)    SUPPORTING DOCUMENTATION AND/OR CLINICAL EVIDENCE:      ECHO RESULTS:< from: TTE Echo Complete w/o Contrast w/ Doppler (03.19.24 @ 09:41) >Left ventricle systolic function appears preserved in the presence of a cardiac arrhythmia. Left ventricle size and structure are within normal limitations. Visual estimation of left ventricle ejection fraction is>50   %. The left atrium is moderately dilated. The right atrium appears moderately dilated. The right ventricle exhibits mild dilation, mild diffuse hypokinesis, and mild depression of contractility. Mild aortic sclerosis is present with normalvalvular opening. Minimal mitral annular calcification is present.      CT Abdomen and Pelvis No Cont (03.19.24 @ 01:08) >LOWER CHEST: Emphysema. Right lower lobe subsegmental atelectasis. 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 Emphysema. Of the left lower lobe calcified granuloma. Right lower lobe 5 mm solid nodule(2-66). Right upper lobe sub-4 mm ill-defined nodule (2-31). Trace right pleural effusion versus thickening.    BNP: 3280    ECG:  < from: 12 Lead ECG (03.19.24 @ 07:05) >Diagnosis Line Atrial fibrillation with rapid ventricular response with premature ventricular or     MEDICATIONS:buMETAnide Injectable 0.5 milliGRAM(s) IV Push (03-21-24), torsemide po    Cardiology-3/21-afib RVR-#Acute on likely chronic HF exacerbation (unknown EF)-- C/w gentle diuresis w/ IV Bumex   CHIEF COMPLAINT: Patient is a 88y old  Female who presents with a chief complaint of LE edema / weakness (18 Mar 2024 23:17), a-fib (on xarelto), DM2 p/w difficulty ambulating and LE edema. Patient has been having difficulty walking due to B/L LE edema. States that LE edema has been worsening since she was started on steroids for temporal arterities about 1 month ago. States that yesterday patient had a fall where she lost her balance and fell on R side, and sustained R hip bruising / hematoma.

## 2024-03-21 NOTE — PROGRESS NOTE ADULT - SUBJECTIVE AND OBJECTIVE BOX
Chief Complaint: Generalized weakness, fall on R side, B/L leg swelling    Interval Hx: Patient seen and examined. Resting comfortably in bed. No new symptoms. Generally weak. B/L LE swelling improved. Hematoma stable. Remains in afib, fairly rate-controlled. No chest complaints. Cardiology and Cardiac EP following. Went for DCCV this AM, unsuccessful. Continued on amiodarone load, Eliquis. Hematology Oncology following as well re findings of pulmonary nodules and R paratracheal and hilar LN. Patient and family wish to pursue further diagnostic testing. Discussed with Cardiac EP Dr. Mendoza who indicated that since patient underwent DCCV, he would recommend uninterrupted AC for 21 days and thus would defer to diagnostic testing until then if feasible.     ROS: Multi system review is comprehensively negative x 10 systems except as above    Vitals:  T(F): 97.7 (21 Mar 2024 13:45), Max: 97.7 (20 Mar 2024 20:45)  HR: 95 (21 Mar 2024 13:45) (67 - 109)  BP: 122/86 (21 Mar 2024 13:45) (88/52 - 122/86)  RR: 17 (21 Mar 2024 13:45) (17 - 20)  SpO2: 100% (21 Mar 2024 13:45) (96% - 100%) on room air    Exam:  Gen: No acute distress  HEENT: NCAT PERRL EOMI MMM clear oropharynx  Neck: Supple, no JVD, no LAD, no thyromegaly  CVS: S1 S2 normal, irregular, rate ~95 bpm  Chest: Normal resp effort, lungs CTA B/L  Abd: +BS, non distended, soft, nontender  Ext: R hip area with extensive hematoma, compartment soft, intact pulses, normal cap refill  Skin: Warm, dry  Mood: Calm, pleasant  Neuro: Awake and alert, follows commands, answers questions appropriately    Labs:             POCT glucose 150s-180s               10.7   10.42 )--------( 134                  32.4       136  |  107  |  33  ----------------------<  195  3.6   |   22   |  1.21    Ca  8.3     Mg  2.3    Phos 2.9          TPro  4.9  /  Alb  2.5  /  TBili  0.9  /  DBili  x   /  AST  15  /  ALT  23  /  AlkPhos  37    PT: 15.0 sec;   INR: 1.34 ratio   PTT: 25.3 sec    Troponin negative  ProBNP 3280    A1c 8.6   TSH WNL   ESR 11  CRP <3    Imaging:  CT abd, pelvis WO 3/19: Liver normal. Normal caliber bile ducts. Gallbladder dependent hyperattenuating sludge and/or small stones. Normal spleen. Fatty atrophy of pancreas. Normal adrenals. Right upper pole and left parapelvic renal cysts. No renal stones or hydronephrosis. Small posterior diverticulum. Calcified uterine fibroid. No bowel obstruction. Moderate colonic fecal load. Appendix is normal. No ascites. Atherosclerotic changes. No lymphadenopathy. 5.7 x 4.3 x 9.0 cm right lateral hip subcutaneous hematoma. Additional infiltration of the subcutaneous fat of the bilateral lateral abdominal wall. Degenerative changes. No evidence of acute fracture.    CT chest WO 3/18: Patent trachea and bronchi. Emphysema. Of the left lower lobe calcified granuloma. Right lower lobe 5 mm solid nodule. Right upper lobe sub-4 mm ill-defined nodule. Trace right pleural effusion versus thickening. Small hiatal hernia. Right paratracheal and right hilar lymphadenopathy, for example, largest lymph node approximately 3.5 cm in short axis in the right paratracheal station. Normal heart size. No pericardial effusion. 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 Coronary artery calcifications.     CT head WO 3/18: There is moderate diffuse parenchymal volume loss. There are areas of low attenuation in the periventricular white matter likely related to mild chronic microvascular ischemic changes. There is no acute intracranial hemorrhage, parenchymal mass, mass effect or midline shift. There is no acute territorial infarct. There is no hydrocephalus. Partial empty sella noted. The cranium is intact. The visualized paranasal sinuses are well-aerated.    US venous duplex B/L LE 3/18: No evidence of deep venous thrombosis in the right lower extremity. No evidence of deep vein thrombosis in the visualized deep veins of the left lower extremity above the knee. The deep veins of the left calf were not seen. Extensive bilateral calf subcutaneous edema.    XR chest 3/18: Normal heart size. Atherosclerotic aorta. Increased right paratracheal soft tissue density may represent prominent vasculature.     Cardiac Testing:  Tele 3/21: Afib 80s-90s    Tele 3/19-20: Afib with 90s-110s    EKG 3/19: Rate 110. Atrial fibrillation with rapid ventricular response with premature ventricular or aberrantly conducted complexes. Nonspecific repolarization abnormalities.    EKG 3/18: rate 128. Atrial fibrillation with rapid ventricular response with premature ventricular or aberrantly conducted complexes    Meds:  MEDICATIONS  (STANDING):  aMIOdarone    Tablet 400 milliGRAM(s) Oral every 12 hours  atorvastatin 80 milliGRAM(s) Oral at bedtime  buMETAnide Injectable 0.5 milliGRAM(s) IV Push daily  folic acid 1 milliGRAM(s) Oral daily  glucagon  Injectable 1 milliGRAM(s) IntraMuscular once  insulin lispro (ADMELOG) corrective regimen sliding scale   SubCutaneous three times a day before meals  insulin lispro (ADMELOG) corrective regimen sliding scale   SubCutaneous at bedtime  metoprolol tartrate 25 milliGRAM(s) Oral every 8 hours  pantoprazole    Tablet 40 milliGRAM(s) Oral before breakfast  predniSONE   Tablet 30 milliGRAM(s) Oral daily  rivaroxaban 15 milliGRAM(s) Oral with dinner  timolol 0.5% Solution 1 Drop(s) Both EYES daily    MEDICATIONS  (PRN):  dextrose Oral Gel 15 Gram(s) Oral once PRN Blood Glucose LESS THAN 70 milliGRAM(s)/deciliter  metoprolol tartrate Injectable 5 milliGRAM(s) IV Push every 6 hours PRN HR >120 after PO meds   Chief Complaint: Generalized weakness, fall on R side, B/L leg swelling    Interval Hx: Patient seen and examined. Resting comfortably in bed. No new symptoms. Generally weak. B/L LE swelling improved. Hematoma stable. Remains in afib, fairly rate-controlled. No chest complaints. Cardiology and Cardiac EP following. Went for DCCV this AM, unsuccessful. Continued on amiodarone load, Eliquis. Hematology Oncology following as well re findings of pulmonary nodules and R paratracheal and hilar LN. Patient and family wish to pursue further diagnostic testing. Discussed with Cardiac EP Dr. Mendoza who indicated that since patient underwent DCCV, he would recommend uninterrupted AC for 21 days and thus would defer to diagnostic testing until then if feasible.     ROS: Multi system review is comprehensively negative x 10 systems except as above    Vitals:  T(F): 97.7 (21 Mar 2024 13:45), Max: 97.7 (20 Mar 2024 20:45)  HR: 95 (21 Mar 2024 13:45) (67 - 109)  BP: 122/86 (21 Mar 2024 13:45) (88/52 - 122/86)  RR: 17 (21 Mar 2024 13:45) (17 - 20)  SpO2: 100% (21 Mar 2024 13:45) (96% - 100%) on room air    Exam:  Gen: No acute distress  HEENT: NCAT PERRL EOMI MMM clear oropharynx  Neck: Supple, no JVD, no LAD, no thyromegaly  CVS: S1 S2 normal, irregular, rate ~95 bpm  Chest: Normal resp effort, lungs CTA B/L  Abd: +BS, non distended, soft, nontender  Ext: R hip area with extensive hematoma, compartment soft, intact pulses, normal cap refill  Skin: Warm, dry  Mood: Calm, pleasant  Neuro: Awake and alert, follows commands, answers questions appropriately    Labs:             POCT glucose 150s-180s               10.7   10.42 )--------( 134                  32.4       136  |  107  |  33  ----------------------<  195  3.6   |   22   |  1.21    Ca  8.3     Mg  2.3    Phos 2.9          TPro  4.9  /  Alb  2.5  /  TBili  0.9  /  DBili  x   /  AST  15  /  ALT  23  /  AlkPhos  37    PT: 15.0 sec;   INR: 1.34 ratio   PTT: 25.3 sec    Troponin negative  ProBNP 3280    A1c 8.6   TSH WNL   ESR 11  CRP <3    Imaging:  CT abd, pelvis WO 3/19: Liver normal. Normal caliber bile ducts. Gallbladder dependent hyperattenuating sludge and/or small stones. Normal spleen. Fatty atrophy of pancreas. Normal adrenals. Right upper pole and left parapelvic renal cysts. No renal stones or hydronephrosis. Small posterior diverticulum. Calcified uterine fibroid. No bowel obstruction. Moderate colonic fecal load. Appendix is normal. No ascites. Atherosclerotic changes. No lymphadenopathy. 5.7 x 4.3 x 9.0 cm right lateral hip subcutaneous hematoma. Additional infiltration of the subcutaneous fat of the bilateral lateral abdominal wall. Degenerative changes. No evidence of acute fracture.    CT chest WO 3/18: Patent trachea and bronchi. Emphysema. Of the left lower lobe calcified granuloma. Right lower lobe 5 mm solid nodule. Right upper lobe sub-4 mm ill-defined nodule. Trace right pleural effusion versus thickening. Small hiatal hernia. Right paratracheal and right hilar lymphadenopathy, for example, largest lymph node approximately 3.5 cm in short axis in the right paratracheal station. Normal heart size. No pericardial effusion. 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 Coronary artery calcifications.     CT head WO 3/18: There is moderate diffuse parenchymal volume loss. There are areas of low attenuation in the periventricular white matter likely related to mild chronic microvascular ischemic changes. There is no acute intracranial hemorrhage, parenchymal mass, mass effect or midline shift. There is no acute territorial infarct. There is no hydrocephalus. Partial empty sella noted. The cranium is intact. The visualized paranasal sinuses are well-aerated.    US venous duplex B/L LE 3/18: No evidence of deep venous thrombosis in the right lower extremity. No evidence of deep vein thrombosis in the visualized deep veins of the left lower extremity above the knee. The deep veins of the left calf were not seen. Extensive bilateral calf subcutaneous edema.    XR chest 3/18: Normal heart size. Atherosclerotic aorta. Increased right paratracheal soft tissue density may represent prominent vasculature.     Cardiac Testing:  Tele 3/21: Afib 80s-90s    TTE 3/19: Left ventricle systolic function appears preserved in the presence of a cardiac arrhythmia. Left ventricle size and structure are within normal limitations. Visual estimation of left ventricle ejection fraction is >50%. The left atrium is moderately dilated. The right atrium appears moderately dilated. The right ventricle exhibits mild dilation, mild diffuse hypokinesis, and mild depression of contractility. Mild aortic sclerosis is present with normalvalvular opening. Minimal mitral annular calcification is present. Mild (1+) mitral regurgitation is present. Moderate to severe (3+) tricuspid valve regurgitation is present. Mild pulmonic valvular regurgitation (1+) is present.    Tele 3/19-20: Afib with 90s-110s    EKG 3/19: Rate 110. Atrial fibrillation with rapid ventricular response with premature ventricular or aberrantly conducted complexes. Nonspecific repolarization abnormalities.    EKG 3/18: rate 128. Atrial fibrillation with rapid ventricular response with premature ventricular or aberrantly conducted complexes    Meds:  MEDICATIONS  (STANDING):  aMIOdarone    Tablet 400 milliGRAM(s) Oral every 12 hours  atorvastatin 80 milliGRAM(s) Oral at bedtime  buMETAnide Injectable 0.5 milliGRAM(s) IV Push daily  folic acid 1 milliGRAM(s) Oral daily  glucagon  Injectable 1 milliGRAM(s) IntraMuscular once  insulin lispro (ADMELOG) corrective regimen sliding scale   SubCutaneous three times a day before meals  insulin lispro (ADMELOG) corrective regimen sliding scale   SubCutaneous at bedtime  metoprolol tartrate 25 milliGRAM(s) Oral every 8 hours  pantoprazole    Tablet 40 milliGRAM(s) Oral before breakfast  predniSONE   Tablet 30 milliGRAM(s) Oral daily  rivaroxaban 15 milliGRAM(s) Oral with dinner  timolol 0.5% Solution 1 Drop(s) Both EYES daily    MEDICATIONS  (PRN):  dextrose Oral Gel 15 Gram(s) Oral once PRN Blood Glucose LESS THAN 70 milliGRAM(s)/deciliter  metoprolol tartrate Injectable 5 milliGRAM(s) IV Push every 6 hours PRN HR >120 after PO meds

## 2024-03-21 NOTE — PROGRESS NOTE ADULT - ASSESSMENT
87 y/o Female EP consulted for At. FIB with RVR. (on both Xarelto and Amiodarone 400 mgs daily).  on 3/19/2024 Amiodarone 400 mgs was increased to BID.  Pt has been complaining of weakness for 2-3 weeks, difficulty ambulating and lower leg edema.  The day prior to admission pt lost her balance and hurt her right hip.  CT showed hematoma of right lateral hip, she was given Digoxin 500 ucg.  Acute on chronic Hf    PMH of temporal arteritis (on steroids), a-fib (on xarelto), DM2 . Patient has been having difficulty walking due to B/L LE edema. States that LE edema has been worsening since she was started on steroids for temporal arteritis about 1 month ago.     Pt had DC CV today that was unsuccessful    Continue with rate control  Continue with Eliquis  Continue to hydrate this pt  Continue with Amiodarone Load

## 2024-03-21 NOTE — PROGRESS NOTE ADULT - ASSESSMENT
87 y/o F w/ PMH of temporal arteritis (on steroids), a-fib (on xarelto), DM2 p/w difficulty ambulating and LE edema. Patient has been having difficulty walking due to B/L LE edema. States that LE edema has been worsening since she was started on steroids for temporal arterities about 1 month ago. States that yesterday patient had a fall where she lost her balance and fell on R side, and sustained R hip bruising / hematoma. Today patient had another fall, but family member helped her to a chair before she hit the floor. Patient denies CP, SOB, cough, runny nose, sore throat, nausea, vomiting, abdominal pain, fever, chills    #Afib with RVR  #Acute on likely chronic HF exacerbation (unknown EF)  #Temporal Arteritis - on chronic steroids  #DMT2  #Hyperlipidemia    - Monitor on tele, HR better   - Off digoxin  - Continue with amiodarone - reloading  - Continue Metoprolol - change to lopressor 25mg Q8 w/ IV PRN if HR >120 after PO  - Continue Xarelto for stroke ppx  - Continue atorvastatin   - Echocardiogram reviewed.  - C/w gentle diuresis w/ IV Bumex   - Plan for CV today    Will follow  Case d/w Dr. Londono

## 2024-03-22 NOTE — PROGRESS NOTE ADULT - SUBJECTIVE AND OBJECTIVE BOX
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 Patient has been having difficulty walking due to B/L LE edema. States that LE edema has been     3/22/24: Patient s/p unsuccessful DCCV yesterday.  Seen at bedside, no complaints.  TELE: Afib 100-130    MEDICATIONS  (STANDING):  aMIOdarone    Tablet   Oral   aMIOdarone    Tablet 400 milliGRAM(s) Oral every 12 hours  atorvastatin 80 milliGRAM(s) Oral at bedtime  dextrose 5%. 1000 milliLiter(s) (100 mL/Hr) IV Continuous <Continuous>  dextrose 5%. 1000 milliLiter(s) (50 mL/Hr) IV Continuous <Continuous>  dextrose 50% Injectable 25 Gram(s) IV Push once  dextrose 50% Injectable 12.5 Gram(s) IV Push once  dextrose 50% Injectable 25 Gram(s) IV Push once  folic acid 1 milliGRAM(s) Oral daily  glucagon  Injectable 1 milliGRAM(s) IntraMuscular once  insulin lispro (ADMELOG) corrective regimen sliding scale   SubCutaneous three times a day before meals  insulin lispro (ADMELOG) corrective regimen sliding scale   SubCutaneous at bedtime  metoprolol tartrate 50 milliGRAM(s) Oral two times a day  pantoprazole    Tablet 40 milliGRAM(s) Oral before breakfast  predniSONE   Tablet 30 milliGRAM(s) Oral daily  rivaroxaban 15 milliGRAM(s) Oral with dinner  timolol 0.5% Solution 1 Drop(s) Both EYES daily  torsemide 20 milliGRAM(s) Oral daily    MEDICATIONS  (PRN):  dextrose Oral Gel 15 Gram(s) Oral once PRN Blood Glucose LESS THAN 70 milliGRAM(s)/deciliter    Vital Signs Last 24 Hrs  T(C): 36.6 (22 Mar 2024 08:25), Max: 36.6 (22 Mar 2024 08:25)  T(F): 97.8 (22 Mar 2024 08:25), Max: 97.8 (22 Mar 2024 08:25)  HR: 78 (22 Mar 2024 08:25) (74 - 118)  BP: 126/66 (22 Mar 2024 08:25) (98/59 - 126/66)  RR: 18 (22 Mar 2024 08:25) (18 - 18)  SpO2: 98% (22 Mar 2024 08:25) (98% - 99%)    Parameters below as of 22 Mar 2024 08:25  Patient On (Oxygen Delivery Method): room air          PHYSICAL EXAMINATION:    GENERAL APPEARANCE:  Pt. is not currently dyspneic, in no distress. Pt. is alert, oriented, and pleasant.  HEENT:  Pupils are normal and react normally. No icterus. Mucous membranes well colored.  NECK:  Supple. No lymphadenopathy. Jugular venous pressure not elevated. Carotids equal.   HEART:  Irregular. There are no murmurs, rubs or gallops noted  CHEST:  Chest is clear to auscultation. Normal respiratory effort.  ABDOMEN:  Soft and nontender.   EXTREMITIES:  There is no edema.   SKIN:  No rash or significant lesions are noted.    LABS:             03-22    138  |  111<H>  |  35<H>  ----------------------------<  224<H>  4.9   |  21<L>  |  1.43<H>    Ca    8.7      22 Mar 2024 06:31  Mg     2.3     03-22                          10.7   10.42 )-----------( 134      ( 21 Mar 2024 06:53 )             32.4       Cardiology:  Impression     Summary     Left ventricle systolic function appears preserved in the presence of a   cardiac arrhythmia. Left ventricle size and structure are within normal   limitations. Visual estimation of left ventricle ejection fraction is>50   %.   The left atrium is moderately dilated.   The right atrium appears moderately dilated.   The right ventricle exhibits mild dilation, mild diffuse hypokinesis, and   mild depression of contractility.   Mild aortic sclerosis is present with normalvalvular opening.   Minimal mitral annular calcification is present.   Mild (1+) mitral regurgitation is present.   Moderate to severe (3+) tricuspid valve regurgitation is present.   Mild pulmonic valvular regurgitation (1+) is present.     Signature     ----------------------------------------------------------------   Electronically signed by Cash Figueroa MD(Interpreting   physician) on 03/19/2024 05:13 PM

## 2024-03-22 NOTE — PROGRESS NOTE ADULT - REASON FOR ADMISSION
Fall, R lateral hip area hematoma  Atrial fibrillation with RVR  Acute on chronic diastolic CHF  Right paratracheal and right hilar lymphadenopathy

## 2024-03-22 NOTE — CONSULT NOTE ADULT - ASSESSMENT
89 y/o F w/ PMH of temporal arteritis (on steroids), A-fib (on Xarelto), DM2 p/w difficulty ambulating, s/p fall, scans noted incidental findings of right paratracheal and right hilar lymphadenopathy.    # Lymphadenopathy / Pulm nodules     -CT Chest 3/18/24: Right paratracheal and right hilar lymphadenopathy, likely neoplastic. Largest lymph node approximately 3.5 cm in short axis in the right paratracheal station  RLL 5mm solid nodule RUL 4mm ill-defined nodule.   -No previous records available  -CT-head & A/P with no acute pathology  -unclear if this is reactive or due to underlying malignancy, will need further w/u, including biopsy.  -patient/family agreeable to pursue biopsy spoke with son-in-law (Adrien- [phone]) upon patient's request, however biopsy for now will have to be postpone in light of patient's recent ongoing uncontrolled A-Fib, with plans for DCCV and must remain on AC.  -d/w Dr. Quiroga--->outpatient biopsy with IR  -f/u outpatient at [facility] with biopsy/path results    # Thrombocytopenia in setting of right hip subcutaneous hematoma    -plts-134K/ul<---120K/ul <--128K/ul, improving  -No previous records available for baseline, suspect due to right hip hematoma  -on Eliquis for A-fib, and would continue in absence of active bleeding, hgb stable and plts remains > 50 K/ul  -Monitor CBC daily  -Transfuse PRBC if Hbg continues to drop and as clinically indicated, if becomes symptomatic or hemodynamically unstable.  -Transfuse platelets only if plts drops < 10K/ul, or 20 K/ul with evidence of active bleeding.    # Rapid Afib    -Known hx of A-Fib on AC  -Cardiology/EP following  - Plan for DCCV with Dr. Mendoza         89 y/o F w/ PMH of temporal arteritis (on steroids), A-fib (on Xarelto), DM2 p/w difficulty ambulating, s/p fall, scans noted incidental findings of right paratracheal and right hilar lymphadenopathy.    # Lymphadenopathy / Pulm nodules     -CT Chest 3/18/24: Right paratracheal and right hilar lymphadenopathy, likely neoplastic. Largest lymph node approximately 3.5 cm in short axis in the right paratracheal station RLL 5mm solid nodule RUL 4mm ill-defined nodule.   -No previous records available  -CT-head & A/P with no acute pathology  -unclear if this is reactive or due to underlying malignancy; will need further w/u, including biopsy.  -patient/family agreeable to pursue biopsy spoke with son-in-law (Adrien- [phone]) upon patient's request, however biopsy postponed s/t patient's recent uncontrolled A-Fib with RVR s/p unsuccessful DCCV on 3/21.  Management per cardiology/EPS  -d/w Dr. Quiroga--->outpatient biopsy with IR  -f/u outpatient at [facility] biopsy/path results    # Thrombocytopenia in setting of right hip subcutaneous hematoma    -plts-134K/ul<---120K/ul <--128K/ul, improving  -No previous records available for baseline, suspect due to right hip hematoma  -on Eliquis for A-fib, and would continue in absence of active bleeding, hgb stable and plts remains > 50 K/ul  -Monitor CBC daily  -Transfuse PRBC if Hbg continues to drop and as clinically indicated, if becomes symptomatic or hemodynamically unstable.  -Transfuse platelets only if plts drops < 10K/ul, or 20 K/ul with evidence of active bleeding.    # Rapid Afib    -Known hx of A-Fib on AC  -Cardiology/EP following for Afib with RVR s/p unsuccessful DCCV on 3/21  -Plan per Dr. Mendoza

## 2024-03-22 NOTE — PROGRESS NOTE ADULT - ASSESSMENT
89 y/o Female EP consulted for At. FIB with RVR. (on both Xarelto and Amiodarone 400 mgs daily).  on 3/19/2024 Amiodarone 400 mgs was increased to BID.  Pt has been complaining of weakness for 2-3 weeks, difficulty ambulating and lower leg edema.  The day prior to admission pt lost her balance and hurt her right hip.  CT showed hematoma of right lateral hip, she was given Digoxin 500 ucg.  Acute on chronic Hf  PMH of temporal arteritis (on steroids), a-fib (on xarelto), DM2 . Patient has been having difficulty walking due to B/L LE edema. States that LE edema has been worsening since she was started on steroids for temporal arteritis about 1 month ago.     Afib with RVR s/p unsuccessful DCCV on 3/21  Continue Amiodarone loading 400mg BID x 5 days and then decrease to 200mg daily  Given RVR will increase Metoprolol to 75mg BID with hold parameters for BP  Continue Xarelto uninterrupted  If rate continues to be difficult to control may need to consider repeat DCCV after Amiodarone loading  Further management per medicine  Plan discussed with patient, hospitalist and Dr. Mendoza

## 2024-03-22 NOTE — PROGRESS NOTE ADULT - SUBJECTIVE AND OBJECTIVE BOX
Chief Complaint: Generalized weakness, fall on R side, B/L leg swelling    Interval Hx: Yesterday, patient underwent DCCV, unsuccessful at converting her to sinus rhythm. No acute events overnight. Patient seen and examined today. Resting comfortably in bed. No new symptoms. B/L LE swelling improving gradually since admission. Hematoma stable. Remains in afib, rate-control not yet optimized.   Cardiology and Cardiac EP following. Metoprolol increased. Continued on amiodarone load, Eliquis. Regarding findings of pulmonary nodules and R paratracheal and hilar LN, Interventional Radiology recommends Pulmonary or Thoracic Surgery evaluation as bronchoscopy with EBUS and biopsy would likely be best approach. Cardiac EP Dr. Mendoza has indicated that since patient underwent DCCV, he would recommend uninterrupted AC for 21 days and thus would defer to the aforementioned diagnostic testing until then if possible.      ROS: Multi system review is comprehensively negative x 10 systems except as above    Vitals:  T(F): 97.8 (22 Mar 2024 08:25), Max: 97.8 (22 Mar 2024 08:25)  HR: 78 (22 Mar 2024 08:25) (74 - 118)  BP: 126/66 (22 Mar 2024 08:25) (98/59 - 126/66)  RR: 18 (22 Mar 2024 08:25) (18 - 18)  SpO2: 98% (22 Mar 2024 08:25) (98% - 99%) on room air    Exam:  Gen: No acute distress  HEENT: NCAT PERRL EOMI MMM clear oropharynx  Neck: Supple, no JVD, no LAD, no thyromegaly  CVS: S1 S2 normal, irregular, rate ~80  Chest: Normal resp effort, lungs CTA B/L  Abd: +BS, non distended, soft, nontender  Ext: R hip area with extensive hematoma, compartment soft, intact pulses, normal cap refill  Skin: Warm, dry  Mood: Calm, pleasant  Neuro: Awake and alert, follows commands, answers questions appropriately    Labs:             POCT glucose 200s               10.7   10.42 )--------( 134                  32.4       138  |  111  |  35  -----------------------<  224  4.9   |   21   |  1.43    Ca  8.7     Mg  2.3    Phos 2.3          TPro  4.9  /  Alb  2.5  /  TBili  0.9  /  DBili  x   /  AST  15  /  ALT  23  /  AlkPhos  37    PT: 15.0 sec;   INR: 1.34 ratio   PTT: 25.3 sec    Troponin negative  ProBNP 3280    A1c 8.6   TSH WNL   ESR 11  CRP <3    Imaging:  CT abd, pelvis WO 3/19: Liver normal. Normal caliber bile ducts. Gallbladder dependent hyperattenuating sludge and/or small stones. Normal spleen. Fatty atrophy of pancreas. Normal adrenals. Right upper pole and left parapelvic renal cysts. No renal stones or hydronephrosis. Small posterior diverticulum. Calcified uterine fibroid. No bowel obstruction. Moderate colonic fecal load. Appendix is normal. No ascites. Atherosclerotic changes. No lymphadenopathy. 5.7 x 4.3 x 9.0 cm right lateral hip subcutaneous hematoma. Additional infiltration of the subcutaneous fat of the bilateral lateral abdominal wall. Degenerative changes. No evidence of acute fracture.    CT chest WO 3/18: Patent trachea and bronchi. Emphysema. Of the left lower lobe calcified granuloma. Right lower lobe 5 mm solid nodule. Right upper lobe sub-4 mm ill-defined nodule. Trace right pleural effusion versus thickening. Small hiatal hernia. Right paratracheal and right hilar lymphadenopathy, for example, largest lymph node approximately 3.5 cm in short axis in the right paratracheal station. Normal heart size. No pericardial effusion. 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 Coronary artery calcifications.     CT head WO 3/18: There is moderate diffuse parenchymal volume loss. There are areas of low attenuation in the periventricular white matter likely related to mild chronic microvascular ischemic changes. There is no acute intracranial hemorrhage, parenchymal mass, mass effect or midline shift. There is no acute territorial infarct. There is no hydrocephalus. Partial empty sella noted. The cranium is intact. The visualized paranasal sinuses are well-aerated.    US venous duplex B/L LE 3/18: No evidence of deep venous thrombosis in the right lower extremity. No evidence of deep vein thrombosis in the visualized deep veins of the left lower extremity above the knee. The deep veins of the left calf were not seen. Extensive bilateral calf subcutaneous edema.    XR chest 3/18: Normal heart size. Atherosclerotic aorta. Increased right paratracheal soft tissue density may represent prominent vasculature.     Cardiac Testing:  Tele 3/22: Afib 90s-100s, occasional RVR to 130    Tele 3/21: Afib 80s-100s    TTE 3/19: Left ventricle systolic function appears preserved in the presence of a cardiac arrhythmia. Left ventricle size and structure are within normal limitations. Visual estimation of left ventricle ejection fraction is >50%. The left atrium is moderately dilated. The right atrium appears moderately dilated. The right ventricle exhibits mild dilation, mild diffuse hypokinesis, and mild depression of contractility. Mild aortic sclerosis is present with normalvalvular opening. Minimal mitral annular calcification is present. Mild (1+) mitral regurgitation is present. Moderate to severe (3+) tricuspid valve regurgitation is present. Mild pulmonic valvular regurgitation (1+) is present.    Tele 3/19-20: Afib with 90s-110s    EKG 3/19: Rate 110. Atrial fibrillation with rapid ventricular response with premature ventricular or aberrantly conducted complexes. Nonspecific repolarization abnormalities.    EKG 3/18: rate 128. Atrial fibrillation with rapid ventricular response with premature ventricular or aberrantly conducted complexes    Meds:  MEDICATIONS  (STANDING):  aMIOdarone    Tablet 400 milliGRAM(s) Oral every 12 hours  atorvastatin 80 milliGRAM(s) Oral at bedtime  folic acid 1 milliGRAM(s) Oral daily  insulin glargine Injectable (LANTUS) 12 Unit(s) SubCutaneous at bedtime  insulin lispro (ADMELOG) corrective regimen sliding scale   SubCutaneous three times a day before meals  insulin lispro (ADMELOG) corrective regimen sliding scale   SubCutaneous at bedtime  insulin lispro Injectable (ADMELOG) 4 Unit(s) SubCutaneous three times a day before meals  metoprolol tartrate 75 milliGRAM(s) Oral two times a day  pantoprazole    Tablet 40 milliGRAM(s) Oral before breakfast  predniSONE   Tablet 30 milliGRAM(s) Oral daily  rivaroxaban 15 milliGRAM(s) Oral with dinner  timolol 0.5% Solution 1 Drop(s) Both EYES daily  torsemide 20 milliGRAM(s) Oral daily    MEDICATIONS  (PRN):  dextrose Oral Gel 15 Gram(s) Oral once PRN Blood Glucose LESS THAN 70 milliGRAM(s)/deciliter   Chief Complaint: Generalized weakness, fall on R side, B/L leg swelling    Interval Hx: Yesterday, patient underwent DCCV, unsuccessful at converting her to sinus rhythm. No acute events overnight. Patient seen and examined today. Resting comfortably in bed. No new symptoms. B/L LE swelling improving gradually since admission. Hematoma stable. Remains in afib, rate-control not yet optimized.   Cardiology and Cardiac EP following. Metoprolol increased. Continued on amiodarone load, Eliquis. Regarding findings of pulmonary nodules and R paratracheal and hilar LN, Interventional Radiology recommends Pulmonary or Thoracic Surgery evaluation as bronchoscopy with EBUS and biopsy would likely be best approach. Cardiac EP Dr. Mendoza has indicated that since patient underwent DCCV, he would recommend uninterrupted AC for 21 days and thus would defer to the aforementioned diagnostic testing until then if possible.      ROS: Multi system review is comprehensively negative x 10 systems except as above    Vitals:  T(F): 97.8 (22 Mar 2024 08:25), Max: 97.8 (22 Mar 2024 08:25)  HR: 78 (22 Mar 2024 08:25) (74 - 118)  BP: 126/66 (22 Mar 2024 08:25) (98/59 - 126/66)  RR: 18 (22 Mar 2024 08:25) (18 - 18)  SpO2: 98% (22 Mar 2024 08:25) (98% - 99%) on room air    Exam:  Gen: No acute distress  HEENT: NCAT PERRL EOMI MMM clear oropharynx  Neck: Supple, no JVD, no LAD, no thyromegaly  CVS: S1 S2 normal, irregular, rate ~80  Chest: Normal resp effort, lungs CTA B/L  Abd: +BS, non distended, soft, nontender  Ext: R hip area with extensive hematoma, compartment soft, intact pulses, normal cap refill  Skin: Warm, dry  Mood: Calm, pleasant  Neuro: Awake and alert, follows commands, answers questions appropriately    Labs:             POCT glucose 200s               10.7   10.42 )--------( 134                  32.4       138  |  111  |  35  -----------------------<  224  4.9   |   21   |  1.43    Ca  8.7     Mg  2.3    Phos 2.3          TPro  4.9  /  Alb  2.5  /  TBili  0.9  /  DBili  x   /  AST  15  /  ALT  23  /  AlkPhos  37    PT: 15.0 sec;   INR: 1.34 ratio   PTT: 25.3 sec    Troponin negative  ProBNP 3280    A1c 8.6   TSH WNL   ESR 11  CRP <3    Imaging:  CT abd, pelvis WO 3/19: Liver normal. Normal caliber bile ducts. Gallbladder dependent hyperattenuating sludge and/or small stones. Normal spleen. Fatty atrophy of pancreas. Normal adrenals. Right upper pole and left parapelvic renal cysts. No renal stones or hydronephrosis. Small posterior diverticulum. Calcified uterine fibroid. No bowel obstruction. Moderate colonic fecal load. Appendix is normal. No ascites. Atherosclerotic changes. No lymphadenopathy. 5.7 x 4.3 x 9.0 cm right lateral hip subcutaneous hematoma. Additional infiltration of the subcutaneous fat of the bilateral lateral abdominal wall. Degenerative changes. No evidence of acute fracture.    CT chest WO 3/18: Patent trachea and bronchi. Emphysema. Of the left lower lobe calcified granuloma. Right lower lobe 5 mm solid nodule. Right upper lobe sub-4 mm ill-defined nodule. Trace right pleural effusion versus thickening. Small hiatal hernia. Right paratracheal and right hilar lymphadenopathy, for example, largest lymph node approximately 3.5 cm in short axis in the right paratracheal station. Normal heart size. No pericardial effusion. 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 Coronary artery calcifications.     CT head WO 3/18: There is moderate diffuse parenchymal volume loss. There are areas of low attenuation in the periventricular white matter likely related to mild chronic microvascular ischemic changes. There is no acute intracranial hemorrhage, parenchymal mass, mass effect or midline shift. There is no acute territorial infarct. There is no hydrocephalus. Partial empty sella noted. The cranium is intact. The visualized paranasal sinuses are well-aerated.    US venous duplex B/L LE 3/18: No evidence of deep venous thrombosis in the right lower extremity. No evidence of deep vein thrombosis in the visualized deep veins of the left lower extremity above the knee. The deep veins of the left calf were not seen. Extensive bilateral calf subcutaneous edema.    XR chest 3/18: Normal heart size. Atherosclerotic aorta. Increased right paratracheal soft tissue density may represent prominent vasculature.     Cardiac Testing:  Tele 3/22: Afib 90s-100s, occasional RVR to 130    Tele 3/21: Afib 80s-100s    TTE 3/19: Left ventricle systolic function appears preserved in the presence of a cardiac arrhythmia. Left ventricle size and structure are within normal limitations. Visual estimation of left ventricle ejection fraction is >50%. The left atrium is moderately dilated. The right atrium appears moderately dilated. The right ventricle exhibits mild dilation, mild diffuse hypokinesis, and mild depression of contractility. Mild aortic sclerosis is present with normal valvular opening. Minimal mitral annular calcification is present. Mild (1+) mitral regurgitation is present. Moderate to severe (3+) tricuspid valve regurgitation is present. Mild pulmonic valvular regurgitation (1+) is present.    Tele 3/19-20: Afib with 90s-110s    EKG 3/19: Rate 110. Atrial fibrillation with rapid ventricular response with premature ventricular or aberrantly conducted complexes. Nonspecific repolarization abnormalities.    EKG 3/18: rate 128. Atrial fibrillation with rapid ventricular response with premature ventricular or aberrantly conducted complexes    Meds:  MEDICATIONS  (STANDING):  aMIOdarone    Tablet 400 milliGRAM(s) Oral every 12 hours  atorvastatin 80 milliGRAM(s) Oral at bedtime  folic acid 1 milliGRAM(s) Oral daily  insulin glargine Injectable (LANTUS) 12 Unit(s) SubCutaneous at bedtime  insulin lispro (ADMELOG) corrective regimen sliding scale   SubCutaneous three times a day before meals  insulin lispro (ADMELOG) corrective regimen sliding scale   SubCutaneous at bedtime  insulin lispro Injectable (ADMELOG) 4 Unit(s) SubCutaneous three times a day before meals  metoprolol tartrate 75 milliGRAM(s) Oral two times a day  pantoprazole    Tablet 40 milliGRAM(s) Oral before breakfast  predniSONE   Tablet 30 milliGRAM(s) Oral daily  rivaroxaban 15 milliGRAM(s) Oral with dinner  timolol 0.5% Solution 1 Drop(s) Both EYES daily  torsemide 20 milliGRAM(s) Oral daily    MEDICATIONS  (PRN):  dextrose Oral Gel 15 Gram(s) Oral once PRN Blood Glucose LESS THAN 70 milliGRAM(s)/deciliter

## 2024-03-22 NOTE — CONSULT NOTE ADULT - CONSULT REASON
Pulm nodules, lymphadenopathy, thrombocytopenia, on AC
Afib RVR
At Fib with RVR
Pulm nodules, lymphadenopathy, thrombocytopenia, on AC

## 2024-03-22 NOTE — PROGRESS NOTE ADULT - ASSESSMENT
87 y/o F w/ PMH of temporal arteritis (on steroids), a-fib (on xarelto), DM2 p/w difficulty ambulating and LE edema. Patient has been having difficulty walking due to B/L LE edema. States that LE edema has been worsening since she was started on steroids for temporal arterities about 1 month ago. States that yesterday patient had a fall where she lost her balance and fell on R side, and sustained R hip bruising / hematoma. Today patient had another fall, but family member helped her to a chair before she hit the floor. Patient denies CP, SOB, cough, runny nose, sore throat, nausea, vomiting, abdominal pain, fever, chills    #Afib with RVR  #Acute on chronic diastolic HF exacerbation   #Temporal Arteritis - on chronic steroids - tapering off  #DMT2  #Hyperlipidemia    - Monitor on tele, HR better - Unsuccessful CV on 3/21   - Off digoxin  - Continue with amiodarone - C/w loading  - Continue Metoprolol - change to lopressor 50mg BID  - Continue Xarelto for stroke ppx  - Continue atorvastatin   - Echocardiogram reviewed.  - S/p gentle diuresis - transition to PO torsemide    Will sign off, recommend close outpatient follow up  Case d/w Dr. Salomon

## 2024-03-22 NOTE — CONSULT NOTE ADULT - SUBJECTIVE AND OBJECTIVE BOX
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 Patient has been having difficulty walking due to B/L LE edema. Stated that LE edema has been worsening since she was started on steroids for temporal arteritis about 1 month ago. s/p fall where she lost her balance and fell on R side, and sustained R hip bruising / hematoma.     3/19/24: Seen at bedside, along with Dr. Espinoza; awake, alert in no acute distress.     3/22/24: Seen at bedside, OOB to chair. Discussed the need for biopsy, but will have to postpone until stabilized from cardiac perspective to be off from AC prior to biopsy. Patient also requested if I could speak with her daughter & son-in-law.     PAST MEDICAL & SURGICAL HISTORY:    Temporal arteritis    A-fib    FAMILY HISTORY:    unknown    MEDICATIONS  (STANDING):    aMIOdarone    Tablet   Oral   aMIOdarone    Tablet 400 milliGRAM(s) Oral every 12 hours  atorvastatin 80 milliGRAM(s) Oral at bedtime  dextrose 5%. 1000 milliLiter(s) (100 mL/Hr) IV Continuous <Continuous>  dextrose 5%. 1000 milliLiter(s) (50 mL/Hr) IV Continuous <Continuous>  dextrose 50% Injectable 25 Gram(s) IV Push once  dextrose 50% Injectable 12.5 Gram(s) IV Push once  dextrose 50% Injectable 25 Gram(s) IV Push once  folic acid 1 milliGRAM(s) Oral daily  glucagon  Injectable 1 milliGRAM(s) IntraMuscular once  insulin lispro (ADMELOG) corrective regimen sliding scale   SubCutaneous three times a day before meals  insulin lispro (ADMELOG) corrective regimen sliding scale   SubCutaneous at bedtime  metoprolol tartrate 50 milliGRAM(s) Oral two times a day  pantoprazole    Tablet 40 milliGRAM(s) Oral before breakfast  predniSONE   Tablet 30 milliGRAM(s) Oral daily  rivaroxaban 15 milliGRAM(s) Oral with dinner  timolol 0.5% Solution 1 Drop(s) Both EYES daily  torsemide 20 milliGRAM(s) Oral daily    MEDICATIONS  (PRN):    dextrose Oral Gel 15 Gram(s) Oral once PRN Blood Glucose LESS THAN 70 milliGRAM(s)/deciliter      Allergies    penicillin (Swelling)    REVIEW OF SYSTEM:    Constitutional, Eyes, ENT, Cardiovascular, Respiratory, Gastrointestinal, Genitourinary, Musculoskeletal, Integumentary, Neurological, Psychiatric, Endocrine, Heme/Lymph and Allergic/Immunologic review of systems are otherwise negative except as noted in HPI.     Vital Signs Last 24 Hrs    T(C): 36.6 (22 Mar 2024 08:25), Max: 36.6 (22 Mar 2024 08:25)  T(F): 97.8 (22 Mar 2024 08:25), Max: 97.8 (22 Mar 2024 08:25)  HR: 78 (22 Mar 2024 08:25) (74 - 118)  BP: 126/66 (22 Mar 2024 08:25) (88/52 - 126/66)  BP(mean): 58 (21 Mar 2024 12:55) (58 - 58)  RR: 18 (22 Mar 2024 08:25) (17 - 20)  SpO2: 98% (22 Mar 2024 08:25) (98% - 100%)    Parameters below as of 22 Mar 2024 08:25  Patient On (Oxygen Delivery Method): room air    PHYSICAL EXAM:    Constitutional: no acute distress  Eyes: no conjunctival infection, anicteric.   ENT: pharynx is unremarkable  Neck: supple without JVD  Pulmonary: clear to auscultation bilaterally   Cardiac: RRR  Vascular: no calf tenderness, + 2 b/l extremities edema  Abdomen: normoactive bowel sounds, soft and nontender, no hepatosplenomegaly or masses appreciated  Lymphatic: no peripheral adenopathy appreciated  Musculoskeletal: full range of motion and no deformities appreciated  Skin: normal appearance, no rash/erythema  Neurology: awake, alert, oriented; no focal deficits    LABS:    CBC Full  -  ( 21 Mar 2024 06:53 )  WBC Count : 10.42 K/uL  RBC Count : 3.37 M/uL  Hemoglobin : 10.7 g/dL  Hematocrit : 32.4 %  Platelet Count - Automated : 134 K/uL  Mean Cell Volume : 96.1 fl  Mean Cell Hemoglobin : 31.8 pg  Mean Cell Hemoglobin Concentration : 33.0 gm/dL  Auto Neutrophil # : 7.66 K/uL  Auto Lymphocyte # : 1.36 K/uL  Auto Monocyte # : 0.77 K/uL  Auto Eosinophil # : 0.00 K/uL  Auto Basophil # : 0.04 K/uL  Auto Neutrophil % : 73.4 %  Auto Lymphocyte % : 13.1 %  Auto Monocyte % : 7.4 %  Auto Eosinophil % : 0.0 %  Auto Basophil % : 0.4 %      03-22    138  |  111<H>  |  35<H>  ----------------------------<  224<H>  4.9   |  21<L>  |  1.43<H>    Ca    8.7      22 Mar 2024 06:31  Mg     2.3     03-22        PT/INR - ( 19 Mar 2024 07:42 )   PT: 15.0 sec;   INR: 1.34 ratio     PTT - ( 19 Mar 2024 07:42 )  PTT:25.3 sec    Urinalysis Basic - ( 19 Mar 2024 07:34 )    Color: x / Appearance: x / SG: x / pH: x  Gluc: 165 mg/dL / Ketone: x  / Bili: x / Urobili: x   Blood: x / Protein: x / Nitrite: x   Leuk Esterase: x / RBC: x / WBC x   Sq Epi: x / Non Sq Epi: x / Bacteria: x    RADIOLOGY & ADDITIONAL STUDIES:    EXAM:  CT ABDOMEN AND PELVIS     PROCEDURE DATE:  03/19/2024      INTERPRETATION:  CLINICAL INFORMATION: Right hip presenting/hematoma   status post fall.    COMPARISON: None.    CONTRAST/COMPLICATIONS:  IV Contrast: NONE  Oral Contrast: NONE  Complications: None reported at time of study completion    PROCEDURE:  CT of the Abdomen and Pelvis was performed.  Sagittal and coronal reformats were performed.    FINDINGS:  LOWER CHEST: Emphysema. Right lower lobe subsegmental atelectasis. 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  BILE DUCTS: Normal caliber.  GALLBLADDER: Dependent hyperattenuating sludge and/or small stones.  SPLEEN: Within normal limits.  PANCREAS: Fatty atrophy.  ADRENALS: Within normal limits.  KIDNEYS/URETERS: Right upper pole and left parapelvic renal cysts. No   renal stones or hydronephrosis.    BLADDER: Small posterior diverticulum.  REPRODUCTIVE ORGANS: Calcified uterine fibroid.    BOWEL: No bowel obstruction. Moderate colonic fecal load. Appendix is   normal.  PERITONEUM: No ascites.  VESSELS: Atherosclerotic changes.  RETROPERITONEUM/LYMPH NODES: No lymphadenopathy.  ABDOMINAL WALL: 5.7 x 4.3 x 9.0 cm right lateral hip subcutaneous   hematoma. Additional infiltration of the subcutaneous fat of the   bilateral lateral abdominal wall.  BONES: Degenerative changes. No evidence of acute fracture.    IMPRESSION:  Subcutaneous hematoma overlying the right lateral hip.      EXAM:  CT CHEST       PROCEDURE DATE:  03/18/2024      INTERPRETATION:  INDICATION: Abnormal chest x-ray    TECHNIQUE: Helical acquisition images of the chest without intravenous   contrast. Maximum intensity projection images were generated.    COMPARISON: Same-day chest x-ray, no prior chest CT.    FINDINGS:    LUNGS/AIRWAYS/PLEURA: Patent trachea and bronchi. Emphysema. Of the left   lower lobe calcified granuloma. Right lower lobe 5 mm solid nodule   (2-66). Right upper lobe sub-4 mm ill-defined nodule (2-31). Trace right   pleural effusion versus thickening.    LYMPH NODES/MEDIASTINUM: Small hiatal hernia. Right paratracheal and   right hilar lymphadenopathy, for example, largest lymph node  approximately 3.5 cm in short axis in the right paratracheal station   (2-34).    HEART/VASCULATURE: Normal heart size. No pericardial effusion. Fat   attenuation within the left ventricular apical myocardium compatible with   a prior infarct. Coronary artery calcifications.    UPPER ABDOMEN: Fat replaced pancreas. Partially included right renal   cysts.    BONES/SOFT TISSUES: Unremarkable.    IMPRESSION:    Right paratracheal and right hilar lymphadenopathy, likely neoplastic.     Few nonspecific very small lung nodules.    EXAM:  CT BRAIN       PROCEDURE DATE:  03/18/2024      INTERPRETATION:  CLINICAL STATEMENT: Pain.    TECHNIQUE: CT of the head was performed without IV contrast.    COMPARISON: None.    FINDINGS:  There is moderate diffuse parenchymal volume loss.    There are areas of low attenuation in the periventricular white matter   likely related to mild chronic microvascular ischemic changes.    There is no acute intracranial hemorrhage, parenchymal mass, mass effect   or midline shift. There is no acute territorial infarct. There is no   hydrocephalus. Partial empty sella noted    The cranium is intact. The visualized paranasal sinuses are well-aerated.    IMPRESSION:    No acute intracranial hemorrhage or acute territorial infarct.  If   symptoms persist, follow-up MRI exam recommended.

## 2024-03-22 NOTE — PROGRESS NOTE ADULT - ASSESSMENT
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 Patient has been having difficulty walking due to B/L LE edema. States that LE edema has been worsening since she was started on steroids for temporal arteritis about 1 month ago. On day prior to presentation, she lost her balance and fell on her right side causing extensive bruising and R hip area subcutaneous hematoma. She has otherwise been in her usual state of health. In the ED she was noted to have rapid afib and acute on chronic diastolic CHF in addition to this hematoma. She was given diltiazem and digoxin, IV fluid hydration and admitted to Medicine.     Fall, generalized weakness  PT consult appreciated. Recommended MARIO vs home with home PT.   - Continue restorative PT sessions     Traumatic hematoma in the area of the R hip  Related to recent fall. On Xarelto for afib. Neurovascularly intact. Hgb ~10. Plt ~120.   - Continue to monitor site  - Continue to trend CBC    Thrombocytopenia  Plts-120K/ul <--128K/ul. No previous records available for baseline Suspect due to right hip hematoma  - Continue to trend CBC  - Transfuse platelets only if Plts drops < 10K/ul, or 20 K/ul with evidence of active bleeding.    Atrial fibrillation with RVR  Known hx of afib. In RVR in setting of mechanical fall, traumatic R hip hematoma. Appreciate Cardiology and Cardiac EP input. Went for DCCV yesterday 3/21, unsuccessful. Remains in afib, heart rates suboptimally controlled. Appreciate Cardiac EP input.   - Increased metoprolol to 75mg BID  - Continue amiodarone load, planned 400mg BID x 6 days then 200 daily   - Continue on Xarelto  - F/u with Cardiac EP    Acute on chronic diastolic CHF  In the setting of rapid afib upon presentation, traumatic R hip hematoma. Troponin negative  ProBNP 3280. Echo done. LA moderately dilated. LV size and structure normal. LVEF preserved. RA moderately dilated. RV mildly dilated with mild diffuse hypokinesis. Mild MR. Mod to severe TR. Appreciate Cardiology input. CHF status improved with diuresis with IV Bumex, switched to PO torsemide today.  - Switched IV Bumex to torsemide 20mg daily  - Continue metoprolol, currently tartrate, will consider switching to succinate once HR optimized and after further discussion with Cardiology  - Strict Is and Os, daily wt  - Trend Cr and lytes    Pulmonary nodules with R paratracheal and R hilar lymphadenopathy   As noted incidentally on CT chest. RLL 5mm solid nodule and RUL 4mm ill-defined nodule. LN enlargement R paratracheal and R hilar. No previous records available. CT head & abd/pelvis with no acute pathology. Unclear if this is reactive or due to underlying malignancy. Appreciate input from Hematology Oncology. Patient and family wish to pursue further diagnostic testing. Discussed with Cardiac EP Dr. Mendoza that if patient does undergo DCCV, he would recommend uninterrupted AC for 21 days and thus would defer to diagnostic testing until then if feasible.   - Plan for further diagnostic testing after 21 days of uninterrupted AC  - IR input appreciated, recommends Pulmonary Medicine or Thoracic Surgery for bronchoscopy, EBUS, endobronchial biopsy  - Outpatient follow up with Hematology/Oncology, Pulmonary Medicine, Thoracic Surgery    Type II DM  Suboptimally controlled. A1c 8.6. POCT glucose 200s  - Start Lantus 12 units qHS, Lispro 4 units TID AC plus correctional scale  - Continue to monitor glucose TID AC and HS    Hx of temporal arteritis  Follows with [facility] in [place]. On prednisone. ESR WNL. CRP WNL. Tapered prednisone to 30mg today 3/21 per patient's rheumatologist.    - Continue prednisone 30mg daily now  - Outpatient follow up with rheumatologist       DVT px: On Xarelto for afib  Code status: Full  Dispo: Anticipate DC home F2F pending HR control

## 2024-03-22 NOTE — PROGRESS NOTE ADULT - SUBJECTIVE AND OBJECTIVE BOX
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 Patient has been having difficulty walking due to B/L LE edema. States that LE edema has been worsening since she was started on steroids for temporal arterities about 1 month ago. States that yesterday patient had a fall where she lost her balance and fell on R side, and sustained R hip bruising / hematoma. Today patient had another fall, but family member helped her to a chair before she hit the floor. Patient denies CP, SOB, cough, runny nose, sore throat, nausea, vomiting, abdominal pain, fever, chills      PSH: Denies   Social Hx: Denies tobacco / etoh / drugs   Family Hx: Denies pertinent hx  (18 Mar 2024 23:17)    Cardiology consulted for afib RVR  Patient denies any CP or SOB. She is switching providers to [place] and requested Dr. Small  Denies any past CAD or heart failure  Denies any past CV and ablations.    3/20. feeling well. D/w Daughter, has not seen cardiologist in past up until recently this year while admitted to [facility]  3/21. No complaints, no overnight events. plan for CV today, c/w gentle diuresis  3/22. Unsuccessful CV, c/w rate control, will increase BB    MEDICATIONS  (STANDING):  aMIOdarone    Tablet   Oral   aMIOdarone    Tablet 400 milliGRAM(s) Oral every 12 hours  atorvastatin 80 milliGRAM(s) Oral at bedtime  buMETAnide Injectable 0.5 milliGRAM(s) IV Push daily  dextrose 5%. 1000 milliLiter(s) (100 mL/Hr) IV Continuous <Continuous>  dextrose 5%. 1000 milliLiter(s) (50 mL/Hr) IV Continuous <Continuous>  dextrose 50% Injectable 25 Gram(s) IV Push once  dextrose 50% Injectable 12.5 Gram(s) IV Push once  dextrose 50% Injectable 25 Gram(s) IV Push once  folic acid 1 milliGRAM(s) Oral daily  glucagon  Injectable 1 milliGRAM(s) IntraMuscular once  insulin lispro (ADMELOG) corrective regimen sliding scale   SubCutaneous three times a day before meals  insulin lispro (ADMELOG) corrective regimen sliding scale   SubCutaneous at bedtime  metoprolol tartrate 50 milliGRAM(s) Oral two times a day  pantoprazole    Tablet 40 milliGRAM(s) Oral before breakfast  predniSONE   Tablet 30 milliGRAM(s) Oral daily  rivaroxaban 15 milliGRAM(s) Oral with dinner  timolol 0.5% Solution 1 Drop(s) Both EYES daily    MEDICATIONS  (PRN):  dextrose Oral Gel 15 Gram(s) Oral once PRN Blood Glucose LESS THAN 70 milliGRAM(s)/deciliter    HOME MEDICATIONS:  amiodarone 200 mg oral tablet: 2 tab(s) orally once a day x 3 weeks then 1 tab daily ***pt started approx 3 days ago*** (18 Mar 2024 16:53)  clindamycin 150 mg oral capsule: 1 cap(s) orally 2 times a day x 10 days ***course not complete-unknown if pt still taking*** (18 Mar 2024 16:53)  Farxiga 10 mg oral tablet: 1 tab(s) orally once a day (18 Mar 2024 16:53)  folic acid 1 mg oral tablet: 1 tab(s) orally once a day (18 Mar 2024 16:53)  ipratropium 21 mcg/inh (0.03%) nasal spray: 2 spray(s) intranasally once a day (18 Mar 2024 16:53)  metoprolol succinate 50 mg oral tablet, extended release: 1 tab(s) orally once a day (18 Mar 2024 16:53)  pantoprazole 40 mg oral delayed release tablet: 1 tab(s) orally once a day (18 Mar 2024 16:53)  potassium chloride 10 mEq oral tablet, extended release: 1 tab(s) orally 2 times a day (18 Mar 2024 16:53)  predniSONE 20 mg oral tablet: 2 tab(s) orally once a day ***pt on a taper- unknown taper- pt has been on 40mg for 3 weeks and will possibly be tapered down at next appt*** (18 Mar 2024 16:52)  rosuvastatin 40 mg oral tablet: 1 tab(s) orally once a day (18 Mar 2024 16:53)  timolol maleate 0.5% ophthalmic solution: 1 drop(s) in each eye once a day (18 Mar 2024 16:56)  torsemide 5 mg oral tablet: 2 tab(s) orally once a day (18 Mar 2024 16:56)  valACYclovir 1 g oral tablet: 1 tab(s) orally 3 times a day x 10 days ***Course Complete*** (18 Mar 2024 16:56)  Xarelto 15 mg oral tablet: 1 tab(s) orally once a day (18 Mar 2024 16:56)    PHYSICAL EXAM:  T(C): 36.6 (22 Mar 2024 08:25), Max: 36.6 (22 Mar 2024 08:25)  T(F): 97.8 (22 Mar 2024 08:25), Max: 97.8 (22 Mar 2024 08:25)  HR: 78 (22 Mar 2024 08:25) (74 - 118)  BP: 126/66 (22 Mar 2024 08:25) (88/52 - 126/66)  BP(mean): 58 (21 Mar 2024 12:55) (58 - 58)  RR: 18 (22 Mar 2024 08:25) (17 - 20)  SpO2: 98% (22 Mar 2024 08:25) (98% - 100%)    Parameters below as of 22 Mar 2024 08:25  Patient On (Oxygen Delivery Method): room air    Constitutional: NAD, awake and alert  HEENT: PERR, EOMI, Normal Hearing, MMM  Neck: Soft and supple, No LAD, No JVD  Respiratory: Breath sounds are clear bilaterally, No wheezing, rales or rhonchi  Cardiovascular: S1 and S2, IR IR  Gastrointestinal: Bowel Sounds present, soft, nontender, nondistended, no guarding, no rebound  Extremities: + pitting peripheral edema  Vascular: 2+ peripheral pulses  Neurological: A/O x 3, no focal deficits  Musculoskeletal: 5/5 strength b/l upper and lower extremities  Skin: No rashes    =======================================    INTERPRETATION OF TELEMETRY: Afib 110s    ECG:  < from: 12 Lead ECG (03.19.24 @ 07:05) >  Diagnosis Line Atrial fibrillation with rapid ventricular response with premature ventricular or aberrantly conducted complexes  Nonspecific repolarization abnormalities  Abnormal ECG  When compared with ECG of 18-MAR-2024 15:22,  No significant change was found    ========================================    LABS: All Labs Reviewed:                        10.7   10.42 )-----------( 134      ( 21 Mar 2024 06:53 )             32.4     03-22    138  |  111<H>  |  35<H>  ----------------------------<  224<H>  4.9   |  21<L>  |  1.43<H>    Ca    8.7      22 Mar 2024 06:31  Mg     2.3     03-22    Troponin: 23.79 ng/L, Troponin: 20.47 ng/L    BNP 3280    CARDIAC TESTING:    < from: TTE Echo Complete w/o Contrast w/ Doppler (03.19.24 @ 09:41) >   Left ventricle systolic function appears preserved in the presence of a   cardiac arrhythmia. Left ventricle size and structure are within normal   limitations. Visual estimation of left ventricle ejection fraction is>50 %.   The left atrium is moderately dilated.   The right atrium appears moderately dilated.   The right ventricle exhibits mild dilation, mild diffuse hypokinesis, and   mild depression of contractility.   Mild aortic sclerosis is present with normalvalvular opening.   Minimal mitral annular calcification is present.   Mild (1+) mitral regurgitation is present.   Moderate to severe (3+) tricuspid valve regurgitation is present.   Mild pulmonic valvular regurgitation (1+) is present.    RADIOLOGY & ADDITIONAL STUDIES:    < from: CT Abdomen and Pelvis No Cont (03.19.24 @ 01:08) >  Subcutaneous hematoma overlyingthe right lateral hip.    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   This was discussed with Dr. Maciel at 5:02 PM on 3/20/2024.  Few nonspecific very small lung nodules.    < from: CT Head No Cont (03.18.24 @ 16:33) >  No acute intracranial hemorrhage or acute territorial infarct.  If   symptoms persist, follow-up MRI exam recommended.    < from: US Duplex Venous Lower Ext Complete, Bilateral (03.18.24 @ 16:28) >  No evidence of deep venous thrombosis in the right lower extremity.  No evidence of deep vein thrombosis in the visualized deep veins of the   left lower extremity above the knee, as detailed above. The deep veins of   the left calf were not seen.  Extensive bilateral calf subcutaneous edema.

## 2024-03-23 NOTE — PROGRESS NOTE ADULT - ASSESSMENT
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 Patient has been having difficulty walking due to B/L LE edema. States that LE edema has been worsening since she was started on steroids for temporal arteritis about 1 month ago. On day prior to presentation, she lost her balance and fell on her right side causing extensive bruising and R hip area subcutaneous hematoma. She has otherwise been in her usual state of health. In the ED she was noted to have rapid afib and acute on chronic diastolic CHF in addition to this hematoma. She was given diltiazem and digoxin, IV fluid hydration and admitted to Medicine.     Fall, generalized weakness  PT consult appreciated. Recommended MARIO vs home with home PT.   - Continue restorative PT sessions     Traumatic hematoma in the area of the R hip  Related to recent fall. On Xarelto for afib. Neurovascularly intact. Hgb ~11. Plt ~160.   - Continue to monitor site  - Continue to trend CBC    Thrombocytopenia  Resolved. Suspect due to right hip hematoma    Atrial fibrillation with RVR  Known hx of afib. In RVR in setting of mechanical fall, traumatic R hip hematoma. Appreciate Cardiology and Cardiac EP input. Went for DCCV yesterday 3/21, unsuccessful. Remains in afib, heart rates suboptimally controlled. Appreciate Cardiac EP input.   - Increased metoprolol to 100mg BID  - Continue amiodarone load, planned 400mg BID x 6 days then 200 daily   - Continue on Xarelto  - F/u with Cardiac EP, possible repeat DCCV Monday pending their reassessment    Acute on chronic diastolic CHF  In the setting of rapid afib upon presentation, traumatic R hip hematoma. Troponin negative  ProBNP 3280. Echo done. LA moderately dilated. LV size and structure normal. LVEF preserved. RA moderately dilated. RV mildly dilated with mild diffuse hypokinesis. Mild MR. Mod to severe TR. Appreciate Cardiology input. CHF status improved with diuresis with IV Bumex, since switched to PO torsemide.  - Continue torsemide 20mg daily  - Continue metoprolol, currently tartrate, 100mg BID, will consider switching to succinate once HR optimized and after further discussion with Cardiology  - Strict Is and Os, daily wt  - Trend Cr and lytes    Pulmonary nodules with R paratracheal and R hilar lymphadenopathy   As noted incidentally on CT chest. RLL 5mm solid nodule and RUL 4mm ill-defined nodule. LN enlargement R paratracheal and R hilar. No previous records available. CT head & abd/pelvis with no acute pathology. Unclear if this is reactive or due to underlying malignancy. Appreciate input from Hematology Oncology. Patient and family wish to pursue further diagnostic testing. Discussed with Cardiac EP Dr. Mendoza that if patient does undergo DCCV, he would recommend uninterrupted AC for 21 days and thus would defer to diagnostic testing until then if feasible.   - Plan for further diagnostic testing after 21 days of uninterrupted AC  - IR input appreciated, recommends Pulmonary Medicine or Thoracic Surgery for bronchoscopy, EBUS, endobronchial biopsy  - Outpatient follow up with Hematology/Oncology, Pulmonary Medicine, Thoracic Surgery    Type II DM  Suboptimally controlled. A1c 8.6. POCT glucose 130s today.   - Continue Lantus 12 units qHS, Lispro 4 units TID AC plus correctional scale  - Continue to monitor glucose TID AC and HS    Hx of temporal arteritis  Follows with [facility] in [place]. On prednisone. ESR WNL. CRP WNL. Tapered prednisone to 30mg today 3/21 per patient's rheumatologist.    - Continue prednisone 30mg daily now  - Outpatient follow up with rheumatologist       DVT px: On Xarelto for afib  Code status: Full  Dispo: Anticipate DC home F2F pending HR control

## 2024-03-23 NOTE — PROGRESS NOTE ADULT - SUBJECTIVE AND OBJECTIVE BOX
Chief Complaint: Generalized weakness, fall on R side, B/L leg swelling    Interval Hx: No acute events overnight. Patient seen out of bed in chair. Resting comfortably. No new symptoms. B/L LE swelling improving gradually since admission. Hematoma stable. Remains in afib, rate-control not yet optimized. Cardiology and Cardiac EP following. On metoprolol. On amiodarone. AC with Eliquis. Cardiac EP following and may offer repeat attempt to convert patient with DCCV on Monday depending on how the next 24-48 hrs go. Regarding findings of pulmonary nodules and R paratracheal and hilar LN, Interventional Radiology recommends Pulmonary and/or Thoracic Surgery evaluation, as bronchoscopy with EBUS and biopsy would likely be best approach. Cardiac EP Dr. Mendoza has indicated that since patient underwent DCCV, he would recommend uninterrupted AC for 21 days and thus would defer to the aforementioned diagnostic testing until then if possible. Patient and family are aware.    ROS: Multi system review is comprehensively negative x 10 systems except as above    Vitals:  T(F): 97.8 (23 Mar 2024 07:58), Max: 97.8 (23 Mar 2024 07:58)  HR: 102 (23 Mar 2024 07:58) (98 - 113)  BP: 125/77 (23 Mar 2024 07:58) (91/57 - 125/77)  RR: 18 (23 Mar 2024 07:58) (18 - 18)  SpO2: 96% (23 Mar 2024 07:58) (96% - 96%) on room air    Exam:  Gen: No acute distress  HEENT: NCAT PERRL EOMI MMM clear oropharynx  Neck: Supple, no JVD, no LAD, no thyromegaly  CVS: S1 S2 normal, irregular, rate ~100  Chest: Normal resp effort, lungs CTA B/L  Abd: +BS, non distended, soft, nontender  Ext: R hip area with extensive hematoma, compartment soft, intact pulses, normal cap refill  Skin: Warm, dry  Mood: Calm, pleasant  Neuro: Awake and alert, follows commands, answers questions appropriately    Labs:             POCT glucose 130s-140s               11.0   12.75 )--------( 165                  33.5       138  |  109  |  44  -----------------------<  156  4.4   |   23   |  1.20    Ca 8.6     Mg  2.2    Phos 3.2          TPro  4.9  /  Alb  2.5  /  TBili  0.9  /  DBili  x   /  AST  15  /  ALT  23  /  AlkPhos  37    PT: 15.0 sec;   INR: 1.34 ratio   PTT: 25.3 sec    Troponin negative  ProBNP 3280    A1c 8.6   TSH WNL   ESR 11  CRP <3    Imaging:  CT abd, pelvis WO 3/19: Liver normal. Normal caliber bile ducts. Gallbladder dependent hyperattenuating sludge and/or small stones. Normal spleen. Fatty atrophy of pancreas. Normal adrenals. Right upper pole and left parapelvic renal cysts. No renal stones or hydronephrosis. Small posterior diverticulum. Calcified uterine fibroid. No bowel obstruction. Moderate colonic fecal load. Appendix is normal. No ascites. Atherosclerotic changes. No lymphadenopathy. 5.7 x 4.3 x 9.0 cm right lateral hip subcutaneous hematoma. Additional infiltration of the subcutaneous fat of the bilateral lateral abdominal wall. Degenerative changes. No evidence of acute fracture.    CT chest WO 3/18: Patent trachea and bronchi. Emphysema. Of the left lower lobe calcified granuloma. Right lower lobe 5 mm solid nodule. Right upper lobe sub-4 mm ill-defined nodule. Trace right pleural effusion versus thickening. Small hiatal hernia. Right paratracheal and right hilar lymphadenopathy, for example, largest lymph node approximately 3.5 cm in short axis in the right paratracheal station. Normal heart size. No pericardial effusion. 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 Coronary artery calcifications.     CT head WO 3/18: There is moderate diffuse parenchymal volume loss. There are areas of low attenuation in the periventricular white matter likely related to mild chronic microvascular ischemic changes. There is no acute intracranial hemorrhage, parenchymal mass, mass effect or midline shift. There is no acute territorial infarct. There is no hydrocephalus. Partial empty sella noted. The cranium is intact. The visualized paranasal sinuses are well-aerated.    US venous duplex B/L LE 3/18: No evidence of deep venous thrombosis in the right lower extremity. No evidence of deep vein thrombosis in the visualized deep veins of the left lower extremity above the knee. The deep veins of the left calf were not seen. Extensive bilateral calf subcutaneous edema.    XR chest 3/18: Normal heart size. Atherosclerotic aorta. Increased right paratracheal soft tissue density may represent prominent vasculature.     Cardiac Testing:  Tele 3/23: Tele 3/22: Afib 90s-100s, occasional RVR to 130    Tele 3/22: Afib 90s-100s, occasional RVR to 130    Tele 3/21: Afib 80s-100s    TTE 3/19: Left ventricle systolic function appears preserved in the presence of a cardiac arrhythmia. Left ventricle size and structure are within normal limitations. Visual estimation of left ventricle ejection fraction is >50%. The left atrium is moderately dilated. The right atrium appears moderately dilated. The right ventricle exhibits mild dilation, mild diffuse hypokinesis, and mild depression of contractility. Mild aortic sclerosis is present with normal valvular opening. Minimal mitral annular calcification is present. Mild (1+) mitral regurgitation is present. Moderate to severe (3+) tricuspid valve regurgitation is present. Mild pulmonic valvular regurgitation (1+) is present.    Tele 3/19-20: Afib with 90s-110s    EKG 3/19: Rate 110. Atrial fibrillation with rapid ventricular response with premature ventricular or aberrantly conducted complexes. Nonspecific repolarization abnormalities.    EKG 3/18: rate 128. Atrial fibrillation with rapid ventricular response with premature ventricular or aberrantly conducted complexes    Meds:  MEDICATIONS  (STANDING):  aMIOdarone    Tablet 400 milliGRAM(s) Oral every 12 hours  atorvastatin 80 milliGRAM(s) Oral at bedtime  folic acid 1 milliGRAM(s) Oral daily  insulin glargine Injectable (LANTUS) 12 Unit(s) SubCutaneous at bedtime  insulin lispro (ADMELOG) corrective regimen sliding scale   SubCutaneous three times a day before meals  insulin lispro (ADMELOG) corrective regimen sliding scale   SubCutaneous at bedtime  insulin lispro Injectable (ADMELOG) 4 Unit(s) SubCutaneous three times a day before meals  metoprolol tartrate 100 milliGRAM(s) Oral two times a day  pantoprazole    Tablet 40 milliGRAM(s) Oral before breakfast  predniSONE   Tablet 30 milliGRAM(s) Oral daily  rivaroxaban 15 milliGRAM(s) Oral with dinner  timolol 0.5% Solution 1 Drop(s) Both EYES daily  torsemide 20 milliGRAM(s) Oral daily    MEDICATIONS  (PRN):  dextrose Oral Gel 15 Gram(s) Oral once PRN Blood Glucose LESS THAN 70 milliGRAM(s)/deciliter

## 2024-03-24 NOTE — PROGRESS NOTE ADULT - SUBJECTIVE AND OBJECTIVE BOX
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 Patient has been having difficulty walking due to B/L LE edema. States that LE edema has been     3/22/24: Patient s/p unsuccessful DCCV yesterday.  Seen at bedside, no complaints.  TELE: Afib 80's to 100        MEDICATIONS  (STANDING):  aMIOdarone    Tablet   Oral   aMIOdarone    Tablet 400 milliGRAM(s) Oral every 12 hours  atorvastatin 80 milliGRAM(s) Oral at bedtime  dextrose 5%. 1000 milliLiter(s) (50 mL/Hr) IV Continuous <Continuous>  dextrose 5%. 1000 milliLiter(s) (100 mL/Hr) IV Continuous <Continuous>  dextrose 50% Injectable 25 Gram(s) IV Push once  dextrose 50% Injectable 12.5 Gram(s) IV Push once  dextrose 50% Injectable 25 Gram(s) IV Push once  folic acid 1 milliGRAM(s) Oral daily  glucagon  Injectable 1 milliGRAM(s) IntraMuscular once  insulin glargine Injectable (LANTUS) 12 Unit(s) SubCutaneous at bedtime  insulin lispro (ADMELOG) corrective regimen sliding scale   SubCutaneous three times a day before meals  insulin lispro (ADMELOG) corrective regimen sliding scale   SubCutaneous at bedtime  insulin lispro Injectable (ADMELOG) 4 Unit(s) SubCutaneous three times a day before meals  metoprolol tartrate 100 milliGRAM(s) Oral two times a day  pantoprazole    Tablet 40 milliGRAM(s) Oral before breakfast  predniSONE   Tablet 30 milliGRAM(s) Oral daily  rivaroxaban 15 milliGRAM(s) Oral with dinner  timolol 0.5% Solution 1 Drop(s) Both EYES daily  torsemide 20 milliGRAM(s) Oral daily    MEDICATIONS  (PRN):  dextrose Oral Gel 15 Gram(s) Oral once PRN Blood Glucose LESS THAN 70 milliGRAM(s)/deciliter      Vital Signs Last 24 Hrs  T(C): 36.6 (24 Mar 2024 08:10), Max: 36.6 (24 Mar 2024 08:10)  T(F): 97.8 (24 Mar 2024 08:10), Max: 97.8 (24 Mar 2024 08:10)  HR: 89 (24 Mar 2024 08:10) (78 - 96)  BP: 112/79 (24 Mar 2024 08:10) (92/56 - 112/79)  BP(mean): 63 (24 Mar 2024 05:40) (63 - 72)  RR: 18 (24 Mar 2024 08:10) (18 - 18)  SpO2: 95% (24 Mar 2024 08:10) (95% - 98%)    Parameters below as of 24 Mar 2024 08:10  Patient On (Oxygen Delivery Method): room air              PHYSICAL EXAMINATION:    GENERAL APPEARANCE:  Pt. is not currently dyspneic, in no distress. Pt. is alert, oriented, and pleasant.  HEENT:  Pupils are normal and react normally. No icterus. Mucous membranes well colored.  NECK:  Supple. No lymphadenopathy. Jugular venous pressure not elevated. Carotids equal.   HEART:  Irregular. There are no murmurs, rubs or gallops noted  CHEST:  Chest is clear to auscultation. Normal respiratory effort.  ABDOMEN:  Soft and nontender.   EXTREMITIES:  There is no edema.   SKIN:  No rash or significant lesions are noted.        ECHO:  Impression     Summary     Left ventricle systolic function appears preserved in the presence of a   cardiac arrhythmia. Left ventricle size and structure are within normal   limitations. Visual estimation of left ventricle ejection fraction is>50  %.   The left atrium is moderately dilated.   The right atrium appears moderately dilated.   The right ventricle exhibits mild dilation, mild diffuse hypokinesis, and   mild depression of contractility.   Mild aortic sclerosis is present with normalvalvular opening.   Minimal mitral annular calcification is present.   Mild (1+) mitral regurgitation is present.   Moderate to severe (3+) tricuspid valve regurgitation is present.   Mild pulmonic valvular regurgitation (1+) is present.

## 2024-03-24 NOTE — PROGRESS NOTE ADULT - ASSESSMENT
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 Patient was having difficulty walking, thought to be due to B/L LE edema that has been since she was started on steroids for temporal arteritis about 1 month ago. On day prior to presentation, she lost her balance and fell on her right side causing extensive bruising and R hip area subcutaneous hematoma. She has otherwise been in her usual state of health. In the ED, she was noted to have rapid afib and acute on chronic diastolic CHF in addition to this hematoma. She was given diltiazem and digoxin, IV fluid hydration, and admitted to Medicine.     Fall, generalized weakness  PT consult appreciated. Recommended home with home PT, family assistance and rolling walker  - Continue restorative PT sessions  - OOB to chair daily     Traumatic hematoma in the area of the R hip, acute blood loss anemia  Related to recent fall while on Xarelto for afib. Hgb ~13 on presentation, unclear if baseline or hemoconcentrated, decreased to ~11 but has been stable ~11 for days now and with Xarelto remaining on board. Lower extremity neurovascularly intact. Performing adequately with PT as mentioned. No significant pain issues.   - Continue to monitor site  - Continue to trend CBC    Atrial fibrillation with RVR  Known hx of afib. In RVR in setting of mechanical fall, traumatic R hip hematoma, acute blood loss anemia. Appreciate Cardiology and Cardiac EP input. Went for DCCV 3/21, unsuccessful. Remains in afib, on amiodarone load and metoprolol with gradually improving heart rate control,   - Continue amiodarone load, planned 400mg BID x 6 days then 200 daily   - Continue metoprolol, now at 100mg BID  - Continue on Xarelto  - As per Cardiac EP Dr. Mendoza, plan to continue above regimen and in approx 4 weeks, re-attempt cardioversion  - Follow up with Cardiac EP as outpatient    Acute on chronic diastolic CHF  In the setting of rapid afib upon presentation. Troponin negative  ProBNP 3280. Echo done. LA moderately dilated. LV size and structure normal. LVEF preserved. RA moderately dilated. RV mildly dilated with mild diffuse hypokinesis. Mild MR. Mod to severe TR. Appreciate Cardiology input. CHF status improved with diuresis with IV Bumex and improved HR control. Patient since switched to PO torsemide.  - Continue torsemide and metoprolol  - Strict Is and Os, daily wt  - Trend Cr and lytes  - Outpatient follow up with Cardiology Dr. Small    Pulmonary nodules with R paratracheal and R hilar lymphadenopathy   As noted incidentally on CT chest. RLL 5mm solid nodule and RUL 4mm ill-defined nodule. LN enlargement R paratracheal and R hilar. No previous records available. CT head & abd/pelvis with no acute pathology. Unclear if this is reactive or due to underlying malignancy. Appreciate input from Hematology Oncology. Patient and family wish to pursue further diagnostic testing. Discussed with Cardiac EP Dr. Mendoza that since patient underwent DCCV 3/21, he would recommend uninterrupted AC for 21 days and thus would defer to diagnostic testing until then if feasible.   - Plan for further diagnostic testing after 21 days of uninterrupted AC  - IR input appreciated, recommends Pulmonary Medicine or Thoracic Surgery for bronchoscopy, EBUS, endobronchial biopsy  - Outpatient follow up with Hematology/Oncology, Pulmonary Medicine, Thoracic Surgery    Type II DM  Suboptimally controlled. A1c 8.6. POCT glucose 100s-140s today. On chronic prednisone for temporal arteritis, see below  - Continue Lantus 12 units qHS, Lispro 4 units TID AC plus correctional scale  - Continue to monitor glucose TID AC and HS  - Will need to determine suitable outpatient DM regimen and whether that will be insulin-based or something like metformin plus Farxiga, etc    Hx of temporal arteritis  Follows with [facility] in [place]. On prednisone. ESR WNL. CRP WNL. Tapered prednisone to 30mg today 3/21 per patient's rheumatologist.    - Continue prednisone 30mg daily now  - Outpatient follow up with rheumatologist       DVT px: On Xarelto for afib  Code status: Full  Dispo: Anticipate DC home with home PT, family assistance and rolling walker, pending HR control, possibly tomorrow 3/25.    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 Patient was having difficulty walking, thought to be due to B/L LE edema that has been worsening since she was started on steroids for temporal arteritis about 1 month ago. On day prior to presentation, she lost her balance and fell on her right side causing extensive bruising and R hip area subcutaneous hematoma. She has otherwise been in her usual state of health. In the ED, she was noted to have rapid afib and acute on chronic diastolic CHF in addition to this hematoma. She was given diltiazem and digoxin, IV fluid hydration, and admitted to Medicine.     Fall, generalized weakness  PT consult appreciated. Recommended home with home PT, family assistance and rolling walker  - Continue restorative PT sessions  - OOB to chair daily     Traumatic hematoma in the area of the R hip, acute blood loss anemia  Related to recent fall while on Xarelto for afib. Hgb ~13 on presentation, unclear if baseline or hemoconcentrated, decreased to ~11 but has been stable ~11 for days now and with Xarelto remaining on board. Lower extremity neurovascularly intact. Performing adequately with PT as mentioned. No significant pain issues.   - Continue to monitor site  - Continue to trend CBC    Atrial fibrillation with RVR  Known hx of afib. In RVR in setting of mechanical fall, traumatic R hip hematoma, acute blood loss anemia. Appreciate Cardiology and Cardiac EP input. Went for DCCV 3/21, unsuccessful. Remains in afib, on amiodarone load and metoprolol with gradually improving heart rate control,   - Continue amiodarone load, planned 400mg BID x 6 days then 200 daily   - Continue metoprolol, now at 100mg BID  - Continue on Xarelto  - As per Cardiac EP Dr. Mendoza, plan to continue above regimen and in approx 4 weeks, re-attempt cardioversion  - Follow up with Cardiac EP as outpatient    Acute on chronic diastolic CHF  In the setting of rapid afib upon presentation. Troponin negative  ProBNP 3280. Echo done. LA moderately dilated. LV size and structure normal. LVEF preserved. RA moderately dilated. RV mildly dilated with mild diffuse hypokinesis. Mild MR. Mod to severe TR. Appreciate Cardiology input. CHF status improved with diuresis with IV Bumex and improved HR control. Patient since switched to PO torsemide.  - Continue torsemide and metoprolol  - Strict Is and Os, daily wt  - Trend Cr and lytes  - Outpatient follow up with Cardiology Dr. Small    Pulmonary nodules with R paratracheal and R hilar lymphadenopathy   As noted incidentally on CT chest. RLL 5mm solid nodule and RUL 4mm ill-defined nodule. LN enlargement R paratracheal and R hilar. No previous records available. CT head & abd/pelvis with no acute pathology. Unclear if this is reactive or due to underlying malignancy. Appreciate input from Hematology Oncology. Patient and family wish to pursue further diagnostic testing. Discussed with Cardiac EP Dr. Mendoza that since patient underwent DCCV 3/21, he would recommend uninterrupted AC for 21 days and thus would defer to diagnostic testing until then if feasible.   - Plan for further diagnostic testing after 21 days of uninterrupted AC  - IR input appreciated, recommends Pulmonary Medicine or Thoracic Surgery for bronchoscopy, EBUS, endobronchial biopsy  - Outpatient follow up with Hematology/Oncology, Pulmonary Medicine, Thoracic Surgery    Type II DM  Suboptimally controlled. A1c 8.6. POCT glucose 100s-140s today. On chronic prednisone for temporal arteritis, see below  - Continue Lantus 12 units qHS, Lispro 4 units TID AC plus correctional scale  - Continue to monitor glucose TID AC and HS  - Will need to determine suitable outpatient DM regimen and whether that will be insulin-based or something like metformin plus Farxiga, etc    Hx of temporal arteritis  Follows with [facility] in [place]. On prednisone. ESR WNL. CRP WNL. Tapered prednisone to 30mg today 3/21 per patient's rheumatologist.    - Continue prednisone 30mg daily now  - Outpatient follow up with rheumatologist       DVT px: On Xarelto for afib  Code status: Full  Dispo: Anticipate DC home with home PT, family assistance and rolling walker, pending HR control, possibly tomorrow 3/25.    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 Patient was having difficulty walking, thought to be due to B/L LE edema that has been worsening since she was started on steroids for temporal arteritis about 1 month ago. On day prior to presentation, she lost her balance and fell on her right side, causing extensive bruising and R hip area subcutaneous hematoma. She has otherwise been in her usual state of health. In the ED, she was noted to have rapid afib and acute on chronic diastolic CHF in addition to this hematoma. She was given diltiazem and digoxin, IV fluid hydration, and admitted to Medicine.     Fall, generalized weakness  PT consult appreciated. Recommended home with home PT, family assistance and rolling walker  - Continue restorative PT sessions  - OOB to chair daily     Traumatic hematoma in the area of the R hip, acute blood loss anemia  Related to recent fall while on Xarelto for afib. Hgb ~13 on presentation, unclear if baseline or hemoconcentrated, decreased to ~11 but has been stable ~11 for days now and with Xarelto remaining on board. Lower extremity neurovascularly intact. Performing adequately with PT as mentioned. No significant pain issues.   - Continue to monitor site  - Continue to trend CBC    Atrial fibrillation with RVR  Known hx of afib. In RVR in setting of mechanical fall, traumatic R hip hematoma, acute blood loss anemia. Appreciate Cardiology and Cardiac EP input. Went for DCCV 3/21, unsuccessful. Remains in afib, on amiodarone load and metoprolol with gradually improving heart rate control,   - Continue amiodarone load, planned 400mg BID x 6 days then 200 daily   - Continue metoprolol, now at 100mg BID  - Continue on Xarelto  - As per Cardiac EP Dr. Mendoza, plan to continue above regimen and in approx 4 weeks, re-attempt cardioversion  - Follow up with Cardiac EP as outpatient    Acute on chronic diastolic CHF  In the setting of rapid afib upon presentation. Troponin negative  ProBNP 3280. Echo done. LA moderately dilated. LV size and structure normal. LVEF preserved. RA moderately dilated. RV mildly dilated with mild diffuse hypokinesis. Mild MR. Mod to severe TR. Appreciate Cardiology input. CHF status improved with diuresis with IV Bumex and improved HR control. Patient since switched to PO torsemide.  - Continue torsemide and metoprolol  - Strict Is and Os, daily wt  - Trend Cr and lytes  - Outpatient follow up with Cardiology Dr. Small    Pulmonary nodules with R paratracheal and R hilar lymphadenopathy   As noted incidentally on CT chest. RLL 5mm solid nodule and RUL 4mm ill-defined nodule. LN enlargement R paratracheal and R hilar. No previous records available. CT head & abd/pelvis with no acute pathology. Unclear if this is reactive or due to underlying malignancy. Appreciate input from Hematology Oncology. Patient and family wish to pursue further diagnostic testing. Discussed with Cardiac EP Dr. Mendoza that since patient underwent DCCV 3/21, he would recommend uninterrupted AC for 21 days and thus would defer to diagnostic testing until then if feasible.   - Plan for further diagnostic testing after 21 days of uninterrupted AC  - IR input appreciated, recommends Pulmonary Medicine or Thoracic Surgery for bronchoscopy, EBUS, endobronchial biopsy  - Outpatient follow up with Hematology/Oncology, Pulmonary Medicine, Thoracic Surgery    Type II DM  Suboptimally controlled. A1c 8.6. POCT glucose 100s-140s today. On chronic prednisone for temporal arteritis, see below  - Continue Lantus 12 units qHS, Lispro 4 units TID AC plus correctional scale  - Continue to monitor glucose TID AC and HS  - Will need to determine suitable outpatient DM regimen and whether that will be insulin-based or something like metformin plus Farxiga, etc    Hx of temporal arteritis  Follows with [facility] in [place]. On prednisone. ESR WNL. CRP WNL. Tapered prednisone to 30mg today 3/21 per patient's rheumatologist.    - Continue prednisone 30mg daily now  - Outpatient follow up with rheumatologist       DVT px: On Xarelto for afib  Code status: Full  Dispo: Anticipate DC home with home PT, family assistance and rolling walker, pending HR control, possibly tomorrow 3/25.    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 Patient was having difficulty walking, thought to be due to B/L LE edema that has been worsening since she was started on steroids for temporal arteritis about 1 month ago. On day prior to presentation, she lost her balance and fell on her right side, causing extensive bruising and R hip area subcutaneous hematoma. She has otherwise been in her usual state of health. In the ED, she was noted to have rapid afib and acute on chronic diastolic CHF in addition to this hematoma. She was given diltiazem and digoxin, IV fluid hydration, and admitted to Medicine.     Fall, generalized weakness  PT consult appreciated. Recommended home with home PT, family assistance and rolling walker  - Continue restorative PT sessions  - OOB to chair daily     Traumatic hematoma in the area of the R hip, acute blood loss anemia  Related to recent fall while on Xarelto for afib. Hgb ~13 on presentation, unclear if baseline or hemoconcentrated, decreased to ~11 but has been stable ~11 for days now and with Xarelto remaining on board. Lower extremity neurovascularly intact. Performing adequately with PT as mentioned. No significant pain issues.   - Continue to monitor site  - Continue to trend CBC    Atrial fibrillation with RVR  Known hx of afib. In RVR in setting of mechanical fall, traumatic R hip hematoma, acute blood loss anemia. Appreciate Cardiology and Cardiac EP input. Went for DCCV 3/21, unsuccessful. Remains in afib, on amiodarone load and metoprolol with gradually improving heart rate control,   - Continue amiodarone load, planned 400mg BID x 6 days then 200 daily   - Continue metoprolol, now at 100mg BID  - Continue on Xarelto  - As per Cardiac EP Dr. Mendoza, plan to continue above regimen and in approx 4 weeks, re-attempt cardioversion  - Follow up with Cardiac EP as outpatient    Acute on chronic diastolic CHF  In the setting of rapid afib upon presentation. Troponin negative  ProBNP 3280. Echo done. LA moderately dilated. LV size and structure normal. LVEF preserved. RA moderately dilated. RV mildly dilated with mild diffuse hypokinesis. Mild MR. Mod to severe TR. Appreciate Cardiology input. CHF status improved with diuresis with IV Bumex and improved HR control. Patient since switched to PO torsemide.  - Continue torsemide and metoprolol  - Strict Is and Os, daily wt  - Trend Cr and lytes  - Outpatient follow up with Cardiology Dr. Small    Pulmonary nodules with R paratracheal and R hilar lymphadenopathy   As noted incidentally on CT chest. RLL 5mm solid nodule and RUL 4mm ill-defined nodule. LN enlargement R paratracheal and R hilar. No previous records available. CT head & abd/pelvis with no acute pathology. Unclear if this is reactive or due to underlying malignancy. Appreciate input from Hematology Oncology. Patient and family wish to pursue further diagnostic testing. Discussed with Cardiac EP Dr. Mendoza that since patient underwent DCCV 3/21, he would recommend uninterrupted AC for 21 days and thus would defer diagnostic testing until then, if feasible.   - Plan for further diagnostic testing after 21 days of uninterrupted AC  - IR input appreciated, recommends Pulmonary Medicine or Thoracic Surgery for bronchoscopy, EBUS, endobronchial biopsy  - Outpatient follow up with Hematology/Oncology, Pulmonary Medicine, Thoracic Surgery    Type II DM  Suboptimally controlled. A1c 8.6. POCT glucose 100s-140s today. On chronic prednisone for temporal arteritis, see below  - Continue Lantus 12 units qHS, Lispro 4 units TID AC plus correctional scale  - Continue to monitor glucose TID AC and HS  - Will need to determine suitable outpatient DM regimen and whether that will be insulin-based or something like metformin plus Farxiga, etc    Hx of temporal arteritis  Follows with [facility] in [place]. On prednisone. ESR WNL. CRP WNL. Tapered prednisone to 30mg today 3/21 per patient's rheumatologist.    - Continue prednisone 30mg daily now  - Outpatient follow up with rheumatologist       DVT px: On Xarelto for afib  Code status: Full  Dispo: Anticipate DC home with home PT, family assistance and rolling walker, pending HR control, possibly tomorrow 3/25.    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 Patient was having difficulty walking, thought to be due to B/L LE edema that has been worsening since she was started on steroids for temporal arteritis about 1 month ago. On day prior to presentation, she lost her balance and fell on her right side, causing extensive bruising and R hip area subcutaneous hematoma. She has otherwise been in her usual state of health. In the ED, she was noted to have rapid afib and acute on chronic diastolic CHF in addition to this hematoma. She was given diltiazem and digoxin, IV fluid hydration, and admitted to Medicine.     Fall, generalized weakness  PT consult appreciated. Recommended home with home PT, family assistance and rolling walker  - Continue restorative PT sessions  - OOB to chair daily     Traumatic hematoma in the area of the R hip, acute blood loss anemia  Related to recent fall while on Xarelto for afib. Hgb ~13 on presentation, unclear if baseline or hemoconcentrated, decreased to ~11 but has been stable ~11 for days now and with Xarelto remaining on board. Lower extremity neurovascularly intact. Performing adequately with PT as mentioned. No significant pain issues.   - Continue to monitor site  - Continue to trend CBC    Atrial fibrillation with RVR  Known hx of afib. In RVR in setting of mechanical fall, traumatic R hip hematoma, acute blood loss anemia. Appreciate Cardiology and Cardiac EP input. Went for DCCV 3/21, unsuccessful. Remains in afib, on amiodarone load and metoprolol with gradually improving heart rate control,   - Continue amiodarone load, planned 400mg BID x 6 days then 200 daily   - Continue metoprolol, now at 100mg BID  - Continue on Xarelto  - As per Cardiac EP Dr. Mendoza, plan to continue above regimen and in approx 4 weeks, re-attempt cardioversion  - Follow up with Cardiac EP as outpatient    Acute on chronic diastolic CHF  In the setting of rapid afib upon presentation. Troponin negative  ProBNP 3280. Echo done. LA moderately dilated. LV size and structure normal. LVEF preserved. RA moderately dilated. RV mildly dilated with mild diffuse hypokinesis. Mild MR. Mod to severe TR. Appreciate Cardiology input. CHF status improved with diuresis with IV Bumex and improved HR control. Patient since switched to PO torsemide.  - Continue torsemide and metoprolol  - Strict Is and Os, daily wt  - Trend Cr and lytes  - Outpatient follow up with Cardiology Dr. Small    Pulmonary nodules with R paratracheal and R hilar lymphadenopathy   As noted incidentally on CT chest. RLL 5mm solid nodule and RUL 4mm ill-defined nodule. LN enlargement R paratracheal and R hilar. No previous records available. CT head & abd/pelvis with no acute pathology. Unclear if this is reactive or due to underlying malignancy. Appreciate input from Hematology Oncology. Patient and family wish to pursue further diagnostic testing. Discussed with Cardiac EP Dr. Mendoza that since patient underwent DCCV 3/21, he would recommend uninterrupted AC for 21 days and thus would defer diagnostic testing until then, if feasible.   - Plan for further diagnostic testing after 21 days of uninterrupted AC  - IR input appreciated, recommends Pulmonary Medicine or Thoracic Surgery for bronchoscopy, EBUS, endobronchial biopsy  - Outpatient follow up with Hematology/Oncology, Pulmonary Medicine, Thoracic Surgery    Type II DM  Suboptimally controlled. A1c 8.6. POCT glucose 100s-140s today. On chronic prednisone for temporal arteritis, see below  - Continue Lantus 12 units qHS, Lispro 4 units TID AC plus correctional scale  - Continue to monitor glucose TID AC and HS  - Will need to determine suitable outpatient DM regimen and whether that will be insulin-based or something like metformin plus Farxiga, etc    Hx of temporal arteritis  Follows with [facility] in [place]. On prednisone. ESR WNL. CRP WNL. Tapered prednisone to 30mg on 3/21 per patient's rheumatologist.    - Continue prednisone 30mg daily now  - Outpatient follow up with rheumatologist       DVT px: On Xarelto for afib  Code status: Full  Dispo: Anticipate DC home with home PT, family assistance and rolling walker, pending HR control, possibly tomorrow 3/25.

## 2024-03-24 NOTE — PROGRESS NOTE ADULT - ASSESSMENT
87 y/o Female EP consulted for At. FIB with RVR. (on both Xarelto and Amiodarone 400 mgs daily).  on 3/19/2024 Amiodarone 400 mgs was increased to BID.  Pt has been complaining of weakness for 2-3 weeks, difficulty ambulating and lower leg edema.  The day prior to admission pt lost her balance and hurt her right hip.  CT showed hematoma of right lateral hip, she was given Digoxin 500 ucg.  Acute on chronic Hf  PMH of temporal arteritis (on steroids), a-fib (on xarelto), DM2 . Patient has been having difficulty walking due to B/L LE edema. States that LE edema has been worsening since she was started on steroids for temporal arteritis about 1 month ago.     Afib with RVR s/p unsuccessful DCCV on 3/21  Continue Amiodarone loading 400mg BID x 5 days and then decrease to 200mg daily  Given RVR will increase Metoprolol to 75mg BID with hold parameters for BP  Continue Xarelto uninterrupted  Given acceptable rate control would recommend continue Amiodarone and repeat cardioversion in 4 weeks to allow adequate loadng and highst possibility for maintaining sinus.   Plan discussed with patient, hospitalist

## 2024-03-24 NOTE — PROGRESS NOTE ADULT - SUBJECTIVE AND OBJECTIVE BOX
Chief Complaint: Generalized weakness, fall on R side, B/L leg swelling    Interval Hx: No acute events overnight. Patient seen out of bed in chair. Resting comfortably. No new symptoms. B/L LE swelling improving gradually since admission. Hematoma stable. Remains in afib, rate-control improved as compared to admission. Cardiology and Cardiac EP following. On metoprolol, increased to 100mg BID. On amiodarone load. AC with Eliquis. Cardiac EP today recommends continued current regimen, repeat cardioversion but as outpatient in 4 weeks instead of repeating tomorrow as was previously considered. Regarding findings of pulmonary nodules and R paratracheal and hilar LN, Interventional Radiology recommends Pulmonary and/or Thoracic Surgery evaluation, as bronchoscopy with EBUS and biopsy would likely be best approach. Cardiac EP Dr. Mendoza has indicated that since patient underwent DCCV 3/21, he would recommend uninterrupted AC for 21 days and thus would defer to the aforementioned diagnostic testing until then if possible. Patient and family are aware.    ROS: Multi system review is comprehensively negative x 10 systems except as above    Vitals:  T(F): 97.8 (24 Mar 2024 08:10), Max: 97.8 (24 Mar 2024 08:10)  HR: 89 (24 Mar 2024 08:10) (78 - 96)  BP: 112/79 (24 Mar 2024 08:10) (92/56 - 112/79)  RR: 18 (24 Mar 2024 08:10) (18 - 18)  SpO2: 95% (24 Mar 2024 08:10) (95% - 98%) on room air    Exam:  Gen: No acute distress  HEENT: NCAT PERRL EOMI MMM clear oropharynx  Neck: Supple, no JVD, no LAD, no thyromegaly  CVS: S1 S2 normal, irregular, rate ~90  Chest: Normal resp effort, lungs CTA B/L  Abd: +BS, non distended, soft, nontender  Ext: R hip area with extensive hematoma, compartment soft, intact pulses, normal cap refill  Skin: Warm, dry  Mood: Calm, pleasant  Neuro: Awake and alert, follows commands, answers questions appropriately    Labs:             POCT glucose 100s-140s               11.0   12.75 )--------( 165                  33.5       138  |  109  |  44  -----------------------<  156  4.4   |   23   |  1.20    Ca 8.6     Mg  2.2    Phos 3.2          TPro  4.9  /  Alb  2.5  /  TBili  0.9  /  DBili  x   /  AST  15  /  ALT  23  /  AlkPhos  37    PT: 15.0 sec;   INR: 1.34 ratio   PTT: 25.3 sec    Troponin negative  ProBNP 3280    A1c 8.6   TSH WNL   ESR 11  CRP <3    Imaging:  CT abd, pelvis WO 3/19: Liver normal. Normal caliber bile ducts. Gallbladder dependent hyperattenuating sludge and/or small stones. Normal spleen. Fatty atrophy of pancreas. Normal adrenals. Right upper pole and left parapelvic renal cysts. No renal stones or hydronephrosis. Small posterior diverticulum. Calcified uterine fibroid. No bowel obstruction. Moderate colonic fecal load. Appendix is normal. No ascites. Atherosclerotic changes. No lymphadenopathy. 5.7 x 4.3 x 9.0 cm right lateral hip subcutaneous hematoma. Additional infiltration of the subcutaneous fat of the bilateral lateral abdominal wall. Degenerative changes. No evidence of acute fracture.    CT chest WO 3/18: Patent trachea and bronchi. Emphysema. Of the left lower lobe calcified granuloma. Right lower lobe 5 mm solid nodule. Right upper lobe sub-4 mm ill-defined nodule. Trace right pleural effusion versus thickening. Small hiatal hernia. Right paratracheal and right hilar lymphadenopathy, for example, largest lymph node approximately 3.5 cm in short axis in the right paratracheal station. Normal heart size. No pericardial effusion. 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 Coronary artery calcifications.     CT head WO 3/18: There is moderate diffuse parenchymal volume loss. There are areas of low attenuation in the periventricular white matter likely related to mild chronic microvascular ischemic changes. There is no acute intracranial hemorrhage, parenchymal mass, mass effect or midline shift. There is no acute territorial infarct. There is no hydrocephalus. Partial empty sella noted. The cranium is intact. The visualized paranasal sinuses are well-aerated.    US venous duplex B/L LE 3/18: No evidence of deep venous thrombosis in the right lower extremity. No evidence of deep vein thrombosis in the visualized deep veins of the left lower extremity above the knee. The deep veins of the left calf were not seen. Extensive bilateral calf subcutaneous edema.    XR chest 3/18: Normal heart size. Atherosclerotic aorta. Increased right paratracheal soft tissue density may represent prominent vasculature.     Cardiac Testing:  Tele 3/24: Afib 80s-100s, no RVC, occ PVCs    Tele 3/23: Afib 90s-100s, occasional RVR to 120s    Tele 3/22: Afib 90s-100s, occasional RVR to 130s    Tele 3/21: Afib 80s-100s, occasional RVR to 130s    TTE 3/19: Left ventricle systolic function appears preserved in the presence of a cardiac arrhythmia. Left ventricle size and structure are within normal limitations. Visual estimation of left ventricle ejection fraction is >50%. The left atrium is moderately dilated. The right atrium appears moderately dilated. The right ventricle exhibits mild dilation, mild diffuse hypokinesis, and mild depression of contractility. Mild aortic sclerosis is present with normal valvular opening. Minimal mitral annular calcification is present. Mild (1+) mitral regurgitation is present. Moderate to severe (3+) tricuspid valve regurgitation is present. Mild pulmonic valvular regurgitation (1+) is present.    Tele 3/19-20: Afib with 90s-110s    EKG 3/19: Rate 110. Atrial fibrillation with rapid ventricular response with premature ventricular or aberrantly conducted complexes. Nonspecific repolarization abnormalities.    EKG 3/18: rate 128. Atrial fibrillation with rapid ventricular response with premature ventricular or aberrantly conducted complexes    Meds:  MEDICATIONS  (STANDING):  aMIOdarone    Tablet 400 milliGRAM(s) Oral every 12 hours  atorvastatin 80 milliGRAM(s) Oral at bedtime  folic acid 1 milliGRAM(s) Oral daily  insulin glargine Injectable (LANTUS) 12 Unit(s) SubCutaneous at bedtime  insulin lispro (ADMELOG) corrective regimen sliding scale   SubCutaneous three times a day before meals  insulin lispro (ADMELOG) corrective regimen sliding scale   SubCutaneous at bedtime  insulin lispro Injectable (ADMELOG) 4 Unit(s) SubCutaneous three times a day before meals  metoprolol tartrate 100 milliGRAM(s) Oral two times a day  pantoprazole    Tablet 40 milliGRAM(s) Oral before breakfast  predniSONE   Tablet 30 milliGRAM(s) Oral daily  rivaroxaban 15 milliGRAM(s) Oral with dinner  timolol 0.5% Solution 1 Drop(s) Both EYES daily  torsemide 20 milliGRAM(s) Oral daily    MEDICATIONS  (PRN):  dextrose Oral Gel 15 Gram(s) Oral once PRN Blood Glucose LESS THAN 70 milliGRAM(s)/deciliter   Chief Complaint: Generalized weakness, fall on R side, B/L leg swelling    Interval Hx: No acute events overnight. Patient seen out of bed in chair. Resting comfortably. No new symptoms. B/L LE swelling improving gradually since admission. Hematoma stable. Remains in afib, rate-control improved as compared to admission. Cardiology and Cardiac EP following. On metoprolol, increased to 100mg BID. On amiodarone load. AC with Eliquis. Cardiac EP today recommends continued current regimen, repeat cardioversion but as outpatient in 4 weeks instead of repeating tomorrow as was previously considered. Regarding findings of pulmonary nodules and R paratracheal and hilar LN, Interventional Radiology recommends Pulmonary and/or Thoracic Surgery evaluation, as bronchoscopy with EBUS and biopsy would likely be best approach. Cardiac EP Dr. Mendoza has indicated that since patient underwent DCCV 3/21, he would recommend uninterrupted AC for 21 days and thus would defer the aforementioned diagnostic testing until then, if possible. Patient and family are aware.    ROS: Multi system review is comprehensively negative x 10 systems except as above    Vitals:  T(F): 97.8 (24 Mar 2024 08:10), Max: 97.8 (24 Mar 2024 08:10)  HR: 89 (24 Mar 2024 08:10) (78 - 96)  BP: 112/79 (24 Mar 2024 08:10) (92/56 - 112/79)  RR: 18 (24 Mar 2024 08:10) (18 - 18)  SpO2: 95% (24 Mar 2024 08:10) (95% - 98%) on room air    Exam:  Gen: No acute distress  HEENT: NCAT PERRL EOMI MMM clear oropharynx  Neck: Supple, no JVD, no LAD, no thyromegaly  CVS: S1 S2 normal, irregular, rate ~90  Chest: Normal resp effort, lungs CTA B/L  Abd: +BS, non distended, soft, nontender  Ext: R hip area with extensive hematoma, compartment soft, intact pulses, normal cap refill  Skin: Warm, dry  Mood: Calm, pleasant  Neuro: Awake and alert, follows commands, answers questions appropriately    Labs:             POCT glucose 100s-140s               11.0   12.75 )--------( 165                  33.5       138  |  109  |  44  -----------------------<  156  4.4   |   23   |  1.20    Ca 8.6     Mg  2.2    Phos 3.2          TPro  4.9  /  Alb  2.5  /  TBili  0.9  /  DBili  x   /  AST  15  /  ALT  23  /  AlkPhos  37    PT: 15.0 sec;   INR: 1.34 ratio   PTT: 25.3 sec    Troponin negative  ProBNP 3280    A1c 8.6   TSH WNL   ESR 11  CRP <3    Imaging:  CT abd, pelvis WO 3/19: Liver normal. Normal caliber bile ducts. Gallbladder dependent hyperattenuating sludge and/or small stones. Normal spleen. Fatty atrophy of pancreas. Normal adrenals. Right upper pole and left parapelvic renal cysts. No renal stones or hydronephrosis. Small posterior diverticulum. Calcified uterine fibroid. No bowel obstruction. Moderate colonic fecal load. Appendix is normal. No ascites. Atherosclerotic changes. No lymphadenopathy. 5.7 x 4.3 x 9.0 cm right lateral hip subcutaneous hematoma. Additional infiltration of the subcutaneous fat of the bilateral lateral abdominal wall. Degenerative changes. No evidence of acute fracture.    CT chest WO 3/18: Patent trachea and bronchi. Emphysema. Of the left lower lobe calcified granuloma. Right lower lobe 5 mm solid nodule. Right upper lobe sub-4 mm ill-defined nodule. Trace right pleural effusion versus thickening. Small hiatal hernia. Right paratracheal and right hilar lymphadenopathy, for example, largest lymph node approximately 3.5 cm in short axis in the right paratracheal station. Normal heart size. No pericardial effusion. 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 Coronary artery calcifications.     CT head WO 3/18: There is moderate diffuse parenchymal volume loss. There are areas of low attenuation in the periventricular white matter likely related to mild chronic microvascular ischemic changes. There is no acute intracranial hemorrhage, parenchymal mass, mass effect or midline shift. There is no acute territorial infarct. There is no hydrocephalus. Partial empty sella noted. The cranium is intact. The visualized paranasal sinuses are well-aerated.    US venous duplex B/L LE 3/18: No evidence of deep venous thrombosis in the right lower extremity. No evidence of deep vein thrombosis in the visualized deep veins of the left lower extremity above the knee. The deep veins of the left calf were not seen. Extensive bilateral calf subcutaneous edema.    XR chest 3/18: Normal heart size. Atherosclerotic aorta. Increased right paratracheal soft tissue density may represent prominent vasculature.     Cardiac Testing:  Tele 3/24: Afib 80s-100s, no RVC, occ PVCs    Tele 3/23: Afib 90s-100s, occasional RVR to 120s    Tele 3/22: Afib 90s-100s, occasional RVR to 130s    Tele 3/21: Afib 80s-100s, occasional RVR to 130s    TTE 3/19: Left ventricle systolic function appears preserved in the presence of a cardiac arrhythmia. Left ventricle size and structure are within normal limitations. Visual estimation of left ventricle ejection fraction is >50%. The left atrium is moderately dilated. The right atrium appears moderately dilated. The right ventricle exhibits mild dilation, mild diffuse hypokinesis, and mild depression of contractility. Mild aortic sclerosis is present with normal valvular opening. Minimal mitral annular calcification is present. Mild (1+) mitral regurgitation is present. Moderate to severe (3+) tricuspid valve regurgitation is present. Mild pulmonic valvular regurgitation (1+) is present.    Tele 3/19-20: Afib with 90s-110s    EKG 3/19: Rate 110. Atrial fibrillation with rapid ventricular response with premature ventricular or aberrantly conducted complexes. Nonspecific repolarization abnormalities.    EKG 3/18: rate 128. Atrial fibrillation with rapid ventricular response with premature ventricular or aberrantly conducted complexes    Meds:  MEDICATIONS  (STANDING):  aMIOdarone    Tablet 400 milliGRAM(s) Oral every 12 hours  atorvastatin 80 milliGRAM(s) Oral at bedtime  folic acid 1 milliGRAM(s) Oral daily  insulin glargine Injectable (LANTUS) 12 Unit(s) SubCutaneous at bedtime  insulin lispro (ADMELOG) corrective regimen sliding scale   SubCutaneous three times a day before meals  insulin lispro (ADMELOG) corrective regimen sliding scale   SubCutaneous at bedtime  insulin lispro Injectable (ADMELOG) 4 Unit(s) SubCutaneous three times a day before meals  metoprolol tartrate 100 milliGRAM(s) Oral two times a day  pantoprazole    Tablet 40 milliGRAM(s) Oral before breakfast  predniSONE   Tablet 30 milliGRAM(s) Oral daily  rivaroxaban 15 milliGRAM(s) Oral with dinner  timolol 0.5% Solution 1 Drop(s) Both EYES daily  torsemide 20 milliGRAM(s) Oral daily    MEDICATIONS  (PRN):  dextrose Oral Gel 15 Gram(s) Oral once PRN Blood Glucose LESS THAN 70 milliGRAM(s)/deciliter   Chief Complaint: Generalized weakness, fall on R side, B/L leg swelling    Interval Hx: No acute events overnight. Patient seen out of bed in chair. Resting comfortably. No new symptoms. B/L LE swelling improving gradually since admission. Hematoma stable. Remains in afib, rate-control improved as compared to admission. Cardiology and Cardiac EP following. On metoprolol, increased to 100mg BID. On amiodarone load. AC with Eliquis. Cardiac EP today recommends continued current regimen, repeat cardioversion but as outpatient in 4 weeks instead of repeating tomorrow as was previously considered. Regarding findings of pulmonary nodules and R paratracheal and hilar LN, Interventional Radiology recommends Pulmonary and/or Thoracic Surgery evaluation, as bronchoscopy with EBUS and biopsy would likely be best approach. Cardiac EP Dr. Mendoza has indicated that since patient underwent DCCV 3/21, he would recommend uninterrupted AC for 21 days and thus would defer the aforementioned diagnostic testing until then, if possible. Patient and family are aware.    ROS: Multi system review is comprehensively negative x 10 systems except as above    Vitals:  T(F): 97.8 (24 Mar 2024 08:10), Max: 97.8 (24 Mar 2024 08:10)  HR: 89 (24 Mar 2024 08:10) (78 - 96)  BP: 112/79 (24 Mar 2024 08:10) (92/56 - 112/79)  RR: 18 (24 Mar 2024 08:10) (18 - 18)  SpO2: 95% (24 Mar 2024 08:10) (95% - 98%) on room air    Exam:  Gen: No acute distress  HEENT: NCAT PERRL EOMI MMM clear oropharynx  Neck: Supple, no JVD, no LAD, no thyromegaly  CVS: S1 S2 normal, irregular, rate ~90  Chest: Normal resp effort, lungs CTA B/L  Abd: +BS, non distended, soft, nontender  Ext: R hip area with extensive hematoma, compartment soft, intact pulses, normal cap refill  Skin: Warm, dry  Mood: Calm, pleasant  Neuro: Awake and alert, follows commands, answers questions appropriately    Labs:             POCT glucose 100s-140s               11.0   12.75 )--------( 165                  33.5       138  |  109  |  44  -----------------------<  156  4.4   |   23   |  1.20    Ca 8.6     Mg  2.2    Phos 3.2          TPro  4.9  /  Alb  2.5  /  TBili  0.9  /  DBili  x   /  AST  15  /  ALT  23  /  AlkPhos  37    PT: 15.0 sec;   INR: 1.34 ratio   PTT: 25.3 sec    Troponin negative  ProBNP 3280    A1c 8.6   TSH WNL   ESR 11  CRP <3    Imaging:  CT abd, pelvis WO 3/19: Liver normal. Normal caliber bile ducts. Gallbladder dependent hyperattenuating sludge and/or small stones. Normal spleen. Fatty atrophy of pancreas. Normal adrenals. Right upper pole and left parapelvic renal cysts. No renal stones or hydronephrosis. Small posterior diverticulum. Calcified uterine fibroid. No bowel obstruction. Moderate colonic fecal load. Appendix is normal. No ascites. Atherosclerotic changes. No lymphadenopathy. 5.7 x 4.3 x 9.0 cm right lateral hip subcutaneous hematoma. Additional infiltration of the subcutaneous fat of the bilateral lateral abdominal wall. Degenerative changes. No evidence of acute fracture.    CT chest WO 3/18: Patent trachea and bronchi. Emphysema. Of the left lower lobe calcified granuloma. Right lower lobe 5 mm solid nodule. Right upper lobe sub-4 mm ill-defined nodule. Trace right pleural effusion versus thickening. Small hiatal hernia. Right paratracheal and right hilar lymphadenopathy, for example, largest lymph node approximately 3.5 cm in short axis in the right paratracheal station. Normal heart size. No pericardial effusion. 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 Coronary artery calcifications.     CT head WO 3/18: There is moderate diffuse parenchymal volume loss. There are areas of low attenuation in the periventricular white matter likely related to mild chronic microvascular ischemic changes. There is no acute intracranial hemorrhage, parenchymal mass, mass effect or midline shift. There is no acute territorial infarct. There is no hydrocephalus. Partial empty sella noted. The cranium is intact. The visualized paranasal sinuses are well-aerated.    US venous duplex B/L LE 3/18: No evidence of deep venous thrombosis in the right lower extremity. No evidence of deep vein thrombosis in the visualized deep veins of the left lower extremity above the knee. The deep veins of the left calf were not seen. Extensive bilateral calf subcutaneous edema.    XR chest 3/18: Normal heart size. Atherosclerotic aorta. Increased right paratracheal soft tissue density may represent prominent vasculature.     Cardiac Testing:  Tele 3/24: Afib 80s-100s, no RVR, occ PVCs    Tele 3/23: Afib 90s-100s, occasional RVR to 120s    Tele 3/22: Afib 90s-100s, occasional RVR to 130s    Tele 3/21: Afib 80s-100s, occasional RVR to 130s    TTE 3/19: Left ventricle systolic function appears preserved in the presence of a cardiac arrhythmia. Left ventricle size and structure are within normal limitations. Visual estimation of left ventricle ejection fraction is >50%. The left atrium is moderately dilated. The right atrium appears moderately dilated. The right ventricle exhibits mild dilation, mild diffuse hypokinesis, and mild depression of contractility. Mild aortic sclerosis is present with normal valvular opening. Minimal mitral annular calcification is present. Mild (1+) mitral regurgitation is present. Moderate to severe (3+) tricuspid valve regurgitation is present. Mild pulmonic valvular regurgitation (1+) is present.    Tele 3/19-20: Afib with 90s-110s    EKG 3/19: Rate 110. Atrial fibrillation with rapid ventricular response with premature ventricular or aberrantly conducted complexes. Nonspecific repolarization abnormalities.    EKG 3/18: rate 128. Atrial fibrillation with rapid ventricular response with premature ventricular or aberrantly conducted complexes    Meds:  MEDICATIONS  (STANDING):  aMIOdarone    Tablet 400 milliGRAM(s) Oral every 12 hours  atorvastatin 80 milliGRAM(s) Oral at bedtime  folic acid 1 milliGRAM(s) Oral daily  insulin glargine Injectable (LANTUS) 12 Unit(s) SubCutaneous at bedtime  insulin lispro (ADMELOG) corrective regimen sliding scale   SubCutaneous three times a day before meals  insulin lispro (ADMELOG) corrective regimen sliding scale   SubCutaneous at bedtime  insulin lispro Injectable (ADMELOG) 4 Unit(s) SubCutaneous three times a day before meals  metoprolol tartrate 100 milliGRAM(s) Oral two times a day  pantoprazole    Tablet 40 milliGRAM(s) Oral before breakfast  predniSONE   Tablet 30 milliGRAM(s) Oral daily  rivaroxaban 15 milliGRAM(s) Oral with dinner  timolol 0.5% Solution 1 Drop(s) Both EYES daily  torsemide 20 milliGRAM(s) Oral daily    MEDICATIONS  (PRN):  dextrose Oral Gel 15 Gram(s) Oral once PRN Blood Glucose LESS THAN 70 milliGRAM(s)/deciliter

## 2024-03-25 PROBLEM — M31.6 OTHER GIANT CELL ARTERITIS: Chronic | Status: ACTIVE | Noted: 2024-01-01

## 2024-03-25 NOTE — DISCHARGE NOTE PROVIDER - NSDCFUADDAPPT_GEN_ALL_CORE_FT
CARDIOLOGY FOLLOW UP APPOINTMENT: 3/27/24 @2PM at [facility] with MING Grier  [facility] [address] [place] [address]   [phone]

## 2024-03-25 NOTE — DISCHARGE NOTE PROVIDER - PROVIDER TOKENS
PROVIDER:[TOKEN:[[id number]],FOLLOWUP:[1 week]],PROVIDER:[TOKEN:[[id number]],FOLLOWUP:[1 week]] PROVIDER:[TOKEN:[[id number]],FOLLOWUP:[1 week]],PROVIDER:[TOKEN:[[id number]],FOLLOWUP:[Routine]],PROVIDER:[TOKEN:[[id number]],FOLLOWUP:[1 month]]

## 2024-03-25 NOTE — PROGRESS NOTE ADULT - ASSESSMENT
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    # Lymphadenopathy / Pulm nodules     -CT Chest 3/18/24: Right paratracheal and right hilar lymphadenopathy, likely neoplastic. Largest lymph node approximately 3.5 cm in short axis in the right paratracheal station RLL 5mm solid nodule RUL 4mm ill-defined nodule.   -No previous records available  -CT-head & A/P with no acute pathology  -unclear if this is reactive or due to underlying malignancy; will need further w/u, including biopsy.  -patient/family agreeable to pursue biopsy spoke with son-in-law (Adrien- [phone]) upon patient's request, however biopsy postponed s/t patient's recent uncontrolled A-Fib with RVR s/p unsuccessful DCCV on 3/21.  Management per cardiology/EPS  -d/w Dr. Downey--->per her plan for outpatient biopsy with Thoracic Sx-Dr. Mohr, patient/family aware.  -f/u outpatient at [facility] pending biopsy/path results    # Thrombocytopenia in setting of right hip subcutaneous hematoma    -plts-190K/ul <--134K/ul<---120K/ul <--128K/ul, improved and recovered  -No previous records available for baseline, suspect due to right hip hematoma  -on Eliquis for A-fib, and would continue in absence of active bleeding, hgb stable and plts remains > 50 K/ul  -Monitor CBC daily  -Transfuse PRBC if Hbg continues to drop and as clinically indicated, if becomes symptomatic or hemodynamically unstable.  -Transfuse platelets only if plts drops < 10K/ul, or 20 K/ul with evidence of active bleeding.    # Rapid Afib    -Known hx of A-Fib on AC  -Cardiology/EP following for Afib with RVR s/p unsuccessful DCCV on 3/21  -Plan per Dr. Kelly Salas, NP-C   Hematology/Oncology [facility]  [phone]

## 2024-03-25 NOTE — DISCHARGE NOTE PROVIDER - CARE PROVIDERS DIRECT ADDRESSES
[email or web address]

## 2024-03-25 NOTE — DISCHARGE NOTE PROVIDER - NSDCMRMEDTOKEN_GEN_ALL_CORE_FT
amiodarone 200 mg oral tablet: 1 tab(s) orally once a day  Farxiga 10 mg oral tablet: 1 tab(s) orally once a day  folic acid 1 mg oral tablet: 1 tab(s) orally once a day  ipratropium 21 mcg/inh (0.03%) nasal spray: 2 spray(s) intranasally once a day  metFORMIN 500 mg oral tablet, extended release: 1 tab(s) orally once a day  metoprolol tartrate 100 mg oral tablet: 1 tab(s) orally 2 times a day  pantoprazole 40 mg oral delayed release tablet: 1 tab(s) orally once a day  potassium chloride 10 mEq oral tablet, extended release: 1 tab(s) orally 2 times a day  predniSONE 10 mg oral tablet: 3 tab(s) orally once a day Take 30mg until next follow up appointment for further taper instructions.  rosuvastatin 40 mg oral tablet: 1 tab(s) orally once a day  timolol maleate 0.5% ophthalmic solution: 1 drop(s) in each eye once a day  torsemide 20 mg oral tablet: 1 tab(s) orally once a day  Xarelto 15 mg oral tablet: 1 tab(s) orally once a day

## 2024-03-25 NOTE — PROGRESS NOTE ADULT - NS ATTEND OPT1A GEN_ALL_CORE
This is a recent snapshot of the patient's [facility] medical record.  For current drug dose and complete information and questions, call [phone] or In Basket pool, [facility] home infusion ([id number])  CSN Number:  [id number]      
Medical decision making

## 2024-03-25 NOTE — DISCHARGE NOTE PROVIDER - NSDCFUSCHEDAPPT_GEN_ALL_CORE_FT
Cash Mendoza  [facility] Physician [address]  Scheduled Appointment: 04/25/2024

## 2024-03-25 NOTE — PROGRESS NOTE ADULT - PROVIDER SPECIALTY LIST ADULT
Electrophysiology
Electrophysiology
Heme/Onc
Hospitalist
Hospitalist
Cardiology
Electrophysiology
Electrophysiology
Hospitalist
Cardiology
Hospitalist
Cardiology

## 2024-03-25 NOTE — PROGRESS NOTE ADULT - SUBJECTIVE AND OBJECTIVE BOX
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 Patient has been having difficulty walking due to B/L LE edema. Stated that LE edema has been worsening since she was started on steroids for temporal arteritis about 1 month ago. s/p fall where she lost her balance and fell on R side, and sustained R hip bruising / hematoma.     3/19/24: Seen at bedside, along with Dr. Espinoza; awake, alert in no acute distress.     3/22/24: Seen at bedside, OOB to chair. Discussed the need for biopsy, but will have to postpone until stabilized from cardiac perspective to be off from AC prior to biopsy. Patient also requested if I could speak with her daughter & son-in-law.     3/25/24: Seen at bedside accompanied by son (Adrien) who is here to pick her up in anticipation of d/c tomorrow.    PAST MEDICAL & SURGICAL HISTORY:    Temporal arteritis    A-fib    FAMILY HISTORY:    unknown    MEDICATIONS  (STANDING):    aMIOdarone    Tablet   Oral   aMIOdarone    Tablet 200 milliGRAM(s) Oral daily  atorvastatin 80 milliGRAM(s) Oral at bedtime  dextrose 5%. 1000 milliLiter(s) (100 mL/Hr) IV Continuous <Continuous>  dextrose 5%. 1000 milliLiter(s) (50 mL/Hr) IV Continuous <Continuous>  dextrose 50% Injectable 25 Gram(s) IV Push once  dextrose 50% Injectable 12.5 Gram(s) IV Push once  dextrose 50% Injectable 25 Gram(s) IV Push once  folic acid 1 milliGRAM(s) Oral daily  glucagon  Injectable 1 milliGRAM(s) IntraMuscular once  insulin glargine Injectable (LANTUS) 12 Unit(s) SubCutaneous at bedtime  insulin lispro (ADMELOG) corrective regimen sliding scale   SubCutaneous three times a day before meals  insulin lispro (ADMELOG) corrective regimen sliding scale   SubCutaneous at bedtime  insulin lispro Injectable (ADMELOG) 4 Unit(s) SubCutaneous three times a day before meals  metoprolol tartrate 100 milliGRAM(s) Oral two times a day  pantoprazole    Tablet 40 milliGRAM(s) Oral before breakfast  predniSONE   Tablet 30 milliGRAM(s) Oral daily  rivaroxaban 15 milliGRAM(s) Oral with dinner  timolol 0.5% Solution 1 Drop(s) Both EYES daily  torsemide 20 milliGRAM(s) Oral daily    MEDICATIONS  (PRN):    dextrose Oral Gel 15 Gram(s) Oral once PRN Blood Glucose LESS THAN 70 milliGRAM(s)/deciliter    Allergies    penicillin (Swelling)    REVIEW OF SYSTEM:    Constitutional, Eyes, ENT, Cardiovascular, Respiratory, Gastrointestinal, Genitourinary, Musculoskeletal, Integumentary, Neurological, Psychiatric, Endocrine, Heme/Lymph and Allergic/Immunologic review of systems are otherwise negative except as noted in HPI.     Vital Signs Last 24 Hrs    T(C): 36.3 (25 Mar 2024 10:14), Max: 36.4 (24 Mar 2024 22:45)  T(F): 97.4 (25 Mar 2024 10:14), Max: 97.5 (24 Mar 2024 22:45)  HR: 73 (25 Mar 2024 10:14) (73 - 89)  BP: 102/45 (25 Mar 2024 10:14) (84/48 - 102/45)  BP(mean): 63 (25 Mar 2024 06:25) (57 - 63)  RR: 19 (25 Mar 2024 10:14) (18 - 19)  SpO2: 99% (25 Mar 2024 10:14) (99% - 100%)    Parameters below as of 25 Mar 2024 10:14  Patient On (Oxygen Delivery Method): room air    PHYSICAL EXAM:    Constitutional: no acute distress  Eyes: no conjunctival infection, anicteric.   ENT: pharynx is unremarkable  Neck: supple without JVD  Pulmonary: clear to auscultation bilaterally   Cardiac: RRR  Vascular: no calf tenderness, + 2 b/l extremities edema  Abdomen: normoactive bowel sounds, soft and nontender, no hepatosplenomegaly or masses appreciated  Lymphatic: no peripheral adenopathy appreciated  Musculoskeletal: full range of motion and no deformities appreciated  Skin: normal appearance, no rash/erythema  Neurology: awake, alert, oriented; no focal deficits    LABS:                          11.1   12.97 )-----------( 190      ( 25 Mar 2024 06:44 )             34.0     03-25    139  |  107  |  40<H>  ----------------------------<  113<H>  3.9   |  25  |  1.13    Ca    8.2<L>      25 Mar 2024 06:44    PT/INR - ( 19 Mar 2024 07:42 )   PT: 15.0 sec;   INR: 1.34 ratio     PTT - ( 19 Mar 2024 07:42 )  PTT:25.3 sec    Urinalysis Basic - ( 19 Mar 2024 07:34 )    Color: x / Appearance: x / SG: x / pH: x  Gluc: 165 mg/dL / Ketone: x  / Bili: x / Urobili: x   Blood: x / Protein: x / Nitrite: x   Leuk Esterase: x / RBC: x / WBC x   Sq Epi: x / Non Sq Epi: x / Bacteria: x    RADIOLOGY & ADDITIONAL STUDIES:    EXAM:  CT ABDOMEN AND PELVIS     PROCEDURE DATE:  03/19/2024      INTERPRETATION:  CLINICAL INFORMATION: Right hip presenting/hematoma   status post fall.    COMPARISON: None.    CONTRAST/COMPLICATIONS:  IV Contrast: NONE  Oral Contrast: NONE  Complications: None reported at time of study completion    PROCEDURE:  CT of the Abdomen and Pelvis was performed.  Sagittal and coronal reformats were performed.    FINDINGS:  LOWER CHEST: Emphysema. Right lower lobe subsegmental atelectasis. 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  BILE DUCTS: Normal caliber.  GALLBLADDER: Dependent hyperattenuating sludge and/or small stones.  SPLEEN: Within normal limits.  PANCREAS: Fatty atrophy.  ADRENALS: Within normal limits.  KIDNEYS/URETERS: Right upper pole and left parapelvic renal cysts. No   renal stones or hydronephrosis.    BLADDER: Small posterior diverticulum.  REPRODUCTIVE ORGANS: Calcified uterine fibroid.    BOWEL: No bowel obstruction. Moderate colonic fecal load. Appendix is   normal.  PERITONEUM: No ascites.  VESSELS: Atherosclerotic changes.  RETROPERITONEUM/LYMPH NODES: No lymphadenopathy.  ABDOMINAL WALL: 5.7 x 4.3 x 9.0 cm right lateral hip subcutaneous   hematoma. Additional infiltration of the subcutaneous fat of the   bilateral lateral abdominal wall.  BONES: Degenerative changes. No evidence of acute fracture.    IMPRESSION:  Subcutaneous hematoma overlying the right lateral hip.      EXAM:  CT CHEST       PROCEDURE DATE:  03/18/2024      INTERPRETATION:  INDICATION: Abnormal chest x-ray    TECHNIQUE: Helical acquisition images of the chest without intravenous   contrast. Maximum intensity projection images were generated.    COMPARISON: Same-day chest x-ray, no prior chest CT.    FINDINGS:    LUNGS/AIRWAYS/PLEURA: Patent trachea and bronchi. Emphysema. Of the left   lower lobe calcified granuloma. Right lower lobe 5 mm solid nodule   (2-66). Right upper lobe sub-4 mm ill-defined nodule (2-31). Trace right   pleural effusion versus thickening.    LYMPH NODES/MEDIASTINUM: Small hiatal hernia. Right paratracheal and   right hilar lymphadenopathy, for example, largest lymph node  approximately 3.5 cm in short axis in the right paratracheal station   (2-34).    HEART/VASCULATURE: Normal heart size. No pericardial effusion. Fat   attenuation within the left ventricular apical myocardium compatible with   a prior infarct. Coronary artery calcifications.    UPPER ABDOMEN: Fat replaced pancreas. Partially included right renal   cysts.    BONES/SOFT TISSUES: Unremarkable.    IMPRESSION:    Right paratracheal and right hilar lymphadenopathy, likely neoplastic.     Few nonspecific very small lung nodules.    EXAM:  CT BRAIN       PROCEDURE DATE:  03/18/2024      INTERPRETATION:  CLINICAL STATEMENT: Pain.    TECHNIQUE: CT of the head was performed without IV contrast.    COMPARISON: None.    FINDINGS:  There is moderate diffuse parenchymal volume loss.    There are areas of low attenuation in the periventricular white matter   likely related to mild chronic microvascular ischemic changes.    There is no acute intracranial hemorrhage, parenchymal mass, mass effect   or midline shift. There is no acute territorial infarct. There is no   hydrocephalus. Partial empty sella noted    The cranium is intact. The visualized paranasal sinuses are well-aerated.    IMPRESSION:    No acute intracranial hemorrhage or acute territorial infarct.  If   symptoms persist, follow-up MRI exam recommended.

## 2024-03-25 NOTE — DISCHARGE NOTE PROVIDER - HOSPITAL COURSE
CC: LE weakness  HPI and Hospital Course: 87 y/o woman with type II DM, atrial fibrillation on Xarelto, hx of temporal arteritis on prednisone, presented for further evaluation of difficulty ambulating and B/L LE edema. Patient was having difficulty walking, thought to be due to B/L LE edema that has been worsening since she was started on steroids for temporal arteritis about 1 month ago. On day prior to presentation, she lost her balance and fell on her right side, causing extensive bruising and R hip area subcutaneous hematoma. She has otherwise been in her usual state of health. In the ED, she was noted to have rapid afib and acute on chronic diastolic CHF in addition to this hematoma. She was given diltiazem and digoxin, IV fluid hydration, and admitted to Medicine.     Managed for fall w traumatic hematoma to hip. Also w afib w RVR. Unsuccessful cardioversion on 3/21, now w rate control on amio and metoprolol. For full a/c for 4 weeks and then repeat attempt at cardioversion.    Also noted to have pulmonary nodules and lymphadenopathy, will need outpt f/u for next steps in w/u. To f/u w Dr. Espinoza.     Updated son on phone on day of d/c.    D/c to home w F2F.  I have spent 34 min on day of d/c coordinating care.  See progress note for problem based plan. CC: LE weakness  HPI and Hospital Course: 89 y/o woman with type II DM, atrial fibrillation on Xarelto, hx of temporal arteritis on prednisone, presented for further evaluation of difficulty ambulating and B/L LE edema. Patient was having difficulty walking, thought to be due to B/L LE edema that has been worsening since she was started on steroids for temporal arteritis about 1 month ago. On day prior to presentation, she lost her balance and fell on her right side, causing extensive bruising and R hip area subcutaneous hematoma. She has otherwise been in her usual state of health. In the ED, she was noted to have rapid afib and acute on chronic diastolic CHF in addition to this hematoma. She was given diltiazem and digoxin, IV fluid hydration, and admitted to Medicine.     Managed for fall w traumatic hematoma to hip. Also w afib w RVR. Unsuccessful cardioversion on 3/21, now w rate control on amio and metoprolol. For full a/c for 4 weeks and then repeat attempt at cardioversion.    Also noted to have pulmonary nodules and lymphadenopathy, will need outpt f/u for next steps in w/u. To f/u w Dr. Mohr for bx.     Updated son on phone on day of d/c.    D/c to home w F2F.  I have spent 34 min on day of d/c coordinating care.  See progress note for problem based plan.

## 2024-03-25 NOTE — PROGRESS NOTE ADULT - NS ATTEND AMEND GEN_ALL_CORE FT
Patient seen and examined. Agree with plan above.
Patient seen and examined. Agree with plan above.
Right paratracheal and right hilar lymphadenopathy, largest lymph node approximately 3.5 cm found on noncontrast CT chest imaging.  No abdominal lymphadenopathy, no splenomegaly.   Biopsy was deferred due to recent cardioversion by cardiology.   Recommend follow up with pulmonary on discharge/she likely will require EBUS/biopsy, contrast imaging.
88-year-old woman status post unsuccessful cardioversion with atrial fibrillation and ventricular response.    Will increase dose of metoprolol and continued loading dose of amiodarone can consider repeat attempt at cardioversion.
Patient seen and examined. Agree with plan above.
88 year old woman with atrial fibrillation will arrange cardioversion given uninterupted anticoagulation.

## 2024-03-25 NOTE — DISCHARGE NOTE PROVIDER - NSDCCAREPROVSEEN_GEN_ALL_CORE_FT
Lorna Downey (hospital medicine)  Palma Grier (cardiology)  Cash Mendoza (EP cardiology)  Mita Sanz (heme onc)

## 2024-03-25 NOTE — DISCHARGE NOTE PROVIDER - NSDCPNSUBOBJ_GEN_ALL_CORE
VITALS:  T(F): 97.4 (03-25-24 @ 10:14), Max: 97.5 (03-24-24 @ 22:45)  HR: 73 (03-25-24 @ 10:14) (73 - 89)  BP: 102/45 (03-25-24 @ 10:14) (84/48 - 102/45)  RR: 19 (03-25-24 @ 10:14) (18 - 19)  SpO2: 99% (03-25-24 @ 10:14) (99% - 100%)    PHYSICAL EXAM:  Gen: NAD  HEENT:  pupils equal and reactive, EOMI, no oropharyngeal lesions, erythema, exudates, oral thrush  NECK:   supple, no carotid bruits, no palpable lymph nodes, no thyromegaly  CV:  +S1, +S2, regular, no murmurs or rubs  RESP:   lungs clear to auscultation bilaterally, no wheezing, rales, rhonchi, good air entry bilaterally  BREAST:  not examined  GI:  abdomen soft, non-tender, non-distended, normal BS, no bruits, no abdominal masses, no palpable masses  RECTAL:  not examinedy, no rigidity, no contractions  EXT:  no clubbing, no cyanosis, no edema, no calf pain, swelling or erythema  VASCULAR:  pulses equal and symmetric in the upper and lower extremities  NEURO:  AAOX3, no focal neurological deficits, follows all commands, able to move extremities spontaneously  SKIN:  no ulcers, lesions or rashes    Fall, generalized weakness  PT consult appreciated. Recommended home with home PT, family assistance and rolling walker  - Continue restorative PT sessions  - OOB to chair daily     Traumatic hematoma in the area of the R hip, acute blood loss anemia  Related to recent fall while on Xarelto for afib. Hgb ~13 on presentation, unclear if baseline or hemoconcentrated, decreased to ~11 but has been stable ~11 for days now and with Xarelto remaining on board. Lower extremity neurovascularly intact. Performing adequately with PT as mentioned. No significant pain issues.   - Continue to monitor site  - Continue to trend CBC    Atrial fibrillation with RVR  Known hx of afib. In RVR in setting of mechanical fall, traumatic R hip hematoma, acute blood loss anemia. Appreciate Cardiology and Cardiac EP input. Went for DCCV 3/21, unsuccessful. Remains in afib, on amiodarone load and metoprolol with gradually improving heart rate control,   - s/p amio load, now 200mg daily  - Continue metoprolol, now at 100mg BID  - Continue on Xarelto  - As per Cardiac EP Dr. Mendoza, plan to continue above regimen and in approx 4 weeks, re-attempt cardioversion  - Follow up with Cardiac EP as outpatient    Acute on chronic diastolic CHF  In the setting of rapid afib upon presentation. Troponin negative  ProBNP 3280. Echo done. LA moderately dilated. LV size and structure normal. LVEF preserved. RA moderately dilated. RV mildly dilated with mild diffuse hypokinesis. Mild MR. Mod to severe TR. Appreciate Cardiology input. CHF status improved with diuresis with IV Bumex and improved HR control. Patient since switched to PO torsemide.  - Continue torsemide and metoprolol  - Strict Is and Os, daily wt  - Trend Cr and lytes  - Outpatient follow up with Cardiology Dr. Small    Pulmonary nodules with R paratracheal and R hilar lymphadenopathy   As noted incidentally on CT chest. RLL 5mm solid nodule and RUL 4mm ill-defined nodule. LN enlargement R paratracheal and R hilar. No previous records available. CT head & abd/pelvis with no acute pathology. Unclear if this is reactive or due to underlying malignancy. Appreciate input from Hematology Oncology. Patient and family wish to pursue further diagnostic testing. Discussed with Cardiac EP Dr. Mendoza that since patient underwent DCCV 3/21, he would recommend uninterrupted AC for 21 days and thus would defer diagnostic testing until then, if feasible.   - Plan for further diagnostic testing after 21 days of uninterrupted AC  - IR input appreciated, recommends Pulmonary Medicine or Thoracic Surgery for bronchoscopy, EBUS, endobronchial biopsy  - Outpatient follow up with Hematology/Oncology, Pulmonary Medicine, Thoracic Surgery    Type II DM  Suboptimally controlled. A1c 8.6. POCT glucose 100s-140s today. On chronic prednisone for temporal arteritis, see below  - managed w insulin here  -d/x on Farxiga w addition of Metformin    Hx of temporal arteritis  Follows with [facility] [place]. On prednisone. ESR WNL. CRP WNL. Tapered prednisone to 30mg on 3/21 per patient's rheumatologist.    - Continue prednisone 30mg daily now  - Outpatient follow up with rheumatologist       DVT px: On Xarelto for afib  Code status: Full  Dispo: home w F2F VITALS:  T(F): 97.4 (03-25-24 @ 10:14), Max: 97.5 (03-24-24 @ 22:45)  HR: 73 (03-25-24 @ 10:14) (73 - 89)  BP: 102/45 (03-25-24 @ 10:14) (84/48 - 102/45)  RR: 19 (03-25-24 @ 10:14) (18 - 19)  SpO2: 99% (03-25-24 @ 10:14) (99% - 100%)    PHYSICAL EXAM:  Gen: NAD  HEENT:  pupils equal and reactive, EOMI, no oropharyngeal lesions, erythema, exudates, oral thrush  NECK:   supple, no carotid bruits, no palpable lymph nodes, no thyromegaly  CV:  +S1, +S2, regular, no murmurs or rubs  RESP:   lungs clear to auscultation bilaterally, no wheezing, rales, rhonchi, good air entry bilaterally  BREAST:  not examined  GI:  abdomen soft, non-tender, non-distended, normal BS, no bruits, no abdominal masses, no palpable masses  RECTAL:  not examinedy, no rigidity, no contractions  EXT:  no clubbing, no cyanosis, no edema, no calf pain, swelling or erythema  VASCULAR:  pulses equal and symmetric in the upper and lower extremities  NEURO:  AAOX3, no focal neurological deficits, follows all commands, able to move extremities spontaneously  SKIN:  no ulcers, lesions or rashes    Fall, generalized weakness  PT consult appreciated. Recommended home with home PT, family assistance and rolling walker  - Continue restorative PT sessions  - OOB to chair daily     Traumatic hematoma in the area of the R hip, acute blood loss anemia  Related to recent fall while on Xarelto for afib. Hgb ~13 on presentation, unclear if baseline or hemoconcentrated, decreased to ~11 but has been stable ~11 for days now and with Xarelto remaining on board. Lower extremity neurovascularly intact. Performing adequately with PT as mentioned. No significant pain issues.   - Continue to monitor site  - Continue to trend CBC    Atrial fibrillation with RVR  Known hx of afib. In RVR in setting of mechanical fall, traumatic R hip hematoma, acute blood loss anemia. Appreciate Cardiology and Cardiac EP input. Went for DCCV 3/21, unsuccessful. Remains in afib, on amiodarone load and metoprolol with gradually improving heart rate control,   - s/p amio load, now 200mg daily  - Continue metoprolol, now at 100mg BID  - Continue on Xarelto  - As per Cardiac EP Dr. Mendoza, plan to continue above regimen and in approx 4 weeks, re-attempt cardioversion  - Follow up with Cardiac EP as outpatient    Acute on chronic diastolic CHF  In the setting of rapid afib upon presentation. Troponin negative  ProBNP 3280. Echo done. LA moderately dilated. LV size and structure normal. LVEF preserved. RA moderately dilated. RV mildly dilated with mild diffuse hypokinesis. Mild MR. Mod to severe TR. Appreciate Cardiology input. CHF status improved with diuresis with IV Bumex and improved HR control. Patient since switched to PO torsemide.  - Continue torsemide and metoprolol  - Strict Is and Os, daily wt  - Trend Cr and lytes  - Outpatient follow up with Cardiology Dr. Small    Pulmonary nodules with R paratracheal and R hilar lymphadenopathy   As noted incidentally on CT chest. RLL 5mm solid nodule and RUL 4mm ill-defined nodule. LN enlargement R paratracheal and R hilar. No previous records available. CT head & abd/pelvis with no acute pathology. Unclear if this is reactive or due to underlying malignancy. Appreciate input from Hematology Oncology. Patient and family wish to pursue further diagnostic testing. Discussed with Cardiac EP Dr. Mendoza that since patient underwent DCCV 3/21, he would recommend uninterrupted AC for 21 days and thus would defer diagnostic testing until then, if feasible.   - Plan for further diagnostic testing after 21 days of uninterrupted AC  - IR input appreciated, recommends Pulmonary Medicine or Thoracic Surgery for bronchoscopy, EBUS, endobronchial biopsy  - Outpatient follow up with thoracic for bx (thoracic PA to have office reach out to schedule), f/u with onc if malignancy.    Type II DM  Suboptimally controlled. A1c 8.6. POCT glucose 100s-140s today. On chronic prednisone for temporal arteritis, see below  - managed w insulin here  -d/x on Farxiga w addition of Metformin    Hx of temporal arteritis  [facility] [place]. On prednisone. ESR WNL. CRP WNL. Tapered prednisone to 30mg on 3/21 per patient's rheumatologist.    - Continue prednisone 30mg daily now  - Outpatient follow up with rheumatologist       DVT px: On Xarelto for afib  Code status: Full  Dispo: home w F2F

## 2024-03-25 NOTE — DISCHARGE NOTE PROVIDER - NSDCCPCAREPLAN_GEN_ALL_CORE_FT
PRINCIPAL DISCHARGE DIAGNOSIS  Diagnosis: Atrial fibrillation with rapid ventricular response  Assessment and Plan of Treatment: Take metoprolol and amiodarone. Follow up with EP, you will likely go repeat cardioversion attempt in one month.      SECONDARY DISCHARGE DIAGNOSES  Diagnosis: Lower extremity edema  Assessment and Plan of Treatment: You received iv diuresis, continue oral diuretics w torsemide (dose has been increased). Follow up with cardiology.    Diagnosis: Pulmonary nodule  Assessment and Plan of Treatment: Follow up with Dr. Espinoza (oncology) regarding next steps, you will need a biopsy.     PRINCIPAL DISCHARGE DIAGNOSIS  Diagnosis: Atrial fibrillation with rapid ventricular response  Assessment and Plan of Treatment: Take metoprolol and amiodarone. Follow up with EP, you will likely go repeat cardioversion attempt in one month.      SECONDARY DISCHARGE DIAGNOSES  Diagnosis: Lower extremity edema  Assessment and Plan of Treatment: You received iv diuresis, continue oral diuretics w torsemide (dose has been increased). Follow up with cardiology.    Diagnosis: Pulmonary nodule  Assessment and Plan of Treatment: Follow up with Dr. Mohr (thoracic surgery) regarding next steps, you will need a biopsy.

## 2024-03-25 NOTE — DISCHARGE NOTE PROVIDER - CARE PROVIDER_API CALL
Cash Laureano  Internal Medicine  [address] [place] [address]  Phone: [phone]  Fax: [phone])-  Follow Up Time: 1 week    Lacy Espinoza  Medical Oncology  [address], [place] [address]  Phone: [phone]  Fax: [phone]  Follow Up Time: 1 week   Cash Laureano  Internal Medicine  [address] [place] [address]  Phone: [phone]  Fax: ()-  Follow Up Time: 1 week    Lacy Espinoza  Medical Oncology  [address], [place] [address]  Phone: [phone]  Fax: [phone]  Follow Up Time: Routine    Pete Mohr  Thoracic Surgery  [address] [place] [address]  Phone: [phone]  Fax: [phone]  Follow Up Time: 1 month

## 2024-03-28 NOTE — ED PROVIDER NOTE - CONSTITUTIONAL, MLM
Overweight, white female, awake, alert, oriented to person, place, time/situation and in no apparent distress. normal...

## 2024-03-28 NOTE — ED PROVIDER NOTE - PATIENT'S SEXUAL ORIENTATION
[place] ambulatory encounter :  [facility] [address]  [address] [place] [address]  [phone]    Assessment/PLAN:    1. Acute bacterial bronchitis        Orders Placed This Encounter   • doxycycline hyclate (VIBRAMYCIN) 100 MG capsule    see Smartchart activity for details.    Diagnosis and plan discussed. I believe this is bacterial in nature. Contagiousness discussed.    Return if symptoms worsen or fail to improve.    Patient Instructions   · Fluids  · Rest  · Over the counter analgesics  · Follow-up with pcp in 3-5 days if not better or seek medical attention earlier if worsens.  · OTC Robitussin DM  · Contagiousness discussed            Diagnosis and plan discussed with the patient. The patient indicates understanding of these issues and agrees with the plan.         SUBJECTIVE:  Chief Complaint   Patient presents with   • Cough     cough, chest congestion x 2-3 weeks     She is  a 57 year old female who presented requesting evaluation for productive cough with chest congestion for the past 2-3 weeks. Symptoms are worsening. Denies fevers, chills, diarrhea, nausea, vomiting, chest pain or shortness of breath. Nonsmoker. Taking DayQuil for the symptoms.    PAST HISTORIES:  I have reviewed the past medical history, medications, allergies and social history listed in the medical record as well as the nursing notes for this encounter.    MEDICATIONS:  Current Outpatient Prescriptions   Medication Sig Dispense Refill   • amlodipine-benazepril (LOTREL) 5-10 MG per capsule Take 1 capsule by mouth daily. 90 capsule 1   • buPROPion (WELLBUTRIN XL) 300 MG 24 hr tablet Take 1 tablet by mouth daily. 30 tablet 4   • propranolol (INDERAL LA) 120 MG 24 hr capsule Take 1 capsule by mouth daily. 30 capsule 5   • albuterol 108 (90 BASE) MCG/ACT inhaler Inhale 2 puffs into the lungs every 4 hours as needed for Shortness of Breath  or Wheezing. 1 Inhaler 1   • citalopram (CELEXA) 20 MG tablet Take 1 tablet by mouth daily. 30 tablet 11   • tretinoin (RETIN-A) 0.025 % cream Apply topically QHS 20 g 1   • rizatriptan (MAXALT-MLT) 10 MG disintegrating tablet Take 1 tablet by mouth as needed for Migraine. May repeat in 2 hours if needed 10 tablet 1   • olopatadine (PATANOL) 0.1 % ophthalmic solution 1 drop 2 times daily.     • loratadine (CLARITIN) 10 MG tablet Take 10 mg by mouth daily. OTC       No current facility-administered medications for this visit.        ALLERGIES:  Allergies as of 12/21/2017 - Reviewed 12/21/2017   Allergen Reaction Noted   • Aspirin  10/14/2013   • Dander [cat dander] Other (See Comments) 03/08/2016   • Erythromycin  10/14/2013   • Seasonal Other (See Comments) 03/08/2016       ROS:  As noted above.    OBJECTIVE:    PHYSICAL EXAM:  Blood pressure 126/78, pulse 72, temperature 98.9 °F (37.2 °C), temperature source Tympanic, height 5' 3.5\" (1.613 m), weight 90.5 kg, SpO2 97 %.    CONSTITUTIONAL: The patient is a well-developed well-nourished female in no obvious distress. There is no jaundice, anemia, cyanosis or respiratory distress.  HEENT: TMs are clear bilaterally. External ears without deformities. Hearing is grossly normal.  Nose without deformities. There is moist nasal mucosa. Throat is without erythema. Moist mucous membranes.  Lips without lesions.  NECK:  Without lymphadenopathy. There is full range of motion. There is no tenderness noted.  CHEST: Without any deformities. Movement of the left side equals that of the right, which is within normal limits.  LUNGS: Scatter rhonchi over the mid and lower lung zones bilaterally. There are no wheezes noted.  CARDIOVASCULAR: Regular rate and rhythm with normal S1 plus S2. There are no murmurs auscultated.  SKIN: Warm, dry, intact without any obvious rashes.  NEUROLOGIC: Alert & oriented x 3  PSYCHIATRIC: Speech and behavior appropriate. Mood and affect are normal.      Heterosexual

## 2024-03-28 NOTE — ED ADULT NURSE REASSESSMENT NOTE - NS ED NURSE REASSESS COMMENT FT1
Pt sitting in stretcher with son at bedside. Pt TBA. Pt and son given drinks. No acute distress noted at this time. Comfort and safety measures maintained.

## 2024-03-28 NOTE — ED PROVIDER NOTE - OBJECTIVE STATEMENT
89 yo female with a PMH of a fib on xarelro, chf on torsemide, htn, GERD, DM, temporal arteritis on prednisone presents with diarrhea x 2 days. Pt was recently d/c from  for rapid a fib, and weakness on 3/25 and when she was d/c and placed on metformin. Pt states that after takign metformin she has having diarrhea TNTC which causes abd pain (which has currnetly resolved) and still feels weak.   Denies cp, sob, n/v, f/c. 89 yo female with a PMH of a fib on xarelto, chf on torsemide, htn, GERD, DM, temporal arteritis on prednisone presents with diarrhea x 2 days. Pt was recently d/c from  for rapid a fib, and weakness on 3/25 and when she was d/c and placed on metformin. Pt states that after taking metformin she has having diarrhea TNTC which causes abd pain (which has currnetly resolved) and still feels weak.   Denies cp, sob, n/v, f/c.

## 2024-03-28 NOTE — ED ADULT TRIAGE NOTE - CHIEF COMPLAINT QUOTE
pt BIBEMS from home c/o diarrhea since tuesday. pt reports recent d/c from  on Tuesday, placed on Metformin and started on Tuesday. Since then, pt has had increased diarrhea. home aide took BP today and pt was found to be hypotensive 80/50 at home. pt with /68 in triage. pt denies abdominal pain, vomiting, fevers.

## 2024-03-28 NOTE — ED ADULT TRIAGE NOTE - GLASGOW COMA SCALE: BEST VERBAL RESPONSE, MLM
Returned patient's call.  Let him know Dr. Brito is not able to prescribe immunosuppression drugs for patient he has not seen.  Recommended he get referring provider to refill and if insurance doesn't pay, he could use a GoodRx coupon for a discounted lazaro.    Renee MORALES LPN  Hepatology Clinic    [facility]    Phone Message    May a detailed message be left on voicemail: yes     Reason for Call: Appointment Intake    Referring Provider Name: Juan Castaneda MD from    Diagnosis and/or Symptoms: Autoimmune Hepatitis     Pt was recommended to be see by Dr. Brito - scheduled 1st available on 4/30 - pt has 2 Rx's that need to be renewed, but he got new insurance since last provider and is unsure of how to have these filled now     Rx info: Azathioprine - 200mg/day   Budesonide - 2.5 or 5 mg/day? Per pt?        Prescribing provider: Dr. Castaneda  [facility]     Action Taken: Message routed to:  [facility] Hep.     Travel Screening: Not Applicable                                                                                                            
(V5) oriented

## 2024-03-28 NOTE — ED PROVIDER NOTE - CLINICAL SUMMARY MEDICAL DECISION MAKING FREE TEXT BOX
89 yo female presents with 2 days of increased weakness and diarrhea after dc from  for a fib and was started on metformin.  Will check labs, UA, cxr, ekg and reeval. -Elgin Avitia PA-C

## 2024-03-29 NOTE — ED ADULT NURSE REASSESSMENT NOTE - NS ED NURSE REASSESS COMMENT FT1
Pt resting in stretcher in room with son at bedside. Pt admitted and awaiting bed on unit. Pt given water. No acute distress noted at this time. Comfort and safety measures maintained.

## 2024-03-29 NOTE — CONSULT NOTE ADULT - SUBJECTIVE AND OBJECTIVE BOX
CHIEF COMPLAINT: Patient is a 88y old  Female who presents with a chief complaint of diarrhea, LOC (29 Mar 2024 07:52)    FROM H&P: 89 yo female with a PMH of a fib on Xarelto, CHF on torsemide, HTN, GERD, DM, temporal arteritis on prednisone presents with diarrhea x 2 days. Pt was recently d/c from  for rapid a fib, and weakness on 3/25 and when she was d/c and placed on metformin. Pt states that after takign metformin she has having diarrhea TNTC which causes abd pain (which has currently resolved) and still feels weak. Per fam since AF meds started/ changed she has low BP, LOC.  ~ Admit for dehydration and to adjust cardiac meds; HR 50s and BP 95 systolic. Diarrhea has stopped since last night. (29 Mar 2024 07:52)    Cardiology consulted for bradycardia  Cr elevated. Patients family called the other day to office, BB reduced for hypotension  Presented for +LOC hypotension and diarrhea from metformin  BP better. no cp or sob    MEDICATIONS  (STANDING):  aMIOdarone    Tablet 200 milliGRAM(s) Oral daily  atorvastatin 80 milliGRAM(s) Oral at bedtime  dextrose 5%. 1000 milliLiter(s) (50 mL/Hr) IV Continuous <Continuous>  dextrose 5%. 1000 milliLiter(s) (100 mL/Hr) IV Continuous <Continuous>  dextrose 50% Injectable 25 Gram(s) IV Push once  dextrose 50% Injectable 12.5 Gram(s) IV Push once  dextrose 50% Injectable 25 Gram(s) IV Push once  folic acid 1 milliGRAM(s) Oral daily  glucagon  Injectable 1 milliGRAM(s) IntraMuscular once  insulin lispro (ADMELOG) corrective regimen sliding scale   SubCutaneous three times a day before meals  pantoprazole    Tablet 40 milliGRAM(s) Oral before breakfast  predniSONE   Tablet 30 milliGRAM(s) Oral daily  rivaroxaban 15 milliGRAM(s) Oral with dinner  timolol 0.5% Solution 1 Drop(s) Both EYES daily    MEDICATIONS  (PRN):  acetaminophen     Tablet .. 650 milliGRAM(s) Oral every 6 hours PRN Temp greater or equal to 38C (100.4F), Mild Pain (1 - 3)  aluminum hydroxide/magnesium hydroxide/simethicone Suspension 30 milliLiter(s) Oral every 4 hours PRN Dyspepsia  dextrose Oral Gel 15 Gram(s) Oral once PRN Blood Glucose LESS THAN 70 milliGRAM(s)/deciliter  melatonin 3 milliGRAM(s) Oral at bedtime PRN Insomnia  ondansetron Injectable 4 milliGRAM(s) IV Push every 8 hours PRN Nausea and/or Vomiting    HOME MEDICATIONS:  Farxiga 10 mg oral tablet: 1 tab(s) orally once a day (28 Mar 2024 22:52)  folic acid 1 mg oral tablet: 1 tab(s) orally once a day (28 Mar 2024 22:52)  ipratropium 21 mcg/inh (0.03%) nasal spray: 2 spray(s) intranasally once a day (28 Mar 2024 22:52)  metFORMIN 500 mg oral tablet, extended release: 1 tab(s) orally once a day *** patient stopped metformin due to diarrhea*** (28 Mar 2024 22:52)  pantoprazole 40 mg oral delayed release tablet: 1 tab(s) orally once a day (28 Mar 2024 22:52)  potassium chloride 10 mEq oral tablet, extended release: 1 tab(s) orally 2 times a day (28 Mar 2024 22:52)  rosuvastatin 40 mg oral tablet: 1 tab(s) orally once a day (28 Mar 2024 22:52)  timolol maleate 0.5% ophthalmic solution: 1 drop(s) in each eye once a day (28 Mar 2024 22:52)  Xarelto 15 mg oral tablet: 1 tab(s) orally once a day (28 Mar 2024 22:52)    PHYSICAL EXAM:  T(C): 36.7 (29 Mar 2024 09:14), Max: 36.7 (29 Mar 2024 09:14)  T(F): 98.1 (29 Mar 2024 09:14), Max: 98.1 (29 Mar 2024 09:14)  HR: 57 (29 Mar 2024 09:14) (48 - 65)  BP: 119/53 (29 Mar 2024 09:14) (99/53 - 119/53)  BP(mean): 69 (29 Mar 2024 06:42) (69 - 79)  RR: 18 (29 Mar 2024 09:14) (12 - 18)  SpO2: 100% (29 Mar 2024 09:14) (98% - 100%)    Parameters below as of 29 Mar 2024 09:14  Patient On (Oxygen Delivery Method): room air    Constitutional: NAD, awake and alert  HEENT: PERR, EOMI, Normal Hearing, MMM  Neck: Soft and supple, No LAD, No JVD  Respiratory: Breath sounds are clear bilaterally, No wheezing, rales or rhonchi  Cardiovascular: S1 and S2, IR  Gastrointestinal: Bowel Sounds present, soft, nontender, nondistended, no guarding, no rebound  Extremities: No peripheral edema  Vascular: 2+ peripheral pulses  Neurological: A/O x 3, no focal deficits  Musculoskeletal: 5/5 strength b/l upper and lower extremities  Skin: No rashes    =======================================    INTERPRETATION OF TELEMETRY: afib 50s    ECG: < from: 12 Lead ECG (03.21.24 @ 11:10) >  Diagnosis Line Atrial fibrillation  Nonspecific ST and T wave abnormality  Abnormal ECG  When compared with ECG of 21-MAR-2024 11:09,  Criteria for Inferior infarct are no longer Present     ========================================    LABS:                        13.3   23.44 )-----------( 225      ( 28 Mar 2024 18:01 )             41.8     03-28    136  |  106  |  51<H>  ----------------------------<  175<H>  4.0   |  20<L>  |  1.61<H>    Ca    8.8      28 Mar 2024 18:01    TPro  5.7<L>  /  Alb  2.7<L>  /  TBili  1.3<H>  /  DBili  x   /  AST  34  /  ALT  34  /  AlkPhos  51  03-28    CARDIAC TESTING:    < from: TTE Echo Complete w/o Contrast w/ Doppler (03.19.24 @ 09:41) >   Left ventricle systolic function appears preserved in the presence of a   cardiac arrhythmia. Left ventricle size and structure are within normal   limitations. Visual estimation of left ventricle ejection fraction is>50 %.   The left atrium is moderately dilated.   The right atrium appears moderately dilated.   The right ventricle exhibits mild dilation, mild diffuse hypokinesis, and   mild depression of contractility.   Mild aortic sclerosis is present with normalvalvular opening.   Minimal mitral annular calcification is present.   Mild (1+) mitral regurgitation is present.   Moderate to severe (3+) tricuspid valve regurgitation is present.   Mild pulmonic valvular regurgitation (1+) is present.    RADIOLOGY & ADDITIONAL STUDIES:    < from: CT Abdomen and Pelvis w/ IV Cont (03.28.24 @ 23:36) >  No evidence of small bowel obstruction or active bowel inflammation.  Stable subcutaneous hematoma overlying the right lateral hip.

## 2024-03-29 NOTE — H&P ADULT - HISTORY OF PRESENT ILLNESS
87 yo female with a PMH of a fib on xarelro, chf on torsemide, htn, GERD, DM, temporal arteritis on prednisone presents with diarrhea x 2 days. Pt was recently d/c from  for rapid a fib, and weakness on 3/25 and when she was d/c and placed on metformin. Pt states that after takign metformin she has having diarrhea TNTC which causes abd pain (which has currently resolved) and still feels weak. Per fam since AF meds started/ changed she has low BP, LOC.    Admit for dehydration and to adjust  cardiac meds; HR 50s and bp95 systolic. Diarrhea has stopped since last night.

## 2024-03-29 NOTE — CONSULT NOTE ADULT - ASSESSMENT
87 yo female with a PMH of a fib on Xarelto, CHF on torsemide, HTN, GERD, DM, temporal arteritis on prednisone presents with diarrhea x 2 days. Pt was recently d/c from  for rapid a fib, and weakness on 3/25 and when she was d/c and placed on metformin. Pt states that after takign metformin she has having diarrhea TNTC which causes abd pain (which has currently resolved) and still feels weak. Per fam since AF meds started/ changed she has low BP, LOC.  ~ Admit for dehydration and to adjust cardiac meds; HR 50s and BP 95 systolic. Diarrhea has stopped since last night.    #Syncope, diarrhea from metformin. Hypotension.  #Afib with Slow ventricular response  #Chronic diastolic HF   #Temporal Arteritis - on chronic steroids - tapering off  #DMT2  #Hyperlipidemia  #EDITA    - Monitor on tele, hx of Unsuccessful CV on 3/21   - Continue with amiodarone   - Hold lopressor due to hypotension/bradycardia  - EDITA, hold Farxiga / torsemide   - Continue Xarelto for stroke ppx  - Continue atorvastatin   - Recent Echocardiogram reviewed.    Will follow  Case d/w Dr. Salomon

## 2024-03-29 NOTE — H&P ADULT - ASSESSMENT
87 y/o F w/ PMH of temporal arteritis (on steroids), a-fib (on xarelto), DM2 p/w weakness. Patient has been having difficulty walking due to B/L LE edema      * Diarrhea most likely iatrogenic  dc Metformin  repeat AM labs      *Chronic AF  -On Xarelto   - consult CArdio  I held BB (100mg), Torsemide and KCl        *DM2  -Humalog ISS  -HbA1c    *Temporal arteritis  -On Prednisone 30mg    *Advance Directives  -Denies having any advance care directives in place     *DVT ppx   -On xarelto     >75 mins required for admission      case d/w fam- son

## 2024-03-29 NOTE — PATIENT PROFILE ADULT - FALL HARM RISK - PATIENT NEEDS ASSISTANCE
Patient has urogyn rfl for recurrent UTIs but is established with [facility]. Explained the difference between specialities and that she could follow up with her previous providers. Patient would like to f/u with urology.   Walking/Toileting/Moving from bed to chair

## 2024-03-29 NOTE — H&P ADULT - NSHPLABSRESULTS_GEN_ALL_CORE
13.3   23.44 )-----------( 225      ( 28 Mar 2024 18:01 )             41.8   03-28    136  |  106  |  51<H>  ----------------------------<  175<H>  4.0   |  20<L>  |  1.61<H>    Ca    8.8      28 Mar 2024 18:01    TPro  5.7<L>  /  Alb  2.7<L>  /  TBili  1.3<H>  /  DBili  x   /  AST  34  /  ALT  34  /  AlkPhos  51  03-28      EKG slow AF    cxr no infiltrates

## 2024-03-29 NOTE — PATIENT PROFILE ADULT - DOES PATIENT HAVE ADVANCE DIRECTIVE
Leslie Gillis is a 25 y.o. female patient.    Active Hospital Problems    Diagnosis  POA    *Mild pre-eclampsia in third trimester [O14.03]  Yes    S/P  [Z98.891]  Not Applicable      Resolved Hospital Problems    Diagnosis Date Resolved POA    Elevated blood pressure affecting pregnancy in third trimester, antepartum [O13.3] 2017 Yes    Preeclampsia [O14.90] 2017 Yes    Elevated blood pressure affecting pregnancy in third trimester, antepartum [O13.3] 2017 Yes     Temp: 97.7 °F (36.5 °C) (17 0400)  Pulse: 84 (17 0400)  Resp: 19 (17 0400)  BP: (!) 136/90 (17 0400)  SpO2: 99 % (17 0155)  Weight: 76.7 kg (169 lb) (17 1026)  Height: 5' (152.4 cm) (17 1026)    Subjective:  Symptoms:  Improved.  No shortness of breath, chest pain or headache.  (Ambulating, voiding, tolerating regular diet.  Preeclampsia ROS is negative.  Patient prefers to stay another night.).    Diet:  Adequate intake.  No nausea or vomiting.    Activity level: Normal.    Pain:  She complains of pain that is mild.  She reports pain is improving.  Pain is well controlled.      Objective:  General Appearance:  Comfortable, well-appearing and in no acute distress.    Vital signs: (most recent): Blood pressure (!) 136/90, pulse 84, temperature 97.7 °F (36.5 °C), temperature source Oral, resp. rate 19, height 5' (1.524 m), weight 76.7 kg (169 lb), SpO2 99 %, unknown if currently breastfeeding.  (-156/).    Lungs:  Normal respiratory rate and normal effort.    Extremities: There is dependent edema (1+ bilateral).    Neurological: Patient is alert and oriented to person, place and time.    Skin:  (Aquacel dressing clean and dry)  Abdomen: Abdomen is soft and non-distended.  Bowel sounds are normal.   There is incisional tenderness. There is no rebound tenderness.  There is no guarding.   (Uterine fundus firm, at umbilicus).     Assessment:  (POD#2 s/p repeat LTCS  for NRFHT's during attempted   Preeclampsia: BP appropriate on current dose of labetalol).     Plan:   Continue current care.  Continue labetalol 800 mg q 8 hours.  Will add procardia if needed.  Anticipate discharge to home tomorrow.       Coleen Ugarte MD  3/9/2017     Yes

## 2024-03-29 NOTE — DISCHARGE NOTE NURSING/CASE MANAGEMENT/SOCIAL WORK - PATIENT PORTAL LINK FT
You can access the FollowMyHealth Patient Portal offered by [facility] by registering at the following website: [email or web address]. By joining [email or web address]’s FollowMyHealth portal, you will also be able to view your health information using other applications (apps) compatible with our system.

## 2024-03-29 NOTE — PATIENT PROFILE ADULT - FALL HARM RISK - HARM RISK INTERVENTIONS

## 2024-03-29 NOTE — ED ADULT NURSE REASSESSMENT NOTE - NS ED NURSE REASSESS COMMENT FT1
Pt report received from Daniela HAMPTON. Pt contact made at 0715. Pt has no new complaints at this time. Pt is Aox4 and speaking in full sentences. Pt TBA with ISO bed pending at this time. Pt given breakfast menu.  Pt safety and comfort maintained.

## 2024-03-29 NOTE — DISCHARGE NOTE NURSING/CASE MANAGEMENT/SOCIAL WORK - NSDCFUADDAPPT_GEN_ALL_CORE_FT
chest wall non-tender, breathing is unlabored without accessory muscle use, normal breath sounds CARDIOLOGY FOLLOW UP APPOINTMENT: 4/8/24 @2:30PM at [facility] with MING Grier CARDIOLOGY FOLLOW UP APPOINTMENT: 4/15/24 @2:30PM at [facility] with MING Grier

## 2024-03-29 NOTE — H&P ADULT - NSHPPHYSICALEXAM_GEN_ALL_CORE
Vital Signs Last 24 Hrs  T(C): 36.2 (29 Mar 2024 06:42), Max: 36.4 (28 Mar 2024 15:53)  T(F): 97.2 (29 Mar 2024 06:42), Max: 97.5 (28 Mar 2024 15:53)  HR: 55 (29 Mar 2024 06:42) (48 - 65)  BP: 103/56 (29 Mar 2024 06:42) (99/53 - 117/56)  BP(mean): 69 (29 Mar 2024 06:42) (69 - 79)  RR: 14 (29 Mar 2024 06:42) (12 - 18)  SpO2: 100% (29 Mar 2024 06:42) (98% - 100%)    Parameters below as of 29 Mar 2024 06:42  Patient On (Oxygen Delivery Method): room air    PHYSICAL EXAM:      Constitutional: NAD  Eyes: perrl, no conjunctival changes  ENMT: no exudates, moist oral muc, uvula midline  Neck: no JVD, no LAD  Back: no cva tenderness  Respiratory: CTA, no exp wheezes  Cardiovascular: S1S2 irreg, no murmur gallop or rub  Gastrointestinal: abd soft, NT/ND + BS  Genitourinary: voiding  Extremities: +3 pitting edema  Vascular: pedal pulses + bilateral, warm extremities  Neurological: non focal, mot str 5/5/ all extr

## 2024-03-30 NOTE — PROGRESS NOTE ADULT - SUBJECTIVE AND OBJECTIVE BOX
Patient is a 88y old  Female who presents with a chief complaint of diarrhea, LOC (29 Mar 2024 09:47)        HPI:  87 yo female with a PMH of a fib on xarelro, chf on torsemide, htn, GERD, DM, temporal arteritis on prednisone presents with diarrhea x 2 days. Pt was recently d/c from  for rapid a fib, and weakness on 3/25 and when she was d/c and placed on metformin. Pt states that after takign metformin she has having diarrhea TNTC which causes abd pain (which has currently resolved) and still feels weak. Per fam since AF meds started/ changed she has low BP, LOC.    Admit for dehydration and to adjust  cardiac meds; HR 50s and bp95 systolic    MEDICATIONS  (STANDING):  aMIOdarone    Tablet 200 milliGRAM(s) Oral daily  atorvastatin 80 milliGRAM(s) Oral at bedtime  dextrose 5%. 1000 milliLiter(s) (50 mL/Hr) IV Continuous <Continuous>  dextrose 5%. 1000 milliLiter(s) (100 mL/Hr) IV Continuous <Continuous>  dextrose 50% Injectable 25 Gram(s) IV Push once  dextrose 50% Injectable 25 Gram(s) IV Push once  dextrose 50% Injectable 12.5 Gram(s) IV Push once  folic acid 1 milliGRAM(s) Oral daily  glucagon  Injectable 1 milliGRAM(s) IntraMuscular once  insulin lispro (ADMELOG) corrective regimen sliding scale   SubCutaneous three times a day before meals  insulin lispro (ADMELOG) corrective regimen sliding scale.   SubCutaneous at bedtime  pantoprazole    Tablet 40 milliGRAM(s) Oral before breakfast  predniSONE   Tablet 30 milliGRAM(s) Oral daily  rivaroxaban 15 milliGRAM(s) Oral with dinner  timolol 0.5% Solution 1 Drop(s) Both EYES daily    MEDICATIONS  (PRN):  acetaminophen     Tablet .. 650 milliGRAM(s) Oral every 6 hours PRN Temp greater or equal to 38C (100.4F), Mild Pain (1 - 3)  aluminum hydroxide/magnesium hydroxide/simethicone Suspension 30 milliLiter(s) Oral every 4 hours PRN Dyspepsia  dextrose Oral Gel 15 Gram(s) Oral once PRN Blood Glucose LESS THAN 70 milliGRAM(s)/deciliter  melatonin 3 milliGRAM(s) Oral at bedtime PRN Insomnia  ondansetron Injectable 4 milliGRAM(s) IV Push every 8 hours PRN Nausea and/or Vomiting        REVIEW OF SYSTEMS:    CONSTITUTIONAL: No weakness, fevers or chills, lightheadedness/dizziness  EYES/ENT: No visual changes;  No vertigo or throat pain   NECK: No pain or stiffness  RESPIRATORY: No cough, wheezing, hemoptysis; No shortness of breath  CARDIOVASCULAR: No chest pain or palpitations  GASTROINTESTINAL: No abdominal or epigastric pain. No nausea, vomiting, or hematemesis; No diarrhea or constipation. No melena or hematochezia.  GENITOURINARY: No dysuria, frequency or hematuria  NEUROLOGICAL: No numbness or weakness  SKIN: No itching, rashes        Vital Signs Last 24 Hrs  T(C): 36.3 (29 Mar 2024 20:53), Max: 36.7 (29 Mar 2024 09:14)  T(F): 97.4 (29 Mar 2024 20:53), Max: 98.1 (29 Mar 2024 09:14)  HR: 52 (29 Mar 2024 20:53) (52 - 57)  BP: 106/45 (29 Mar 2024 20:53) (106/45 - 119/53)  BP(mean): --  RR: 18 (29 Mar 2024 20:53) (18 - 18)  SpO2: 100% (29 Mar 2024 20:53) (100% - 100%)    Parameters below as of 29 Mar 2024 09:14  Patient On (Oxygen Delivery Method): room air          I&O's Summary        PHYSICAL EXAM:  GENERAL: NAD, well-groomed, well-developed,  HEENT - NC/AT, pupils equal and reactive to light,  ; Moist mucous membranes, Good dentition, No lesions  NECK: Supple, No JVD  CHEST/LUNG: Clear to auscultation bilaterally; No rales, rhonchi, wheezing  HEART: Regular rate and rhythm; No murmurs, rubs, or gallops  ABDOMEN: Soft, Nontender, Nondistended; Bowel sounds present  EXTREMITIES:  2+ Peripheral Pulses, No clubbing, cyanosis, or edema  NEURO:  No Focal deficits, sensory and motor intact                                  11.3   11.01 )-----------( 183      ( 30 Mar 2024 06:11 )             36.0     03-30    135  |  107  |  36<H>  ----------------------------<  230<H>  3.7   |  21<L>  |  1.15    Ca    8.5      30 Mar 2024 06:11    TPro  5.7<L>  /  Alb  2.7<L>  /  TBili  1.3<H>  /  DBili  x   /  AST  34  /  ALT  34  /  AlkPhos  51  03-28    LIVER FUNCTIONS - ( 28 Mar 2024 18:01 )  Alb: 2.7 g/dL / Pro: 5.7 gm/dL / ALK PHOS: 51 U/L / ALT: 34 U/L / AST: 34 U/L / GGT: x             Culture - Blood (collected 28 Mar 2024 23:45)  Source: .Blood Blood-Peripheral-aerobic plus received  Preliminary Report (30 Mar 2024 04:01):    No growth at 24 hours    Culture - Blood (collected 28 Mar 2024 23:45)  Source: .Blood Blood-Peripheral- aerobic plus received  Preliminary Report (30 Mar 2024 04:01):    No growth at 24 hours        Urinalysis Basic - ( 30 Mar 2024 06:11 )    Color: x / Appearance: x / SG: x / pH: x  Gluc: 230 mg/dL / Ketone: x  / Bili: x / Urobili: x   Blood: x / Protein: x / Nitrite: x   Leuk Esterase: x / RBC: x / WBC x   Sq Epi: x / Non Sq Epi: x / Bacteria: x        < from: TTE Echo Complete w/o Contrast w/ Doppler (03.19.24 @ 09:41) >  Left ventricle systolic function appears preserved in the presence of a   cardiac arrhythmia. Left ventricle size and structure are within normal   limitations. Visual estimation of left ventricle ejection fraction is>50   %.   The left atrium is moderately dilated.   The right atrium appears moderately dilated.   The right ventricle exhibits mild dilation, mild diffuse hypokinesis, and   mild depression of contractility.   Mild aortic sclerosis is present with normalvalvular opening.   Minimal mitral annular calcification is present.   Mild (1+) mitral regurgitation is present.   Moderate to severe (3+) tricuspid valve regurgitation is present.   Mild pulmonic valvular regurgitation (1+) is present.    < end of copied text >

## 2024-03-30 NOTE — PROGRESS NOTE ADULT - ASSESSMENT
87 yo female with a PMH of a fib on Xarelto, CHF on torsemide, HTN, GERD, DM, temporal arteritis on prednisone presents with diarrhea x 2 days. Pt was recently d/c from  for rapid a fib, and weakness on 3/25 and when she was d/c and placed on metformin. Pt states that after takign metformin she has having diarrhea TNTC which causes abd pain (which has currently resolved) and still feels weak. Per fam since AF meds started/ changed she has low BP, LOC.  ~ Admit for dehydration and to adjust cardiac meds; HR 50s and BP 95 systolic. Diarrhea has stopped since last night.    #Syncope, diarrhea from metformin. Hypotension.  #Afib with Slow ventricular response  #Chronic diastolic HF   #Temporal Arteritis - on chronic steroids - tapering off  #DMT2  #Hyperlipidemia  #EDITA    - Monitor on tele, hx of Unsuccessful CV on 3/21   - Continue with amiodarone   - Hold lopressor due to hypotension/bradycardia  - EDITA, hold Farxiga / torsemide   - Continue Xarelto for stroke ppx  - Continue atorvastatin   - Recent Echocardiogram reviewed.    Will follow 87 yo female with a PMH of a fib on Xarelto, CHF on torsemide, HTN, GERD, DM, temporal arteritis on prednisone presents with diarrhea x 2 days. Pt was recently d/c from  for rapid a fib, and weakness on 3/25 and when she was d/c and placed on metformin. Pt states that after takign metformin she has having diarrhea TNTC which causes abd pain (which has currently resolved) and still feels weak. Per fam since AF meds started/ changed she has low BP, LOC.  ~ Admit for dehydration and to adjust cardiac meds; HR 50s and BP 95 systolic. Diarrhea has stopped since last night.    #Syncope, diarrhea from metformin. Hypotension.  #Afib with Slow ventricular response  #Chronic diastolic HF   #Temporal Arteritis - on chronic steroids - tapering off  #DMT2  #Hyperlipidemia  #EDITA    - Monitor on tele, hx of Unsuccessful CV on 3/21   - Continue with amiodarone - BB held for bradycardia -  now - will increase amio to 200 BID for a week - monitor on tele  - Hold lopressor due to hypotension/bradycardia  - EDITA, resolved - cr now 1.1  - Continue Xarelto for stroke ppx  - Continue atorvastatin   - Recent Echocardiogram reviewed.    Will follow

## 2024-03-30 NOTE — PROGRESS NOTE ADULT - ASSESSMENT
87 y/o F w/ PMH of temporal arteritis (on steroids), a-fib (on xarelto), DM2 p/w weakness. Patient has been having difficulty walking due to B/L LE edema      #Diarrhea most likely iatrogenic  dc Metformin  repeat AM labs  improving     *Chronic AF  -On Xarelto   -Amio         *DM2  -Humalog ISS  -a1c:     *Temporal arteritis  -On Prednisone 30mg    *Advance Directives  -Denies having any advance care directives in place     *DVT ppx   -On xarelto     >75 mins required for admission      case d/w fam- son

## 2024-03-30 NOTE — PROGRESS NOTE ADULT - SUBJECTIVE AND OBJECTIVE BOX
HOSPITALIST ATTENDING PROGRESS NOTE     Chart and meds reviewed. Patient seen and examined     CC: diarrhea    Subjective: diarrhea is improving. No other acute complaints.       All other systems and founds to be negative with exception of what has been described above.         PHYSICAL EXAM:  Vital Signs Last 24 Hrs  T(C): 36.4 (30 Mar 2024 20:25), Max: 36.4 (30 Mar 2024 20:25)  T(F): 97.6 (30 Mar 2024 20:25), Max: 97.6 (30 Mar 2024 20:25)  HR: 66 (30 Mar 2024 20:25) (66 - 66)  BP: 100/55 (30 Mar 2024 20:25) (100/55 - 100/55)  RR: 18 (30 Mar 2024 20:25) (18 - 18)  SpO2: 100% (30 Mar 2024 20:25) (100% - 100%)      Daily     Daily Weight in k.7 (30 Mar 2024 06:02)    GEN: NAD   HEENT: EOMI,  moist mucous membranes  NECK : Soft and supple, no JVD  LUNG: CTABL, No wheezing, rales or rhonchi  CVS: S1S2+,+irregularly irregular   GI: BS+, soft, NT/ND, no guarding, no rebound  EXTREMITIES: No peripheral edema  VASCULAR: 2+ peripheral pulses  NEURO: AAOx3, grossly non-focal   SKIN: No rashes    HOME MEDICATIONS:  Home Medications:  Farxiga 10 mg oral tablet: 1 tab(s) orally once a day (28 Mar 2024 22:52)  folic acid 1 mg oral tablet: 1 tab(s) orally once a day (28 Mar 2024 22:52)  ipratropium 21 mcg/inh (0.03%) nasal spray: 2 spray(s) intranasally once a day (28 Mar 2024 22:52)  metFORMIN 500 mg oral tablet, extended release: 1 tab(s) orally once a day *** patient stopped metformin due to diarrhea*** (29 Mar 2024 15:12)  pantoprazole 40 mg oral delayed release tablet: 1 tab(s) orally once a day (28 Mar 2024 22:52)  potassium chloride 10 mEq oral tablet, extended release: 1 tab(s) orally 2 times a day (28 Mar 2024 22:52)  rosuvastatin 40 mg oral tablet: 1 tab(s) orally once a day (28 Mar 2024 22:52)  timolol maleate 0.5% ophthalmic solution: 1 drop(s) in each eye once a day (28 Mar 2024 22:52)  Xarelto 15 mg oral tablet: 1 tab(s) orally once a day (28 Mar 2024 22:52)      MEDICATIONS  MEDICATIONS  (STANDING):  aMIOdarone    Tablet 200 milliGRAM(s) Oral two times a day  atorvastatin 80 milliGRAM(s) Oral at bedtime  dextrose 5%. 1000 milliLiter(s) (50 mL/Hr) IV Continuous <Continuous>  dextrose 5%. 1000 milliLiter(s) (100 mL/Hr) IV Continuous <Continuous>  dextrose 50% Injectable 25 Gram(s) IV Push once  dextrose 50% Injectable 12.5 Gram(s) IV Push once  dextrose 50% Injectable 25 Gram(s) IV Push once  folic acid 1 milliGRAM(s) Oral daily  glucagon  Injectable 1 milliGRAM(s) IntraMuscular once  insulin lispro (ADMELOG) corrective regimen sliding scale   SubCutaneous three times a day before meals  insulin lispro (ADMELOG) corrective regimen sliding scale.   SubCutaneous at bedtime  pantoprazole    Tablet 40 milliGRAM(s) Oral before breakfast  predniSONE   Tablet 30 milliGRAM(s) Oral daily  rivaroxaban 15 milliGRAM(s) Oral with dinner  timolol 0.5% Solution 1 Drop(s) Both EYES daily      LABS: All Labs Reviewed:                        11.3    )-----------( 183      ( 30 Mar 2024 06:11 )             36.0     03-30    135  |  107  |  36<H>  ----------------------------<  230<H>  3.7   |  21<L>  |  1.15    Ca    8.5      30 Mar 2024 06:11          Urinalysis Basic - ( 30 Mar 2024 06:11 )    Color: x / Appearance: x / SG: x / pH: x  Gluc: 230 mg/dL / Ketone: x  / Bili: x / Urobili: x   Blood: x / Protein: x / Nitrite: x   Leuk Esterase: x / RBC: x / WBC x   Sq Epi: x / Non Sq Epi: x / Bacteria: x        Culture - Blood (collected 28 Mar 2024 23:45)  Source: .Blood Blood-Peripheral-aerobic plus received  Preliminary Report (30 Mar 2024 04:01):    No growth at 24 hours    Culture - Blood (collected 28 Mar 2024 23:45)  Source: .Blood Blood-Peripheral- aerobic plus received  Preliminary Report (30 Mar 2024 04:01):    No growth at 24 hours      Blood Culture:  @ 23:45  Organism --  Gram Stain Blood -- Gram Stain --  Specimen Source .Blood Blood-Peripheral- aerobic plus received  Culture-Blood --      I&O's Detail    CAPILLARY BLOOD GLUCOSE      POCT Blood Glucose.: 203 mg/dL (30 Mar 2024 16:52)  POCT Blood Glucose.: 204 mg/dL (30 Mar 2024 11:58)  POCT Blood Glucose.: 189 mg/dL (30 Mar 2024 07:34)        CARDIOLOGY TESTING

## 2024-03-31 NOTE — PROGRESS NOTE ADULT - SUBJECTIVE AND OBJECTIVE BOX
HOSPITALIST ATTENDING PROGRESS NOTE     Chart and meds reviewed. Patient seen and examined     CC: diarrhea    Subjective: diarrhea is improving. No other acute complaints.     3/31: Seen and evaluated. Diarrhea resolved. Noted to be in afib later in the day: 1x Lopressor given       All other systems and founds to be negative with exception of what has been described above.       PHYSICAL EXAM:  Vital Signs Last 24 Hrs  T(C): 36.4 (31 Mar 2024 07:26), Max: 36.4 (30 Mar 2024 20:25)  T(F): 97.5 (31 Mar 2024 07:26), Max: 97.6 (30 Mar 2024 20:25)  HR: 122 (31 Mar 2024 07:) (66 - 122)  BP: 99/64 (31 Mar 2024 07:26) (99/64 - 100/55)  RR: 19 (31 Mar 2024 07:26) (18 - 19)  SpO2: 100% (31 Mar 2024 07:26) (100% - 100%)    Parameters below as of 31 Mar 2024 07:26  Patient On (Oxygen Delivery Method): room air        Daily     Daily Weight in k.7 (30 Mar 2024 06:02)    GEN: NAD   HEENT: EOMI,  moist mucous membranes  NECK : Soft and supple, no JVD  LUNG: CTABL, No wheezing, rales or rhonchi  CVS: S1S2+,+irregularly irregular   GI: BS+, soft, NT/ND, no guarding, no rebound  EXTREMITIES: No peripheral edema  VASCULAR: 2+ peripheral pulses  NEURO: AAOx3, grossly non-focal   SKIN: No rashes    HOME MEDICATIONS:  Home Medications:  Farxiga 10 mg oral tablet: 1 tab(s) orally once a day (28 Mar 2024 22:52)  folic acid 1 mg oral tablet: 1 tab(s) orally once a day (28 Mar 2024 22:52)  ipratropium 21 mcg/inh (0.03%) nasal spray: 2 spray(s) intranasally once a day (28 Mar 2024 22:52)  metFORMIN 500 mg oral tablet, extended release: 1 tab(s) orally once a day *** patient stopped metformin due to diarrhea*** (29 Mar 2024 15:12)  pantoprazole 40 mg oral delayed release tablet: 1 tab(s) orally once a day (28 Mar 2024 22:52)  potassium chloride 10 mEq oral tablet, extended release: 1 tab(s) orally 2 times a day (28 Mar 2024 22:52)  rosuvastatin 40 mg oral tablet: 1 tab(s) orally once a day (28 Mar 2024 22:52)  timolol maleate 0.5% ophthalmic solution: 1 drop(s) in each eye once a day (28 Mar 2024 22:52)  Xarelto 15 mg oral tablet: 1 tab(s) orally once a day (28 Mar 2024 22:52)      MEDICATIONS  MEDICATIONS  (STANDING):  aMIOdarone    Tablet 200 milliGRAM(s) Oral two times a day  atorvastatin 80 milliGRAM(s) Oral at bedtime  dextrose 5%. 1000 milliLiter(s) (50 mL/Hr) IV Continuous <Continuous>  dextrose 5%. 1000 milliLiter(s) (100 mL/Hr) IV Continuous <Continuous>  dextrose 50% Injectable 25 Gram(s) IV Push once  dextrose 50% Injectable 12.5 Gram(s) IV Push once  dextrose 50% Injectable 25 Gram(s) IV Push once  folic acid 1 milliGRAM(s) Oral daily  glucagon  Injectable 1 milliGRAM(s) IntraMuscular once  insulin lispro (ADMELOG) corrective regimen sliding scale   SubCutaneous three times a day before meals  insulin lispro (ADMELOG) corrective regimen sliding scale.   SubCutaneous at bedtime  pantoprazole    Tablet 40 milliGRAM(s) Oral before breakfast  predniSONE   Tablet 30 milliGRAM(s) Oral daily  rivaroxaban 15 milliGRAM(s) Oral with dinner  timolol 0.5% Solution 1 Drop(s) Both EYES daily    MEDICATIONS  (PRN):  acetaminophen     Tablet .. 650 milliGRAM(s) Oral every 6 hours PRN Temp greater or equal to 38C (100.4F), Mild Pain (1 - 3)  aluminum hydroxide/magnesium hydroxide/simethicone Suspension 30 milliLiter(s) Oral every 4 hours PRN Dyspepsia  dextrose Oral Gel 15 Gram(s) Oral once PRN Blood Glucose LESS THAN 70 milliGRAM(s)/deciliter  melatonin 3 milliGRAM(s) Oral at bedtime PRN Insomnia  ondansetron Injectable 4 milliGRAM(s) IV Push every 8 hours PRN Nausea and/or Vomiting        LABS: All Labs Reviewed:                        112   1229 )-----------( 201      ( 31 Mar 2024 06:34 )             34.6   03-31    138  |  108  |  33<H>  ----------------------------<  187<H>  3.8   |  21<L>  |  0.97    Ca    8.4<L>      31 Mar 2024 06:34  Phos  2.3     03  Mg     2.0     0331                     Urinalysis Basic - ( 30 Mar 2024 06:11 )    Color: x / Appearance: x / SG: x / pH: x  Gluc: 230 mg/dL / Ketone: x  / Bili: x / Urobili: x   Blood: x / Protein: x / Nitrite: x   Leuk Esterase: x / RBC: x / WBC x   Sq Epi: x / Non Sq Epi: x / Bacteria: x        Culture - Blood (collected 28 Mar 2024 23:45)  Source: .Blood Blood-Peripheral-aerobic plus received  Preliminary Report (30 Mar 2024 04:01):    No growth at 24 hours    Culture - Blood (collected 28 Mar 2024 23:45)  Source: .Blood Blood-Peripheral- aerobic plus received  Preliminary Report (30 Mar 2024 04:01):    No growth at 24 hours      Blood Culture:  @ 23:45  Organism --  Gram Stain Blood -- Gram Stain --  Specimen Source .Blood Blood-Peripheral- aerobic plus received  Culture-Blood --      I&O's Detail    CAPILLARY BLOOD GLUCOSE      POCT Blood Glucose.: 209 mg/dL (31 Mar 2024 16:46)  POCT Blood Glucose.: 194 mg/dL (31 Mar 2024 11:35)  POCT Blood Glucose.: 167 mg/dL (31 Mar 2024 07:40)  POCT Blood Glucose.: 231 mg/dL (30 Mar 2024 22:04)        CARDIOLOGY TESTING

## 2024-03-31 NOTE — PROGRESS NOTE ADULT - ASSESSMENT
87 yo female with a PMH of a fib on Xarelto, CHF on torsemide, HTN, GERD, DM, temporal arteritis on prednisone presents with diarrhea x 2 days. Pt was recently d/c from  for rapid a fib, and weakness on 3/25 and when she was d/c and placed on metformin. Pt states that after takign metformin she has having diarrhea TNTC which causes abd pain (which has currently resolved) and still feels weak. Per fam since AF meds started/ changed she has low BP, LOC.  ~ Admit for dehydration and to adjust cardiac meds; HR 50s and BP 95 systolic. Diarrhea has stopped since last night.    #Syncope, diarrhea from metformin. Hypotension.  #Afib with Slow ventricular response  #Chronic diastolic HF   #Temporal Arteritis - on chronic steroids - tapering off  #DMT2  #Hyperlipidemia  #EDITA    - Monitor on tele, hx of Unsuccessful CV on 3/21   - Continue with amiodarone - BB held for bradycardia -  now - on amio to 200 BID - at this point will ask EP to evaluate for repeat DCCV vs PPM for tachybrady syndrome - EP consulted  - EDITA, resolved - cr now 1  - Continue Xarelto for stroke ppx  - Continue atorvastatin

## 2024-03-31 NOTE — PROGRESS NOTE ADULT - ASSESSMENT
89 y/o F w/ PMH of temporal arteritis (on steroids), a-fib (on xarelto), DM2 p/w weakness. Patient has been having difficulty walking due to B/L LE edema      #Diarrhea most likely iatrogenic  -dc Metformin  -resolved       #Chronic AF, tachy/delvis syndeom  -On Xarelto   -Amio  -plan for DCCV tomrrow   -Cardiology/EP following         *DM2  -Humalog ISS  -a1c:     *Temporal arteritis  -On Prednisone 30mg    *Advance Directives  -Denies having any advance care directives in place     *DVT ppx   -On xarelto

## 2024-03-31 NOTE — PROGRESS NOTE ADULT - SUBJECTIVE AND OBJECTIVE BOX
Patient is a 89y old  Female who presents with a chief complaint of diarrhea, LOC (30 Mar 2024 21:05)        HPI:  87 yo female with a PMH of a fib on xarelro, chf on torsemide, htn, GERD, DM, temporal arteritis on prednisone presents with diarrhea x 2 days. Pt was recently d/c from  for rapid a fib, and weakness on 3/25 and when she was d/c and placed on metformin. Pt states that after takign metformin she has having diarrhea TNTC which causes abd pain (which has currently resolved) and still feels weak. Per fam since AF meds started/ changed she has low BP, LOC.    Admit for dehydration and to adjust  cardiac meds; HR 50s and bp95 systolic. Diarrhea has stopped since last night. (29 Mar 2024 07:52)      3/31 - HR back to 130s in rapid AF  amiodarone increased yesterday  BB was held due to severe bradycardia  At this point will ask EP to evaluate for repeat DCCV on amiodarone      PAST MEDICAL & SURGICAL HISTORY:  Temporal arteritis      Atrial fibrillation      Acute CHF            FAMILY HISTORY:        Allergies    penicillin (Swelling)    Intolerances          MEDICATIONS  (STANDING):  aMIOdarone    Tablet 200 milliGRAM(s) Oral two times a day  atorvastatin 80 milliGRAM(s) Oral at bedtime  dextrose 5%. 1000 milliLiter(s) (50 mL/Hr) IV Continuous <Continuous>  dextrose 5%. 1000 milliLiter(s) (100 mL/Hr) IV Continuous <Continuous>  dextrose 50% Injectable 25 Gram(s) IV Push once  dextrose 50% Injectable 25 Gram(s) IV Push once  dextrose 50% Injectable 12.5 Gram(s) IV Push once  folic acid 1 milliGRAM(s) Oral daily  glucagon  Injectable 1 milliGRAM(s) IntraMuscular once  insulin lispro (ADMELOG) corrective regimen sliding scale   SubCutaneous three times a day before meals  insulin lispro (ADMELOG) corrective regimen sliding scale.   SubCutaneous at bedtime  pantoprazole    Tablet 40 milliGRAM(s) Oral before breakfast  predniSONE   Tablet 30 milliGRAM(s) Oral daily  rivaroxaban 15 milliGRAM(s) Oral with dinner  timolol 0.5% Solution 1 Drop(s) Both EYES daily    MEDICATIONS  (PRN):  acetaminophen     Tablet .. 650 milliGRAM(s) Oral every 6 hours PRN Temp greater or equal to 38C (100.4F), Mild Pain (1 - 3)  aluminum hydroxide/magnesium hydroxide/simethicone Suspension 30 milliLiter(s) Oral every 4 hours PRN Dyspepsia  dextrose Oral Gel 15 Gram(s) Oral once PRN Blood Glucose LESS THAN 70 milliGRAM(s)/deciliter  melatonin 3 milliGRAM(s) Oral at bedtime PRN Insomnia  ondansetron Injectable 4 milliGRAM(s) IV Push every 8 hours PRN Nausea and/or Vomiting        REVIEW OF SYSTEMS:    CONSTITUTIONAL: No weakness, fevers or chills, lightheadedness/dizziness  EYES/ENT: No visual changes;  No vertigo or throat pain   NECK: No pain or stiffness  RESPIRATORY: No cough, wheezing, hemoptysis; No shortness of breath  CARDIOVASCULAR: No chest pain or palpitations  GASTROINTESTINAL: No abdominal or epigastric pain. No nausea, vomiting, or hematemesis; No diarrhea or constipation. No melena or hematochezia.  GENITOURINARY: No dysuria, frequency or hematuria  NEUROLOGICAL: No numbness or weakness  SKIN: No itching, rashes        Vital Signs Last 24 Hrs  T(C): 36.4 (31 Mar 2024 07:26), Max: 36.4 (30 Mar 2024 20:25)  T(F): 97.5 (31 Mar 2024 07:26), Max: 97.6 (30 Mar 2024 20:25)  HR: 122 (31 Mar 2024 07:26) (66 - 122)  BP: 99/64 (31 Mar 2024 07:26) (99/64 - 100/55)  BP(mean): --  RR: 19 (31 Mar 2024 07:26) (18 - 19)  SpO2: 100% (31 Mar 2024 07:26) (100% - 100%)    Parameters below as of 31 Mar 2024 07:26  Patient On (Oxygen Delivery Method): room air          I&O's Summary        PHYSICAL EXAM:  GENERAL: NAD, well-groomed, well-developed,  HEENT - NC/AT, pupils equal and reactive to light,  ; Moist mucous membranes, Good dentition, No lesions  NECK: Supple, No JVD  CHEST/LUNG: Clear to auscultation bilaterally; No rales, rhonchi, wheezing  HEART:irregularly irregular rate and rhythm; No murmurs, rubs, or gallops  ABDOMEN: Soft, Nontender, Nondistended; Bowel sounds present  EXTREMITIES:  2+ Peripheral Pulses, No clubbing, cyanosis, or edema  NEURO:  No Focal deficits, sensory and motor intact                                  11.2   12.29 )-----------( 201      ( 31 Mar 2024 06:34 )             34.6     03-31    138  |  108  |  33<H>  ----------------------------<  187<H>  3.8   |  21<L>  |  0.97    Ca    8.4<L>      31 Mar 2024 06:34  Phos  2.3     03-31  Mg     2.0     03-31          Culture - Urine (collected 28 Mar 2024 23:58)  Source: Clean Catch None  Final Report (30 Mar 2024 22:44):    <10,000 CFU/mL Normal Urogenital Trice    Culture - Blood (collected 28 Mar 2024 23:45)  Source: .Blood Blood-Peripheral-aerobic plus received  Preliminary Report (31 Mar 2024 04:02):    No growth at 48 Hours    Culture - Blood (collected 28 Mar 2024 23:45)  Source: .Blood Blood-Peripheral- aerobic plus received  Preliminary Report (31 Mar 2024 04:02):    No growth at 48 Hours        Urinalysis Basic - ( 31 Mar 2024 06:34 )    Color: x / Appearance: x / SG: x / pH: x  Gluc: 187 mg/dL / Ketone: x  / Bili: x / Urobili: x   Blood: x / Protein: x / Nitrite: x   Leuk Esterase: x / RBC: x / WBC x   Sq Epi: x / Non Sq Epi: x / Bacteria: x

## 2024-03-31 NOTE — CONSULT NOTE ADULT - ASSESSMENT
An 89 yo female known to our EP service with a PMH of a fib on xarelto, recent unsuccessful DCCV last week,, chf on torsemide, htn, GERD, DM, temporal arteritis on prednisone presents with diarrhea x 2 days & return of AF with -130s on tele  1) Continue tele monitoring  2) NPO after midnight tonight for possible DCCV tomorrow am  3) Has been on Xarelto- SHU if any questionable interruptions  4) Optimize electrolytes and fluid resuscitation Keep K > 4.0, Mg > 2.0  5) Amiodarone reloading with 200 mg po BID with food  6) All of above D/W Dr Radha Beasley Cardiology  7) Evaluation of diarrhea source with newly initiated metformin and farxiga? An 87 yo female known to our EP service with a PMH of a fib on xarelto, recent unsuccessful DCCV last week,, chf on torsemide, htn, GERD, DM, temporal arteritis on prednisone presents with diarrhea x 2 days & return of AF with -130s on tele  1) Continue tele monitoring  2) NPO after midnight tonight for possible DCCV tomorrow am  3) Continue uniterrupted DOAC-Has been on Xarelto- SHU if any questionable interruptions  4) Optimize electrolytes and fluid resuscitation Keep K > 4.0, Mg > 2.0  5) Amiodarone reloading with 200 mg po BID with food  6) All of above D/W Dr Rahda Beasley Cardiology  7) Evaluation of diarrhea source with newly initiated metformin and farxiga?

## 2024-03-31 NOTE — CONSULT NOTE ADULT - SUBJECTIVE AND OBJECTIVE BOX
ELECTROPHYSIOLOGY CONSULTATION NOTE                                                                                               Consult requested by:  Dr Radha Beasley  Reason for Consultation: AF with RVR    History obtained by: Patient and medical record   obtained: No    Chief complaint:    Patient is a 89y old  Female who presents with a chief complaint of diarrhea, LOC (31 Mar 2024 09:56)    HPI:  89 yo female with a PMH of a fib on xarelro, chf on torsemide, htn, GERD, DM, temporal arteritis on prednisone presents with diarrhea x 2 days. Pt was recently d/c from  for rapid a fib, and weakness on 3/25 and when she was d/c and placed on metformin. Pt states that after takign metformin she has having diarrhea TNTC which causes abd pain (which has currently resolved) and still feels weak. Per fam since AF meds started/ changed she has low BP, LOC.    Admit for dehydration and to adjust  cardiac meds; HR 50s and bp95 systolic. Diarrhea has stopped since last night. (29 Mar 2024 07:52)    3/31/24-EP asked to evaluate pt secondary to the above HPI with recent UNSUCCESSFUL DCCV 3/22/24 / amiodarone initiation at that time and above symptoms noted with return to AF with RVR in the 120-130s this hospitalization, although the 3/29/24 initial admission EKG demonstrates SInus Osman. D/W Dr Beasley-amiodarone gentle reloading at 200 mg po BID with food in progress. Will keep NPO after midnight tonight for possible DCCV tomorrow now in the setting of amiodarone medication on board. Will D/W Dr Mendoza.    General: No fatigue, no fevers/chills  Respiratory: No dyspnea, no cough, no wheeze  CV: No chest pain, no palpitations  Abd: No nausea had + abd pain ( gone now) + diarrhea ( resolved at present)  Neuro: No headache, no dizziness  ALL OTHER REVIEW OF SYSTEMS ARE NEGATIVE.      PREVIOUS DIAGNOSTIC TESTING  ECHO FINDINGS:  ACC: [id number] EXAM:  ECHO TTE WO CON COMP W DOPP   ORDERED BY: KATIE ALCARAZ     PROCEDURE DATE:  03/19/2024          INTERPRETATION:  Transthoracic Echocardiography Report (TTE)     Demographics     Patient name        LA MICHAEL       Age     88 year(s)     Med Rec #           [id number]         Gender        Female     Account #           [id number]      Date of Birth 1935     Interpreting        Cash Figueroa MD  Room Number   0329   Physician     Referring Physician katie griffith     Sonographer   Sudhakar Gonzalez,                                                       [facility]     Date of study       03/19/2024 09:30                       AM     Height              62.99 in          Weight        141.1 pounds    Type of Study:     TTE procedure: ECHO TTE WO CON COMP W DOP     BP: 108/59 mmHg     Study Location: 3ETechnical Quality: Fair    Indications   1) R60.9 - Edema    M-Mode Measurements (cm)     LVEDd: 4.06 cm              LVESd: 3.02 cm   IVSEd: 0.88 cm   LVPWd: 0.99 cm              AO Root Dimension: 3 cm                               ACS: 1.7 cm                               LA: 2.7 cm    Doppler Measurements:     AV Velocity:140 cm/s               MV Peak E-Wave: 84.4 cm/s   AV Peak Gradient: 7.84 mmHg     MV Peak A-Wave: 38.3 cm/s                                      MV E/A Ratio: 2.2 %   TR Velocity:233 cm/s               MV Peak Gradient: 2.85 mmHg   TR Gradient:21.7156 mmHg   Estimated RAP:3 mmHg   RVSP:25 mmHg     Findings     Mitral Valve   Minimal mitral annular calcification is present.   Mild (1+) mitral regurgitation is present.     Aortic Valve   Mild aortic sclerosis is present with normal valvular opening.     Tricuspid Valve   Moderate to severe (3+) tricuspid valve regurgitationis present.     Pulmonic Valve   Mild pulmonic valvular regurgitation (1+) is present.     Left Atrium   The left atrium is moderately dilated.     Left Ventricle   Left ventricle systolic function appears preserved in the presence of a   cardiac arrhythmia. Left ventricle size and structure are within normal   limitations. Visual estimation of left ventricle ejection fraction is>50   %.     Right Atrium   The right atrium appears moderately dilated.     Right Ventricle   The right ventricle exhibits mild dilation, mild diffuse hypokinesis, and   mild depression of contractility.     Pericardial Effusion   No evidence of pericardial effusion.     Pleural Effusion   No evidence of pleural effusion.     Miscellaneous   The IVC appears normal.     Impression     Summary     Left ventricle systolic function appears preserved in the presence of a   cardiac arrhythmia. Left ventricle size and structure are within normal   limitations. Visual estimation of left ventricle ejection fraction is>50   %.   The left atrium is moderately dilated.   The right atrium appears moderately dilated.   The right ventricle exhibits mild dilation, mild diffuse hypokinesis, and   mild depression of contractility.   Mild aortic sclerosis is present with normalvalvular opening.   Minimal mitral annular calcification is present.   Mild (1+) mitral regurgitation is present.   Moderate to severe (3+) tricuspid valve regurgitation is present.   Mild pulmonic valvular regurgitation (1+) is present.     Signature     ----------------------------------------------------------------   Electronically signed by Cash Figueroa MD(Interpreting   physician) on 03/19/2024 05:13 PM   ----------------------------------------------------------------    Valves     MitralValve     Peak E-Wave: 84.4 cm/s   Peak A-Wave: 38.3 cm/s              Deceleration Time: 236 msec   Peak Gradient: 2.85 mmHg                                       E/A Ratio: 2.2     Aortic Valve     Peak Velocity: 140 cm/s   Peak Gradient: 7.84 mmHg     Cusp Separation: 1.7 cm     Tricuspid Valve     TR Velocity: 233 cm/s                  Estimated RAP: 3 mmHg   TR Gradient: 21.7156 mmHg              Estimated RVSP: 25 mmHg     Pulmonic Valve              Estimated PASP: 24.72 mmHg    Structures     Left Atrium     LA Dimension: 2.7 cm        LA Area: 16.2 cm^2   LA/Aorta: 0.9               LA Volume/Index: 40.3 ml /24m^2     Left Ventricle     Diastolic Dimension: 4.06 cm          Systolic Dimension: 3.02 cm   Septum Diastolic: 0.88 cm   PWDiastolic: 0.99 cm     FS: 25.62 %     Right Atrium     RA Systolic Pressure: 3 mmHg                RA Area: 16.8 cm^2     Right Ventricle              RV Systolic Pressure: 24.72 mmHg     Miscellaneous     Aorta     Aortic Root: 3 cm          ALLERGIES: Allergies    penicillin (Swelling)    Intolerances          PAST MEDICAL HISTORY  Temporal arteritis    Atrial fibrillation    Acute CHF        PAST SURGICAL HISTORY      FAMILY HISTORY: Two Brothers with AF       SOCIAL HISTORY:  Denies smoking/alcohol/drugs      CURRENT MEDICATIONS:  aMIOdarone    Tablet 200 milliGRAM(s) Oral two times a day     pantoprazole    Tablet  atorvastatin  dextrose 5%.  dextrose 5%.  dextrose 50% Injectable  dextrose 50% Injectable  dextrose 50% Injectable  folic acid  glucagon  Injectable  insulin lispro (ADMELOG) corrective regimen sliding scale  insulin lispro (ADMELOG) corrective regimen sliding scale.  predniSONE   Tablet  rivaroxaban  timolol 0.5% Solution      Vital Signs Last 24 Hrs  T(C): 36.4 (31 Mar 2024 07:26), Max: 36.4 (30 Mar 2024 20:25)  T(F): 97.5 (31 Mar 2024 07:26), Max: 97.6 (30 Mar 2024 20:25)  HR: 122 (31 Mar 2024 07:26) (66 - 122)  BP: 99/64 (31 Mar 2024 07:26) (99/64 - 100/55)  BP(mean): --  RR: 19 (31 Mar 2024 07:26) (18 - 19)  SpO2: 100% (31 Mar 2024 07:26) (100% - 100%)    Parameters below as of 31 Mar 2024 07:26  Patient On (Oxygen Delivery Method): room air      PHYSICAL EXAM:  Constitutional: Comfortable . No acute distress.   HEENT: Atraumatic and normocephalic , neck is supple . no JVD. No carotid bruit. PEERL   CNS: A&Ox3. No focal deficits. EOMI. Cranial nerves II-IX are intact.   Lymph Nodes: Cervical : Not palpable.  Respiratory: CTAB  Cardiovascular: S1S2 IRR. No murmur/rubs or gallop.  Gastrointestinal: Soft non-tender and non distended . +Bowel sounds.   Extremities: No edema.   Psychiatric: Calm . no agitation.  Skin: No skin rash/ulcers visualized to face, hands or feet.    Intake and output:     LABS:                        11.2   12.29 )-----------( 201      ( 31 Mar 2024 06:34 )             34.6     03-31    138  |  108  |  33<H>  ----------------------------<  187<H>  3.8   |  21<L>  |  0.97    Ca    8.4<L>      31 Mar 2024 06:34  Phos  2.3     03-31  Mg     2.0     03-31      ;p-BNP=    Urinalysis Basic - ( 31 Mar 2024 06:34 )    Color: x / Appearance: x / SG: x / pH: x  Gluc: 187 mg/dL / Ketone: x  / Bili: x / Urobili: x   Blood: x / Protein: x / Nitrite: x   Leuk Esterase: x / RBC: x / WBC x   Sq Epi: x / Non Sq Epi: x / Bacteria: x      INTERPRETATION OF TELEMETRY: Reviewed by me. AF with rates 90-120s   ECG: Reviewed by me.   Ventricular Rate 49 BPM    Atrial Rate 49 BPM    P-R Interval 140 ms    QRS Duration 66 ms    Q-T Interval 460 ms    QTC Calculation(Bazett) 415 ms    P Axis 48 degrees    R Axis -13 degrees    T Axis -32 degrees    Diagnosis Line Poor data quality  Sinus bradycardia  Nonspecific ST and T wave abnormality  Abnormal ECG  When compared with ECG of 21-MAR-2024 11:10,  Sinus rhythm has replaced Atrial fibrillation  Vent. rate has decreased BY  46 BPM  Confirmed by BRADLY GONZALEZ MD ([id number]) on 3/29/2024 2:32:56 PM                                                                                                                                               ELECTROPHYSIOLOGY CONSULTATION NOTE                                                                                               Consult requested by:  Dr Radha Beasley  Reason for Consultation: AF with RVR    History obtained by: Patient and medical record   obtained: No    Chief complaint:    Patient is a 89y old  Female who presents with a chief complaint of diarrhea, LOC (31 Mar 2024 09:56)    HPI:  89 yo female with a PMH of a fib on xarelro, chf on torsemide, htn, GERD, DM, temporal arteritis on prednisone presents with diarrhea x 2 days. Pt was recently d/c from  for rapid a fib, and weakness on 3/25 and when she was d/c and placed on metformin. Pt states that after takign metformin she has having diarrhea TNTC which causes abd pain (which has currently resolved) and still feels weak. Per fam since AF meds started/ changed she has low BP, LOC.    Admit for dehydration and to adjust  cardiac meds; HR 50s and bp95 systolic. Diarrhea has stopped since last night. (29 Mar 2024 07:52)    3/31/24-EP asked to evaluate pt secondary to the above HPI with recent UNSUCCESSFUL DCCV 3/22/24 / amiodarone initiation at that time and above symptoms noted with return to AF with RVR in the 120-130s this hospitalization, although the 3/29/24 initial admission EKG demonstrates SInus Osman. D/W Dr Beasley-amiodarone gentle reloading at 200 mg po BID with food in progress. Will keep NPO after midnight tonight for possible DCCV tomorrow now in the setting of amiodarone medication on board. Will D/W Dr Mendoza.    General: No fatigue, no fevers/chills  Respiratory: No dyspnea, no cough, no wheeze  CV: No chest pain, no palpitations  Abd: No nausea had + abd pain ( gone now) + diarrhea ( resolved at present)  Neuro: No headache, no dizziness  ALL OTHER REVIEW OF SYSTEMS ARE NEGATIVE.      PREVIOUS DIAGNOSTIC TESTING  ECHO FINDINGS:  ACC: [id number] EXAM:  ECHO TTE WO CON COMP W DOPP   ORDERED BY: KATIE ALCARAZ     PROCEDURE DATE:  03/19/2024          INTERPRETATION:  Transthoracic Echocardiography Report (TTE)     Demographics     Patient name        LA MICHAEL       Age     88 year(s)     Med Rec #           [id number]         Gender        Female     Account #           [id number]      Date of Birth 1935     Interpreting        Cash Figueroa MD  Room Number   0329   Physician     Referring Physician katie griffith     Sonographer   Sudhakar Gonzalez,                                                       [facility]     Date of study       03/19/2024 09:30                       AM     Height              62.99 in          Weight        141.1 pounds    Type of Study:     TTE procedure: ECHO TTE WO CON COMP W DOP     BP: 108/59 mmHg     Study Location: 3ETechnical Quality: Fair    Indications   1) R60.9 - Edema    M-Mode Measurements (cm)     LVEDd: 4.06 cm              LVESd: 3.02 cm   IVSEd: 0.88 cm   LVPWd: 0.99 cm              AO Root Dimension: 3 cm                               ACS: 1.7 cm                               LA: 2.7 cm    Doppler Measurements:     AV Velocity:140 cm/s               MV Peak E-Wave: 84.4 cm/s   AV Peak Gradient: 7.84 mmHg     MV Peak A-Wave: 38.3 cm/s                                      MV E/A Ratio: 2.2 %   TR Velocity:233 cm/s               MV Peak Gradient: 2.85 mmHg   TR Gradient:21.7156 mmHg   Estimated RAP:3 mmHg   RVSP:25 mmHg     Findings     Mitral Valve   Minimal mitral annular calcification is present.   Mild (1+) mitral regurgitation is present.     Aortic Valve   Mild aortic sclerosis is present with normal valvular opening.     Tricuspid Valve   Moderate to severe (3+) tricuspid valve regurgitationis present.     Pulmonic Valve   Mild pulmonic valvular regurgitation (1+) is present.     Left Atrium   The left atrium is moderately dilated.     Left Ventricle   Left ventricle systolic function appears preserved in the presence of a   cardiac arrhythmia. Left ventricle size and structure are within normal   limitations. Visual estimation of left ventricle ejection fraction is>50   %.     Right Atrium   The right atrium appears moderately dilated.     Right Ventricle   The right ventricle exhibits mild dilation, mild diffuse hypokinesis, and   mild depression of contractility.     Pericardial Effusion   No evidence of pericardial effusion.     Pleural Effusion   No evidence of pleural effusion.     Miscellaneous   The IVC appears normal.     Impression     Summary     Left ventricle systolic function appears preserved in the presence of a   cardiac arrhythmia. Left ventricle size and structure are within normal   limitations. Visual estimation of left ventricle ejection fraction is>50   %.   The left atrium is moderately dilated.   The right atrium appears moderately dilated.   The right ventricle exhibits mild dilation, mild diffuse hypokinesis, and   mild depression of contractility.   Mild aortic sclerosis is present with normalvalvular opening.   Minimal mitral annular calcification is present.   Mild (1+) mitral regurgitation is present.   Moderate to severe (3+) tricuspid valve regurgitation is present.   Mild pulmonic valvular regurgitation (1+) is present.     Signature     ----------------------------------------------------------------   Electronically signed by Cash Figueroa MD(Interpreting   physician) on 03/19/2024 05:13 PM   ----------------------------------------------------------------    Valves     MitralValve     Peak E-Wave: 84.4 cm/s   Peak A-Wave: 38.3 cm/s              Deceleration Time: 236 msec   Peak Gradient: 2.85 mmHg                                       E/A Ratio: 2.2     Aortic Valve     Peak Velocity: 140 cm/s   Peak Gradient: 7.84 mmHg     Cusp Separation: 1.7 cm     Tricuspid Valve     TR Velocity: 233 cm/s                  Estimated RAP: 3 mmHg   TR Gradient: 21.7156 mmHg              Estimated RVSP: 25 mmHg     Pulmonic Valve              Estimated PASP: 24.72 mmHg    Structures     Left Atrium     LA Dimension: 2.7 cm        LA Area: 16.2 cm^2   LA/Aorta: 0.9               LA Volume/Index: 40.3 ml /24m^2     Left Ventricle     Diastolic Dimension: 4.06 cm          Systolic Dimension: 3.02 cm   Septum Diastolic: 0.88 cm   PWDiastolic: 0.99 cm     FS: 25.62 %     Right Atrium     RA Systolic Pressure: 3 mmHg                RA Area: 16.8 cm^2     Right Ventricle              RV Systolic Pressure: 24.72 mmHg     Miscellaneous     Aorta     Aortic Root: 3 cm          ALLERGIES: Allergies    penicillin (Swelling)    Intolerances          PAST MEDICAL HISTORY  Temporal arteritis    Atrial fibrillation    Acute CHF        PAST SURGICAL HISTORY      FAMILY HISTORY: Two Brothers with AF       SOCIAL HISTORY:  Denies smoking/alcohol/drugs      CURRENT MEDICATIONS:  aMIOdarone    Tablet 200 milliGRAM(s) Oral two times a day     pantoprazole    Tablet  atorvastatin  dextrose 5%.  dextrose 5%.  dextrose 50% Injectable  dextrose 50% Injectable  dextrose 50% Injectable  folic acid  glucagon  Injectable  insulin lispro (ADMELOG) corrective regimen sliding scale  insulin lispro (ADMELOG) corrective regimen sliding scale.  predniSONE   Tablet  rivaroxaban  timolol 0.5% Solution      Vital Signs Last 24 Hrs  T(C): 36.4 (31 Mar 2024 07:26), Max: 36.4 (30 Mar 2024 20:25)  T(F): 97.5 (31 Mar 2024 07:26), Max: 97.6 (30 Mar 2024 20:25)  HR: 122 (31 Mar 2024 07:26) (66 - 122)  BP: 99/64 (31 Mar 2024 07:26) (99/64 - 100/55)  BP(mean): --  RR: 19 (31 Mar 2024 07:26) (18 - 19)  SpO2: 100% (31 Mar 2024 07:26) (100% - 100%)    Parameters below as of 31 Mar 2024 07:26  Patient On (Oxygen Delivery Method): room air      PHYSICAL EXAM:  Constitutional: Comfortable . No acute distress.   HEENT: Atraumatic and normocephalic , neck is supple . no JVD. No carotid bruit. PEERL   CNS: A&Ox3. No focal deficits. EOMI. Cranial nerves II-IX are intact.   Lymph Nodes: Cervical : Not palpable.  Respiratory: CTAB  Cardiovascular: S1S2 IRR. No murmur/rubs or gallop.  Gastrointestinal: Soft non-tender and non distended . +Bowel sounds.   Extremities: 2+ Lower extremity edema.   Psychiatric: Calm . no agitation.  Skin: No skin rash/ulcers visualized to face, hands or feet.    Intake and output:     LABS:                        11.2   12.29 )-----------( 201      ( 31 Mar 2024 06:34 )             34.6     03-31    138  |  108  |  33<H>  ----------------------------<  187<H>  3.8   |  21<L>  |  0.97    Ca    8.4<L>      31 Mar 2024 06:34  Phos  2.3     03-31  Mg     2.0     03-31      ;p-BNP=    Urinalysis Basic - ( 31 Mar 2024 06:34 )    Color: x / Appearance: x / SG: x / pH: x  Gluc: 187 mg/dL / Ketone: x  / Bili: x / Urobili: x   Blood: x / Protein: x / Nitrite: x   Leuk Esterase: x / RBC: x / WBC x   Sq Epi: x / Non Sq Epi: x / Bacteria: x      INTERPRETATION OF TELEMETRY: Reviewed by me. AF with rates 90-120s   ECG: Reviewed by me.   Ventricular Rate 49 BPM    Atrial Rate 49 BPM    P-R Interval 140 ms    QRS Duration 66 ms    Q-T Interval 460 ms    QTC Calculation(Bazett) 415 ms    P Axis 48 degrees    R Axis -13 degrees    T Axis -32 degrees    Diagnosis Line Poor data quality  Sinus bradycardia  Nonspecific ST and T wave abnormality  Abnormal ECG  When compared with ECG of 21-MAR-2024 11:10,  Sinus rhythm has replaced Atrial fibrillation  Vent. rate has decreased BY  46 BPM  Confirmed by BRADLY GONZALEZ MD ([id number]) on 3/29/2024 2:32:56 PM

## 2024-04-01 NOTE — PROCEDURAL SAFETY CHECKLIST WITH OR WITHOUT SEDATION - NSPOSTCOMMENTFT_GEN_ALL_CORE
DCCV with 200Jx1, converted to NSR for a minute then went back to  afib. EKG done. Dr. Mendoza at bedside. Redness on chest noted, slight redness at the back

## 2024-04-01 NOTE — PROGRESS NOTE ADULT - ASSESSMENT
87 yo female with a PMH of a fib on Xarelto, CHF on torsemide, HTN, GERD, DM, temporal arteritis on prednisone presents with diarrhea x 2 days. Pt was recently d/c from  for rapid a fib, and weakness on 3/25 and when she was d/c and placed on metformin. Pt states that after takign metformin she has having diarrhea TNTC which causes abd pain (which has currently resolved) and still feels weak. Per fam since AF meds started/ changed she has low BP, LOC.  ~ Admit for dehydration and to adjust cardiac meds; HR 50s and BP 95 systolic. Diarrhea has stopped since last night.    #Syncope, diarrhea from metformin. Hypotension.  #Afib with Slow ventricular response  #Chronic diastolic HF   #Temporal Arteritis - on chronic steroids - tapering off  #DMT2  #Hyperlipidemia  #EDITA    - Monitor on tele, hx of Unsuccessful CV on 3/21   - Continue with amiodarone   - BB held for bradycardia  - EDITA, resolved - cr now 1, Torsemide and Farxiga held for now  - Continue Xarelto for stroke ppx  - Continue atorvastatin     Case d/w Dr. Londono  Will follow

## 2024-04-01 NOTE — PROGRESS NOTE ADULT - ASSESSMENT
87 y/o F w/ PMH of temporal arteritis (on steroids), a-fib (on xarelto), DM2 p/w weakness. Patient has been having difficulty walking due to B/L LE edema      #Diarrhea most likely iatrogenic  -dc Metformin  -resolved       #Chronic AF, tachy/delvis Syndrome   -On Xarelto   -Amio  -s/p DCCV today: was in NSR then reverted to Afib   -Cardiology/EP following         *DM2  -Humalog ISS    #Temporal arteritis  -On Prednisone 30mg    *Advance Directives  -Denies having any advance care directives in place     *DVT ppx   -Xarelto

## 2024-04-01 NOTE — PACU DISCHARGE NOTE - COMMENTS
Report given to jonas URRUTIA RN, Verified with [facility] that the patient is on cardiac monitor. Transferred patient back to  with transport staff.
Hyperlipidemia

## 2024-04-01 NOTE — PROGRESS NOTE ADULT - SUBJECTIVE AND OBJECTIVE BOX
HOSPITALIST ATTENDING PROGRESS NOTE     Chart and meds reviewed. Patient seen and examined     CC: diarrhea    Subjective: diarrhea is improving. No other acute complaints.     3/31: Seen and evaluated. Diarrhea resolved. Noted to be in afib later in the day: 1x Lopressor given     : Seen and evaluated. Denies chest pain, sob.   remain in Afib, s/p unsuccessful DCCV       All other systems and founds to be negative with exception of what has been described above.       PHYSICAL EXAM:  Vital Signs Last 24 Hrs  T(C): 36.6 (2024 20:43), Max: 37 (2024 10:18)  T(F): 97.9 (2024 20:43), Max: 98.6 (2024 10:18)  HR: 114 (2024 20:43) (87 - 130)  BP: 105/78 (2024 20:43) (95/57 - 116/78)  RR: 19 (2024 20:43) (16 - 19)  SpO2: 100% (2024 20:43) (98% - 100%)    Parameters below as of 2024 20:43  Patient On (Oxygen Delivery Method): room air            Daily     Daily Weight in k.7 (30 Mar 2024 06:02)    GEN: NAD   HEENT: EOMI,  moist mucous membranes  NECK : Soft and supple, no JVD  LUNG: CTABL, No wheezing, rales or rhonchi  CVS: S1S2+,+irregularly irregular   GI: BS+, soft, NT/ND, no guarding, no rebound  EXTREMITIES: No peripheral edema  VASCULAR: 2+ peripheral pulses  NEURO: AAOx3, grossly non-focal   SKIN: No rashes    HOME MEDICATIONS:  Home Medications:  Farxiga 10 mg oral tablet: 1 tab(s) orally once a day (28 Mar 2024 22:52)  folic acid 1 mg oral tablet: 1 tab(s) orally once a day (28 Mar 2024 22:52)  ipratropium 21 mcg/inh (0.03%) nasal spray: 2 spray(s) intranasally once a day (28 Mar 2024 22:52)  metFORMIN 500 mg oral tablet, extended release: 1 tab(s) orally once a day *** patient stopped metformin due to diarrhea*** (29 Mar 2024 15:12)  pantoprazole 40 mg oral delayed release tablet: 1 tab(s) orally once a day (28 Mar 2024 22:52)  potassium chloride 10 mEq oral tablet, extended release: 1 tab(s) orally 2 times a day (28 Mar 2024 22:52)  rosuvastatin 40 mg oral tablet: 1 tab(s) orally once a day (28 Mar 2024 22:52)  timolol maleate 0.5% ophthalmic solution: 1 drop(s) in each eye once a day (28 Mar 2024 22:52)  Xarelto 15 mg oral tablet: 1 tab(s) orally once a day (28 Mar 2024 22:52)      MEDICATIONS  MEDICATIONS  (STANDING):  aMIOdarone    Tablet 200 milliGRAM(s) Oral two times a day  atorvastatin 80 milliGRAM(s) Oral at bedtime  dextrose 5%. 1000 milliLiter(s) (100 mL/Hr) IV Continuous <Continuous>  dextrose 5%. 1000 milliLiter(s) (50 mL/Hr) IV Continuous <Continuous>  dextrose 50% Injectable 25 Gram(s) IV Push once  dextrose 50% Injectable 12.5 Gram(s) IV Push once  dextrose 50% Injectable 25 Gram(s) IV Push once  folic acid 1 milliGRAM(s) Oral daily  glucagon  Injectable 1 milliGRAM(s) IntraMuscular once  insulin lispro (ADMELOG) corrective regimen sliding scale   SubCutaneous three times a day before meals  insulin lispro (ADMELOG) corrective regimen sliding scale.   SubCutaneous at bedtime  pantoprazole    Tablet 40 milliGRAM(s) Oral before breakfast  predniSONE   Tablet 30 milliGRAM(s) Oral daily  rivaroxaban 15 milliGRAM(s) Oral with dinner  timolol 0.5% Solution 1 Drop(s) Both EYES daily    MEDICATIONS  (PRN):  acetaminophen     Tablet .. 650 milliGRAM(s) Oral every 6 hours PRN Temp greater or equal to 38C (100.4F), Mild Pain (1 - 3)  aluminum hydroxide/magnesium hydroxide/simethicone Suspension 30 milliLiter(s) Oral every 4 hours PRN Dyspepsia  dextrose Oral Gel 15 Gram(s) Oral once PRN Blood Glucose LESS THAN 70 milliGRAM(s)/deciliter  melatonin 3 milliGRAM(s) Oral at bedtime PRN Insomnia  metoprolol tartrate Injectable 5 milliGRAM(s) IV Push every 6 hours PRN if HR>120  ondansetron Injectable 4 milliGRAM(s) IV Push every 8 hours PRN Nausea and/or Vomiting        LABS: All Labs Reviewed:                          11.2   12.29 )-----------( 201      ( 31 Mar 2024 06:34 )             34.6   04-01    139  |  113<H>  |  32<H>  ----------------------------<  189<H>  4.7   |  20<L>  |  1.13    Ca    8.3<L>      2024 06:04  Phos  2.8     04-  Mg     2.0     04-            Urinalysis Basic - ( 30 Mar 2024 06:11 )    Color: x / Appearance: x / SG: x / pH: x  Gluc: 230 mg/dL / Ketone: x  / Bili: x / Urobili: x   Blood: x / Protein: x / Nitrite: x   Leuk Esterase: x / RBC: x / WBC x   Sq Epi: x / Non Sq Epi: x / Bacteria: x        Culture - Blood (collected 28 Mar 2024 23:45)  Source: .Blood Blood-Peripheral-aerobic plus received  Preliminary Report (30 Mar 2024 04:01):    No growth at 24 hours    Culture - Blood (collected 28 Mar 2024 23:45)  Source: .Blood Blood-Peripheral- aerobic plus received  Preliminary Report (30 Mar 2024 04:01):    No growth at 24 hours      Blood Culture:  @ 23:45  Organism --  Gram Stain Blood -- Gram Stain --  Specimen Source .Blood Blood-Peripheral- aerobic plus received  Culture-Blood --      I&O's Detail    CAPILLARY BLOOD GLUCOSE      POCT Blood Glucose.: 251 mg/dL (2024 17:01)  POCT Blood Glucose.: 145 mg/dL (2024 12:03)  POCT Blood Glucose.: 190 mg/dL (2024 06:10)        CARDIOLOGY TESTING

## 2024-04-02 NOTE — PROGRESS NOTE ADULT - SUBJECTIVE AND OBJECTIVE BOX
HOSPITALIST ATTENDING PROGRESS NOTE     Chart and meds reviewed. Patient seen and examined     CC: diarrhea    Subjective: Pt seen and evaluated. Diarrhea has since resolved. 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 States she had repeat US which showed resolution of clots.   Has Hx of Temporal Arteritis with LEFT eye blindness, started on High dose prednisone. Cannot recall the name of the her rheumatologist   No known Hx of malignancy   Recent TTE reviewed:   Recent CT chest, abdomen/Pelvis: reviewed   US LE: reviewed       Continues to remain in Afib, rate remains uncontrolled. s/p multiple doses of Metoprolol      All other systems and founds to be negative with exception of what has been described above.     PHYSICAL EXAM:  Vital Signs Last 24 Hrs  T(C): 36.2 (2024 20:45), Max: 36.3 (2024 07:24)  T(F): 97.2 (2024 20:45), Max: 97.3 (2024 07:24)  HR: 110 (2024 20:45) (99 - 150)  BP: 102/72 (2024 20:45) (90/62 - 121/73)  BP(mean): 77 (2024 14:10) (77 - 77)  RR: 18 (2024 20:45) (18 - 18)  SpO2: 98% (2024 20:45) (98% - 100%)    Parameters below as of 2024 20:45  Patient On (Oxygen Delivery Method): room air          Daily     Daily Weight in k.8 (2024 07:04)    GEN: NAD   HEENT: EOMI,  moist mucous membranes  NECK : Soft and supple, no JVD  LUNG: CTABL, No wheezing, rales or rhonchi  CVS: S1S2+, +irregular irregular   GI: BS+, soft, NT/ND, no guarding, no rebound  EXTREMITIES: +edema   VASCULAR: 2+ peripheral pulses  NEURO: AAOx3, grossly non-focal   SKIN: No rashes    HOME MEDICATIONS:  Home Medications:  Farxiga 10 mg oral tablet: 1 tab(s) orally once a day (28 Mar 2024 22:52)  folic acid 1 mg oral tablet: 1 tab(s) orally once a day (28 Mar 2024 22:52)  ipratropium 21 mcg/inh (0.03%) nasal spray: 2 spray(s) intranasally once a day (28 Mar 2024 22:52)  metFORMIN 500 mg oral tablet, extended release: 1 tab(s) orally once a day *** patient stopped metformin due to diarrhea*** (29 Mar 2024 15:12)  pantoprazole 40 mg oral delayed release tablet: 1 tab(s) orally once a day (28 Mar 2024 22:52)  potassium chloride 10 mEq oral tablet, extended release: 1 tab(s) orally 2 times a day (28 Mar 2024 22:52)  rosuvastatin 40 mg oral tablet: 1 tab(s) orally once a day (28 Mar 2024 22:52)  timolol maleate 0.5% ophthalmic solution: 1 drop(s) in each eye once a day (28 Mar 2024 22:52)  Xarelto 15 mg oral tablet: 1 tab(s) orally once a day (28 Mar 2024 22:52)      MEDICATIONS  MEDICATIONS  (STANDING):  aMIOdarone    Tablet 200 milliGRAM(s) Oral two times a day  atorvastatin 80 milliGRAM(s) Oral at bedtime  dextrose 5%. 1000 milliLiter(s) (100 mL/Hr) IV Continuous <Continuous>  dextrose 5%. 1000 milliLiter(s) (50 mL/Hr) IV Continuous <Continuous>  dextrose 50% Injectable 25 Gram(s) IV Push once  dextrose 50% Injectable 12.5 Gram(s) IV Push once  dextrose 50% Injectable 25 Gram(s) IV Push once  folic acid 1 milliGRAM(s) Oral daily  glucagon  Injectable 1 milliGRAM(s) IntraMuscular once  insulin lispro (ADMELOG) corrective regimen sliding scale   SubCutaneous three times a day before meals  insulin lispro (ADMELOG) corrective regimen sliding scale.   SubCutaneous at bedtime  lactated ringers. 1000 milliLiter(s) (50 mL/Hr) IV Continuous <Continuous>  metoprolol tartrate 25 milliGRAM(s) Oral two times a day  pantoprazole    Tablet 40 milliGRAM(s) Oral before breakfast  predniSONE   Tablet 30 milliGRAM(s) Oral daily  rivaroxaban 15 milliGRAM(s) Oral with dinner  timolol 0.5% Solution 1 Drop(s) Both EYES daily  torsemide 10 milliGRAM(s) Oral daily      LABS: All Labs Reviewed:                        11.3   1242 )-----------( 202      ( 2024 07:14 )             35.6   04-02    138  |  112<H>  |  33<H>  ----------------------------<  163<H>  4.8   |  21<L>  |  0.91    Ca    8.3<L>      2024 07:14  Phos  2.7     04-02  Mg     1.9     04-02      Urinalysis Basic - ( 2024 07:14 )    Color: x / Appearance: x / SG: x / pH: x  Gluc: 163 mg/dL / Ketone: x  / Bili: x / Urobili: x   Blood: x / Protein: x / Nitrite: x   Leuk Esterase: x / RBC: x / WBC x   Sq Epi: x / Non Sq Epi: x / Bacteria: x        Blood Culture:  @ 23:58  Organism --  Gram Stain Blood -- Gram Stain --  Specimen Source Clean Catch None  Culture-Blood --     @ 23:45  Organism --  Gram Stain Blood -- Gram Stain --  Specimen Source .Blood Blood-Peripheral- aerobic plus received  Culture-Blood --      I&O's Detail    2024 07:01  -  2024 21:11  --------------------------------------------------------  IN:  Total IN: 0 mL    OUT:    Voided (mL): 800 mL  Total OUT: 800 mL    Total NET: -800 mL        CAPILLARY BLOOD GLUCOSE      POCT Blood Glucose.: 309 mg/dL (2024 16:50)  POCT Blood Glucose.: 238 mg/dL (2024 11:31)  POCT Blood Glucose.: 151 mg/dL (2024 07:48)  POCT Blood Glucose.: 230 mg/dL (2024 21:54)        CARDIOLOGY TESTING   EKG: reviewed     ECHO  < from: TTE Echo Complete w/o Contrast w/ Doppler (24 @ 09:41) >   Summary     Left ventricle systolic function appears preserved in the presence of a   cardiac arrhythmia. Left ventricle size and structure are within normal   limitations. Visual estimation of left ventricle ejection fraction is>50   %.   The left atrium is moderately dilated.   The right atrium appears moderately dilated.   The right ventricle exhibits mild dilation, mild diffuse hypokinesis, and   mild depression of contractility.   Mild aortic sclerosis is present with normalvalvular opening.   Minimal mitral annular calcification is present.   Mild (1+) mitral regurgitation is present.   Moderate to severe (3+) tricuspid valve regurgitation is present.   Mild pulmonic valvular regurgitation (1+) is present.      RADIOLOGY  < from: CT Abdomen and Pelvis w/ IV Cont (24 @ 23:36) >  FINDINGS:  LOWER CHEST: Emphysema. Subsegmental atelectasis. 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  BILE DUCTS: Normal caliber.  GALLBLADDER: Dependent hyperattenuating sludge and/or small stones.  SPLEEN: Within normal limits.  PANCREAS: Fatty atrophy.  ADRENALS: Within normal limits.  KIDNEYS/URETERS: Bilateral renal cysts. No renal stones or hydronephrosis.    BLADDER: Small posterior diverticulum.  REPRODUCTIVE ORGANS: Calcified uterine fibroid.    BOWEL: No bowel obstruction or active bowel inflammation. Appendix is   normal.  PERITONEUM: No ascites.  VESSELS: Atherosclerotic changes.  RETROPERITONEUM/LYMPH NODES: No lymphadenopathy.  ABDOMINAL WALL: Stable 5.7 x 4.2 x 8.7 cm right lateral hip subcutaneous   hematoma.  BONES: Degenerative changes. No evidence of acute fracture.    IMPRESSION:  No evidence of small bowel obstruction or active bowel inflammation.    Stable subcutaneous hematoma overlying the right lateral hip.

## 2024-04-02 NOTE — PHYSICAL THERAPY INITIAL EVALUATION ADULT - GENERAL OBSERVATIONS, REHAB EVAL
Pt see Pt seen on 3N, NAD, Dr Doshi lifted the bedrest order via in person conversation, HM+ -150 this session no symptoms. Pt alert and willing to participate. B LE edema+, PT limited to bedside eval due to high HR.

## 2024-04-02 NOTE — PROGRESS NOTE ADULT - SUBJECTIVE AND OBJECTIVE BOX
CC: Patient is a 89y old  Female who presents with a chief complaint of diarrhea, LOC (30 Mar 2024 21:05)    FROM HPI: 87 yo female with a PMH of a fib on xarelro, chf on torsemide, htn, GERD, DM, temporal arteritis on prednisone presents with diarrhea x 2 days. Pt was recently d/c from  for rapid a fib, and weakness on 3/25 and when she was d/c and placed on metformin. Pt states that after takign metformin she has having diarrhea TNTC which causes abd pain (which has currently resolved) and still feels weak. Per family since AF meds started/ changed she has low BP, LOC.  Admit for dehydration and to adjust  cardiac meds; HR 50s and bp95 systolic. Diarrhea has stopped since last night. (29 Mar 2024 07:52)    3/31. HR back to 130s in rapid AF, amiodarone increased yesterday. BB was held due to severe bradycardia. At this point will ask EP to evaluate for repeat DCCV on amiodarone  4/1. Possible CV this AM, C/w amiodarone. Holding diuretics.  4/2. Unsuccessful CV attempt yesterday. Will resume low dose BB.     PAST MEDICAL & SURGICAL HISTORY:  Temporal arteritis  Atrial fibrillation  Acute CHF    PSH: Denies   Social Hx: Denies tobacco / etoh / drugs   Family Hx: Denies pertinent hx  (18 Mar 2024 23:17)    Allergies: Penicillin (Swelling), Intolerances    MEDICATIONS  (STANDING):  aMIOdarone    Tablet 200 milliGRAM(s) Oral two times a day  atorvastatin 80 milliGRAM(s) Oral at bedtime  dextrose 5%. 1000 milliLiter(s) (50 mL/Hr) IV Continuous <Continuous>  dextrose 5%. 1000 milliLiter(s) (100 mL/Hr) IV Continuous <Continuous>  dextrose 50% Injectable 12.5 Gram(s) IV Push once  dextrose 50% Injectable 25 Gram(s) IV Push once  dextrose 50% Injectable 25 Gram(s) IV Push once  folic acid 1 milliGRAM(s) Oral daily  glucagon  Injectable 1 milliGRAM(s) IntraMuscular once  insulin lispro (ADMELOG) corrective regimen sliding scale   SubCutaneous three times a day before meals  insulin lispro (ADMELOG) corrective regimen sliding scale.   SubCutaneous at bedtime  metoprolol tartrate 25 milliGRAM(s) Oral two times a day  pantoprazole    Tablet 40 milliGRAM(s) Oral before breakfast  predniSONE   Tablet 30 milliGRAM(s) Oral daily  rivaroxaban 15 milliGRAM(s) Oral with dinner  timolol 0.5% Solution 1 Drop(s) Both EYES daily    MEDICATIONS  (PRN):  acetaminophen     Tablet .. 650 milliGRAM(s) Oral every 6 hours PRN Temp greater or equal to 38C (100.4F), Mild Pain (1 - 3)  aluminum hydroxide/magnesium hydroxide/simethicone Suspension 30 milliLiter(s) Oral every 4 hours PRN Dyspepsia  dextrose Oral Gel 15 Gram(s) Oral once PRN Blood Glucose LESS THAN 70 milliGRAM(s)/deciliter  melatonin 3 milliGRAM(s) Oral at bedtime PRN Insomnia  metoprolol tartrate Injectable 5 milliGRAM(s) IV Push every 6 hours PRN if HR>120  ondansetron Injectable 4 milliGRAM(s) IV Push every 8 hours PRN Nausea and/or Vomiting    HOME MEDICATIONS:  Farxiga 10 mg oral tablet: 1 tab(s) orally once a day (28 Mar 2024 22:52)  folic acid 1 mg oral tablet: 1 tab(s) orally once a day (28 Mar 2024 22:52)  ipratropium 21 mcg/inh (0.03%) nasal spray: 2 spray(s) intranasally once a day (28 Mar 2024 22:52)  metFORMIN 500 mg oral tablet, extended release: 1 tab(s) orally once a day *** patient stopped metformin due to diarrhea*** (29 Mar 2024 15:12)  pantoprazole 40 mg oral delayed release tablet: 1 tab(s) orally once a day (28 Mar 2024 22:52)  potassium chloride 10 mEq oral tablet, extended release: 1 tab(s) orally 2 times a day (28 Mar 2024 22:52)  rosuvastatin 40 mg oral tablet: 1 tab(s) orally once a day (28 Mar 2024 22:52)  timolol maleate 0.5% ophthalmic solution: 1 drop(s) in each eye once a day (28 Mar 2024 22:52)  Xarelto 15 mg oral tablet: 1 tab(s) orally once a day (28 Mar 2024 22:52)    PHYSICAL EXAM:  T(C): 36.3 (02 Apr 2024 07:24), Max: 37 (01 Apr 2024 10:18)  T(F): 97.3 (02 Apr 2024 07:24), Max: 98.6 (01 Apr 2024 10:18)  HR: 99 (02 Apr 2024 07:24) (99 - 130)  BP: 121/73 (02 Apr 2024 07:24) (95/57 - 121/73)  RR: 18 (02 Apr 2024 07:24) (16 - 19)  SpO2: 99% (02 Apr 2024 07:24) (99% - 100%)    Parameters below as of 02 Apr 2024 07:24  Patient On (Oxygen Delivery Method): room air    GENERAL: NAD, well-groomed, well-developed,  HEENT - NC/AT, pupils equal and reactive to light,  ; Moist mucous membranes, Good dentition, No lesions  NECK: Supple, No JVD  CHEST/LUNG: Clear to auscultation bilaterally; No rales, rhonchi, wheezing  HEART: Irregularly irregular rate and rhythm; No murmurs, rubs, or gallops  ABDOMEN: Soft, Nontender, Nondistended; Bowel sounds present  EXTREMITIES:  2+ Peripheral Pulses, No clubbing, cyanosis, or edema  NEURO:  No Focal deficits, sensory and motor intact    LABS: All Labs Reviewed:                        11.3   12.42 )-----------( 202      ( 02 Apr 2024 07:14 )             35.6     04-02    138  |  112<H>  |  33<H>  ----------------------------<  163<H>  4.8   |  21<L>  |  0.91    Ca    8.3<L>      02 Apr 2024 07:14  Phos  2.7     04-02  Mg     1.9     04-02    Troponin: 16.50 ng/L    CARDIAC TESTING:    < from: TTE Echo Complete w/o Contrast w/ Doppler (03.19.24 @ 09:41) >   Left ventricle systolic function appears preserved in the presence of a   cardiac arrhythmia. Left ventricle size and structure are within normal   limitations. Visual estimation of left ventricle ejection fraction is>50  %.   The left atrium is moderately dilated.   The right atrium appears moderately dilated.   The right ventricle exhibits mild dilation, mild diffuse hypokinesis, and   mild depression of contractility.   Mild aortic sclerosis is present with normal valvular opening.   Minimal mitral annular calcification is present.   Mild (1+) mitral regurgitation is present.   Moderate to severe (3+) tricuspid valve regurgitation is present.   Mild pulmonic valvular regurgitation (1+) is present.    RADIOLOGY:    CT Abdomen and Pelvis w/ IV Cont (03.28.24 @ 23:36) : No evidence of small bowel obstruction or active bowel inflammation. Stable subcutaneous hematoma overlying the right lateral hip.    Xray Chest 1 View- PORTABLE-Urgent (03.28.24 @ 16:51) Persistent large retrocaval mass.

## 2024-04-02 NOTE — PHYSICAL THERAPY INITIAL EVALUATION ADULT - PERTINENT HX OF CURRENT PROBLEM, REHAB EVAL
As per chart review, 89 y/o F,  presents with diarrhea x 2 days, recently d/c from  for rapid a fib, and weakness on 3/25 and when she was d/c and placed on metformin. Pt states that after taking metformin she has having diarrhea, weak, since AF meds started/ changed she has low BP, LOC.  PMH of a fib on xarelro, chf on torsemide, htn, GERD, DM, temporal arteritis on prednisone

## 2024-04-02 NOTE — PROGRESS NOTE ADULT - ASSESSMENT
87 y/o F w/ PMH of temporal arteritis (on steroids), a-fib (on xarelto), DM2 p/w weakness. Patient has been having difficulty walking due to B/L LE edema    #Diarrhea most likely iatrogenic  -dc Metformin  -resolved     #Chronic AF, tachy/delvis Syndrome   -TTE:  The right ventricle exhibits mild dilation, mild diffuse hypokinesis, and mild depression of contractility, TR,   -On Xarelto   -Amio  -s/p DCCV: was in NSR then reverted to Afib   -Cardiology/EP following     #HFpEF  -Torsemide   -Metoprolol         #Temporal Arteritis - on chronic steroids - tapering off.    #DMT2  #Hyperlipidemia  -Continue atorvastatin     #EDITA   - resolved.    #Temporal arteritis with recent left eye blindness   -On Prednisone 30mg  -f/u with Rheumatologist at [facility], pt cannot recall the name of the Rheumatologist   -ESR: wnl     #Hx of DVT  -recent US duplex: negative   -f/u on CTA     #Lymphadenopathy / Pulm nodules  -CT Chest 3/18/24: Right paratracheal and right hilar lymphadenopathy, likely neoplastic. Largest lymph node approximately 3.5 cm in short axis in the right paratracheal station RLL 5mm solid nodule RUL 4mm ill-defined nodule.   -unclear if this is reactive or due to underlying malignancy; will need further w/u, including biopsy.  -per chart review: patient/family agreeable to pursue biopsy spoke with son-in-law (Adrien- [phone]) upon patient's request, however biopsy postponed s/t patient's recent uncontrolled A-Fib with RVR s/p unsuccessful DCCV on 3/21.  Management per cardiology/EPS  -plan for outpatient biopsy with Thoracic Sx-Dr. Mohr, patient/family aware.  -f/u outpatient at [facility] pending biopsy/path results      #DVT ppx   -Xarelto   87 y/o F w/ PMH of temporal arteritis (on steroids), a-fib (on xarelto), DM2 p/w weakness. Patient has been having difficulty walking due to B/L LE edema    #Diarrhea most likely iatrogenic  -dc Metformin  -resolved     #Chronic AF, tachy/delvis Syndrome   -TTE:  The right ventricle exhibits mild dilation, mild diffuse hypokinesis, and mild depression of contractility, TR,   -On Xarelto   -Amio  -s/p DCCV: was in NSR then reverted to Afib   -Cardiology/EP following     #HFpEF  -Torsemide   -Metoprolol     #DM  -hold oral medications  -A1c: 8.6  -POC qAC/hs  -RISS   -target BGM <180  -Carb restriction     #Hyperlipidemia  -Continue atorvastatin     #EDITA   - resolved.    #Temporal arteritis with recent left eye blindness   -On Prednisone 30mg  -f/u with Rheumatologist at [facility], pt cannot recall the name of the Rheumatologist   -ESR: wnl     #Hx of DVT  -recent US duplex: negative   -f/u on CTA     #Lymphadenopathy / Pulm nodules  -CT Chest 3/18/24: Right paratracheal and right hilar lymphadenopathy, likely neoplastic. Largest lymph node approximately 3.5 cm in short axis in the right paratracheal station RLL 5mm solid nodule RUL 4mm ill-defined nodule.   -unclear if this is reactive or due to underlying malignancy; will need further w/u, including biopsy.  -per chart review: patient/family agreeable to pursue biopsy spoke with son-in-law (Adrien- [phone]) upon patient's request, however biopsy postponed s/t patient's recent uncontrolled A-Fib with RVR s/p unsuccessful DCCV on 3/21.  Management per cardiology/EPS  -plan for outpatient biopsy with Thoracic Sx-Dr. Mohr, patient/family aware.  -f/u outpatient at [facility] pending biopsy/path results      #DVT ppx   -Xarelto      will attempt to get records for [facility]

## 2024-04-02 NOTE — PHYSICAL THERAPY INITIAL EVALUATION ADULT - NSPTDISCHREC_GEN_A_CORE
MARIO vs. HPT TBD on progress (Pt unable to stand at this session) will follow w/ OOB assessment when HR improved.

## 2024-04-02 NOTE — PHYSICAL THERAPY INITIAL EVALUATION ADULT - WEIGHT-BEARING RESTRICTIONS: SIT/STAND, REHAB EVAL
Pt reports just feeling too weak to stand today, unable at first attempt, w/ HR did not try a second attempt./full weight-bearing

## 2024-04-02 NOTE — PHYSICAL THERAPY INITIAL EVALUATION ADULT - ADDITIONAL COMMENTS
Pt reported 1 month ago starting to use a RW, before that she was on a treadmill. Pt reports B LE edema is from the Predisone meds.

## 2024-04-02 NOTE — PROGRESS NOTE ADULT - ASSESSMENT
89 yo female with a PMH of a fib on Xarelto, CHF on torsemide, HTN, GERD, DM, temporal arteritis on prednisone presents with diarrhea x 2 days. Pt was recently d/c from  for rapid a fib, and weakness on 3/25 and when she was d/c and placed on metformin. Pt states that after takign metformin she has having diarrhea TNTC which causes abd pain (which has currently resolved) and still feels weak. Per fam since AF meds started/ changed she has low BP, LOC.  ~ Admit for dehydration and to adjust cardiac meds; HR 50s and BP 95 systolic. Diarrhea has stopped since last night.    #Syncope  #Diarrhea from metformin. Hypotension. Resolved.   #Afib with Slow ventricular response in the setting of high dose BB and amio. Now RVR this AM. Unsuccessful CV attempt again on 4/1.   #Chronic diastolic HF   #Temporal Arteritis - on chronic steroids - tapering off.    #DMT2  #Hyperlipidemia  #EDITA - resolved.    - Monitor on tele, hx of Unsuccessful CV on 3/21 and 4/1.  - Continue with amiodarone, added back low dose lopressor 25mg BID  - EDITA, resolved, resume low dose torsemide 10mg daily  - Hold farxiga   - Continue Xarelto for stroke ppx  - Continue atorvastatin     Case d/w Dr. Sanchez  Will follow

## 2024-04-02 NOTE — PHYSICAL THERAPY INITIAL EVALUATION ADULT - LIVES WITH, PROFILE
Pt lives w/ her dgt and dgts  and family, in a home w/ 3 ADRIAN w/ HR and resides on the main floor.

## 2024-04-02 NOTE — PHYSICAL THERAPY INITIAL EVALUATION ADULT - MODALITIES TREATMENT COMMENTS
Pt returned back to bed due to  and pt not able to stand, PT trying to attempt sit to stand and steps to HOB but pt unable this session. Supine HOB elevated +alarm, B LE elevated, CBIR, pt resting comfortable, denies pain. HR unchanged this session from 147-150 range.

## 2024-04-02 NOTE — CHART NOTE - NSCHARTNOTEFT_GEN_A_CORE
Patient s/p cardioversion remained in sinus for approximately 4 minutes then return to AF with controlled rate.     Will continue current medications.
Pt is an 87yo female with a PMH of AF on Xarelto, CHF on torsemide, HTN, GERD, DM, temporal arteritis on prednisone presents with persistent diarrhea, for which she was recently DCd on 3/25 for. During her admission she was found to have AF, begun on amiodarone and underwent an unsuccessful DCCV on 3/21. On this admission, pt noted to remain in AF, rates to 150s, and underwent a second DCCV today, which was unsuccessful. Pt was seen and examined post procedure in bed. Pt denies complaints at this time.     Plan:  Continue with amiodarone load, AC therapy.  Keep K >4.0 and Mag >2.0.  MCOT on DC with followup appt with Dr. Mendoza.  Pt may require AVJ ablation for refractory AF.
Pt is an 87yo female with a PMH of AF on Xarelto, CHF on torsemide, HTN, GERD, DM, temporal arteritis on prednisone presents with persistent diarrhea, for which she was recently DCd on 3/25 for. During her admission she was found to have AF, begun on amiodarone and underwent an unsuccessful DCCV on 3/21. On this admission, pt noted to remain in AF, rates to 150s, and underwent a second DCCV yesterday which was unsuccessful.     Pt seen and examined today in bed. Pt denies complaints at this time. Telemetry reveals AF w RVR with HR varying from 100-150s. Pt received one dose of Lopressor 5mg IVP at 1415hrs with decrease from 150 to 110s. Pt states she has never felt palpitations. Of note, pt states she has not had diarrhea since last night.     Plan:  Pt has persistent refractory AF w RVR s/p two DCCVs.   Discussed steroid use as possible cause with Dr. Doshi, however, continuous prednisone is required for temporal arteritis.  Additionally, because of steroid use, pt is not currently a candidate for AVJ ablation.  Pt currently on Lopressor 5mg IVP Q6H, still experiencing breakthrough tachycardia.  Give 1 liter NS bolus to improve hydration status (EF >50%)  Continue with amiodarone load, AC therapy.  Add Cardizem 120mg PO QD.  Keep K >4.0 and Mag >2.0.
I meet the pt and her son at the bedside, she gave me permission to speak freely and allow him to join the conversation.  I explained the my role as the ER , we discussed her Observation status and the MOON form in great detail.  I answered all questions and they verbalized an understanding but declined to sign the form because they said that the pt was going to be admitted as an inpatient.  I explained that because she is currently an observation patient that I would still have to give this notice, we discussed what happens if an observation patient is converted to inpatient, again they verbalized an understanding but declined to sign (information included on MOON form).  Pt has [employer] as her secondary and Medicare as primary.  Pt recently received treatment for AFib and her DM medications were changed to include metformin, which is believed to cause her diarrhea that was thought to possible be c-diff--information relayed by son.

## 2024-04-03 NOTE — PROGRESS NOTE ADULT - SUBJECTIVE AND OBJECTIVE BOX
HOSPITALIST ATTENDING PROGRESS NOTE    Chart and meds reviewed.      Subjective: Patient seen and examined. Patient worked with PT today, still significantly weak and unable to stand but was able to transfer to chair. Seen by Cardiology and EP. Started on oral cardizem. Yesterday patients heart rate increased on attempt to stand. patient declines MARIO. Will continue to monitor HR with change in meds. Continue amiodarone.       Additional results/Imaging, I have personally reviewed:    LABS:                            11.3   12.42 )-----------( 202      ( 02 Apr 2024 07:14 )             35.6     04-02    138  |  112<H>  |  33<H>  ----------------------------<  163<H>  4.8   |  21<L>  |  0.91    Ca    8.3<L>      02 Apr 2024 07:14  Phos  2.7     04-02  Mg     1.9     04-02              Urinalysis Basic - ( 02 Apr 2024 07:14 )    Color: x / Appearance: x / SG: x / pH: x  Gluc: 163 mg/dL / Ketone: x  / Bili: x / Urobili: x   Blood: x / Protein: x / Nitrite: x   Leuk Esterase: x / RBC: x / WBC x   Sq Epi: x / Non Sq Epi: x / Bacteria: x              All other systems reviewed and found to be negative with the exception of what has been described above.    MEDICATIONS  (STANDING):  aMIOdarone    Tablet 200 milliGRAM(s) Oral two times a day  atorvastatin 80 milliGRAM(s) Oral at bedtime  dextrose 5%. 1000 milliLiter(s) (100 mL/Hr) IV Continuous <Continuous>  dextrose 5%. 1000 milliLiter(s) (50 mL/Hr) IV Continuous <Continuous>  dextrose 50% Injectable 25 Gram(s) IV Push once  dextrose 50% Injectable 25 Gram(s) IV Push once  dextrose 50% Injectable 12.5 Gram(s) IV Push once  diltiazem    milliGRAM(s) Oral daily  folic acid 1 milliGRAM(s) Oral daily  glucagon  Injectable 1 milliGRAM(s) IntraMuscular once  insulin lispro (ADMELOG) corrective regimen sliding scale   SubCutaneous three times a day before meals  insulin lispro (ADMELOG) corrective regimen sliding scale.   SubCutaneous at bedtime  pantoprazole    Tablet 40 milliGRAM(s) Oral before breakfast  predniSONE   Tablet 30 milliGRAM(s) Oral daily  rivaroxaban 15 milliGRAM(s) Oral with dinner  timolol 0.5% Solution 1 Drop(s) Both EYES daily  torsemide 10 milliGRAM(s) Oral daily    MEDICATIONS  (PRN):  acetaminophen     Tablet .. 650 milliGRAM(s) Oral every 6 hours PRN Temp greater or equal to 38C (100.4F), Mild Pain (1 - 3)  aluminum hydroxide/magnesium hydroxide/simethicone Suspension 30 milliLiter(s) Oral every 4 hours PRN Dyspepsia  dextrose Oral Gel 15 Gram(s) Oral once PRN Blood Glucose LESS THAN 70 milliGRAM(s)/deciliter  melatonin 3 milliGRAM(s) Oral at bedtime PRN Insomnia  ondansetron Injectable 4 milliGRAM(s) IV Push every 8 hours PRN Nausea and/or Vomiting      VITALS:  T(F): 97.9 (04-03-24 @ 08:50), Max: 97.9 (04-03-24 @ 08:50)  HR: 87 (04-03-24 @ 08:50) (87 - 160)  BP: 108/42 (04-03-24 @ 08:50) (96/38 - 108/42)  RR: 18 (04-02-24 @ 20:45) (18 - 18)  SpO2: 97% (04-03-24 @ 08:50) (97% - 100%)  Wt(kg): --    I&O's Summary    02 Apr 2024 07:01  -  03 Apr 2024 07:00  --------------------------------------------------------  IN: 0 mL / OUT: 800 mL / NET: -800 mL        CAPILLARY BLOOD GLUCOSE      POCT Blood Glucose.: 210 mg/dL (03 Apr 2024 11:29)  POCT Blood Glucose.: 189 mg/dL (03 Apr 2024 08:00)  POCT Blood Glucose.: 261 mg/dL (02 Apr 2024 21:43)  POCT Blood Glucose.: 309 mg/dL (02 Apr 2024 16:50)      PHYSICAL EXAM:  GEN: NAD   HEENT: EOMI,  moist mucous membranes  NECK : Soft and supple, no JVD  LUNG: CTABL, No wheezing, rales or rhonchi  CVS: S1S2+, +irregular irregular   GI: BS+, soft, NT/ND, no guarding, no rebound  EXTREMITIES: +edema   VASCULAR: 2+ peripheral pulses  NEURO: AAOx3, grossly non-focal   SKIN: No rashes    CULTURES:      Telemetry, personally reviewed

## 2024-04-03 NOTE — PROGRESS NOTE ADULT - SUBJECTIVE AND OBJECTIVE BOX
Pt is an 89yo female with a PMH of AF on Xarelto, CHF on torsemide, HTN, GERD, DM, temporal arteritis on prednisone presents with persistent diarrhea, for which she was recently DCd on 3/25 for. During her admission she was found to have AF, begun on amiodarone and underwent an unsuccessful DCCV on 3/21. On this admission, pt noted to remain in AF, rates to 150s, and underwent a second DCCV on 4/1 which was unsuccessful.     4/3/24:  pt seen resting in bed in NAD- she denies any symptoms.  HRs are better rates around 100-120 bpm. Pt says she is asymptomatic and does not have SOB, CP or palpitations   TELE: atrial flutter and atrial fibrillation rate currently 107 bpm      MEDICATIONS  (STANDING):  aMIOdarone    Tablet 200 milliGRAM(s) Oral two times a day  atorvastatin 80 milliGRAM(s) Oral at bedtime  dextrose 5%. 1000 milliLiter(s) (100 mL/Hr) IV Continuous <Continuous>  dextrose 5%. 1000 milliLiter(s) (50 mL/Hr) IV Continuous <Continuous>  dextrose 50% Injectable 25 Gram(s) IV Push once  dextrose 50% Injectable 12.5 Gram(s) IV Push once  dextrose 50% Injectable 25 Gram(s) IV Push once  diltiazem    milliGRAM(s) Oral daily  folic acid 1 milliGRAM(s) Oral daily  glucagon  Injectable 1 milliGRAM(s) IntraMuscular once  insulin lispro (ADMELOG) corrective regimen sliding scale   SubCutaneous three times a day before meals  insulin lispro (ADMELOG) corrective regimen sliding scale.   SubCutaneous at bedtime  pantoprazole    Tablet 40 milliGRAM(s) Oral before breakfast  predniSONE   Tablet 30 milliGRAM(s) Oral daily  rivaroxaban 15 milliGRAM(s) Oral with dinner  timolol 0.5% Solution 1 Drop(s) Both EYES daily  torsemide 10 milliGRAM(s) Oral daily    MEDICATIONS  (PRN):  acetaminophen     Tablet .. 650 milliGRAM(s) Oral every 6 hours PRN Temp greater or equal to 38C (100.4F), Mild Pain (1 - 3)  aluminum hydroxide/magnesium hydroxide/simethicone Suspension 30 milliLiter(s) Oral every 4 hours PRN Dyspepsia  dextrose Oral Gel 15 Gram(s) Oral once PRN Blood Glucose LESS THAN 70 milliGRAM(s)/deciliter  melatonin 3 milliGRAM(s) Oral at bedtime PRN Insomnia  ondansetron Injectable 4 milliGRAM(s) IV Push every 8 hours PRN Nausea and/or Vomiting    Allergies    penicillin (Swelling)    Intolerances    REVIEW OF SYSTEMS:    CONSTITUTIONAL: No weakness, fevers or chills  EYES/ENT: No visual changes;  No vertigo or throat pain   NECK: No pain or stiffness  RESPIRATORY: No cough, wheezing, hemoptysis; No shortness of breath  CARDIOVASCULAR: No chest pain or palpitations  GASTROINTESTINAL: No abdominal or epigastric pain. No nausea, vomiting, or hematemesis; No diarrhea or constipation. No melena or hematochezia.  GENITOURINARY: No dysuria, frequency or hematuria  NEUROLOGICAL: No numbness or weakness  SKIN: No itching, burning, rashes, or lesions   All other review of systems is negative unless indicated above      Vital Signs Last 24 Hrs  T(C): 36.6 (03 Apr 2024 08:50), Max: 36.6 (03 Apr 2024 08:50)  T(F): 97.9 (03 Apr 2024 08:50), Max: 97.9 (03 Apr 2024 08:50)  HR: 87 (03 Apr 2024 08:50) (87 - 160)  BP: 108/42 (03 Apr 2024 08:50) (96/38 - 108/42)  BP(mean): 77 (02 Apr 2024 14:10) (77 - 77)  RR: 18 (02 Apr 2024 20:45) (18 - 18)  SpO2: 97% (03 Apr 2024 08:50) (97% - 100%)    Parameters below as of 02 Apr 2024 20:45  Patient On (Oxygen Delivery Method): room air                          11.3   12.42 )-----------( 202      ( 02 Apr 2024 07:14 )             35.6     04-02    138  |  112<H>  |  33<H>  ----------------------------<  163<H>  4.8   |  21<L>  |  0.91    Ca    8.3<L>      02 Apr 2024 07:14  Phos  2.7     04-02  Mg     1.9     04-02      PHYSICAL EXAM:    General: WN/WD NAD  Neurology: A&Ox3, nonfocal, JACKSON x 4  HEENT: NC/AT, neck supple, no JVD, EOMI, PERRL  Respiratory: CTA B/L  CV:Irregular S1S2, no murmurs, rubs or gallops  Abdominal: Soft, NT, ND +BS  Extremities: B/L LE +1-2 edema, + peripheral pulses  Skin: No rashes      < from: TTE Echo Complete w/o Contrast w/ Doppler (03.19.24 @ 09:41) >   Impression     Summary     Left ventricle systolic function appears preserved in the presence of a   cardiac arrhythmia. Left ventricle size and structure are within normal   limitations. Visual estimation of left ventricle ejection fraction is>50   %.   The left atrium is moderately dilated.   The right atrium appears moderately dilated.   The right ventricle exhibits mild dilation, mild diffuse hypokinesis, and   mild depression of contractility.   Mild aortic sclerosis is present with normalvalvular opening.   Minimal mitral annular calcification is present.   Mild (1+) mitral regurgitation is present.   Moderate to severe (3+) tricuspid valve regurgitation is present.   Mild pulmonic valvular regurgitation (1+) is present.      < from: CT Angio Chest PE Protocol w/ IV Cont (04.02.24 @ 23:52) >    IMPRESSION:    1.  No pulmonary embolism.  2.  No change in the enlarged chest lymph nodes.  3.  Small bilateral pleural effusions and atelectasis.  4.  No change in the right upper lobe nodule or right lower lobe opacity.

## 2024-04-03 NOTE — PROGRESS NOTE ADULT - ASSESSMENT
87 yo female with a PMH of a fib on Xarelto, CHF on torsemide, HTN, GERD, DM, temporal arteritis on prednisone presents with diarrhea x 2 days. Pt was recently d/c from  for rapid a fib, and weakness on 3/25 and when she was d/c and placed on metformin. Pt states that after takign metformin she has having diarrhea TNTC which causes abd pain (which has currently resolved) and still feels weak. Per fam since AF meds started/ changed she has low BP, LOC.  ~ Admit for dehydration and to adjust cardiac meds; HR 50s and BP 95 systolic. Diarrhea has stopped since last night.    #Syncope  #Diarrhea from metformin. Hypotension. Resolved.   #Afib with Slow ventricular response in the setting of high dose BB and amio. Now RVR this AM. Unsuccessful CV attempt again on 4/1.   #Chronic diastolic HF   #Temporal Arteritis - on chronic steroids - tapering off.    #DMT2  #Hyperlipidemia  #EDITA - resolved.    - Monitor on tele, hx of Unsuccessful CV on 3/21 and 4/1.  - Continue with amiodarone, added back low dose lopressor 25mg BID  - EDITA, resolved, resume low dose torsemide 10mg daily  - Hold farxiga   - Continue Xarelto for stroke ppx  - Continue atorvastatin     Case d/w Dr. Small     89 yo female with a PMH of a fib on Xarelto, CHF on torsemide, HTN, GERD, DM, temporal arteritis on prednisone presents with diarrhea x 2 days. Pt was recently d/c from  for rapid a fib, and weakness on 3/25 and when she was d/c and placed on metformin. Pt states that after takign metformin she has having diarrhea TNTC which causes abd pain (which has currently resolved) and still feels weak. Per fam since AF meds started/ changed she has low BP, LOC.  ~ Admit for dehydration and to adjust cardiac meds; HR 50s and BP 95 systolic. Diarrhea has stopped since last night.    #Syncope  #Diarrhea from metformin. Hypotension. Resolved.   #Afib with Slow ventricular response in the setting of high dose BB and amio. Now RVR this AM. Unsuccessful CV attempt again on 4/1.   #Chronic diastolic HF   #Temporal Arteritis - on chronic steroids - tapering off.    #DMT2  #Hyperlipidemia  #EDITA - resolved.    - Monitor on tele, hx of Unsuccessful CV on 3/21 and 4/1.  - Continue with amiodarone, d/w EP will switch to Cardizem 120mg daily  - EDITA, resolved, resumed low dose torsemide 10mg daily  - Hold farxiga   - Continue Xarelto for stroke ppx  - Continue atorvastatin     Case d/w Dr. Small  Will follow

## 2024-04-03 NOTE — PROGRESS NOTE ADULT - ASSESSMENT
An 89 yo female known to our EP service with a PMH of a fib on xarelto, recent unsuccessful DCCV last week,, chf on torsemide, htn, GERD, DM, temporal arteritis on prednisone presents with diarrhea x 2 days & return of AF with -130s on tele      Persistent atrial fib/atrial flutter, difficult to control rates, s/p 2 unsuccessful DCCV on 3/21 and 4/1  Continue tele monitoring  Pt says she is asymptomatic and does not have SOB, CP or palpitations  Continue uniterrupted DOAC-Has been on Xarelto  Continue Amiodarone reloading with 200 mg po BID with food  Cardizem  PO daily was added  PRN IV Lopressor if needed  Keep lytes repleted, K>4, Mg>2  Continuous prednisone is required for temporal arteritis.  Additionally, because of steroid use, pt is not currently a candidate for AVJ ablation.  MCOT on discharge for continued monitoring  EP will follow  Plan d/w Dr. Martines and patient

## 2024-04-03 NOTE — PROGRESS NOTE ADULT - SUBJECTIVE AND OBJECTIVE BOX
CC: Patient is a 89y old  Female who presents with a chief complaint of diarrhea, LOC (30 Mar 2024 21:05)    FROM HPI: 89 yo female with a PMH of a fib on xarelro, chf on torsemide, htn, GERD, DM, temporal arteritis on prednisone presents with diarrhea x 2 days. Pt was recently d/c from  for rapid a fib, and weakness on 3/25 and when she was d/c and placed on metformin. Pt states that after takign metformin she has having diarrhea TNTC which causes abd pain (which has currently resolved) and still feels weak. Per family since AF meds started/ changed she has low BP, LOC.  Admit for dehydration and to adjust  cardiac meds; HR 50s and bp95 systolic. Diarrhea has stopped since last night. (29 Mar 2024 07:52)    3/31. HR back to 130s in rapid AF, amiodarone increased yesterday. BB was held due to severe bradycardia. At this point will ask EP to evaluate for repeat DCCV on amiodarone  4/1. Possible CV this AM, C/w amiodarone. Holding diuretics.  4/2. Unsuccessful CV attempt yesterday. Will resume low dose BB.   4/3.    PAST MEDICAL & SURGICAL HISTORY:  Temporal arteritis  Atrial fibrillation  Acute CHF    PSH: Denies   Social Hx: Denies tobacco / etoh / drugs   Family Hx: Denies pertinent hx  (18 Mar 2024 23:17)    Allergies: Penicillin (Swelling), Intolerances    MEDICATIONS  (STANDING):  aMIOdarone    Tablet 200 milliGRAM(s) Oral two times a day  atorvastatin 80 milliGRAM(s) Oral at bedtime  dextrose 5%. 1000 milliLiter(s) (100 mL/Hr) IV Continuous <Continuous>  dextrose 5%. 1000 milliLiter(s) (50 mL/Hr) IV Continuous <Continuous>  dextrose 50% Injectable 25 Gram(s) IV Push once  dextrose 50% Injectable 12.5 Gram(s) IV Push once  dextrose 50% Injectable 25 Gram(s) IV Push once  folic acid 1 milliGRAM(s) Oral daily  glucagon  Injectable 1 milliGRAM(s) IntraMuscular once  insulin lispro (ADMELOG) corrective regimen sliding scale   SubCutaneous three times a day before meals  insulin lispro (ADMELOG) corrective regimen sliding scale.   SubCutaneous at bedtime  metoprolol tartrate 25 milliGRAM(s) Oral two times a day  pantoprazole    Tablet 40 milliGRAM(s) Oral before breakfast  predniSONE   Tablet 30 milliGRAM(s) Oral daily  rivaroxaban 15 milliGRAM(s) Oral with dinner  timolol 0.5% Solution 1 Drop(s) Both EYES daily  torsemide 10 milliGRAM(s) Oral daily    MEDICATIONS  (PRN):  acetaminophen     Tablet .. 650 milliGRAM(s) Oral every 6 hours PRN Temp greater or equal to 38C (100.4F), Mild Pain (1 - 3)  aluminum hydroxide/magnesium hydroxide/simethicone Suspension 30 milliLiter(s) Oral every 4 hours PRN Dyspepsia  dextrose Oral Gel 15 Gram(s) Oral once PRN Blood Glucose LESS THAN 70 milliGRAM(s)/deciliter  melatonin 3 milliGRAM(s) Oral at bedtime PRN Insomnia  metoprolol tartrate Injectable 5 milliGRAM(s) IV Push every 6 hours PRN if HR>120  ondansetron Injectable 4 milliGRAM(s) IV Push every 8 hours PRN Nausea and/or Vomiting    HOME MEDICATIONS:  Farxiga 10 mg oral tablet: 1 tab(s) orally once a day (28 Mar 2024 22:52)  folic acid 1 mg oral tablet: 1 tab(s) orally once a day (28 Mar 2024 22:52)  ipratropium 21 mcg/inh (0.03%) nasal spray: 2 spray(s) intranasally once a day (28 Mar 2024 22:52)  metFORMIN 500 mg oral tablet, extended release: 1 tab(s) orally once a day *** patient stopped metformin due to diarrhea*** (29 Mar 2024 15:12)  pantoprazole 40 mg oral delayed release tablet: 1 tab(s) orally once a day (28 Mar 2024 22:52)  potassium chloride 10 mEq oral tablet, extended release: 1 tab(s) orally 2 times a day (28 Mar 2024 22:52)  rosuvastatin 40 mg oral tablet: 1 tab(s) orally once a day (28 Mar 2024 22:52)  timolol maleate 0.5% ophthalmic solution: 1 drop(s) in each eye once a day (28 Mar 2024 22:52)  Xarelto 15 mg oral tablet: 1 tab(s) orally once a day (28 Mar 2024 22:52)    PHYSICAL EXAM:  T(C): 36.2 (02 Apr 2024 20:45), Max: 36.2 (02 Apr 2024 20:45)  T(F): 97.2 (02 Apr 2024 20:45), Max: 97.2 (02 Apr 2024 20:45)  HR: 160 (02 Apr 2024 23:01) (100 - 160)  BP: 96/38 (02 Apr 2024 23:01) (90/62 - 119/68)  BP(mean): 77 (02 Apr 2024 14:10) (77 - 77)  RR: 18 (02 Apr 2024 20:45) (18 - 18)  SpO2: 98% (02 Apr 2024 20:45) (98% - 100%)    Parameters below as of 02 Apr 2024 20:45  Patient On (Oxygen Delivery Method): room air    GENERAL: NAD, well-groomed, well-developed,  HEENT - NC/AT, pupils equal and reactive to light,  ; Moist mucous membranes, Good dentition, No lesions  NECK: Supple, No JVD  CHEST/LUNG: Clear to auscultation bilaterally; No rales, rhonchi, wheezing  HEART: Irregularly irregular rate and rhythm; No murmurs, rubs, or gallops  ABDOMEN: Soft, Nontender, Nondistended; Bowel sounds present  EXTREMITIES:  2+ Peripheral Pulses, No clubbing, cyanosis, or edema  NEURO:  No Focal deficits, sensory and motor intact    LABS: All Labs Reviewed:                        11.3   12.42 )-----------( 202      ( 02 Apr 2024 07:14 )             35.6     04-02    138  |  112<H>  |  33<H>  ----------------------------<  163<H>  4.8   |  21<L>  |  0.91    Ca    8.3<L>      02 Apr 2024 07:14  Phos  2.7     04-02  Mg     1.9     04-02    Troponin: 16.50 ng/L    CARDIAC TESTING:    < from: TTE Echo Complete w/o Contrast w/ Doppler (03.19.24 @ 09:41) >   Left ventricle systolic function appears preserved in the presence of a   cardiac arrhythmia. Left ventricle size and structure are within normal   limitations. Visual estimation of left ventricle ejection fraction is>50  %.   The left atrium is moderately dilated.   The right atrium appears moderately dilated.   The right ventricle exhibits mild dilation, mild diffuse hypokinesis, and   mild depression of contractility.   Mild aortic sclerosis is present with normal valvular opening.   Minimal mitral annular calcification is present.   Mild (1+) mitral regurgitation is present.   Moderate to severe (3+) tricuspid valve regurgitation is present.   Mild pulmonic valvular regurgitation (1+) is present.    RADIOLOGY:    CT Angio Chest PE Protocol w/ IV Cont (04.02.24 @ 23:52) >  No pulmonary embolism. Small bilateral pleural effusions and   bibasilar atelectasis. Emphysema. Unchanged mediastinal and right hilar   lymphadenopathy.    CT Abdomen and Pelvis w/ IV Cont (03.28.24 @ 23:36) : No evidence of small bowel obstruction or active bowel inflammation. Stable subcutaneous hematoma overlying the right lateral hip.    Xray Chest 1 View- PORTABLE-Urgent (03.28.24 @ 16:51) Persistent large retrocaval mass.                 CC: Patient is a 89y old  Female who presents with a chief complaint of diarrhea, LOC (30 Mar 2024 21:05)    FROM HPI: 87 yo female with a PMH of a fib on xarelro, chf on torsemide, htn, GERD, DM, temporal arteritis on prednisone presents with diarrhea x 2 days. Pt was recently d/c from  for rapid a fib, and weakness on 3/25 and when she was d/c and placed on metformin. Pt states that after takign metformin she has having diarrhea TNTC which causes abd pain (which has currently resolved) and still feels weak. Per family since AF meds started/ changed she has low BP, LOC.  Admit for dehydration and to adjust  cardiac meds; HR 50s and bp95 systolic. Diarrhea has stopped since last night. (29 Mar 2024 07:52)    3/31. HR back to 130s in rapid AF, amiodarone increased yesterday. BB was held due to severe bradycardia. At this point will ask EP to evaluate for repeat DCCV on amiodarone  4/1. Possible CV this AM, C/w amiodarone. Holding diuretics.  4/2. Unsuccessful CV attempt yesterday. Will resume low dose BB.   4/3. She feels well. HR still uncontrolled.    PAST MEDICAL & SURGICAL HISTORY:  Temporal arteritis  Atrial fibrillation  Acute CHF    PSH: Denies   Social Hx: Denies tobacco / etoh / drugs   Family Hx: Denies pertinent hx  (18 Mar 2024 23:17)    Allergies: Penicillin (Swelling), Intolerances    MEDICATIONS  (STANDING):  aMIOdarone    Tablet 200 milliGRAM(s) Oral two times a day  atorvastatin 80 milliGRAM(s) Oral at bedtime  dextrose 5%. 1000 milliLiter(s) (100 mL/Hr) IV Continuous <Continuous>  dextrose 5%. 1000 milliLiter(s) (50 mL/Hr) IV Continuous <Continuous>  dextrose 50% Injectable 25 Gram(s) IV Push once  dextrose 50% Injectable 25 Gram(s) IV Push once  dextrose 50% Injectable 12.5 Gram(s) IV Push once  diltiazem    milliGRAM(s) Oral daily  folic acid 1 milliGRAM(s) Oral daily  glucagon  Injectable 1 milliGRAM(s) IntraMuscular once  insulin lispro (ADMELOG) corrective regimen sliding scale   SubCutaneous three times a day before meals  insulin lispro (ADMELOG) corrective regimen sliding scale.   SubCutaneous at bedtime  pantoprazole    Tablet 40 milliGRAM(s) Oral before breakfast  predniSONE   Tablet 30 milliGRAM(s) Oral daily  rivaroxaban 15 milliGRAM(s) Oral with dinner  timolol 0.5% Solution 1 Drop(s) Both EYES daily  torsemide 10 milliGRAM(s) Oral daily    MEDICATIONS  (PRN):  acetaminophen     Tablet .. 650 milliGRAM(s) Oral every 6 hours PRN Temp greater or equal to 38C (100.4F), Mild Pain (1 - 3)  aluminum hydroxide/magnesium hydroxide/simethicone Suspension 30 milliLiter(s) Oral every 4 hours PRN Dyspepsia  dextrose Oral Gel 15 Gram(s) Oral once PRN Blood Glucose LESS THAN 70 milliGRAM(s)/deciliter  melatonin 3 milliGRAM(s) Oral at bedtime PRN Insomnia  ondansetron Injectable 4 milliGRAM(s) IV Push every 8 hours PRN Nausea and/or Vomiting    HOME MEDICATIONS:  Farxiga 10 mg oral tablet: 1 tab(s) orally once a day (28 Mar 2024 22:52)  folic acid 1 mg oral tablet: 1 tab(s) orally once a day (28 Mar 2024 22:52)  ipratropium 21 mcg/inh (0.03%) nasal spray: 2 spray(s) intranasally once a day (28 Mar 2024 22:52)  metFORMIN 500 mg oral tablet, extended release: 1 tab(s) orally once a day *** patient stopped metformin due to diarrhea*** (29 Mar 2024 15:12)  pantoprazole 40 mg oral delayed release tablet: 1 tab(s) orally once a day (28 Mar 2024 22:52)  potassium chloride 10 mEq oral tablet, extended release: 1 tab(s) orally 2 times a day (28 Mar 2024 22:52)  rosuvastatin 40 mg oral tablet: 1 tab(s) orally once a day (28 Mar 2024 22:52)  timolol maleate 0.5% ophthalmic solution: 1 drop(s) in each eye once a day (28 Mar 2024 22:52)  Xarelto 15 mg oral tablet: 1 tab(s) orally once a day (28 Mar 2024 22:52)    PHYSICAL EXAM:  Vital Signs Last 24 Hrs  T(C): 36.2 (02 Apr 2024 20:45), Max: 36.2 (02 Apr 2024 20:45)  T(F): 97.2 (02 Apr 2024 20:45), Max: 97.2 (02 Apr 2024 20:45)  HR: 160 (02 Apr 2024 23:01) (100 - 160)  BP: 96/38 (02 Apr 2024 23:01) (90/62 - 119/68)  BP(mean): 77 (02 Apr 2024 14:10) (77 - 77)  RR: 18 (02 Apr 2024 20:45) (18 - 18)  SpO2: 98% (02 Apr 2024 20:45) (98% - 100%)    Parameters below as of 02 Apr 2024 20:45  Patient On (Oxygen Delivery Method): room air    GENERAL: NAD, well-groomed, well-developed,  HEENT - NC/AT, pupils equal and reactive to light,  ; Moist mucous membranes, Good dentition, No lesions  NECK: Supple, No JVD  CHEST/LUNG: Clear to auscultation bilaterally; No rales, rhonchi, wheezing  HEART: Irregularly irregular rate and rhythm; No murmurs, rubs, or gallops  ABDOMEN: Soft, Nontender, Nondistended; Bowel sounds present  EXTREMITIES:  2+ Peripheral Pulses, No clubbing, cyanosis, or edema  NEURO:  No Focal deficits, sensory and motor intact    LABS: All Labs Reviewed:                        11.3   12.42 )-----------( 202      ( 02 Apr 2024 07:14 )             35.6     04-02    138  |  112<H>  |  33<H>  ----------------------------<  163<H>  4.8   |  21<L>  |  0.91    Ca    8.3<L>      02 Apr 2024 07:14  Phos  2.7     04-02  Mg     1.9     04-02    Troponin: 16.50 ng/L    CARDIAC TESTING:    < from: TTE Echo Complete w/o Contrast w/ Doppler (03.19.24 @ 09:41) >   Left ventricle systolic function appears preserved in the presence of a   cardiac arrhythmia. Left ventricle size and structure are within normal   limitations. Visual estimation of left ventricle ejection fraction is>50  %.   The left atrium is moderately dilated.   The right atrium appears moderately dilated.   The right ventricle exhibits mild dilation, mild diffuse hypokinesis, and   mild depression of contractility.   Mild aortic sclerosis is present with normal valvular opening.   Minimal mitral annular calcification is present.   Mild (1+) mitral regurgitation is present.   Moderate to severe (3+) tricuspid valve regurgitation is present.   Mild pulmonic valvular regurgitation (1+) is present.    RADIOLOGY:    CT Angio Chest PE Protocol w/ IV Cont (04.02.24 @ 23:52) >  No pulmonary embolism. Small bilateral pleural effusions and   bibasilar atelectasis. Emphysema. Unchanged mediastinal and right hilar   lymphadenopathy.    CT Abdomen and Pelvis w/ IV Cont (03.28.24 @ 23:36) : No evidence of small bowel obstruction or active bowel inflammation. Stable subcutaneous hematoma overlying the right lateral hip.    Xray Chest 1 View- PORTABLE-Urgent (03.28.24 @ 16:51) Persistent large retrocaval mass.

## 2024-04-04 NOTE — PROGRESS NOTE ADULT - ASSESSMENT
An 87 yo female known to our EP service with a PMH of a fib on xarelto, recent unsuccessful DCCV last week,, chf on torsemide, htn, GERD, DM, temporal arteritis on prednisone presents with diarrhea x 2 days & return of AF with -130s on tele      Persistent atrial fib/atrial flutter, difficult to control rates, s/p 2 unsuccessful DCCV on 3/21 and 4/1  Continue tele monitoring  Pt says she is asymptomatic and does not have SOB, CP or palpitations  Rates still uncontrolled despite Cardizem CD increased to 180 mg PO Daily  Systolic BP's on soft side, continue to monitor  Will give Digoxin 250mcg IVP X1, may need second dose in 6 hrs, will re-assess, then will give Digoxin 125MCG PO daily starting 4/5/24.  Continue uniterrupted DOAC-Has been on Xarelto  Continue Amiodarone reloading with 200 mg po BID with food  Keep lytes repleted, K>4, Mg>2  Continuous prednisone is required for temporal arteritis.  MCOT on discharge for continued monitoring  EP will follow- if rates still uncontrolled she may need PPM/AVJ, continue with rate control for now.  Plan d/w Dr. Martines and patient, RN

## 2024-04-04 NOTE — PROGRESS NOTE ADULT - SUBJECTIVE AND OBJECTIVE BOX
CC: Patient is a 89y old  Female who presents with a chief complaint of diarrhea, LOC (30 Mar 2024 21:05)    FROM HPI: 89 yo female with a PMH of a fib on xarelro, chf on torsemide, htn, GERD, DM, temporal arteritis on prednisone presents with diarrhea x 2 days. Pt was recently d/c from  for rapid a fib, and weakness on 3/25 and when she was d/c and placed on metformin. Pt states that after takign metformin she has having diarrhea TNTC which causes abd pain (which has currently resolved) and still feels weak. Per family since AF meds started/ changed she has low BP, LOC.  Admit for dehydration and to adjust  cardiac meds; HR 50s and bp95 systolic. Diarrhea has stopped since last night. (29 Mar 2024 07:52)    3/31. HR back to 130s in rapid AF, amiodarone increased yesterday. BB was held due to severe bradycardia. At this point will ask EP to evaluate for repeat DCCV on amiodarone  4/1. Possible CV this AM, C/w amiodarone. Holding diuretics.  4/2. Unsuccessful CV attempt yesterday. Will resume low dose BB.   4/3. She feels well. HR still uncontrolled.  4/4. Ep to follow up today, HR still uncontrolled    PAST MEDICAL & SURGICAL HISTORY:  Temporal arteritis  Atrial fibrillation  Acute CHF    PSH: Denies   Social Hx: Denies tobacco / etoh / drugs   Family Hx: Denies pertinent hx  (18 Mar 2024 23:17)    Allergies: Penicillin (Swelling), Intolerances    MEDICATIONS  (STANDING):  aMIOdarone    Tablet 200 milliGRAM(s) Oral two times a day  atorvastatin 80 milliGRAM(s) Oral at bedtime  dextrose 5%. 1000 milliLiter(s) (100 mL/Hr) IV Continuous <Continuous>  dextrose 5%. 1000 milliLiter(s) (50 mL/Hr) IV Continuous <Continuous>  dextrose 50% Injectable 25 Gram(s) IV Push once  dextrose 50% Injectable 12.5 Gram(s) IV Push once  dextrose 50% Injectable 25 Gram(s) IV Push once  diltiazem    milliGRAM(s) Oral daily  folic acid 1 milliGRAM(s) Oral daily  glucagon  Injectable 1 milliGRAM(s) IntraMuscular once  insulin lispro (ADMELOG) corrective regimen sliding scale   SubCutaneous three times a day before meals  insulin lispro (ADMELOG) corrective regimen sliding scale.   SubCutaneous at bedtime  pantoprazole    Tablet 40 milliGRAM(s) Oral before breakfast  predniSONE   Tablet 30 milliGRAM(s) Oral daily  rivaroxaban 15 milliGRAM(s) Oral with dinner  timolol 0.5% Solution 1 Drop(s) Both EYES daily  torsemide 10 milliGRAM(s) Oral daily    MEDICATIONS  (PRN):  acetaminophen     Tablet .. 650 milliGRAM(s) Oral every 6 hours PRN Temp greater or equal to 38C (100.4F), Mild Pain (1 - 3)  aluminum hydroxide/magnesium hydroxide/simethicone Suspension 30 milliLiter(s) Oral every 4 hours PRN Dyspepsia  dextrose Oral Gel 15 Gram(s) Oral once PRN Blood Glucose LESS THAN 70 milliGRAM(s)/deciliter  melatonin 3 milliGRAM(s) Oral at bedtime PRN Insomnia  ondansetron Injectable 4 milliGRAM(s) IV Push every 8 hours PRN Nausea and/or Vomiting    HOME MEDICATIONS:  Farxiga 10 mg oral tablet: 1 tab(s) orally once a day (28 Mar 2024 22:52)  folic acid 1 mg oral tablet: 1 tab(s) orally once a day (28 Mar 2024 22:52)  ipratropium 21 mcg/inh (0.03%) nasal spray: 2 spray(s) intranasally once a day (28 Mar 2024 22:52)  metFORMIN 500 mg oral tablet, extended release: 1 tab(s) orally once a day *** patient stopped metformin due to diarrhea*** (29 Mar 2024 15:12)  pantoprazole 40 mg oral delayed release tablet: 1 tab(s) orally once a day (28 Mar 2024 22:52)  potassium chloride 10 mEq oral tablet, extended release: 1 tab(s) orally 2 times a day (28 Mar 2024 22:52)  rosuvastatin 40 mg oral tablet: 1 tab(s) orally once a day (28 Mar 2024 22:52)  timolol maleate 0.5% ophthalmic solution: 1 drop(s) in each eye once a day (28 Mar 2024 22:52)  Xarelto 15 mg oral tablet: 1 tab(s) orally once a day (28 Mar 2024 22:52)    PHYSICAL EXAM:  T(C): 36.6 (04 Apr 2024 07:40), Max: 36.8 (03 Apr 2024 21:14)  T(F): 97.9 (04 Apr 2024 07:40), Max: 98.2 (03 Apr 2024 21:14)  HR: 93 (04 Apr 2024 07:40) (93 - 97)  BP: 107/64 (04 Apr 2024 07:40) (92/65 - 107/64)  RR: 18 (04 Apr 2024 07:40) (18 - 18)  SpO2: 98% (04 Apr 2024 07:40) (97% - 98%)    Parameters below as of 04 Apr 2024 07:40  Patient On (Oxygen Delivery Method): room air    GENERAL: NAD, well-groomed, well-developed,  HEENT - NC/AT, pupils equal and reactive to light,  ; Moist mucous membranes, Good dentition, No lesions  NECK: Supple, No JVD  CHEST/LUNG: Clear to auscultation bilaterally; No rales, rhonchi, wheezing  HEART: Irregularly irregular rate and rhythm; No murmurs, rubs, or gallops  ABDOMEN: Soft, Nontender, Nondistended; Bowel sounds present  EXTREMITIES:  2+ Peripheral Pulses, No clubbing, cyanosis, or edema  NEURO:  No Focal deficits, sensory and motor intact    LABS: All Labs Reviewed:                        11.8   13.55 )-----------( 189      ( 04 Apr 2024 06:43 )             36.7     04-04    140  |  111<H>  |  42<H>  ----------------------------<  206<H>  3.9   |  21<L>  |  0.96    Ca    8.5      04 Apr 2024 06:43    Troponin: 16.50 ng/L    CARDIAC TESTING:    < from: TTE Echo Complete w/o Contrast w/ Doppler (03.19.24 @ 09:41) >   Left ventricle systolic function appears preserved in the presence of a   cardiac arrhythmia. Left ventricle size and structure are within normal   limitations. Visual estimation of left ventricle ejection fraction is>50  %.   The left atrium is moderately dilated.   The right atrium appears moderately dilated.   The right ventricle exhibits mild dilation, mild diffuse hypokinesis, and   mild depression of contractility.   Mild aortic sclerosis is present with normal valvular opening.   Minimal mitral annular calcification is present.   Mild (1+) mitral regurgitation is present.   Moderate to severe (3+) tricuspid valve regurgitation is present.   Mild pulmonic valvular regurgitation (1+) is present.    RADIOLOGY:    CT Angio Chest PE Protocol w/ IV Cont (04.02.24 @ 23:52) >  No pulmonary embolism. Small bilateral pleural effusions and   bibasilar atelectasis. Emphysema. Unchanged mediastinal and right hilar   lymphadenopathy.    CT Abdomen and Pelvis w/ IV Cont (03.28.24 @ 23:36) : No evidence of small bowel obstruction or active bowel inflammation. Stable subcutaneous hematoma overlying the right lateral hip.    Xray Chest 1 View- PORTABLE-Urgent (03.28.24 @ 16:51) Persistent large retrocaval mass.

## 2024-04-04 NOTE — PROGRESS NOTE ADULT - ASSESSMENT
87 yo female with a PMH of a fib on Xarelto, CHF on torsemide, HTN, GERD, DM, temporal arteritis on prednisone presents with diarrhea x 2 days. Pt was recently d/c from  for rapid a fib, and weakness on 3/25 and when she was d/c and placed on metformin. Pt states that after takign metformin she has having diarrhea TNTC which causes abd pain (which has currently resolved) and still feels weak. Per fam since AF meds started/ changed she has low BP, LOC.  ~ Admit for dehydration and to adjust cardiac meds; HR 50s and BP 95 systolic. Diarrhea has stopped since last night.    #Syncope  #Diarrhea from metformin. Hypotension. Resolved.   #Afib with Slow ventricular response in the setting of high dose BB and amio. Now RVR this AM. Unsuccessful CV attempt again on 4/1.   #Chronic diastolic HF   #Temporal Arteritis - on chronic steroids - tapering off.    #DMT2  #Hyperlipidemia  #EDITA - resolved.    - Monitor on tele, hx of Unsuccessful CV on 3/21 and 4/1.  - Continue with amiodarone, witched to Cardizem 120mg daily  - EDITA, resolved, resumed low dose torsemide 10mg daily  - Hold farxiga   - Continue Xarelto for stroke ppx  - Continue atorvastatin   - EP to follow up regarding possible AVJ ablation/PPM    Case d/w Dr. Kapadia  Will follow

## 2024-04-04 NOTE — PROGRESS NOTE ADULT - SUBJECTIVE AND OBJECTIVE BOX
Pt is an 89yo female with a PMH of AF on Xarelto, CHF on torsemide, HTN, GERD, DM, temporal arteritis on prednisone presents with persistent diarrhea, for which she was recently DCd on 3/25 for. During her admission she was found to have AF, begun on amiodarone and underwent an unsuccessful DCCV on 3/21. On this admission, pt noted to remain in AF, rates to 150s, and underwent a second DCCV on 4/1 which was unsuccessful.     4/3/24:  pt seen resting in bed in NAD- she denies any symptoms.  HRs are better rates around 100-120 bpm. Pt says she is asymptomatic and does not have SOB, CP or palpitations   TELE: atrial flutter and atrial fibrillation rate currently 107 bpm    4/4/24:  pt seen in NAD resting in bed- she denies any symptoms of SOB, palpitations or CP.    TELE: Atrial fib rates 100-120, increases with minimal activity        MEDICATIONS  (STANDING):  aMIOdarone    Tablet 200 milliGRAM(s) Oral two times a day  atorvastatin 80 milliGRAM(s) Oral at bedtime  dextrose 5%. 1000 milliLiter(s) (100 mL/Hr) IV Continuous <Continuous>  dextrose 5%. 1000 milliLiter(s) (50 mL/Hr) IV Continuous <Continuous>  dextrose 50% Injectable 25 Gram(s) IV Push once  dextrose 50% Injectable 25 Gram(s) IV Push once  dextrose 50% Injectable 12.5 Gram(s) IV Push once  diltiazem    milliGRAM(s) Oral daily  folic acid 1 milliGRAM(s) Oral daily  glucagon  Injectable 1 milliGRAM(s) IntraMuscular once  insulin lispro (ADMELOG) corrective regimen sliding scale   SubCutaneous three times a day before meals  insulin lispro (ADMELOG) corrective regimen sliding scale.   SubCutaneous at bedtime  pantoprazole    Tablet 40 milliGRAM(s) Oral before breakfast  predniSONE   Tablet 30 milliGRAM(s) Oral daily  rivaroxaban 15 milliGRAM(s) Oral with dinner  timolol 0.5% Solution 1 Drop(s) Both EYES daily  torsemide 10 milliGRAM(s) Oral daily    MEDICATIONS  (PRN):  acetaminophen     Tablet .. 650 milliGRAM(s) Oral every 6 hours PRN Temp greater or equal to 38C (100.4F), Mild Pain (1 - 3)  aluminum hydroxide/magnesium hydroxide/simethicone Suspension 30 milliLiter(s) Oral every 4 hours PRN Dyspepsia  dextrose Oral Gel 15 Gram(s) Oral once PRN Blood Glucose LESS THAN 70 milliGRAM(s)/deciliter  melatonin 3 milliGRAM(s) Oral at bedtime PRN Insomnia  ondansetron Injectable 4 milliGRAM(s) IV Push every 8 hours PRN Nausea and/or Vomiting    Allergies    penicillin (Swelling)    Intolerances    Vital Signs Last 24 Hrs  T(C): 36.6 (04 Apr 2024 07:40), Max: 36.8 (03 Apr 2024 21:14)  T(F): 97.9 (04 Apr 2024 07:40), Max: 98.2 (03 Apr 2024 21:14)  HR: 93 (04 Apr 2024 07:40) (93 - 97)  BP: 107/64 (04 Apr 2024 07:40) (92/65 - 107/64)  BP(mean): --  RR: 18 (04 Apr 2024 07:40) (18 - 18)             SpO2: 98% (04 Apr 2024 07:40) (97% - 98%)    Parameters below as of 04 Apr 2024 07:40  Patient On (Oxygen Delivery Method): room air                   11.8   13.55 )-----------( 189      ( 04 Apr 2024 06:43 )             36.7     04-04    140  |  111<H>  |  42<H>  ----------------------------<  206<H>  3.9   |  21<L>  |  0.96    Ca    8.5      04 Apr 2024 06:43      PHYSICAL EXAM:    General: WN/WD NAD  Neurology: A&Ox3, nonfocal, JACKSON x 4  HEENT: NC/AT, neck supple, no JVD, EOMI, PERRL  Respiratory: CTA B/L  CV:Irregular S1S2, no murmurs, rubs or gallops  Abdominal: Soft, NT, ND +BS  Extremities: B/L LE +1-2 edema, + peripheral pulses  Skin: No rashes      < from: TTE Echo Complete w/o Contrast w/ Doppler (03.19.24 @ 09:41) >   Impression     Summary     Left ventricle systolic function appears preserved in the presence of a   cardiac arrhythmia. Left ventricle size and structure are within normal   limitations. Visual estimation of left ventricle ejection fraction is>50   %.   The left atrium is moderately dilated.   The right atrium appears moderately dilated.   The right ventricle exhibits mild dilation, mild diffuse hypokinesis, and   mild depression of contractility.   Mild aortic sclerosis is present with normalvalvular opening.   Minimal mitral annular calcification is present.   Mild (1+) mitral regurgitation is present.   Moderate to severe (3+) tricuspid valve regurgitation is present.   Mild pulmonic valvular regurgitation (1+) is present.      < from: CT Angio Chest PE Protocol w/ IV Cont (04.02.24 @ 23:52) >    IMPRESSION:    1.  No pulmonary embolism.  2.  No change in the enlarged chest lymph nodes.  3.  Small bilateral pleural effusions and atelectasis.  4.  No change in the right upper lobe nodule or right lower lobe opacity.

## 2024-04-04 NOTE — PROGRESS NOTE ADULT - ASSESSMENT
87 y/o F w/ PMH of temporal arteritis (on steroids), a-fib (on xarelto), DM2 p/w weakness. Patient has been having difficulty walking due to B/L LE edema      #Chronic AF, tachy/delvis Syndrome   -TTE:  The right ventricle exhibits mild dilation, mild diffuse hypokinesis, and mild depression of contractility, TR,   -On Xarelto   -Amio 200mg BID   -s/p DCCV: was in NSR then reverted to Afib   -Cardiology/EP following   Started on Cardizem CD 120mg daily  - increased to 180mg daily.   -Concern that steroid dose may be contributing to her tachybrady syndrome however patient has had multiple episodes and recurrence of Temporal arteritis and would be prudent to continue at this time.   Started on Digoxin. If no improvement , way warrant PPM/AVJ       #HFpEF  -Torsemide   -Cardizem    #DM  -hold oral medications  -A1c: 8.6  -POC qAC/hs  -RISS   -target BGM <180  -Carb restriction     #Hyperlipidemia  -Continue atorvastatin     #EDITA   - resolved.    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, pt cannot recall the name of the Rheumatologist   -ESR: wnl     #Hx of DVT  -recent US duplex: negative   -f/u on CTA     #Lymphadenopathy / Pulm nodules  -CT Chest 3/18/24: Right paratracheal and right hilar lymphadenopathy, likely neoplastic. Largest lymph node approximately 3.5 cm in short axis in the right paratracheal station RLL 5mm solid nodule RUL 4mm ill-defined nodule.   -unclear if this is reactive or due to underlying malignancy; will need further w/u, including biopsy.  -per chart review: patient/family agreeable to pursue biopsy spoke with son-in-law (Adrien- [phone]) upon patient's request, however biopsy postponed s/t patient's recent uncontrolled A-Fib with RVR s/p unsuccessful DCCV on 3/21.  Management per cardiology/EPS  -plan for outpatient biopsy with Thoracic Sx-Dr. Mohr, patient/family aware.  -f/u outpatient at [facility] pending biopsy/path results      #DVT ppx   -Xarelto

## 2024-04-04 NOTE — PROGRESS NOTE ADULT - SUBJECTIVE AND OBJECTIVE BOX
HOSPITALIST ATTENDING PROGRESS NOTE    Chart and meds reviewed.      Subjective: Patient seen and examined. Resting comfortabyl in bed. Cardizem 120 was added yesterday, increased to 180mg daily today. HR still in the 110s and increases with minimal activity. Seen by EP, patient started on digoxin, if continues to be uncontrolled may warrant a pacemaker.       Additional results/Imaging, I have personally reviewed:    LABS:                            11.8   13.55 )-----------( 189      ( 04 Apr 2024 06:43 )             36.7     04-04    140  |  111<H>  |  42<H>  ----------------------------<  206<H>  3.9   |  21<L>  |  0.96    Ca    8.5      04 Apr 2024 06:43              Urinalysis Basic - ( 04 Apr 2024 06:43 )    Color: x / Appearance: x / SG: x / pH: x  Gluc: 206 mg/dL / Ketone: x  / Bili: x / Urobili: x   Blood: x / Protein: x / Nitrite: x   Leuk Esterase: x / RBC: x / WBC x   Sq Epi: x / Non Sq Epi: x / Bacteria: x              All other systems reviewed and found to be negative with the exception of what has been described above.    MEDICATIONS  (STANDING):  aMIOdarone    Tablet 200 milliGRAM(s) Oral two times a day  atorvastatin 80 milliGRAM(s) Oral at bedtime  dextrose 5%. 1000 milliLiter(s) (100 mL/Hr) IV Continuous <Continuous>  dextrose 5%. 1000 milliLiter(s) (50 mL/Hr) IV Continuous <Continuous>  dextrose 50% Injectable 25 Gram(s) IV Push once  dextrose 50% Injectable 12.5 Gram(s) IV Push once  dextrose 50% Injectable 25 Gram(s) IV Push once  diltiazem    milliGRAM(s) Oral daily  folic acid 1 milliGRAM(s) Oral daily  glucagon  Injectable 1 milliGRAM(s) IntraMuscular once  insulin lispro (ADMELOG) corrective regimen sliding scale   SubCutaneous three times a day before meals  insulin lispro (ADMELOG) corrective regimen sliding scale.   SubCutaneous at bedtime  pantoprazole    Tablet 40 milliGRAM(s) Oral before breakfast  predniSONE   Tablet 30 milliGRAM(s) Oral daily  rivaroxaban 15 milliGRAM(s) Oral with dinner  timolol 0.5% Solution 1 Drop(s) Both EYES daily  torsemide 10 milliGRAM(s) Oral daily    MEDICATIONS  (PRN):  acetaminophen     Tablet .. 650 milliGRAM(s) Oral every 6 hours PRN Temp greater or equal to 38C (100.4F), Mild Pain (1 - 3)  aluminum hydroxide/magnesium hydroxide/simethicone Suspension 30 milliLiter(s) Oral every 4 hours PRN Dyspepsia  dextrose Oral Gel 15 Gram(s) Oral once PRN Blood Glucose LESS THAN 70 milliGRAM(s)/deciliter  melatonin 3 milliGRAM(s) Oral at bedtime PRN Insomnia  ondansetron Injectable 4 milliGRAM(s) IV Push every 8 hours PRN Nausea and/or Vomiting      VITALS:  T(F): 97.9 (04-04-24 @ 07:40), Max: 98.2 (04-03-24 @ 21:14)  HR: 93 (04-04-24 @ 07:40) (93 - 97)  BP: 107/64 (04-04-24 @ 07:40) (92/65 - 107/64)  RR: 18 (04-04-24 @ 07:40) (18 - 18)  SpO2: 98% (04-04-24 @ 07:40) (97% - 98%)  Wt(kg): --    I&O's Summary      CAPILLARY BLOOD GLUCOSE      POCT Blood Glucose.: 154 mg/dL (04 Apr 2024 11:50)  POCT Blood Glucose.: 178 mg/dL (04 Apr 2024 08:00)  POCT Blood Glucose.: 218 mg/dL (03 Apr 2024 22:04)  POCT Blood Glucose.: 289 mg/dL (03 Apr 2024 17:08)      PHYSICAL EXAM:  GEN: NAD   HEENT: EOMI,  moist mucous membranes  NECK : Soft and supple, no JVD  LUNG: CTABL, No wheezing, rales or rhonchi  CVS: S1S2+, +irregular irregular   GI: BS+, soft, NT/ND, no guarding, no rebound  EXTREMITIES: +edema   VASCULAR: 2+ peripheral pulses  NEURO: AAOx3, grossly non-focal   SKIN: No rashes    CULTURES:      Telemetry, personally reviewed

## 2024-04-05 PROBLEM — I50.9 HEART FAILURE, UNSPECIFIED: Chronic | Status: ACTIVE | Noted: 2024-01-01

## 2024-04-05 PROBLEM — I48.91 UNSPECIFIED ATRIAL FIBRILLATION: Chronic | Status: ACTIVE | Noted: 2024-01-01

## 2024-04-05 NOTE — PROGRESS NOTE ADULT - NS ATTEND AMEND GEN_ALL_CORE FT
Patient seen and examined. Agree with plan above.
peAF difficult to control despite dccv and amiodarone  cont rate control follow outpatient if pt remains difficult to rate control may need AVJ ablation and pacing   may attempt again dccv after amiodarone laoding after 2 weeks   I spent a total of 35 minutes on the encounter, including chart review, evaluating and discussing treatment options with the patient, as well as counseling and coordination as stated above.
peAF difficult to control despite dccv and amiodarone  cont rate control follow add digoxin, in addition to cardizem and amiodarone  monitor telemetry  if pt remains difficult to rate control may need AVJ ablation and pacing   may attempt again dccv after amiodarone laoding after 2 weeks   I spent a total of 35 minutes on the encounter, including chart review, evaluating and discussing treatment options with the patient, as well as counseling and coordination as stated above.
peAF difficult to control despite dccv and amiodarone  cont rate control with digoxin, increase cardizem 240mg qd and amiodarone  monitor telemetry   I suspect AF may be better controlled when steroid dose is tapered off   should attempt again dccv as outpatient while pt is maintained on amio and when steroid treatment is completed   I spent a total of 35 minutes on the encounter, including chart review, evaluating and discussing treatment options with the patient, as well as counseling and coordination as stated above.

## 2024-04-05 NOTE — PROGRESS NOTE ADULT - SUBJECTIVE AND OBJECTIVE BOX
Pt is an 89yo female with a PMH of AF on Xarelto, CHF on torsemide, HTN, GERD, DM, temporal arteritis on prednisone presents with persistent diarrhea, for which she was recently DCd on 3/25 for. During her admission she was found to have AF, begun on amiodarone and underwent an unsuccessful DCCV on 3/21. On this admission, pt noted to remain in AF, rates to 150s, and underwent a second DCCV on 4/1 which was unsuccessful.     4/3/24:  pt seen resting in bed in NAD- she denies any symptoms.  HRs are better rates around 100-120 bpm. Pt says she is asymptomatic and does not have SOB, CP or palpitations   TELE: atrial flutter and atrial fibrillation rate currently 107 bpm    4/4/24:  pt seen in NAD resting in bed- she denies any symptoms of SOB, palpitations or CP.    TELE: Atrial fib rates 100-120, increases with minimal activity    MEDICATIONS  (STANDING):  aMIOdarone    Tablet 200 milliGRAM(s) Oral two times a day  atorvastatin 80 milliGRAM(s) Oral at bedtime  dextrose 5%. 1000 milliLiter(s) (100 mL/Hr) IV Continuous <Continuous>  dextrose 5%. 1000 milliLiter(s) (50 mL/Hr) IV Continuous <Continuous>  dextrose 50% Injectable 25 Gram(s) IV Push once  dextrose 50% Injectable 25 Gram(s) IV Push once  dextrose 50% Injectable 12.5 Gram(s) IV Push once  digoxin     Tablet 125 MICROGram(s) Oral daily  diltiazem    milliGRAM(s) Oral daily  folic acid 1 milliGRAM(s) Oral daily  glucagon  Injectable 1 milliGRAM(s) IntraMuscular once  insulin lispro (ADMELOG) corrective regimen sliding scale   SubCutaneous three times a day before meals  insulin lispro (ADMELOG) corrective regimen sliding scale.   SubCutaneous at bedtime  pantoprazole    Tablet 40 milliGRAM(s) Oral before breakfast  predniSONE   Tablet 30 milliGRAM(s) Oral daily  rivaroxaban 15 milliGRAM(s) Oral with dinner  timolol 0.5% Solution 1 Drop(s) Both EYES daily  torsemide 10 milliGRAM(s) Oral daily    MEDICATIONS  (PRN):  acetaminophen     Tablet .. 650 milliGRAM(s) Oral every 6 hours PRN Temp greater or equal to 38C (100.4F), Mild Pain (1 - 3)  aluminum hydroxide/magnesium hydroxide/simethicone Suspension 30 milliLiter(s) Oral every 4 hours PRN Dyspepsia  dextrose Oral Gel 15 Gram(s) Oral once PRN Blood Glucose LESS THAN 70 milliGRAM(s)/deciliter  melatonin 3 milliGRAM(s) Oral at bedtime PRN Insomnia  ondansetron Injectable 4 milliGRAM(s) IV Push every 8 hours PRN Nausea and/or Vomiting    Vital Signs Last 24 Hrs  T(C): 36.2 (05 Apr 2024 07:45), Max: 36.7 (05 Apr 2024 06:12)  T(F): 97.1 (05 Apr 2024 07:45), Max: 98.1 (05 Apr 2024 06:12)  HR: 77 (05 Apr 2024 07:45) (77 - 112)  BP: 110/77 (05 Apr 2024 07:45) (94/55 - 122/72)  RR: 18 (05 Apr 2024 07:45) (18 - 19)  SpO2: 93% (05 Apr 2024 07:45) (93% - 99%)    Parameters below as of 05 Apr 2024 07:45  Patient On (Oxygen Delivery Method): room air    General:  NAD  Respiratory: CTAB, normal respiratory effort, no wheezes, crackles, rales  CV:  S1/S2, no murmurs, rubs or gallops  Abdominal: Soft, bowel sounds present, NT, ND +BS  Extremities: No edema, 2+ DP pulses  Neurological: A&Ox4, MAEx4, non-focal  Skin: No rashes        Labs:  04-05    140  |  111<H>  |  45<H>  ----------------------------<  180<H>  3.8   |  22  |  0.94    Ca    8.7      05 Apr 2024 06:39                          12.1   13.06 )-----------( 195      ( 05 Apr 2024 06:39 )             37.6       < from: TTE Echo Complete w/o Contrast w/ Doppler (03.19.24 @ 09:41) >   Impression     Summary     Left ventricle systolic function appears preserved in the presence of a   cardiac arrhythmia. Left ventricle size and structure are within normal   limitations. Visual estimation of left ventricle ejection fraction is>50   %.   The left atrium is moderately dilated.   The right atrium appears moderately dilated.   The right ventricle exhibits mild dilation, mild diffuse hypokinesis, and   mild depression of contractility.   Mild aortic sclerosis is present with normalvalvular opening.   Minimal mitral annular calcification is present.   Mild (1+) mitral regurgitation is present.   Moderate to severe (3+) tricuspid valve regurgitation is present.   Mild pulmonic valvular regurgitation (1+) is present.      < from: CT Angio Chest PE Protocol w/ IV Cont (04.02.24 @ 23:52) >    IMPRESSION:    1.  No pulmonary embolism.  2.  No change in the enlarged chest lymph nodes.  3.  Small bilateral pleural effusions and atelectasis.  4.  No change in the right upper lobe nodule or right lower lobe opacity. Pt is an 87yo female with a PMH of AF on Xarelto, CHF on torsemide, HTN, GERD, DM, temporal arteritis on prednisone presents with persistent diarrhea, for which she was recently DCd on 3/25 for. During her admission she was found to have AF, begun on amiodarone and underwent an unsuccessful DCCV on 3/21. On this admission, pt noted to remain in AF, rates to 150s, and underwent a second DCCV on 4/1 which was unsuccessful.     4/3/24:  pt seen resting in bed in NAD- she denies any symptoms.  HRs are better rates around 100-120 bpm. Pt says she is asymptomatic and does not have SOB, CP or palpitations   TELE: atrial flutter and atrial fibrillation rate currently 107 bpm    4/4/24:  pt seen in NAD resting in bed- she denies any symptoms of SOB, palpitations or CP.    TELE: Atrial fib rates 100-120, increases with minimal activity    4/5/24: Patient seen OOB to chair, feeling well.  TELE: Afib     MEDICATIONS  (STANDING):  aMIOdarone    Tablet 200 milliGRAM(s) Oral two times a day  atorvastatin 80 milliGRAM(s) Oral at bedtime  dextrose 5%. 1000 milliLiter(s) (100 mL/Hr) IV Continuous <Continuous>  dextrose 5%. 1000 milliLiter(s) (50 mL/Hr) IV Continuous <Continuous>  dextrose 50% Injectable 25 Gram(s) IV Push once  dextrose 50% Injectable 25 Gram(s) IV Push once  dextrose 50% Injectable 12.5 Gram(s) IV Push once  digoxin     Tablet 125 MICROGram(s) Oral daily  diltiazem    milliGRAM(s) Oral daily  folic acid 1 milliGRAM(s) Oral daily  glucagon  Injectable 1 milliGRAM(s) IntraMuscular once  insulin lispro (ADMELOG) corrective regimen sliding scale   SubCutaneous three times a day before meals  insulin lispro (ADMELOG) corrective regimen sliding scale.   SubCutaneous at bedtime  pantoprazole    Tablet 40 milliGRAM(s) Oral before breakfast  predniSONE   Tablet 30 milliGRAM(s) Oral daily  rivaroxaban 15 milliGRAM(s) Oral with dinner  timolol 0.5% Solution 1 Drop(s) Both EYES daily  torsemide 10 milliGRAM(s) Oral daily    MEDICATIONS  (PRN):  acetaminophen     Tablet .. 650 milliGRAM(s) Oral every 6 hours PRN Temp greater or equal to 38C (100.4F), Mild Pain (1 - 3)  aluminum hydroxide/magnesium hydroxide/simethicone Suspension 30 milliLiter(s) Oral every 4 hours PRN Dyspepsia  dextrose Oral Gel 15 Gram(s) Oral once PRN Blood Glucose LESS THAN 70 milliGRAM(s)/deciliter  melatonin 3 milliGRAM(s) Oral at bedtime PRN Insomnia  ondansetron Injectable 4 milliGRAM(s) IV Push every 8 hours PRN Nausea and/or Vomiting    Vital Signs Last 24 Hrs  T(C): 36.2 (05 Apr 2024 07:45), Max: 36.7 (05 Apr 2024 06:12)  T(F): 97.1 (05 Apr 2024 07:45), Max: 98.1 (05 Apr 2024 06:12)  HR: 77 (05 Apr 2024 07:45) (77 - 112)  BP: 110/77 (05 Apr 2024 07:45) (94/55 - 122/72)  RR: 18 (05 Apr 2024 07:45) (18 - 19)  SpO2: 93% (05 Apr 2024 07:45) (93% - 99%)    Parameters below as of 05 Apr 2024 07:45  Patient On (Oxygen Delivery Method): room air    General:  NAD  Respiratory: CTAB, normal respiratory effort, no wheezes, crackles, rales  CV:  S1/S2, no murmurs, rubs or gallops  Abdominal: Soft, bowel sounds present, NT, ND +BS  Extremities: No edema, 2+ DP pulses  Neurological: A&Ox4, MAEx4, non-focal  Skin: No rashes        Labs:  04-05    140  |  111<H>  |  45<H>  ----------------------------<  180<H>  3.8   |  22  |  0.94    Ca    8.7      05 Apr 2024 06:39                          12.1   13.06 )-----------( 195      ( 05 Apr 2024 06:39 )             37.6       < from: TTE Echo Complete w/o Contrast w/ Doppler (03.19.24 @ 09:41) >   Impression     Summary     Left ventricle systolic function appears preserved in the presence of a   cardiac arrhythmia. Left ventricle size and structure are within normal   limitations. Visual estimation of left ventricle ejection fraction is>50   %.   The left atrium is moderately dilated.   The right atrium appears moderately dilated.   The right ventricle exhibits mild dilation, mild diffuse hypokinesis, and   mild depression of contractility.   Mild aortic sclerosis is present with normalvalvular opening.   Minimal mitral annular calcification is present.   Mild (1+) mitral regurgitation is present.   Moderate to severe (3+) tricuspid valve regurgitation is present.   Mild pulmonic valvular regurgitation (1+) is present.      < from: CT Angio Chest PE Protocol w/ IV Cont (04.02.24 @ 23:52) >    IMPRESSION:    1.  No pulmonary embolism.  2.  No change in the enlarged chest lymph nodes.  3.  Small bilateral pleural effusions and atelectasis.  4.  No change in the right upper lobe nodule or right lower lobe opacity.

## 2024-04-05 NOTE — PROGRESS NOTE ADULT - ASSESSMENT
87 yo female with a PMH of a fib on Xarelto, CHF on torsemide, HTN, GERD, DM, temporal arteritis on prednisone presents with diarrhea x 2 days. Pt was recently d/c from  for rapid a fib, and weakness on 3/25 and when she was d/c and placed on metformin. Pt states that after takign metformin she has having diarrhea TNTC which causes abd pain (which has currently resolved) and still feels weak. Per fam since AF meds started/ changed she has low BP, LOC.  ~ Admit for dehydration and to adjust cardiac meds; HR 50s and BP 95 systolic. Diarrhea has stopped since last night.    #Syncope  #Diarrhea from metformin. Hypotension. Resolved.   #Afib with Slow ventricular response in the setting of high dose BB and amio. Now RVR this AM. Unsuccessful CV attempt again on 4/1.   #Chronic diastolic HF   #Temporal Arteritis - on chronic steroids - tapering off.    #DMT2  #Hyperlipidemia  #EDITA - resolved.    - Monitor on tele, hx of Unsuccessful CV on 3/21 and 4/1.  - Continue with amiodarone, Cardizem and digoxin - HR better controlled  - EDITA, resolved, resumed low dose torsemide 10mg daily  - Hold farxiga   - Continue Xarelto for stroke ppx  - Continue atorvastatin        Case d/w Dr. Aime PENALOZA planning, will sign off.

## 2024-04-05 NOTE — PROGRESS NOTE ADULT - NS ATTEND BILL GEN_ALL_CORE
Attending to bill
PA/NP to bill
Attending to bill
Attending to bill

## 2024-04-05 NOTE — PROGRESS NOTE ADULT - SUBJECTIVE AND OBJECTIVE BOX
HOSPITALIST ATTENDING PROGRESS NOTE    Chart and meds reviewed.      Subjective: Patient seen and examined. Resting comfortably. Still reports weakness. HR better controlled on digoxin , Cardizem increased to 240mg by cardiology       Additional results/Imaging, I have personally reviewed:    LABS:                            12.1   13.06 )-----------( 195      ( 05 Apr 2024 06:39 )             37.6     04-05    140  |  111<H>  |  45<H>  ----------------------------<  180<H>  3.8   |  22  |  0.94    Ca    8.7      05 Apr 2024 06:39              Urinalysis Basic - ( 05 Apr 2024 06:39 )    Color: x / Appearance: x / SG: x / pH: x  Gluc: 180 mg/dL / Ketone: x  / Bili: x / Urobili: x   Blood: x / Protein: x / Nitrite: x   Leuk Esterase: x / RBC: x / WBC x   Sq Epi: x / Non Sq Epi: x / Bacteria: x              All other systems reviewed and found to be negative with the exception of what has been described above.    MEDICATIONS  (STANDING):  aMIOdarone    Tablet 200 milliGRAM(s) Oral two times a day  atorvastatin 80 milliGRAM(s) Oral at bedtime  dextrose 5%. 1000 milliLiter(s) (100 mL/Hr) IV Continuous <Continuous>  dextrose 5%. 1000 milliLiter(s) (50 mL/Hr) IV Continuous <Continuous>  dextrose 50% Injectable 25 Gram(s) IV Push once  dextrose 50% Injectable 12.5 Gram(s) IV Push once  dextrose 50% Injectable 25 Gram(s) IV Push once  digoxin     Tablet 125 MICROGram(s) Oral daily  diltiazem    Tablet 60 milliGRAM(s) Oral once  folic acid 1 milliGRAM(s) Oral daily  glucagon  Injectable 1 milliGRAM(s) IntraMuscular once  insulin lispro (ADMELOG) corrective regimen sliding scale   SubCutaneous three times a day before meals  insulin lispro (ADMELOG) corrective regimen sliding scale.   SubCutaneous at bedtime  pantoprazole    Tablet 40 milliGRAM(s) Oral before breakfast  predniSONE   Tablet 30 milliGRAM(s) Oral daily  rivaroxaban 15 milliGRAM(s) Oral with dinner  timolol 0.5% Solution 1 Drop(s) Both EYES daily  torsemide 10 milliGRAM(s) Oral daily    MEDICATIONS  (PRN):  acetaminophen     Tablet .. 650 milliGRAM(s) Oral every 6 hours PRN Temp greater or equal to 38C (100.4F), Mild Pain (1 - 3)  aluminum hydroxide/magnesium hydroxide/simethicone Suspension 30 milliLiter(s) Oral every 4 hours PRN Dyspepsia  dextrose Oral Gel 15 Gram(s) Oral once PRN Blood Glucose LESS THAN 70 milliGRAM(s)/deciliter  melatonin 3 milliGRAM(s) Oral at bedtime PRN Insomnia  ondansetron Injectable 4 milliGRAM(s) IV Push every 8 hours PRN Nausea and/or Vomiting      VITALS:  T(F): 97.1 (04-05-24 @ 07:45), Max: 98.1 (04-05-24 @ 06:12)  HR: 73 (04-05-24 @ 14:42) (73 - 112)  BP: 108/59 (04-05-24 @ 14:42) (94/55 - 122/72)  RR: 18 (04-05-24 @ 07:45) (18 - 19)  SpO2: 93% (04-05-24 @ 07:45) (93% - 99%)  Wt(kg): --    I&O's Summary      CAPILLARY BLOOD GLUCOSE      POCT Blood Glucose.: 207 mg/dL (05 Apr 2024 12:17)  POCT Blood Glucose.: 181 mg/dL (05 Apr 2024 08:32)  POCT Blood Glucose.: 245 mg/dL (04 Apr 2024 21:38)  POCT Blood Glucose.: 212 mg/dL (04 Apr 2024 17:35)      PHYSICAL EXAM:  GENERAL: NAD, well-groomed, well-developed,  HEENT - NC/AT, pupils equal and reactive to light,  ; Moist mucous membranes, Good dentition, No lesions  NECK: Supple, No JVD  CHEST/LUNG: Clear to auscultation bilaterally; No rales, rhonchi, wheezing  HEART: Irregularly irregular rate and rhythm; No murmurs, rubs, or gallops  ABDOMEN: Soft, Nontender, Nondistended; Bowel sounds present  EXTREMITIES:  2+ Peripheral Pulses, No clubbing, cyanosis, or edema  NEURO:  No Focal deficits, sensory and motor intact      CULTURES:      Telemetry, personally reviewed

## 2024-04-05 NOTE — PROGRESS NOTE ADULT - ASSESSMENT
89 y/o F w/ PMH of temporal arteritis (on steroids), a-fib (on xarelto), DM2 p/w weakness. Patient has been having difficulty walking due to B/L LE edema      #Chronic AF, tachy/delvis Syndrome   -TTE:  The right ventricle exhibits mild dilation, mild diffuse hypokinesis, and mild depression of contractility, TR,   -On Xarelto   -Amio 200mg BID   -s/p DCCV: was in NSR then reverted to Afib   -Cardiology/EP following   Started on Cardizem CD 120mg daily  - now increased to 240mg daily.   -Concern that steroid dose may be contributing to her tachybrady syndrome however patient has had multiple episodes and recurrence of Temporal arteritis and would be prudent to continue at this time.   Continue Digoxin. If no improvement , way warrant PPM/AVJ - Currently better controlled       #HFpEF  -Torsemide   -Cardizem    #DM  -hold oral medications  -A1c: 8.6  -POC qAC/hs  -RISS   -target BGM <180  -Carb restriction     #Hyperlipidemia  -Continue atorvastatin     #EDITA   - resolved.    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 Largest lymph node approximately 3.5 cm in short axis in the right paratracheal station RLL 5mm solid nodule RUL 4mm ill-defined nodule.   -unclear if this is reactive or due to underlying malignancy; will need further w/u, including biopsy.  -per chart review: patient/family agreeable to pursue biopsy spoke with son-in-law (Adrien- [phone]) upon patient's request, however biopsy postponed s/t patient's recent uncontrolled A-Fib with RVR s/p unsuccessful DCCV on 3/21.  Management per cardiology/EPS  -plan for outpatient biopsy with Thoracic Sx-Dr. Mohr, patient/family aware.  -f/u outpatient at [facility] pending biopsy/path results      #DVT ppx   -Xarelto

## 2024-04-05 NOTE — PROGRESS NOTE ADULT - SUBJECTIVE AND OBJECTIVE BOX
CC: Patient is a 89y old  Female who presents with a chief complaint of diarrhea, LOC (30 Mar 2024 21:05)    FROM HPI: 87 yo female with a PMH of a fib on xarelro, chf on torsemide, htn, GERD, DM, temporal arteritis on prednisone presents with diarrhea x 2 days. Pt was recently d/c from  for rapid a fib, and weakness on 3/25 and when she was d/c and placed on metformin. Pt states that after takign metformin she has having diarrhea TNTC which causes abd pain (which has currently resolved) and still feels weak. Per family since AF meds started/ changed she has low BP, LOC.  Admit for dehydration and to adjust  cardiac meds; HR 50s and bp95 systolic. Diarrhea has stopped since last night. (29 Mar 2024 07:52)    3/31. HR back to 130s in rapid AF, amiodarone increased yesterday. BB was held due to severe bradycardia. At this point will ask EP to evaluate for repeat DCCV on amiodarone  4/1. Possible CV this AM, C/w amiodarone. Holding diuretics.  4/2. Unsuccessful CV attempt yesterday. Will resume low dose BB.   4/3. She feels well. HR still uncontrolled.  4/4. Ep to follow up today, HR still uncontrolled  4/5. HR better with digoxin    PAST MEDICAL & SURGICAL HISTORY:  Temporal arteritis  Atrial fibrillation  Acute CHF    PSH: Denies   Social Hx: Denies tobacco / etoh / drugs   Family Hx: Denies pertinent hx  (18 Mar 2024 23:17)    Allergies: Penicillin (Swelling), Intolerances    MEDICATIONS  (STANDING):  aMIOdarone    Tablet 200 milliGRAM(s) Oral two times a day  atorvastatin 80 milliGRAM(s) Oral at bedtime  dextrose 5%. 1000 milliLiter(s) (100 mL/Hr) IV Continuous <Continuous>  dextrose 5%. 1000 milliLiter(s) (50 mL/Hr) IV Continuous <Continuous>  dextrose 50% Injectable 25 Gram(s) IV Push once  dextrose 50% Injectable 12.5 Gram(s) IV Push once  dextrose 50% Injectable 25 Gram(s) IV Push once  digoxin     Tablet 125 MICROGram(s) Oral daily  diltiazem    milliGRAM(s) Oral daily  folic acid 1 milliGRAM(s) Oral daily  glucagon  Injectable 1 milliGRAM(s) IntraMuscular once  insulin lispro (ADMELOG) corrective regimen sliding scale   SubCutaneous three times a day before meals  insulin lispro (ADMELOG) corrective regimen sliding scale.   SubCutaneous at bedtime  pantoprazole    Tablet 40 milliGRAM(s) Oral before breakfast  predniSONE   Tablet 30 milliGRAM(s) Oral daily  rivaroxaban 15 milliGRAM(s) Oral with dinner  timolol 0.5% Solution 1 Drop(s) Both EYES daily  torsemide 10 milliGRAM(s) Oral daily    MEDICATIONS  (PRN):  acetaminophen     Tablet .. 650 milliGRAM(s) Oral every 6 hours PRN Temp greater or equal to 38C (100.4F), Mild Pain (1 - 3)  aluminum hydroxide/magnesium hydroxide/simethicone Suspension 30 milliLiter(s) Oral every 4 hours PRN Dyspepsia  dextrose Oral Gel 15 Gram(s) Oral once PRN Blood Glucose LESS THAN 70 milliGRAM(s)/deciliter  melatonin 3 milliGRAM(s) Oral at bedtime PRN Insomnia  ondansetron Injectable 4 milliGRAM(s) IV Push every 8 hours PRN Nausea and/or Vomiting      HOME MEDICATIONS:  Farxiga 10 mg oral tablet: 1 tab(s) orally once a day (28 Mar 2024 22:52)  folic acid 1 mg oral tablet: 1 tab(s) orally once a day (28 Mar 2024 22:52)  ipratropium 21 mcg/inh (0.03%) nasal spray: 2 spray(s) intranasally once a day (28 Mar 2024 22:52)  metFORMIN 500 mg oral tablet, extended release: 1 tab(s) orally once a day *** patient stopped metformin due to diarrhea*** (29 Mar 2024 15:12)  pantoprazole 40 mg oral delayed release tablet: 1 tab(s) orally once a day (28 Mar 2024 22:52)  potassium chloride 10 mEq oral tablet, extended release: 1 tab(s) orally 2 times a day (28 Mar 2024 22:52)  rosuvastatin 40 mg oral tablet: 1 tab(s) orally once a day (28 Mar 2024 22:52)  timolol maleate 0.5% ophthalmic solution: 1 drop(s) in each eye once a day (28 Mar 2024 22:52)  Xarelto 15 mg oral tablet: 1 tab(s) orally once a day (28 Mar 2024 22:52)    PHYSICAL EXAM:  T(C): 36.2 (05 Apr 2024 07:45), Max: 36.7 (05 Apr 2024 06:12)  T(F): 97.1 (05 Apr 2024 07:45), Max: 98.1 (05 Apr 2024 06:12)  HR: 77 (05 Apr 2024 07:45) (77 - 112)  BP: 110/77 (05 Apr 2024 07:45) (94/55 - 122/72)  RR: 18 (05 Apr 2024 07:45) (18 - 19)  SpO2: 93% (05 Apr 2024 07:45) (93% - 99%)    Parameters below as of 05 Apr 2024 07:45  Patient On (Oxygen Delivery Method): room air    GENERAL: NAD, well-groomed, well-developed,  HEENT - NC/AT, pupils equal and reactive to light,  ; Moist mucous membranes, Good dentition, No lesions  NECK: Supple, No JVD  CHEST/LUNG: Clear to auscultation bilaterally; No rales, rhonchi, wheezing  HEART: Irregularly irregular rate and rhythm; No murmurs, rubs, or gallops  ABDOMEN: Soft, Nontender, Nondistended; Bowel sounds present  EXTREMITIES:  2+ Peripheral Pulses, No clubbing, cyanosis, or edema  NEURO:  No Focal deficits, sensory and motor intact    LABS: All Labs Reviewed:                        12.1 13.06 )-----------( 195      ( 05 Apr 2024 06:39 )             37.6     04-05    140  |  111<H>  |  45<H>  ----------------------------<  180<H>  3.8   |  22  |  0.94    Ca    8.7      05 Apr 2024 06:39      CARDIAC TESTING:    < from: TTE Echo Complete w/o Contrast w/ Doppler (03.19.24 @ 09:41) >   Left ventricle systolic function appears preserved in the presence of a   cardiac arrhythmia. Left ventricle size and structure are within normal   limitations. Visual estimation of left ventricle ejection fraction is>50  %.   The left atrium is moderately dilated.   The right atrium appears moderately dilated.   The right ventricle exhibits mild dilation, mild diffuse hypokinesis, and   mild depression of contractility.   Mild aortic sclerosis is present with normal valvular opening.   Minimal mitral annular calcification is present.   Mild (1+) mitral regurgitation is present.   Moderate to severe (3+) tricuspid valve regurgitation is present.   Mild pulmonic valvular regurgitation (1+) is present.    RADIOLOGY:    CT Angio Chest PE Protocol w/ IV Cont (04.02.24 @ 23:52) >  No pulmonary embolism. Small bilateral pleural effusions and   bibasilar atelectasis. Emphysema. Unchanged mediastinal and right hilar   lymphadenopathy.    CT Abdomen and Pelvis w/ IV Cont (03.28.24 @ 23:36) : No evidence of small bowel obstruction or active bowel inflammation. Stable subcutaneous hematoma overlying the right lateral hip.    Xray Chest 1 View- PORTABLE-Urgent (03.28.24 @ 16:51) Persistent large retrocaval mass.

## 2024-04-05 NOTE — PROGRESS NOTE ADULT - ASSESSMENT
An 89 yo female known to our EP service with a PMH of a fib on xarelto, recent unsuccessful DCCV last week,, chf on torsemide, htn, GERD, DM, temporal arteritis on prednisone presents with diarrhea x 2 days & return of AF with -130s on tele      Persistent atrial fib/atrial flutter, difficult to control rates, s/p 2 unsuccessful DCCV on 3/21 and 4/1  Continue tele monitoring  Pt says she is asymptomatic and does not have SOB, CP or palpitations  Rates still uncontrolled despite Cardizem CD increased to 180 mg PO Daily and addition of Digoxin, will increase Diltiazem to 240mg daily  Close monitoring of blood pressure  Continue Digoxin 125mcg PO daily  Continue uniterrupted DOAC-Has been on Xarelto  Continue Amiodarone reloading with 200 mg po BID with food  Keep lytes repleted, K>4, Mg>2  Continuous prednisone is required for temporal arteritis.  MCOT on discharge for continued monitoring  EP will follow- if rates still uncontrolled she may need PPM/AVJ, continue with rate control for now.  Plan d/w Dr. Martines and patient, RN   An 87 yo female known to our EP service with a PMH of a fib on xarelto, recent unsuccessful DCCV last week,, chf on torsemide, htn, GERD, DM, temporal arteritis on prednisone presents with diarrhea x 2 days & return of AF with -130s on tele      Persistent atrial fib/atrial flutter, difficult to control rates, s/p 2 unsuccessful DCCV on 3/21 and 4/1  Continue tele monitoring  Pt says she is asymptomatic and does not have SOB, CP or palpitations  Rates still uncontrolled despite Cardizem CD increased to 180 mg PO Daily and addition of Digoxin, will increase Diltiazem to 240mg daily  Close monitoring of blood pressure  Continue Digoxin 125mcg PO daily  Continue uniterrupted DOAC-Has been on Xarelto  Continue Amiodarone reloading with 200 mg po BID with food  Keep lytes repleted, K>4, Mg>2  Continuous prednisone is required for temporal arteritis.  MCOT on discharge for continued monitoring  EP will follow- if rates still uncontrolled she may need PPM/AVJ, continue with rate control for now.  Plan d/w patient, hospitalist and Dr. Martines   An 87 yo female known to our EP service with a PMH of a fib on xarelto, recent unsuccessful DCCV last week,, chf on torsemide, htn, GERD, DM, temporal arteritis on prednisone presents with diarrhea x 2 days & return of AF with -130s on tele      Persistent atrial fib/atrial flutter, difficult to control rates, s/p 2 unsuccessful DCCV on 3/21 and 4/1  Continue tele monitoring  Pt says she is asymptomatic and does not have SOB, CP or palpitations  Rates still uncontrolled despite Cardizem CD increased to 180 mg PO Daily and addition of Digoxin, will increase Diltiazem to 240mg daily  Close monitoring of blood pressure  Continue Digoxin 125mcg PO daily  Continue uniterrupted DOAC-Has been on Xarelto  Continue Amiodarone reloading with 200 mg po BID with food  Keep lytes repleted, K>4, Mg>2  Continuous prednisone is required for temporal arteritis.  AF may be better controlled when steroid dose is tapered off   should attempt again dccv as outpatient while pt is maintained on amio and when steroid treatment is completed   MCOT on discharge for continued monitoring  EP will follow- if difficult to control AF despite all efforts she may need PPM/AVJ, continue with rate control for now.  Plan d/w patient, hospitalist and Dr. Martines

## 2024-04-06 NOTE — PROGRESS NOTE ADULT - ASSESSMENT
87 y/o F w/ PMH of temporal arteritis (on steroids), a-fib (on xarelto), DM2 p/w weakness. Patient has been having difficulty walking due to B/L LE edema      #Chronic AF, tachy/delvis Syndrome   -TTE:  The right ventricle exhibits mild dilation, mild diffuse hypokinesis, and mild depression of contractility, TR,   -On Xarelto   -Amio 200mg BID   -s/p DCCV: was in NSR then reverted to Afib   -Cardiology/EP following   Started on Cardizem CD 120mg daily  - increased to 180mg daily.   -Concern that steroid dose may be contributing to her tachybrady syndrome however patient has had multiple episodes and recurrence of Temporal arteritis and would be prudent to continue at this time.   Started on Digoxin. If no improvement , way warrant PPM/AVJ   HR has been controlled on Digoxin, continue   Cotninue to work with PT   As per EP attempt again dccv as outpatient while pt is maintained on amio and when steroid treatment is completed         #HFpEF  -Torsemide   -Cardizem  Holding farxiga     #DM  -hold oral medications  -A1c: 8.6  -POC qAC/hs  -RISS   -target BGM <180  -Carb restriction     #Hyperlipidemia  -Continue atorvastatin     #EDITA   - resolved.    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 Largest lymph node approximately 3.5 cm in short axis in the right paratracheal station RLL 5mm solid nodule RUL 4mm ill-defined nodule.   -unclear if this is reactive or due to underlying malignancy; will need further w/u, including biopsy.  -per chart review: patient/family agreeable to pursue biopsy spoke with son-in-law (Antonio [phone]) upon patient's request, however biopsy postponed s/t patient's recent uncontrolled A-Fib with RVR s/p unsuccessful DCCV on 3/21.  Management per cardiology/EPS  -plan for outpatient biopsy with Thoracic Sx-Dr. Mohr, patient/family aware.  -f/u outpatient at [facility] pending biopsy/path results      #DVT ppx   -Xarelto   87 y/o F w/ PMH of temporal arteritis (on steroids), a-fib (on xarelto), DM2 p/w weakness. Patient has been having difficulty walking due to B/L LE edema      #Chronic AF, tachy/delvis Syndrome   -TTE:  The right ventricle exhibits mild dilation, mild diffuse hypokinesis, and mild depression of contractility, TR,   -On Xarelto   -Amio 200mg BID   -s/p DCCV: was in NSR then reverted to Afib   -Cardiology/EP following   Started on Cardizem CD 120mg daily  - increased to 180mg daily.   -Concern that steroid dose may be contributing to her tachybrady syndrome however patient has had multiple episodes and recurrence of Temporal arteritis and would be prudent to continue at this time.   Started on Digoxin. If no improvement , way warrant PPM/AVJ   HR has been controlled on Digoxin, continue   Cotninue to work with PT   As per EP attempt again dccv as outpatient while pt is maintained on amio and when steroid treatment is completed       Addendum   patient with an episode of bradycardia and then ?Trigeminy on telemetry on telemetry,   will obtain EKG  Will check digoxin levels.   Hr now in 70-80s , still in afib   Will lower cardizem to back to 180mg from 240mg         #HFpEF  -Torsemide   -Cardizem  Holding farxiga     #DM  -hold oral medications  -A1c: 8.6  -POC qAC/hs  -RISS   -target BGM <180  -Carb restriction     #Hyperlipidemia  -Continue atorvastatin     #EDITA   - resolved.    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 Largest lymph node approximately 3.5 cm in short axis in the right paratracheal station RLL 5mm solid nodule RUL 4mm ill-defined nodule.   -unclear if this is reactive or due to underlying malignancy; will need further w/u, including biopsy.  -per chart review: patient/family agreeable to pursue biopsy spoke with son-in-law (john- [phone]) upon patient's request, however biopsy postponed s/t patient's recent uncontrolled A-Fib with RVR s/p unsuccessful DCCV on 3/21.  Management per cardiology/EPS  -plan for outpatient biopsy with Thoracic Sx-Dr. Mohr, patient/family aware.  -f/u outpatient at [facility] pending biopsy/path results      #DVT ppx   -Xarelto

## 2024-04-06 NOTE — PROGRESS NOTE ADULT - SUBJECTIVE AND OBJECTIVE BOX
HOSPITALIST ATTENDING PROGRESS NOTE    Chart and meds reviewed.      Subjective: Patient seen and examined.  Heart rate better controlled in thr 70s 80s, Continues to feel weak. More awake and alert today. Denies any chest pain or palpitations No fevers or chills overnight.       Additional results/Imaging, I have personally reviewed:    LABS:                            12.9   11.72 )-----------( 181      ( 06 Apr 2024 06:57 )             39.8     04-06    138  |  109<H>  |  44<H>  ----------------------------<  203<H>  4.5   |  22  |  0.99    Ca    8.7      06 Apr 2024 06:57              Urinalysis Basic - ( 06 Apr 2024 06:57 )    Color: x / Appearance: x / SG: x / pH: x  Gluc: 203 mg/dL / Ketone: x  / Bili: x / Urobili: x   Blood: x / Protein: x / Nitrite: x   Leuk Esterase: x / RBC: x / WBC x   Sq Epi: x / Non Sq Epi: x / Bacteria: x              All other systems reviewed and found to be negative with the exception of what has been described above.    MEDICATIONS  (STANDING):  aMIOdarone    Tablet 200 milliGRAM(s) Oral two times a day  atorvastatin 80 milliGRAM(s) Oral at bedtime  dextrose 5%. 1000 milliLiter(s) (50 mL/Hr) IV Continuous <Continuous>  dextrose 5%. 1000 milliLiter(s) (100 mL/Hr) IV Continuous <Continuous>  dextrose 50% Injectable 25 Gram(s) IV Push once  dextrose 50% Injectable 12.5 Gram(s) IV Push once  dextrose 50% Injectable 25 Gram(s) IV Push once  digoxin     Tablet 125 MICROGram(s) Oral daily  diltiazem    milliGRAM(s) Oral daily  folic acid 1 milliGRAM(s) Oral daily  glucagon  Injectable 1 milliGRAM(s) IntraMuscular once  insulin lispro (ADMELOG) corrective regimen sliding scale   SubCutaneous three times a day before meals  insulin lispro (ADMELOG) corrective regimen sliding scale.   SubCutaneous at bedtime  pantoprazole    Tablet 40 milliGRAM(s) Oral before breakfast  predniSONE   Tablet 30 milliGRAM(s) Oral daily  rivaroxaban 15 milliGRAM(s) Oral with dinner  timolol 0.5% Solution 1 Drop(s) Both EYES daily  torsemide 10 milliGRAM(s) Oral daily    MEDICATIONS  (PRN):  acetaminophen     Tablet .. 650 milliGRAM(s) Oral every 6 hours PRN Temp greater or equal to 38C (100.4F), Mild Pain (1 - 3)  aluminum hydroxide/magnesium hydroxide/simethicone Suspension 30 milliLiter(s) Oral every 4 hours PRN Dyspepsia  dextrose Oral Gel 15 Gram(s) Oral once PRN Blood Glucose LESS THAN 70 milliGRAM(s)/deciliter  melatonin 3 milliGRAM(s) Oral at bedtime PRN Insomnia  ondansetron Injectable 4 milliGRAM(s) IV Push every 8 hours PRN Nausea and/or Vomiting      VITALS:  T(F): 97.6 (04-06-24 @ 07:56), Max: 97.7 (04-05-24 @ 21:51)  HR: 77 (04-06-24 @ 07:56) (63 - 77)  BP: 117/56 (04-06-24 @ 07:56) (98/55 - 117/56)  RR: 18 (04-06-24 @ 07:56) (17 - 18)  SpO2: 100% (04-06-24 @ 07:56) (99% - 100%)  Wt(kg): --    I&O's Summary    05 Apr 2024 07:01  -  06 Apr 2024 07:00  --------------------------------------------------------  IN: 720 mL / OUT: 1000 mL / NET: -280 mL    06 Apr 2024 07:01  -  06 Apr 2024 13:15  --------------------------------------------------------  IN: 0 mL / OUT: 900 mL / NET: -900 mL        CAPILLARY BLOOD GLUCOSE      POCT Blood Glucose.: 249 mg/dL (06 Apr 2024 11:46)  POCT Blood Glucose.: 174 mg/dL (06 Apr 2024 07:44)  POCT Blood Glucose.: 287 mg/dL (05 Apr 2024 21:48)  POCT Blood Glucose.: 295 mg/dL (05 Apr 2024 17:10)      PHYSICAL EXAM:  GEN: NAD   HEENT: EOMI,  moist mucous membranes  NECK : Soft and supple, no JVD  LUNG: CTABL, No wheezing, rales or rhonchi  CVS: S1S2+, +irregular irregular   GI: BS+, soft, NT/ND, no guarding, no rebound  EXTREMITIES: +edema   VASCULAR: 2+ peripheral pulses  NEURO: AAOx3, grossly non-focal   SKIN: No rashes      CULTURES:      Telemetry, personally reviewed

## 2024-04-07 NOTE — PROGRESS NOTE ADULT - ASSESSMENT
89 y/o F w/ PMH of temporal arteritis (on steroids), a-fib (on xarelto), DM2 p/w weakness. Patient has been having difficulty walking due to B/L LE edema      #Chronic AF, tachy/delvis Syndrome   -TTE:  The right ventricle exhibits mild dilation, mild diffuse hypokinesis, and mild depression of contractility, TR,   -On Xarelto   -Amio 200mg BID   -s/p DCCV: was in NSR then reverted to Afib   -Cardiology/EP following   Started on Cardizem CD 120mg daily  - now increased to 240mg daily.   -Concern that steroid dose may be contributing to her tachybrady syndrome however patient has had multiple episodes and recurrence of Temporal arteritis and would be prudent to continue at this time.   Continue Digoxin. If no improvement , way warrant PPM/AVJ - Currently better controlled       #HFpEF  -Torsemide   -Cardizem    #DM  -hold oral medications  -A1c: 8.6  -POC qAC/hs  -RISS   -target BGM <180  -Carb restriction     #Hyperlipidemia  -Continue atorvastatin     #EDITA   - resolved.    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, pt cannot recall the name of the Rheumatologist   -ESR: wnl     #Hx of DVT  -recent US duplex: negative   -f/u on CTA     #Lymphadenopathy / Pulm nodules  -CT Chest 3/18/24: Right paratracheal and right hilar lymphadenopathy, likely neoplastic. Largest lymph node approximately 3.5 cm in short axis in the right paratracheal station RLL 5mm solid nodule RUL 4mm ill-defined nodule.   -unclear if this is reactive or due to underlying malignancy; will need further w/u, including biopsy.  -per chart review: patient/family agreeable to pursue biopsy spoke with son-in-law (Adrien- [phone]) upon patient's request, however biopsy postponed s/t patient's recent uncontrolled A-Fib with RVR s/p unsuccessful DCCV on 3/21.  Management per cardiology/EPS  -plan for outpatient biopsy with Thoracic Sx-Dr. Mohr, patient/family aware.  -f/u outpatient at [facility] pending biopsy/path results      #DVT ppx   -Xarelto

## 2024-04-07 NOTE — PROGRESS NOTE ADULT - REASON FOR ADMISSION
diarrhea, LOC

## 2024-04-07 NOTE — PROGRESS NOTE ADULT - PROVIDER SPECIALTY LIST ADULT
Cardiology
Electrophysiology
Hospitalist
Cardiology
Cardiology
Hospitalist
Cardiology
Electrophysiology
Hospitalist
Cardiology
Electrophysiology

## 2024-04-07 NOTE — PROGRESS NOTE ADULT - SUBJECTIVE AND OBJECTIVE BOX
HOSPITALIST ATTENDING PROGRESS NOTE    Chart and meds reviewed.      Subjective: Patient seen and examined. Still significantly weak, continue to work with PT. HR better controlled. Patient transferred from chair to bed today with mild tachycardia and quickly returned to < 100. No chest pain shortness of breath       Additional results/Imaging, I have personally reviewed:    LABS:                            12.3   12.47 )-----------( 191      ( 07 Apr 2024 07:32 )             38.4     04-07    138  |  109<H>  |  39<H>  ----------------------------<  169<H>  3.6   |  22  |  0.85    Ca    8.4<L>      07 Apr 2024 07:32              Urinalysis Basic - ( 07 Apr 2024 07:32 )    Color: x / Appearance: x / SG: x / pH: x  Gluc: 169 mg/dL / Ketone: x  / Bili: x / Urobili: x   Blood: x / Protein: x / Nitrite: x   Leuk Esterase: x / RBC: x / WBC x   Sq Epi: x / Non Sq Epi: x / Bacteria: x              All other systems reviewed and found to be negative with the exception of what has been described above.    MEDICATIONS  (STANDING):  aMIOdarone    Tablet 200 milliGRAM(s) Oral two times a day  atorvastatin 80 milliGRAM(s) Oral at bedtime  dextrose 5%. 1000 milliLiter(s) (100 mL/Hr) IV Continuous <Continuous>  dextrose 5%. 1000 milliLiter(s) (50 mL/Hr) IV Continuous <Continuous>  dextrose 50% Injectable 25 Gram(s) IV Push once  dextrose 50% Injectable 25 Gram(s) IV Push once  dextrose 50% Injectable 12.5 Gram(s) IV Push once  digoxin     Tablet 125 MICROGram(s) Oral daily  diltiazem    milliGRAM(s) Oral daily  folic acid 1 milliGRAM(s) Oral daily  glucagon  Injectable 1 milliGRAM(s) IntraMuscular once  insulin lispro (ADMELOG) corrective regimen sliding scale   SubCutaneous three times a day before meals  insulin lispro (ADMELOG) corrective regimen sliding scale.   SubCutaneous at bedtime  pantoprazole    Tablet 40 milliGRAM(s) Oral before breakfast  predniSONE   Tablet 30 milliGRAM(s) Oral daily  rivaroxaban 15 milliGRAM(s) Oral with dinner  timolol 0.5% Solution 1 Drop(s) Both EYES daily  torsemide 10 milliGRAM(s) Oral daily    MEDICATIONS  (PRN):  acetaminophen     Tablet .. 650 milliGRAM(s) Oral every 6 hours PRN Temp greater or equal to 38C (100.4F), Mild Pain (1 - 3)  aluminum hydroxide/magnesium hydroxide/simethicone Suspension 30 milliLiter(s) Oral every 4 hours PRN Dyspepsia  dextrose Oral Gel 15 Gram(s) Oral once PRN Blood Glucose LESS THAN 70 milliGRAM(s)/deciliter  melatonin 3 milliGRAM(s) Oral at bedtime PRN Insomnia  ondansetron Injectable 4 milliGRAM(s) IV Push every 8 hours PRN Nausea and/or Vomiting      VITALS:  T(F): 97.7 (04-07-24 @ 08:55), Max: 98.2 (04-06-24 @ 21:20)  HR: 83 (04-07-24 @ 08:55) (83 - 90)  BP: 115/50 (04-07-24 @ 08:55) (100/51 - 115/50)  RR: 18 (04-07-24 @ 08:55) (18 - 18)  SpO2: 97% (04-07-24 @ 08:55) (97% - 100%)  Wt(kg): --    I&O's Summary    06 Apr 2024 07:01  -  07 Apr 2024 07:00  --------------------------------------------------------  IN: 0 mL / OUT: 1600 mL / NET: -1600 mL    07 Apr 2024 07:01  -  07 Apr 2024 15:24  --------------------------------------------------------  IN: 0 mL / OUT: 900 mL / NET: -900 mL        CAPILLARY BLOOD GLUCOSE      POCT Blood Glucose.: 287 mg/dL (07 Apr 2024 11:44)  POCT Blood Glucose.: 162 mg/dL (07 Apr 2024 08:09)  POCT Blood Glucose.: 249 mg/dL (06 Apr 2024 21:17)  POCT Blood Glucose.: 292 mg/dL (06 Apr 2024 16:30)      PHYSICAL EXAM:  GENERAL: NAD, well-groomed, well-developed,  HEENT - NC/AT, pupils equal and reactive to light,  ; Moist mucous membranes, Good dentition, No lesions  NECK: Supple, No JVD  CHEST/LUNG: Clear to auscultation bilaterally; No rales, rhonchi, wheezing  HEART: Irregularly irregular rate and rhythm; No murmurs, rubs, or gallops  ABDOMEN: Soft, Nontender, Nondistended; Bowel sounds present  EXTREMITIES:  2+ Peripheral Pulses, No clubbing, cyanosis, or edema  NEURO:  No Focal deficits, sensory and motor intact        CULTURES:      Telemetry, personally reviewed

## 2024-04-08 NOTE — DISCHARGE NOTE PROVIDER - CARE PROVIDER_API CALL
Cash Laureano  Internal Medicine  [address] [place] [address]  Phone: [phone]  Fax: [phone]  Follow Up Time: 1 week    Luisana Martines  Cardiac Electrophysiology  [address] [place] [address]  Phone: [phone]  Fax: [phone]  Follow Up Time: 1 week    Pete Mohr  Thoracic Surgery  [address] [place] [address]  Phone: [phone]  Fax: [phone]  Follow Up Time: 1 week    Efrain Salomon  Cardiology  [address] [place] [address]  Phone: [phone]  Fax: [phone]  Follow Up Time: 1 week

## 2024-04-08 NOTE — DISCHARGE NOTE PROVIDER - NSDCCPCAREPLAN_GEN_ALL_CORE_FT
PRINCIPAL DISCHARGE DIAGNOSIS  Diagnosis: Afib  Assessment and Plan of Treatment: WHAT IS ATRIAL FIBRILLATION? Atrial fibrillation (AFIB) is a cardiac arrhythmia caused by a disorder in the hearts electrical system. An arrhythmia is a problem with the speed or rhythm of the heartbeat. Atrial fibrillation is the most common type of arrhythmia.  THINGS TO DO: (1) Monitor your blood pressure and heart rate (2) Limit or avoid alcohol intake – alcohol can increase your risk of AFIB (3) Do not smoke – nicotine can damage your heart and make it difficult to manage your AFIB (4) Eat heart healthy foods like fruits, vegetables, and whole grains (5) Manage a healthy weight (5) Get regular physical activity  MONITOR THESE SIGNS AND SYMPTOMS: (1) Shortness of breath (2) Nausea or vomiting (3) Chest pain or pressure (4) Chest palpitations. If you experience any of these, DO alert your primary care provider, or return to the Emergency Department if you feel very sick.      SECONDARY DISCHARGE DIAGNOSES  Diagnosis: Leukocytosis  Assessment and Plan of Treatment: on steroids

## 2024-04-08 NOTE — DISCHARGE NOTE PROVIDER - PROVIDER TOKENS
PROVIDER:[TOKEN:[[id number]],FOLLOWUP:[1 week]],PROVIDER:[TOKEN:[[id number]],FOLLOWUP:[1 week]],PROVIDER:[TOKEN:[[id number]],FOLLOWUP:[1 week]],PROVIDER:[TOKEN:[[id number]],FOLLOWUP:[1 week]]

## 2024-04-08 NOTE — DISCHARGE NOTE PROVIDER - NSDCMRMEDTOKEN_GEN_ALL_CORE_FT
digoxin 125 mcg (0.125 mg) oral tablet: 1 tab(s) orally once a day  dilTIAZem 180 mg/24 hours oral capsule, extended release: 1 cap(s) orally once a day  Farxiga 10 mg oral tablet: 1 tab(s) orally once a day  folic acid 1 mg oral tablet: 1 tab(s) orally once a day  ipratropium 21 mcg/inh (0.03%) nasal spray: 2 spray(s) intranasally once a day  Pacerone 200 mg oral tablet: 1 tab(s) orally 2 times a day  pantoprazole 40 mg oral delayed release tablet: 1 tab(s) orally once a day  potassium chloride 10 mEq oral tablet, extended release: 1 tab(s) orally 2 times a day  predniSONE 10 mg oral tablet: 3 tab(s) orally once a day Take 30mg until next follow up appointment for further taper instructions.  rosuvastatin 40 mg oral tablet: 1 tab(s) orally once a day  timolol maleate 0.5% ophthalmic solution: 1 drop(s) in each eye once a day  torsemide 20 mg oral tablet: 1 tab(s) orally once a day  Xarelto 15 mg oral tablet: 1 tab(s) orally once a day   ascorbic acid 500 mg oral tablet: 1 tab(s) orally once a day  cholecalciferol 25 mcg (1000 intl units) oral tablet: 1 tab(s) orally once a day  dilTIAZem 120 mg/12 hours oral capsule, extended release: 1 cap(s) orally once a day if SBP &gt; 140  Farxiga 10 mg oral tablet: 1 tab(s) orally once a day  folic acid 1 mg oral tablet: 1 tab(s) orally once a day  furosemide 40 mg oral tablet: 1 tab(s) orally once a day  ipratropium 21 mcg/inh (0.03%) nasal spray: 2 spray(s) intranasally once a day  Januvia 50 mg oral tablet: 1 tab(s) orally once a day  Multiple Vitamins with Minerals oral tablet: 1 tab(s) orally once a day  pantoprazole 40 mg oral delayed release tablet: 1 tab(s) orally once a day  potassium chloride 10 mEq oral tablet, extended release: 1 tab(s) orally once a day  predniSONE 5 mg oral tablet: 3 tab(s) orally once a day taper to 12.5  on  5/8 and then as directed by MD- as of 5/28 - take 7.5mg daily (starting 5/28) x 7 days then 5mg daily x 7 days then 2.5mg daily x 7 days then see MD  rosuvastatin 40 mg oral tablet: 1 tab(s) orally once a day  timolol maleate 0.5% ophthalmic solution: 1 drop(s) in each eye 2 times a day  Xarelto 15 mg oral tablet: 1 tab(s) orally once a day

## 2024-04-08 NOTE — DISCHARGE NOTE PROVIDER - NSDCFUADDAPPT_GEN_ALL_CORE_FT
CARDIOLOGY FOLLOW UP APPOINTMENT: 4/15/24 @2:30PM at [facility] with MING Grier

## 2024-04-08 NOTE — DISCHARGE NOTE PROVIDER - ATTENDING DISCHARGE PHYSICAL EXAMINATION:
GENERAL: NAD, well-groomed, well-developed,weak  HEENT - NC/AT, pupils equal and reactive to light,  ; Moist mucous membranes, Good dentition, No lesions  NECK: Supple, No JVD  CHEST/LUNG: Clear to auscultation bilaterally; No rales, rhonchi, wheezing  HEART: Irregularly irregular rate and rhythm; No murmurs, rubs, or gallops - rate controlled, when ambulating increases to 110s but returns to baseline in few minutes   ABDOMEN: Soft, Nontender, Nondistended; Bowel sounds present  EXTREMITIES:  2+ Peripheral Pulses, No clubbing, cyanosis, or edema  NEURO:  No Focal deficits, sensory and motor intact

## 2024-04-08 NOTE — DISCHARGE NOTE PROVIDER - HOSPITAL COURSE
89 yo female with a PMH of a fib on xarelro, chf on torsemide, htn, GERD, DM, temporal arteritis on prednisone presents with diarrhea x 2 days. Pt was recently d/c from  for rapid a fib, and weakness on 3/25 and when she was d/c and placed on metformin. Pt states that after taking metformin she has having diarrhea which causes abd pain (which has currently resolved) and still feels weak. Per fam since AF meds started/ changed she has low BP, LOC. Patient Admitted to the hospitalist service for dehydration and to adjust  cardiac meds for Chronic AF, tachy/delvis Syndrome ;  Diarrhea has stopped since last night. Xarelto continued underwent DCCV but returned to afib. Patient started on cardizem eventually digoxin and now heart rate is better controlled. Concern that steroid dose may be contributing to her tachybrady syndrome however patient has had multiple episodes and recurrence of Temporal arteritis and would be prudent to continue at this time. Patient to follow up with rheumatology outpatient. Torsemide initially held, but has been continued. Patient to be discharged home with outpatient follow up with PCP Cardiology in 1-2 weeks. Advised to return to ED with any new or worsening symptoms chest pain shortness of breath fevers chills nausea vomiting diarrhea. Of note, PT recommended MARIO but patient and family decline.          #Lymphadenopathy / Pulm nodules  -CT Chest 3/18/24: Right paratracheal and right hilar lymphadenopathy, likely neoplastic. Largest lymph node approximately 3.5 cm in short axis in the right paratracheal station RLL 5mm solid nodule RUL 4mm ill-defined nodule.   -unclear if this is reactive or due to underlying malignancy; will need further w/u, including biopsy.  -per chart review: patient/family agreeable to pursue biopsy spoke with son-in-law (Adrien- [phone]) upon patient's request, however biopsy postponed s/t patient's recent uncontrolled A-Fib with RVR s/p unsuccessful DCCV on 3/21.  Management per cardiology/EPS  -plan for outpatient biopsy with Thoracic Sx-Dr. Mohr, patient/family aware.  -f/u outpatient at [facility] pending biopsy/path results     89 yo female with a PMH of a fib on xarelro, chf on torsemide, htn, GERD, DM, temporal arteritis on prednisone presents with diarrhea x 2 days. Pt was recently d/c from  for rapid a fib, and weakness on 3/25 and when she was d/c and placed on metformin. Pt states that after taking metformin she has having diarrhea which causes abd pain (which has currently resolved) and still feels weak. Per fam since AF meds started/ changed she has low BP, LOC. Patient Admitted to the hospitalist service for dehydration and to adjust  cardiac meds for Chronic AF, tachy/delvis Syndrome ;  Diarrhea has stopped since last night. Xarelto continued underwent DCCV but returned to afib. Patient started on cardizem eventually digoxin and now heart rate is better controlled. Concern that steroid dose may be contributing to her tachybrady syndrome however patient has had multiple episodes and recurrence of Temporal arteritis and would be prudent to continue at this time. Patient to follow up with rheumatology outpatient. Torsemide initially held, but has been continued. Patient to be discharged home with outpatient follow up with PCP EP and Cardiology in 1-2 weeks. should attempt again dccv as outpatient while pt is maintained on amio and when steroid treatment is completed.  Advised to return to ED with any new or worsening symptoms chest pain shortness of breath fevers chills nausea vomiting diarrhea. Of note, PT recommended MARIO but patient and family decline.          #Lymphadenopathy / Pulm nodules  -CT Chest 3/18/24: Right paratracheal and right hilar lymphadenopathy, likely neoplastic. Largest lymph node approximately 3.5 cm in short axis in the right paratracheal station RLL 5mm solid nodule RUL 4mm ill-defined nodule.   -unclear if this is reactive or due to underlying malignancy; will need further w/u, including biopsy.  -per chart review: patient/family agreeable to pursue biopsy spoke with son-in-law (Adrien- [phone]) upon patient's request, however biopsy postponed s/t patient's recent uncontrolled A-Fib with RVR s/p unsuccessful DCCV on 3/21.  Management per cardiology/EPS  -plan for outpatient biopsy with Thoracic Sx-Dr. Mohr, patient/family aware.  -f/u outpatient at [facility] pending biopsy/path results

## 2024-04-08 NOTE — DISCHARGE NOTE PROVIDER - NSDCFUSCHEDAPPT_GEN_ALL_CORE_FT
Luisana Martines  [facility]  [address]  Scheduled Appointment: 05/10/2024

## 2024-04-08 NOTE — DISCHARGE NOTE PROVIDER - CARE PROVIDERS DIRECT ADDRESSES
[email or web address]

## 2024-04-08 NOTE — PHARMACOTHERAPY INTERVENTION NOTE - COMMENTS
Medication history complete, reviewed medications with patient family member at bedside and confirmed with doctor first med hx.  
patient with elevated blood sugars, POCT >400 last night - start lantus 9 QHS and admelog 3 TID AC based on 0.3 units/kg

## 2024-04-11 NOTE — HISTORY OF PRESENT ILLNESS
[Post-hospitalization from ___ Hospital] : Post-hospitalization from [unfilled] Hospital [Admitted on: ___] : The patient was admitted on [unfilled] [Discharged on ___] : discharged on [unfilled] [Discharge Summary] : discharge summary [Discharge Med List] : discharge medication list [Med Reconciliation] : medication reconciliation has been completed [Patient Contacted By: ____] : and contacted by [unfilled] [FreeTextEntry3] : READMITTED 3/29 - 4/8/24. REC DONE OF 4/9 [FreeTextEntry2] : 89 y/o F w/ PMH of temporal arteritis (on steroids), a-fib (on xarelto), DM2 p/w weakness. Patient has been having difficulty walking due to B/L LE edema  #Chronic AF, tachy/delvis Syndrome  -TTE:  The right ventricle exhibits mild dilation, mild diffuse hypokinesis, and mild depression of contractility, TR,  -On Xarelto  -Amio 200mg BID  -s/p DCCV: was in NSR then reverted to Afib  -Cardiology/EP following  Started on Cardizem CD 120mg daily  - now increased to 240mg daily.  -Concern that steroid dose may be contributing to her tachybrady syndrome however patient has had multiple episodes and recurrence of Temporal arteritis and would be prudent to continue at this time.  Continue Digoxin. If no improvement , way warrant PPM/AVJ - Currently better controlled   #HFpEF -Torsemide  -Cardizem  #DM -hold oral medications -A1c: 8.6 -POC qAC/hs -RISS  -target BGM <180 -Carb restriction   #Hyperlipidemia -Continue atorvastatin   #EDITA  - resolved.  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 Largest lymph node approximately 3.5 cm in short axis in the right paratracheal station RLL 5mm solid nodule RUL 4mm ill-defined nodule.  -unclear if this is reactive or due to underlying malignancy; will need further w/u, including biopsy. -per chart review: patient/family agreeable to pursue biopsy spoke with son-in-law (Adrien- [phone]) upon patient's request, however biopsy postponed s/t patient's recent uncontrolled A-Fib with RVR s/p unsuccessful DCCV on 3/21.  Management per cardiology/EPS -plan for outpatient biopsy with Thoracic Sx-Dr. Mohr, patient/family aware. -f/u outpatient at [facility] pending biopsy/path results  CC: Pt is seen at home s/p recent admission for CHF/AFIB. Pt is temporarily unable to leave home as it requires a considerable and taxing effort, exhibits unsteady gait and needs assistive device to leave home. Non ambulatory HPI: Pt Is a 88 y/o female enrolled in the [facility] seen at home s/p a recent admission for CHF/Afib. Pt was contacted by TCM RN on 4/9  and med rec was done within 48 hours of DC.  Upon exam A&O x 3 does not appear stated age. lives with dtg and her son in law who are now her primary care givers. Overall she feels ok, but very tired. Prior to hospitalization she was ambulatory with RW and was walking 40 minutes on the treadmill daily. She has been in & out of the hospital since January when she suddenly lost her vision in her L eye, admitted to [facility] told her temporal arteritis had come back and placed on steroids. Since high dose steroids her legs have been edematous. She was admitted for Afib/CHF 3/18 - 3/25 at [facility] and readmitted on 3/29 for weakness, diarrhea and Afib. AFib: rate now controlled on current regimen, heart monitor in place. No palpitations and RRR on exam. CHF no signs volume overload, following low Na diet, unable to safely weight. 4+ edema BLE, which family states is improved. DM 2: farxiga, unable to tolerate metformin 2/2 diarrhea now resolved. Unstagable ulcer to sacral area/coccyx. VNS services in home

## 2024-04-11 NOTE — PLAN
[FreeTextEntry1] : A/P: # Chronic AF, tachy/delvis Syndrome  - TTE:  The right ventricle exhibits mild dilation, mild diffuse hypokinesis, and mild depression of contractility, TR,  - con't Xarelto, Amio 200mg BID  - s/p DCCV: was in NSR then reverted to Afib  - Cardiology/EP following, cardiac monitor in place - Started on Cardizem CD 180mg daily - con't digoxin - Concern that steroid dose may be contributing to her tachy-delvis syndrome however patient has had multiple episodes and recurrence of Temporal arteritis and would be prudent to continue at this time.  If no improvement  may warrant PPM/AVJ - Currently better controlled. - F/U EP  # HFpEF - ECHO as above, preserved EF - con't Torsemide, farxiga, low Na diet - unable to safely weigh at this time, call for worsening edema, orthopnea, SOB - further management per cardiology - elevate BLE as tolerated  # DM 2: - con't farxiga, diabetic diet, monitor home BS - A1c: 8.6 - target BGM <180  # Temporal arteritis with recent left eye blindness  -On Prednisone 30mg, slow taper per Rheum -f/u with Rheumatologist at [facility] -ESR: wnl   # Unstagable ulcer to sacral region: - offload pressure - family to order foam cushion for w/c - patient to rest in bed 3x during the day T&P side to side. - con't hydrophilic wound dressing with aquacel to wound per VNS wound care RN - Left message await call back to further discucs wound  # Hx of DVT - recent US duplex: negative  - con't xarelto  # Lymphadenopathy / Pulm nodules -CT Chest 3/18/24: Right paratracheal and right hilar lymphadenopathy, likely neoplastic. Largest lymph node approximately 3.5 cm in short axis in the right paratracheal station RLL 5mm solid nodule RUL 4mm ill-defined nodule.  -unclear if this is reactive or due to underlying malignancy; will need further w/u, including biopsy. -per chart review: patient/family agreeable to pursue biopsy spoke with son-in-law (Adrien- [phone]) upon patient's request, however biopsy postponed s/t patient's recent uncontrolled A-Fib with RVR s/p unsuccessful DCCV on 3/21.  Management per cardiology/EPS -plan for outpatient biopsy with Thoracic Sx-Dr. Mohr, patient/family aware. -f/u outpatient at [facility] pending biopsy/path results

## 2024-04-11 NOTE — PHYSICAL EXAM
[No Acute Distress] : no acute distress [Well Nourished] : well nourished [Well Developed] : well developed [Normal Sclera/Conjunctiva] : normal sclera/conjunctiva [Normal Outer Ear/Nose] : the outer ears and nose were normal in appearance [Supple] : supple [No Respiratory Distress] : no respiratory distress  [Clear to Auscultation] : lungs were clear to auscultation bilaterally [No Accessory Muscle Use] : no accessory muscle use [Normal Rate] : normal rate  [Regular Rhythm] : with a regular rhythm [Normal S1, S2] : normal S1 and S2 [Soft] : abdomen soft [Non Tender] : non-tender [Non-distended] : non-distended [Normal Bowel Sounds] : normal bowel sounds [No Spinal Tenderness] : no spinal tenderness [Grossly Normal Strength/Tone] : grossly normal strength/tone [Coordination Grossly Intact] : coordination grossly intact [No Focal Deficits] : no focal deficits [Normal Affect] : the affect was normal [Alert and Oriented x3] : oriented to person, place, and time [de-identified] : 3-4+ pitting edema to BLE, cool extremities, unable to palpate pulses 2/2 edema [de-identified] : unstagable ulcer to sacral area

## 2024-04-11 NOTE — REVIEW OF SYSTEMS
[Lower Ext Edema] : lower extremity edema [Negative] : Psychiatric [FreeTextEntry3] : L eye blindness [FreeTextEntry9] : debility, non ambulatory [de-identified] : unstagable ulcer to sacral area

## 2024-04-15 NOTE — H&P ADULT - NSHPLABSRESULTS_GEN_ALL_CORE
14.4   12.57 )-----------( 185      ( 15 Apr 2024 19:44 )             44.6     04-15    134<L>  |  103  |  31<H>  ----------------------------<  155<H>  5.3   |  21<L>  |  1.03    Ca    8.7      15 Apr 2024 17:40    TPro  6.1  /  Alb  2.7<L>  /  TBili  1.5<H>  /  DBili  x   /  AST  90<H>  /  ALT  48  /  AlkPhos  55  04-15    CAPILLARY BLOOD GLUCOSE      POCT Blood Glucose.: 117 mg/dL (15 Apr 2024 23:47)    PT/INR - ( 15 Apr 2024 17:40 )   PT: 19.8 sec;   INR: 1.78 ratio         PTT - ( 15 Apr 2024 17:40 )  PTT:31.7 sec  Urinalysis Basic - ( 15 Apr 2024 20:06 )    Color: Yellow / Appearance: Clear / S.011 / pH: x  Gluc: x / Ketone: Trace mg/dL  / Bili: Negative / Urobili: 0.2 mg/dL   Blood: x / Protein: 30 mg/dL / Nitrite: Negative   Leuk Esterase: Negative / RBC: 23 /HPF / WBC 1 /HPF   Sq Epi: x / Non Sq Epi: 1 /HPF / Bacteria: Negative /HPF      RUQ US AP 4/15    IMPRESSION:  No cholelithiasis or evidence of acute cholecystitis.    TTE 3/19     Impression     Summary     Left ventricle systolic function appears preserved in the presence of a   cardiac arrhythmia. Left ventricle size and structure are within normal   limitations. Visual estimation of left ventricle ejection fraction is>50   %.   The left atrium is moderately dilated.   The right atrium appears moderately dilated.   The right ventricle exhibits mild dilation, mild diffuse hypokinesis, and   mild depression of contractility.   Mild aortic sclerosis is present with normal valvular opening.   Minimal mitral annular calcification is present.   Mild (1+) mitral regurgitation is present.   Moderate to severe (3+) tricuspid valve regurgitation is present.   Mild pulmonic valvular regurgitation (1+) is present.

## 2024-04-15 NOTE — ED ADULT NURSE REASSESSMENT NOTE - NS ED NURSE REASSESS COMMENT FT1
Assumed patient care from Annabelle HAMPTON. VS as noted. pt resting in stretcher with no complaints at this time. respirations even and unlabored. pt in NAD.

## 2024-04-15 NOTE — ED PROVIDER NOTE - CARE PLAN
Principal Discharge DX:	Cellulitis of left anterior lower leg  Secondary Diagnosis:	Adult failure to thrive   1 Principal Discharge DX:	Cellulitis of left anterior lower leg  Secondary Diagnosis:	Adult failure to thrive  Secondary Diagnosis:	General weakness

## 2024-04-15 NOTE — ED ADULT NURSE NOTE - PAIN: PRESENCE, MLM
denies pain/discomfort (Rating = 0) [facility] Pregnancy And Lactation Text: There is insufficient data to evaluate drug-associated risk for major birth defects, miscarriage, or other adverse maternal or fetal outcomes.  There is a pregnancy registry that monitors pregnancy outcomes in pregnant persons exposed to the medication during pregnancy.  It is unknown if this medication is excreted in breast milk.  Do not breastfeed during treatment and for about 4 weeks after the last dose.

## 2024-04-15 NOTE — H&P ADULT - HISTORY OF PRESENT ILLNESS
89 yo  F with PMH of afib on Xarelto, HFpEF on torsemide, HTN, GERD, DM, temporal arteritis on prednisone presents to the ED BIBEMS from home c/o moderate-severe intermittent diffuse upper abdominal pain worsening over the last  x3 weeks.  Patient with HH admission on 3/25 and 3/29-4/8.  As per family at bedside last admission was not due to diarrhea it was due to weakness from metoprolol but patient also complained of diarrhea secondary to  metformin. Medications were adjusted. Now she returns due to worsening weakness. She is unable to transfer from bed to her wheelchair. He has to lift her out of bed. Abdominal pain is not new and not associated with diarrhea. Diarrhea has resolved. She denies  fevers, chills, chest pain, shortness of breath, diarrhea, vomiting, nausea, dysuria.   Main complaint is  weakness. She also has decreased appetite with poor oral intake last 2 days.    She lives alone has home care services and visiting nurse for wound care.   Today home visiting nurse noticed decubitus sacral ulcer was worse than usual and  redness L anterior shin worsening, concerning for cellulitis.  Family is interested in rehab not NH.  Patient had cardiologist appointment today with Dr Brooks, but did not make it.

## 2024-04-15 NOTE — H&P ADULT - CONVERSATION DETAILS
Patient states she has lived a good life she would not want to be on machines  she wishes to be DNR with trial of NIV and intubation if that fails

## 2024-04-15 NOTE — ED PROVIDER NOTE - WR INTERPRETATION 4
Patient c/o cough for 2 weeks and headache for 2 days. Denies any chest pain , sob , fever nor chills . negative

## 2024-04-15 NOTE — H&P ADULT - ASSESSMENT
88 yo  F with PMH of afib on Xarelto, HFpEF on torsemide, HTN, GERD, DM, temporal arteritis on prednisone presents for LLE cellulitis,  worsening weakness and decrease oral intake. Patient now reports she is unable to transfer from bed to wheelchair    LLE cellulitis  leukocytosis  s/p abx in ED  continue abx doxy 100mg BID    Chronic AF   s/p failed DCCV  continue home meds  monitor on tele  HFpEF  TTE EF >50% 3/2024  on Farxiga  on Torsemide  monitor urine output  strict I and O  trop elevated  cardiology/EP consult    DM2  no home meds  metformin recently discontinued  A1c  8.6 3/19/24  ISS, lantus, and premeal insulin  monitor FS  hypoglycemic bundle     Temporal arteritis  Prednisone 20mg as per family and patient    Sacral ulcer POA  wound consult    hypoalbuminemia  nutrition consult  inadequate calorie intake    Weakness  recent hospital admission  deconditioned  PT consult     DVT ppx   Xarelto    Advance Directives MOLST completed: DNR/ trial of NIV and intubation

## 2024-04-15 NOTE — ED PROVIDER NOTE - SKIN, MLM
Skin normal color for race, warm, dry and intact. No evidence of rash. stage 2 sacral decubitus ulcer extending to L buttock, exposed subcutaneous tissue, no obvious cellulitis, R CVA ecchymosis extending down into R thigh, L anterior shin +erythema extending beyond red marking from 2-3 days ago, + ecchymosis inferior to erythematous area, no tactile warmth, son states bruising is due to previous fall Skin normal color for race, warm, dry and intact. No evidence of rash. Stage 2 sacral decubitus ulcer extending to L buttock, exposed subcutaneous tissue, no obvious cellulitis.  R CVA ecchymosis extending down into R thigh.  L anterior shin +erythema extending beyond red marking from 2-3 days ago, + ecchymosis inferior to erythematous area, no tactile warmth. Son states bruising is due to previous fall

## 2024-04-15 NOTE — ED PROVIDER NOTE - ENMT, MLM
Airway patent, Nasal mucosa clear. Mouth with mildly dry mucosa. Throat has no vesicles, no oropharyngeal exudates and uvula is midline. OP clear

## 2024-04-15 NOTE — ED PROVIDER NOTE - OBJECTIVE STATEMENT
89 y/o F with PMHx of afib on Xarelto, CHF on torsemide, HTN, GERD, DM, temporal arteritis on prednisone presents to the ED BIBEMS from home c/o moderate-severe intermittent diffuse upper abd pain worsening over the last x3 weeks.   admission 3/29-4/8 regarding diarrhea, dehydration, cardiac med adjustment. Pt's son at bedside endorses that pt had the diarrhea last week due to the Metformin that she was on, but since stopping the medication, the diarrhea has stopped. Pt endorses having the abd pain she has now since she was admitted. Endorsing general weakness, poor appetite/poor PO intake last 2 days. Denies fevers, chest pain, diarrhea, vomiting, nausea, dysuria. Pt lives at home, has a visiting nurse/home care services [facility] 1.5 hours TIW. Today home visiting nurse noticed pt's decubitus sacral ulcer was worse than usual and worsening redness L anterior shin, concern for cellulitis. Baseline ambulation with walker assistance, but last 2 days pt has been too weak to ambulate. Allergic to penicillin. Intolerant to Metformin and Metoprolol. Pt due for cardiologist appointment today with Dr Brooks, but did not make it. 88 y/o F with PMHx of AFib on Xarelto, CHF on torsemide, HTN, GERD, DM, temporal arteritis on prednisone, BIBEMS from home to ED c/o moderate-severe intermittent diffuse upper abd pain worsening over the last 3 weeks.   admission 3/29-4/8 regarding diarrhea, dehydration, cardiac med adjustment. Pt's son at bedside endorses that pt had the diarrhea last week due to the Metformin that she was on, but since stopping the medication, the diarrhea has stopped. Pt endorses having the abd pain she has now since she was admitted. Endorsing severe general weakness, poor appetite/poor PO intake last 2 days. Denies fevers, chest pain, diarrhea, vomiting, nausea, dysuria. Pt lives at home, has a visiting nurse/home care services [facility] 1.5 hours TIW. Today home visiting nurse noticed pt's decubitus sacral ulcer was worse than usual and worsening redness L anterior shin, concern for cellulitis. Baseline ambulation with walker assistance, but last 2 days pt has been too weak to ambulate. Allergic to penicillin. Intolerant to Metformin and Metoprolol. Pt due for cardiologist appointment today with Dr Brooks, but did not make it.

## 2024-04-15 NOTE — ED PROVIDER NOTE - NEUROLOGICAL, MLM
Alert and oriented, no focal deficits, no motor or sensory deficits. Alert and oriented, CNs intact, normal speech.

## 2024-04-15 NOTE — ED PROVIDER NOTE - CLINICAL SUMMARY MEDICAL DECISION MAKING FREE TEXT BOX
89 y/o F with PMHx of afib on Xarelto, CHF on torsemide, HTN, GERD, DM, temporal arteritis on prednisone presents to the ED BIBEMS from home c/o moderate-severe intermittent diffuse upper abd pain worsening over the last x3 weeks. HH admission 3/29-4/8 regarding diarrhea, dehydration, cardiac med adjustment. Today home visiting nurse noticed pt's decubitus sacral ulcer was worse than usual and worsening redness L anterior shin, concern for cellulitis. Pt with poor PO intake especially past 2 days, general weakness, failure to thrive, too weak to get out of bed past 2 days, intermittent diffuse abd discomfort. NO SOB, cp, n/v/d, fever, chills. Visiting nurse advised pt go to ED for further eval and treatment. Pt ill appearing, no respiratory distress, RRR, abd nontender.  Plan on EKG, cxr, labs including pan culture, lactate, lipase, troponin, flu/covid swab, cautious IVH, XR L tibfib, b/l hip, monitor, observe, reassess, pt may require med observation. 89 y/o F with PMHx of afib on Xarelto, CHF on torsemide, HTN, GERD, DM, temporal arteritis on prednisone presents to the ED BIBEMS from home c/o moderate-severe intermittent diffuse upper abd pain worsening over the last x3 weeks. HH admission 3/29-4/8 regarding diarrhea, dehydration, cardiac med adjustment. Today home visiting nurse noticed pt's decubitus sacral ulcer was worse than usual and worsening redness L anterior shin, concern for cellulitis. Pt with poor PO intake especially past 2 days, general weakness, failure to thrive, too weak to get out of bed past 2 days, intermittent diffuse abd discomfort. NO SOB, cp, n/v/d, fever, chills. Visiting nurse advised pt go to ED for further eval and treatment. Pt ill appearing, no respiratory distress, RRR, abd nontender.  Plan on EKG, cxr, labs including pan culture, lactate, lipase, troponin, flu/covid swab, cautious IVH, XR L tibfib, b/l hip, US RUQ, CT abd pelvis with contrast, monitor, observe, reassess, pt may require med observation. 89 y/o F with PMHx of afib on Xarelto, CHF on torsemide, HTN, GERD, DM, temporal arteritis on prednisone presents to the ED BIBEMS from home c/o moderate-severe intermittent diffuse upper abd pain worsening over the last x3 weeks.  admission 3/29-4/8 regarding diarrhea, dehydration, cardiac med adjustment. Today home visiting nurse noticed pt's decubitus sacral ulcer was worse than usual and worsening redness L anterior shin, concern for cellulitis. Pt with poor PO intake especially past 2 days, general weakness, failure to thrive, too weak to get out of bed past 2 days, intermittent diffuse abd discomfort. NO SOB, cp, n/v/d, fever, chills. Visiting nurse advised pt go to ED for further eval and treatment. Pt ill appearing, no respiratory distress, RRR, abd nontender.  Plan on EKG, cxr, labs including pan culture, lactate, lipase, troponin, flu/covid swab, cautious IVH, XR L tibfib, b/l hip, US RUQ, CT abd pelvis with contrast, monitor, observe, reassess, pt may require med observation.    19:45, C MD Mirian:  Labs notable for mild elev. WBC with normal lactate, + elev. Troponin ~ 90.  RUQ abd u/s negative.  XRs wet read personally by me: negqtive.  Pt unable to be manged at home, + spreading redness of lower LLE, unable to ambulate even w/ assistance, not candidate for outpt management.  case d/w Dr. Valencia who accepts Med Observation with Remote tele. 88 y/o F with PMHx of AFib on Xarelto, CHF on torsemide, HTN, GERD, DM, temporal arteritis on prednisone, BIBEMS from home c/o xsevere general weakness, inability to get out of bed X 2 dd., moderate-severe intermittent diffuse upper abd pain worsening over the last 3 weeks.  admission 3/29-4/8 regarding diarrhea, dehydration, cardiac med adjustment. Today home visiting nurse noticed pt's decubitus sacral ulcer was worse than usual and worsening redness L anterior shin, concern for cellulitis. Pt with poor PO intake especially past 2 days, general weakness, failure to thrive, too weak to get out of bed past 2 days, intermittent diffuse abd discomfort. Visiting nurse advised pt go to ED for further eval and treatment.   Pt ill appearing, no respiratory distress, RRR, abd nontender.  Plan on EKG, cxr, labs including pan culture, lactate, lipase, troponin, flu/covid swab, cautious IVH, XR L tib/fib, b/l hip, US RUQ, CT abd pelvis with contrast, fall precautions; monitor, observe, reassess, pt may require Med observation.    19:45, C MD Mirian:  Labs notable for mild elev. WBC with normal lactate, + elev. Troponin ~ 90.  RUQ abd u/s negative.  XRs wet read personally by me: negative.  Pt unable to be managed at home, + spreading redness of lower LLE, unable to ambulate even w/ assistance, not candidate for outpt management.  Case d/w Dr. Valencia who accepts Med Observation with Remote Tele.

## 2024-04-15 NOTE — H&P ADULT - NSHPPHYSICALEXAM_GEN_ALL_CORE
T(C): 36.3 (04-16-24 @ 01:01), Max: 36.5 (04-15-24 @ 22:42)  HR: 64 (04-16-24 @ 01:01) (64 - 66)  BP: 119/64 (04-16-24 @ 01:01) (112/55 - 119/64)  RR: 18 (04-16-24 @ 01:01) (17 - 18)  SpO2: 94% (04-16-24 @ 01:01) (94% - 100%)  Wt(kg): --      GENERAL: NAD, well-groomed, well-developed  HEAD:  Atraumatic, Normocephalic  NERVOUS SYSTEM:  Alert & Oriented X3, Good concentration   CHEST/LUNG: poor inspiratory effort, clear but diminished    HEART: s1s2  ABDOMEN: Soft, Nontender, Nondistended; Bowel sounds present  EXTREMITIES:  +b/l LE edema; LLE with erythema extending beyond red marking placed 2-3 days ago; +ecchymosis secondary to a previous fall; calf tenderness   SKIN: sacral ulcer

## 2024-04-15 NOTE — ED PROVIDER NOTE - MUSCULOSKELETAL, MLM
Spine appears normal, range of motion is not limited, no muscle or joint tenderness, pt unable to SLR b/l legs, partially flexed R knee and hip without pain, + soft tissue swelling b/l thighs Spine appears normal, range of motion is not limited, no muscle or joint tenderness, pt unable to SLR b/l legs, partially flexes R knee and hip without pain, + soft tissue swelling b/l thighs

## 2024-04-16 NOTE — CONSULT NOTE ADULT - SUBJECTIVE AND OBJECTIVE BOX
CHIEF COMPLAINT: Patient is a 89y old  Female who presents with a chief complaint of cellulitis and FTT (15 Apr 2024 21:58)    FROM H&P: 88 yo  F with PMH of afib on Xarelto, HFpEF on torsemide, HTN, GERD, DM, temporal arteritis on prednisone presents to the ED BIBEMS from home c/o moderate-severe intermittent diffuse upper abdominal pain worsening over the last  x3 weeks.  Patient with HH admission on 3/25 and 3/29-4/8.  As per family at bedside last admission was not due to diarrhea it was due to weakness from metoprolol but patient also complained of diarrhea secondary to  metformin. Medications were adjusted. Now she returns due to worsening weakness. She is unable to transfer from bed to her wheelchair. He has to lift her out of bed. Abdominal pain is not new and not associated with diarrhea. Diarrhea has resolved. She denies  fevers, chills, chest pain, shortness of breath, diarrhea, vomiting, nausea, dysuria.   Main complaint is  weakness. She also has decreased appetite with poor oral intake last 2 days.    She lives alone has home care services and visiting nurse for wound care.   Today home visiting nurse noticed decubitus sacral ulcer was worse than usual and  redness L anterior shin worsening, concerning for cellulitis.  Family is interested in rehab not NH.  Patient had cardiologist appointment today with Dr Grier, but did not make it. (15 Apr 2024 21:58)    Cardiology consulted for afib, elevated troponin  Patient denies CP or SOB  +Weakness    MEDICATIONS  (STANDING):  aMIOdarone    Tablet 200 milliGRAM(s) Oral two times a day  atorvastatin 40 milliGRAM(s) Oral at bedtime  dapagliflozin 10 milliGRAM(s) Oral every 24 hours  dextrose 10% Bolus 125 milliLiter(s) IV Bolus once  dextrose 5%. 1000 milliLiter(s) (50 mL/Hr) IV Continuous <Continuous>  dextrose 5%. 1000 milliLiter(s) (100 mL/Hr) IV Continuous <Continuous>  dextrose 50% Injectable 25 Gram(s) IV Push once  dextrose 50% Injectable 12.5 Gram(s) IV Push once  digoxin     Tablet 125 MICROGram(s) Oral daily  diltiazem    milliGRAM(s) Oral daily  doxycycline monohydrate Capsule 100 milliGRAM(s) Oral every 12 hours  folic acid 1 milliGRAM(s) Oral daily  glucagon  Injectable 1 milliGRAM(s) IntraMuscular once  insulin glargine Injectable (LANTUS) 9 Unit(s) SubCutaneous at bedtime  insulin lispro (ADMELOG) corrective regimen sliding scale   SubCutaneous at bedtime  insulin lispro Injectable (ADMELOG) 3 Unit(s) SubCutaneous three times a day before meals  pantoprazole    Tablet 40 milliGRAM(s) Oral before breakfast  predniSONE   Tablet 20 milliGRAM(s) Oral daily  rivaroxaban 15 milliGRAM(s) Oral with dinner  sertraline 25 milliGRAM(s) Oral daily  timolol 0.5% Solution 1 Drop(s) Both EYES daily  torsemide 20 milliGRAM(s) Oral daily    MEDICATIONS  (PRN):  acetaminophen     Tablet .. 650 milliGRAM(s) Oral every 6 hours PRN Temp greater or equal to 38C (100.4F), Mild Pain (1 - 3)  aluminum hydroxide/magnesium hydroxide/simethicone Suspension 30 milliLiter(s) Oral every 4 hours PRN Dyspepsia  dextrose Oral Gel 15 Gram(s) Oral once PRN Blood Glucose LESS THAN 70 milliGRAM(s)/deciliter  melatonin 3 milliGRAM(s) Oral at bedtime PRN Insomnia  ondansetron Injectable 4 milliGRAM(s) IV Push every 8 hours PRN Nausea and/or Vomiting    HOME MEDICATIONS:  Farxiga 10 mg oral tablet: 1 tab(s) orally once a day (15 Apr 2024 23:03)  folic acid 1 mg oral tablet: 1 tab(s) orally once a day (15 Apr 2024 23:03)  ipratropium 21 mcg/inh (0.03%) nasal spray: 2 spray(s) intranasally once a day (15 Apr 2024 23:03)  levoFLOXacin 500 mg oral tablet: 1 tab(s) orally once a day for 10 days ***NOT COMPLETE, pt took 1 dose total*** (16 Apr 2024 09:13)  pantoprazole 40 mg oral delayed release tablet: 1 tab(s) orally once a day (15 Apr 2024 23:03)  potassium chloride 10 mEq oral tablet, extended release: 1 tab(s) orally 2 times a day (15 Apr 2024 23:03)  predniSONE 10 mg oral tablet: 2 tab(s) orally once a day (16 Apr 2024 09:13)  rosuvastatin 40 mg oral tablet: 1 tab(s) orally once a day (15 Apr 2024 23:03)  sertraline 25 mg oral tablet: 1 tab(s) orally once a day (in the afternoon) (16 Apr 2024 09:13)  timolol maleate 0.5% ophthalmic solution: 1 drop(s) in each eye once a day (15 Apr 2024 23:03)  Xarelto 15 mg oral tablet: 1 tab(s) orally once a day (15 Apr 2024 23:03)    PHYSICAL EXAM:  T(C): 36.3 (16 Apr 2024 07:42), Max: 36.5 (15 Apr 2024 22:42)  T(F): 97.3 (16 Apr 2024 07:42), Max: 97.7 (15 Apr 2024 22:42)  HR: 71 (16 Apr 2024 09:30) (64 - 72)  BP: 89/58 (16 Apr 2024 09:30) (89/58 - 119/64)  BP(mean): 67 (16 Apr 2024 09:30) (61 - 72)  RR: 18 (16 Apr 2024 01:01) (17 - 18)  SpO2: 100% (16 Apr 2024 09:30) (94% - 100%)    Parameters below as of 16 Apr 2024 09:30  Patient On (Oxygen Delivery Method): room air    Constitutional: NAD, awake and alert  HEENT: PERR, EOMI, Normal Hearing, MMM  Neck: Soft and supple, No LAD, No JVD  Respiratory: Breath sounds are clear bilaterally, No wheezing, rales or rhonchi  Cardiovascular: S1 and S2, regular rate and rhythm, no Murmurs, gallops or rubs  Gastrointestinal: Bowel Sounds present, soft, nontender, nondistended, no guarding, no rebound  Extremities: No peripheral edema  Vascular: 2+ peripheral pulses  Neurological: A/O x 3, no focal deficits  Musculoskeletal: 5/5 strength b/l upper and lower extremities  Skin: Wounds, bruising     =======================================    INTERPRETATION OF TELEMETRY: SR 60       LABS:                        13.5   10.72 )-----------( 155      ( 16 Apr 2024 07:38 )             41.9     04-16    136  |  104  |  27<H>  ----------------------------<  109<H>  3.4<L>   |  22  |  0.84    Ca    8.6      16 Apr 2024 07:38    TPro  5.0<L>  /  Alb  2.4<L>  /  TBili  1.1  /  DBili  x   /  AST  38<H>  /  ALT  36  /  AlkPhos  49  04-16    PT/INR - ( 15 Apr 2024 17:40 )   PT: 19.8 sec;   INR: 1.78 ratio       PTT - ( 15 Apr 2024 17:40 )  PTT:31.7 sec    CARDIAC TESTING:    < from: TTE Echo Complete w/o Contrast w/ Doppler (03.19.24 @ 09:41) >   Left ventricle systolic function appears preserved in the presence of a   cardiac arrhythmia. Left ventricle size and structure are within normal   limitations. Visual estimation of left ventricle ejection fraction is>50  %.   The left atrium is moderately dilated.   The right atrium appears moderately dilated.   The right ventricle exhibits mild dilation, mild diffuse hypokinesis, and   mild depression of contractility.   Mild aortic sclerosis is present with normal valvular opening.   Minimal mitral annular calcification is present.   Mild (1+) mitral regurgitation is present.   Moderate to severe (3+) tricuspid valve regurgitation is present.   Mild pulmonic valvular regurgitation (1+) is present.      RADIOLOGY & ADDITIONAL STUDIES:    < from: US Duplex Venous Lower Ext Complete, Bilateral (04.15.24 @ 23:33) >  No evidence of deep venous thrombosis in eitherlower extremity.    < from: CT Abdomen and Pelvis w/ IV Cont (04.15.24 @ 22:15) >  Mild mural prominence of distal jejunal ileal folds, which may represent   nonspecific enteritis. No bowel obstruction.    Diffuse bilateral subcutaneous edema.  Slight interval enlargement of   right lateral hip subcutaneous hematoma in anteroposterior dimension with   liquefaction and organization.

## 2024-04-16 NOTE — PATIENT PROFILE ADULT - FALL HARM RISK - HARM RISK INTERVENTIONS

## 2024-04-16 NOTE — CONSULT NOTE ADULT - SUBJECTIVE AND OBJECTIVE BOX
HPI:  Pt is an 88yo female with PMH of AF on Xarelto s/p two failed DCCV 3/21 and 4/1, HFpEF on torsemide, HTN, GERD, DM, temporal arteritis on prednisone whose wound care nurse called EMS for worsening sacral decubiti and concern for LLE cellulitis. Pt's primary complaint is weakness, with decreased appetite and PO intake x 2 days. Patient had cardiologist appointment today with NP Cecilia on 4/15, but was unable to make it.     PAST MEDICAL & SURGICAL HISTORY:  Temporal arteritis  Atrial fibrillation   HFpEF  HTN  GERD  DM    MEDICATIONS  (STANDING):  aMIOdarone    Tablet 200 milliGRAM(s) Oral daily  atorvastatin 40 milliGRAM(s) Oral at bedtime  dapagliflozin 10 milliGRAM(s) Oral every 24 hours  dextrose 10% Bolus 125 milliLiter(s) IV Bolus once  dextrose 5%. 1000 milliLiter(s) (100 mL/Hr) IV Continuous <Continuous>  dextrose 5%. 1000 milliLiter(s) (50 mL/Hr) IV Continuous <Continuous>  dextrose 50% Injectable 25 Gram(s) IV Push once  dextrose 50% Injectable 12.5 Gram(s) IV Push once  diltiazem    milliGRAM(s) Oral daily  doxycycline monohydrate Capsule 100 milliGRAM(s) Oral every 12 hours  folic acid 1 milliGRAM(s) Oral daily  glucagon  Injectable 1 milliGRAM(s) IntraMuscular once  insulin glargine Injectable (LANTUS) 9 Unit(s) SubCutaneous at bedtime  insulin lispro (ADMELOG) corrective regimen sliding scale   SubCutaneous at bedtime  insulin lispro Injectable (ADMELOG) 3 Unit(s) SubCutaneous three times a day before meals  pantoprazole    Tablet 40 milliGRAM(s) Oral before breakfast  predniSONE   Tablet 20 milliGRAM(s) Oral daily  rivaroxaban 15 milliGRAM(s) Oral with dinner  sertraline 25 milliGRAM(s) Oral daily  timolol 0.5% Solution 1 Drop(s) Both EYES daily  torsemide 20 milliGRAM(s) Oral daily    MEDICATIONS  (PRN):  acetaminophen     Tablet .. 650 milliGRAM(s) Oral every 6 hours PRN Temp greater or equal to 38C (100.4F), Mild Pain (1 - 3)  aluminum hydroxide/magnesium hydroxide/simethicone Suspension 30 milliLiter(s) Oral every 4 hours PRN Dyspepsia  dextrose Oral Gel 15 Gram(s) Oral once PRN Blood Glucose LESS THAN 70 milliGRAM(s)/deciliter  melatonin 3 milliGRAM(s) Oral at bedtime PRN Insomnia  ondansetron Injectable 4 milliGRAM(s) IV Push every 8 hours PRN Nausea and/or Vomiting    Allergies  penicillin (Swelling)  Intolerances  metformin (Diarrhea)  Metoprolol Tartrate (Other; Sedation/Somnol)    SOCIAL HISTORY: Denies tobacco, etoh abuse or illicit drug use    Vital Signs Last 24 Hrs  T(C): 36.3 (16 Apr 2024 07:42), Max: 36.5 (15 Apr 2024 22:42)  T(F): 97.3 (16 Apr 2024 07:42), Max: 97.7 (15 Apr 2024 22:42)  HR: 71 (16 Apr 2024 09:30) (64 - 72)  BP: 89/58 (16 Apr 2024 09:30) (89/58 - 119/64)  BP(mean): 67 (16 Apr 2024 09:30) (61 - 72)  RR: 18 (16 Apr 2024 01:01) (17 - 18)  SpO2: 100% (16 Apr 2024 09:30) (94% - 100%)    Parameters below as of 16 Apr 2024 09:30  Patient On (Oxygen Delivery Method): room air    REVIEW OF SYSTEMS:  CONSTITUTIONAL: Pt endorses generalized weakness and decreased appetite.  HEENT:  Eyes:  No diplopia or blurred vision. ENT:  No earache, sore throat or runny nose.  CARDIOVASCULAR:  No pressure, squeezing, strangling, tightness, heaviness or aching about the chest, neck, axilla or epigastrium.  RESPIRATORY:  No cough, shortness of breath, PND or orthopnea.  GASTROINTESTINAL:  No nausea, vomiting or diarrhea.  GENITOURINARY:  No dysuria, frequency or urgency.  MUSCULOSKELETAL:  As per HPI.  SKIN:  No change in skin, hair or nails.  NEUROLOGIC:  No paresthesias, fasciculations, seizures or weakness.  PSYCHIATRIC:  No disorder of thought or mood.  ENDOCRINE:  No heat or cold intolerance, polyuria or polydipsia.  HEMATOLOGICAL:  No easy bruising or bleeding.    PHYSICAL EXAMINATION:  GENERAL APPEARANCE:  Pt. is not currently dyspneic, in no distress. Pt. is alert, oriented, and pleasant.  HEENT:  Pupils are normal and react normally. No icterus. Mucous membranes well colored.  NECK:  Supple. No lymphadenopathy. Jugular venous pressure not elevated. Carotids equal.   HEART: The cardiac impulse has a normal quality. There are no murmurs, rubs or gallops noted. RRR.  CHEST:  Chest is clear to auscultation. Normal respiratory effort.  ABDOMEN:  Soft and nontender.   EXTREMITIES: BLE 3+edema to knee.   SKIN:  LLE redness noted.     I&O's Summary    15 Apr 2024 07:01  -  16 Apr 2024 07:00  --------------------------------------------------------  IN: 0 mL / OUT: 310 mL / NET: -310 mL      LABS:                        13.5   10.72 )-----------( 155      ( 16 Apr 2024 07:38 )             41.9     04-16    136  |  104  |  27<H>  ----------------------------<  109<H>  3.4<L>   |  22  |  0.84    Ca    8.6      16 Apr 2024 07:38    TPro  5.0<L>  /  Alb  2.4<L>  /  TBili  1.1  /  DBili  x   /  AST  38<H>  /  ALT  36  /  AlkPhos  49  04-16    LIVER FUNCTIONS - ( 16 Apr 2024 07:38 )  Alb: 2.4 g/dL / Pro: 5.0 gm/dL / ALK PHOS: 49 U/L / ALT: 36 U/L / AST: 38 U/L / GGT: x           PT/INR - ( 15 Apr 2024 17:40 )   PT: 19.8 sec;   INR: 1.78 ratio    PTT - ( 15 Apr 2024 17:40 )  PTT:31.7 sec    Troponin I, High Sensitivity Result: 91.68 4/15/24 1740hrs  Troponin I, High Sensitivity Result: 93.78 4/16/24 0001hrs    Thyroid Stimulating Hormone, Serum: 1.24 uU/mL (04.15.24 @ 17:40)     Urinalysis (04.15.24 @ 20:06)   pH Urine: 5.5  Glucose Qualitative, Urine: >=1000 mg/dL  Blood, Urine: Large  Color: Yellow  Urine Appearance: Clear  Bilirubin: Negative  Ketone - Urine: Trace mg/dL  Specific Gravity: 1.011  Protein, Urine: 30 mg/dL  Urobilinogen: 0.2 mg/dL  Nitrite: Negative  Leukocyte Esterase Concentration: Negative    EKG:     TELEMETRY: SR in 70s.     CARDIAC TESTS:  < from: TTE Echo Complete w/o Contrast w/ Doppler (03.19.24 @ 09:41) >   Left ventricle systolic function appears preserved in the presence of a   cardiac arrhythmia. Left ventricle size and structure are within normal   limitations. Visual estimation of left ventricle ejection fraction is>50  %.   The left atrium is moderately dilated.   The right atrium appears moderately dilated.   The right ventricle exhibits mild dilation, mild diffuse hypokinesis, and   mild depression of contractility.   Mild aortic sclerosis is present with normal valvular opening.   Minimal mitral annular calcification is present.   Mild (1+) mitral regurgitation is present.   Moderate to severe (3+) tricuspid valve regurgitation is present.   Mild pulmonic valvular regurgitation (1+) is present.    RADIOLOGY & ADDITIONAL STUDIES:  < from: US Duplex Venous Lower Ext Complete, Bilateral (04.15.24 @ 23:33) >  No evidence of deep venous thrombosis in eitherlower extremity.    < from: CT Abdomen and Pelvis w/ IV Cont (04.15.24 @ 22:15) >  Mild mural prominence of distal jejunal ileal folds, which may represent   nonspecific enteritis. No bowel obstruction.  Diffuse bilateral subcutaneous edema.  Slight interval enlargement of   right lateral hip subcutaneous hematoma in anteroposterior dimension with   liquefaction and organization.    ASSESSMENT & PLAN: HPI:  Pt is an 88yo female with PMH of AF on Xarelto s/p two failed DCCV 3/21 and , HFpEF on torsemide, HTN, GERD, DM, temporal arteritis on prednisone whose wound care nurse called EMS for worsening sacral decubiti and concern for LLE cellulitis. Pt's primary complaint is weakness, with decreased appetite and PO intake x 2 days. Patient had cardiologist appointment today with NP Cecilia on 4/15, but was unable to make it.     PAST MEDICAL & SURGICAL HISTORY:  Temporal arteritis  Atrial fibrillation   HFpEF  HTN  GERD  DM    MEDICATIONS  (STANDING):  aMIOdarone    Tablet 200 milliGRAM(s) Oral daily  atorvastatin 40 milliGRAM(s) Oral at bedtime  dapagliflozin 10 milliGRAM(s) Oral every 24 hours  dextrose 10% Bolus 125 milliLiter(s) IV Bolus once  dextrose 5%. 1000 milliLiter(s) (100 mL/Hr) IV Continuous <Continuous>  dextrose 5%. 1000 milliLiter(s) (50 mL/Hr) IV Continuous <Continuous>  dextrose 50% Injectable 25 Gram(s) IV Push once  dextrose 50% Injectable 12.5 Gram(s) IV Push once  diltiazem    milliGRAM(s) Oral daily  doxycycline monohydrate Capsule 100 milliGRAM(s) Oral every 12 hours  folic acid 1 milliGRAM(s) Oral daily  glucagon  Injectable 1 milliGRAM(s) IntraMuscular once  insulin glargine Injectable (LANTUS) 9 Unit(s) SubCutaneous at bedtime  insulin lispro (ADMELOG) corrective regimen sliding scale   SubCutaneous at bedtime  insulin lispro Injectable (ADMELOG) 3 Unit(s) SubCutaneous three times a day before meals  pantoprazole    Tablet 40 milliGRAM(s) Oral before breakfast  predniSONE   Tablet 20 milliGRAM(s) Oral daily  rivaroxaban 15 milliGRAM(s) Oral with dinner  sertraline 25 milliGRAM(s) Oral daily  timolol 0.5% Solution 1 Drop(s) Both EYES daily  torsemide 20 milliGRAM(s) Oral daily    MEDICATIONS  (PRN):  acetaminophen     Tablet .. 650 milliGRAM(s) Oral every 6 hours PRN Temp greater or equal to 38C (100.4F), Mild Pain (1 - 3)  aluminum hydroxide/magnesium hydroxide/simethicone Suspension 30 milliLiter(s) Oral every 4 hours PRN Dyspepsia  dextrose Oral Gel 15 Gram(s) Oral once PRN Blood Glucose LESS THAN 70 milliGRAM(s)/deciliter  melatonin 3 milliGRAM(s) Oral at bedtime PRN Insomnia  ondansetron Injectable 4 milliGRAM(s) IV Push every 8 hours PRN Nausea and/or Vomiting    Allergies  penicillin (Swelling)  Intolerances  metformin (Diarrhea)  Metoprolol Tartrate (Other; Sedation/Somnol)    SOCIAL HISTORY: Denies tobacco, etoh abuse or illicit drug use    Vital Signs Last 24 Hrs  T(C): 36.3 (2024 07:42), Max: 36.5 (15 Apr 2024 22:42)  T(F): 97.3 (2024 07:42), Max: 97.7 (15 Apr 2024 22:42)  HR: 71 (2024 09:30) (64 - 72)  BP: 89/58 (2024 09:30) (89/58 - 119/64)  BP(mean): 67 (2024 09:30) (61 - 72)  RR: 18 (2024 01:01) (17 - 18)  SpO2: 100% (2024 09:30) (94% - 100%)    Parameters below as of 2024 09:30  Patient On (Oxygen Delivery Method): room air    REVIEW OF SYSTEMS:  CONSTITUTIONAL: Pt endorses generalized weakness and decreased appetite.  HEENT:  Eyes:  No diplopia or blurred vision. ENT:  No earache, sore throat or runny nose.  CARDIOVASCULAR:  No pressure, squeezing, strangling, tightness, heaviness or aching about the chest, neck, axilla or epigastrium.  RESPIRATORY:  No cough, shortness of breath, PND or orthopnea.  GASTROINTESTINAL:  No nausea, vomiting or diarrhea.  GENITOURINARY:  No dysuria, frequency or urgency.  MUSCULOSKELETAL:  As per HPI.  SKIN:  No change in skin, hair or nails.  NEUROLOGIC:  No paresthesias, fasciculations, seizures or weakness.  PSYCHIATRIC:  No disorder of thought or mood.  ENDOCRINE:  No heat or cold intolerance, polyuria or polydipsia.  HEMATOLOGICAL:  No easy bruising or bleeding.    PHYSICAL EXAMINATION:  GENERAL APPEARANCE:  Pt. is not currently dyspneic, in no distress. Pt. is alert, oriented, and pleasant.  HEENT:  Pupils are normal and react normally. No icterus. Mucous membranes well colored.  NECK:  Supple. No lymphadenopathy. Jugular venous pressure not elevated. Carotids equal.   HEART: The cardiac impulse has a normal quality. There are no murmurs, rubs or gallops noted. RRR.  CHEST:  Chest is clear to auscultation. Normal respiratory effort.  ABDOMEN:  Soft and nontender.   EXTREMITIES: BLE 3+edema to knee.   SKIN:  LLE redness noted.     I&O's Summary    15 Apr 2024 07:01  -  2024 07:00  --------------------------------------------------------  IN: 0 mL / OUT: 310 mL / NET: -310 mL      LABS:                        13.5   10.72 )-----------( 155      ( 2024 07:38 )             41.9     04-16    136  |  104  |  27<H>  ----------------------------<  109<H>  3.4<L>   |  22  |  0.84    Ca    8.6      2024 07:38    TPro  5.0<L>  /  Alb  2.4<L>  /  TBili  1.1  /  DBili  x   /  AST  38<H>  /  ALT  36  /  AlkPhos  49  04-16    LIVER FUNCTIONS - ( 2024 07:38 )  Alb: 2.4 g/dL / Pro: 5.0 gm/dL / ALK PHOS: 49 U/L / ALT: 36 U/L / AST: 38 U/L / GGT: x           PT/INR - ( 15 Apr 2024 17:40 )   PT: 19.8 sec;   INR: 1.78 ratio    PTT - ( 15 Apr 2024 17:40 )  PTT:31.7 sec    Troponin I, High Sensitivity Result: 91.68 4/15/24 1740hrs  Troponin I, High Sensitivity Result: 93.78 24 0001hrs    Thyroid Stimulating Hormone, Serum: 1.24 uU/mL (04.15.24 @ 17:40)     Urinalysis (04.15.24 @ 20:06)   pH Urine: 5.5  Glucose Qualitative, Urine: >=1000 mg/dL  Blood, Urine: Large  Color: Yellow  Urine Appearance: Clear  Bilirubin: Negative  Ketone - Urine: Trace mg/dL  Specific Gravity: 1.011  Protein, Urine: 30 mg/dL  Urobilinogen: 0.2 mg/dL  Nitrite: Negative  Leukocyte Esterase Concentration: Negative    EK/15/24 1659hrs  NSR, HR 68     TELEMETRY: SR in 70s.     CARDIAC TESTS:  < from: TTE Echo Complete w/o Contrast w/ Doppler (24 @ 09:41) >   Left ventricle systolic function appears preserved in the presence of a   cardiac arrhythmia. Left ventricle size and structure are within normal   limitations. Visual estimation of left ventricle ejection fraction is>50  %.   The left atrium is moderately dilated.   The right atrium appears moderately dilated.   The right ventricle exhibits mild dilation, mild diffuse hypokinesis, and   mild depression of contractility.   Mild aortic sclerosis is present with normal valvular opening.   Minimal mitral annular calcification is present.   Mild (1+) mitral regurgitation is present.   Moderate to severe (3+) tricuspid valve regurgitation is present.   Mild pulmonic valvular regurgitation (1+) is present.    RADIOLOGY & ADDITIONAL STUDIES:  < from: US Duplex Venous Lower Ext Complete, Bilateral (04.15.24 @ 23:33) >  No evidence of deep venous thrombosis in eitherlower extremity.    < from: CT Abdomen and Pelvis w/ IV Cont (04.15.24 @ 22:15) >  Mild mural prominence of distal jejunal ileal folds, which may represent   nonspecific enteritis. No bowel obstruction.  Diffuse bilateral subcutaneous edema.  Slight interval enlargement of   right lateral hip subcutaneous hematoma in anteroposterior dimension with   liquefaction and organization.    ASSESSMENT & PLAN:

## 2024-04-16 NOTE — DIETITIAN INITIAL EVALUATION ADULT - ORAL INTAKE PTA/DIET HISTORY
Lives at home by herself, has home care aides to assist w/ ADLs. States her appetite was great and she used to "eat like a horse" however w/ recent poor appetite x ~3 weeks 2/2 abdominal pain and weakness. Likes to consume a variety of foods. Denies any food allergies. Likely meeting 50-75% ENN.

## 2024-04-16 NOTE — DIETITIAN INITIAL EVALUATION ADULT - NSFNSPHYEXAMSKINFT_GEN_A_CORE
Rodney score = 13  skin- redness non-blanchable, ecchymotic   Pressure Injury 1: sacrum, Unstageable  Pressure Injury 2: Right:, elbow, Stage II

## 2024-04-16 NOTE — DIETITIAN INITIAL EVALUATION ADULT - PERTINENT MEDS FT
MEDICATIONS  (STANDING):  aMIOdarone    Tablet 200 milliGRAM(s) Oral daily  atorvastatin 40 milliGRAM(s) Oral at bedtime  dapagliflozin 10 milliGRAM(s) Oral every 24 hours  dextrose 10% Bolus 125 milliLiter(s) IV Bolus once  dextrose 5%. 1000 milliLiter(s) (50 mL/Hr) IV Continuous <Continuous>  dextrose 5%. 1000 milliLiter(s) (100 mL/Hr) IV Continuous <Continuous>  dextrose 50% Injectable 12.5 Gram(s) IV Push once  dextrose 50% Injectable 25 Gram(s) IV Push once  diltiazem    milliGRAM(s) Oral daily  doxycycline monohydrate Capsule 100 milliGRAM(s) Oral every 12 hours  folic acid 1 milliGRAM(s) Oral daily  glucagon  Injectable 1 milliGRAM(s) IntraMuscular once  insulin glargine Injectable (LANTUS) 9 Unit(s) SubCutaneous at bedtime  insulin lispro (ADMELOG) corrective regimen sliding scale   SubCutaneous at bedtime  insulin lispro Injectable (ADMELOG) 3 Unit(s) SubCutaneous three times a day before meals  pantoprazole    Tablet 40 milliGRAM(s) Oral before breakfast  predniSONE   Tablet 20 milliGRAM(s) Oral daily  rivaroxaban 15 milliGRAM(s) Oral with dinner  sertraline 25 milliGRAM(s) Oral daily  timolol 0.5% Solution 1 Drop(s) Both EYES daily  torsemide 20 milliGRAM(s) Oral daily    MEDICATIONS  (PRN):  acetaminophen     Tablet .. 650 milliGRAM(s) Oral every 6 hours PRN Temp greater or equal to 38C (100.4F), Mild Pain (1 - 3)  aluminum hydroxide/magnesium hydroxide/simethicone Suspension 30 milliLiter(s) Oral every 4 hours PRN Dyspepsia  dextrose Oral Gel 15 Gram(s) Oral once PRN Blood Glucose LESS THAN 70 milliGRAM(s)/deciliter  melatonin 3 milliGRAM(s) Oral at bedtime PRN Insomnia  ondansetron Injectable 4 milliGRAM(s) IV Push every 8 hours PRN Nausea and/or Vomiting

## 2024-04-16 NOTE — DIETITIAN INITIAL EVALUATION ADULT - PERTINENT LABORATORY DATA
04-16    136  |  104  |  27<H>  ----------------------------<  109<H>  3.4<L>   |  22  |  0.84    Ca    8.6      16 Apr 2024 07:38    TPro  5.0<L>  /  Alb  2.4<L>  /  TBili  1.1  /  DBili  x   /  AST  38<H>  /  ALT  36  /  AlkPhos  49  04-16    A1C with Estimated Average Glucose Result: 8.6 % (03-19-24 @ 07:42)    CAPILLARY BLOOD GLUCOSE  POCT Blood Glucose.: 97 mg/dL (16 Apr 2024 08:42)  POCT Blood Glucose.: 117 mg/dL (15 Apr 2024 23:47)

## 2024-04-16 NOTE — DIETITIAN INITIAL EVALUATION ADULT - OTHER INFO
88 y/o F with PMH of afib on Xarelto, HFpEF on torsemide, HTN, GERD, DM, temporal arteritis on prednisone presents to the ED BIBEMS from home c/o moderate-severe intermittent diffuse upper abdominal pain worsening over the last x3 weeks. Patient with HH admission on 3/25 and 3/29-4/8. Now she returns due to worsening weakness. She is unable to transfer from bed to her wheelchair. Abdominal pain is not new and not associated with diarrhea. Diarrhea has resolved.  GOC: DNR/ trial intubation.    Currently on DASH/TLC diet. Labs reviewed: POCT x 24 hrs WNL (<180 mg/dL), HgbA1c 8.6% on 3/19/24, hypokalemia; all other lytes WNL. States she consumed ~50% breakfast tray this morning (fruit cup, english muffin); remains w/ poor appetite. UBW stated at 138-140#, denies recent changes in wt, states she has chronic LE swelling 2/2 fluid retention. Bed scale wt of 140# taken by RD on 4/16/24 - no wt changes noted at this time however moderate to severe edema doc'd which may be skewing current wt appearance. NFPE reveals varying degrees of muscle/fat wasting. Due to poor PO intake since admission, HIGHLY recommend to liberalize diet to only low sodium to maximize caloric and protein intake. Will also add Ensure + HP shake BID to optimize nutritional needs/ promote wound healing (provides 350 kcal, 20g protein/ shake) - pt receptive. See other recs below.

## 2024-04-16 NOTE — CONSULT NOTE ADULT - ASSESSMENT
Pt is an 89yo female with PMH as noted above who presented to ED for a worsening sacral decubiti and concern for LLE cellulitis as per home wound care nurse. Pt's primary complaint is weakness with decreased appetite and PO intake.     EP consulted for pt's history of AF s/p two failed DCCVs and recent medication adjustments. During this admission, pt has remained in SR and denies any complaints of CP, SOB, palpitations, fluttering. Elevated troponin likely r/t demand ischemia.     PLAN:  Stop digoxin as pt is currently rate controlled.   Check Dig level.   Keep K >4.0 and Mag >2.0.  Continue tele monitoring x 24 hrs. If no further episodes of AF, ok to DC tele.   C/w Xarelto.     Plan discussed with patient, MING Leblanc, Dr. Mendoza, and RN.  Pt is an 88yo female with PMH as noted above who presented to ED for a worsening sacral decubiti and concern for LLE cellulitis as per home wound care nurse. Pt's primary complaint is weakness with decreased appetite and PO intake.     EP consulted for pt's history of AF s/p two failed DCCVs and recent medication adjustments, normal LV function. Patient currently in NSR and denies any complaints of CP, SOB, palpitations, fluttering. Elevated troponin likely r/t demand ischemia.     PLAN:  Discontinue Digoxin, patient in SR  Check Dig level  Continue Diltiazem 180mg PO daily  Decrease Amiodarone to 200mg PO daily  Continue Xarelto  Keep K >4.0 and Mag >2.0  Further management per medicine  No further EP intervention indicated at this time, please reconsult as needed    Plan discussed with patient, MING Leblanc, Dr. Mendoza, and RN.

## 2024-04-16 NOTE — DIETITIAN INITIAL EVALUATION ADULT - ADD RECOMMEND
1) Liberalize diet to only low sodium to maximize caloric and protein intake  2) Will add Ensure + HP shake BID to optimize nutritional needs/ promote wound healing (provides 350 kcal, 20g protein/ shake)   3) Recommend to add MVI w/minerals, Vit C 500 mg BID, add Zinc Sulfate 220 mg x 10 days to promote wound healing.   4) Consider adding thiamine 100 mg daily 2/2 poor PO intake/ malnutrition   5) Monitor daily wt to track/trend changes; strict I/Os   6) Consider to obtain vitamin D 25OH level to assess nutriture   7) Confirm goals of care regarding nutrition support   RD will continue to monitor PO intake, labs, hydration, and wt prn.

## 2024-04-16 NOTE — PROGRESS NOTE ADULT - SUBJECTIVE AND OBJECTIVE BOX
89 yo  F with PMH of afib on Xarelto, HFpEF on torsemide, HTN, GERD, DM, temporal arteritis on prednisone presents to the ED BIBEMS from home c/o moderate-severe intermittent diffuse upper abdominal pain worsening over the last  x3 weeks.  Patient with HH admission on 3/25 and 3/29-4/8.  As per family at bedside last admission was not due to diarrhea it was due to weakness from metoprolol but patient also complained of diarrhea secondary to  metformin. Medications were adjusted. Now she returns due to worsening weakness. She is unable to transfer from bed to her wheelchair. He has to lift her out of bed. Abdominal pain is not new and not associated with diarrhea. Diarrhea has resolved. She denies  fevers, chills, chest pain, shortness of breath, diarrhea, vomiting, nausea, dysuria.   Main complaint is  weakness. She also has decreased appetite with poor oral intake last 2 days.    She lives alone has home care services and visiting nurse for wound care.   Today home visiting nurse noticed decubitus sacral ulcer was worse than usual and  redness L anterior shin worsening, concerning for cellulitis.  Family is interested in rehab not NH.      04/16/24: Patient seen and examined. Complaining of weakness.       Vital Signs Last 24 Hrs  T(C): 36.3 (16 Apr 2024 07:42), Max: 36.5 (15 Apr 2024 22:42)  T(F): 97.3 (16 Apr 2024 07:42), Max: 97.7 (15 Apr 2024 22:42)  HR: 65 (16 Apr 2024 11:35) (64 - 72)  BP: 125/57 (16 Apr 2024 11:35) (89/58 - 125/57)  BP(mean): 77 (16 Apr 2024 11:35) (61 - 77)  RR: 18 (16 Apr 2024 01:01) (17 - 18)  SpO2: 99% (16 Apr 2024 11:35) (94% - 100%)    Parameters below as of 16 Apr 2024 11:35  Patient On (Oxygen Delivery Method): room air      Physical Exam:       GENERAL: NAD, well-groomed, well-developed  HEAD:  Atraumatic, Normocephalic  NERVOUS SYSTEM:  Alert & Oriented X3, Good concentration   CHEST/LUNG: poor inspiratory effort, clear but diminished    HEART: s1s2  ABDOMEN: Soft, Nontender, Nondistended; Bowel sounds present  EXTREMITIES:  +b/l LE edema; LLE with erythema extending beyond red marking placed 2-3 days ago; +ecchymosis secondary to a previous fall; calf tenderness   SKIN: sacral ulcer        Labs:                             13.5   10.72 )-----------( 155      ( 16 Apr 2024 07:38 )             41.9     16 Apr 2024 07:38    136    |  104    |  27     ----------------------------<  109    3.4     |  22     |  0.84     Ca    8.6        16 Apr 2024 07:38    TPro  5.0    /  Alb  2.4    /  TBili  1.1    /  DBili  x      /  AST  38     /  ALT  36     /  AlkPhos  49     16 Apr 2024 07:38    LIVER FUNCTIONS - ( 16 Apr 2024 07:38 )  Alb: 2.4 g/dL / Pro: 5.0 gm/dL / ALK PHOS: 49 U/L / ALT: 36 U/L / AST: 38 U/L / GGT: x           PT/INR - ( 15 Apr 2024 17:40 )   PT: 19.8 sec;   INR: 1.78 ratio         PTT - ( 15 Apr 2024 17:40 )  PTT:31.7 sec  CAPILLARY BLOOD GLUCOSE      POCT Blood Glucose.: 135 mg/dL (16 Apr 2024 12:41)  POCT Blood Glucose.: 97 mg/dL (16 Apr 2024 08:42)  POCT Blood Glucose.: 117 mg/dL (15 Apr 2024 23:47)        Urinalysis Basic - ( 16 Apr 2024 07:38 )    Color: x / Appearance: x / SG: x / pH: x  Gluc: 109 mg/dL / Ketone: x  / Bili: x / Urobili: x   Blood: x / Protein: x / Nitrite: x   Leuk Esterase: x / RBC: x / WBC x   Sq Epi: x / Non Sq Epi: x / Bacteria: x          MEDICATIONS  (STANDING):  aMIOdarone    Tablet 200 milliGRAM(s) Oral daily  atorvastatin 40 milliGRAM(s) Oral at bedtime  dapagliflozin 10 milliGRAM(s) Oral every 24 hours  dextrose 10% Bolus 125 milliLiter(s) IV Bolus once  dextrose 5%. 1000 milliLiter(s) (100 mL/Hr) IV Continuous <Continuous>  dextrose 5%. 1000 milliLiter(s) (50 mL/Hr) IV Continuous <Continuous>  dextrose 50% Injectable 25 Gram(s) IV Push once  dextrose 50% Injectable 12.5 Gram(s) IV Push once  diltiazem    milliGRAM(s) Oral daily  doxycycline monohydrate Capsule 100 milliGRAM(s) Oral every 12 hours  folic acid 1 milliGRAM(s) Oral daily  glucagon  Injectable 1 milliGRAM(s) IntraMuscular once  insulin glargine Injectable (LANTUS) 9 Unit(s) SubCutaneous at bedtime  insulin lispro (ADMELOG) corrective regimen sliding scale   SubCutaneous at bedtime  insulin lispro Injectable (ADMELOG) 3 Unit(s) SubCutaneous three times a day before meals  pantoprazole    Tablet 40 milliGRAM(s) Oral before breakfast  predniSONE   Tablet 20 milliGRAM(s) Oral daily  rivaroxaban 15 milliGRAM(s) Oral with dinner  sertraline 25 milliGRAM(s) Oral daily  timolol 0.5% Solution 1 Drop(s) Both EYES daily  torsemide 20 milliGRAM(s) Oral daily    MEDICATIONS  (PRN):  acetaminophen     Tablet .. 650 milliGRAM(s) Oral every 6 hours PRN Temp greater or equal to 38C (100.4F), Mild Pain (1 - 3)  aluminum hydroxide/magnesium hydroxide/simethicone Suspension 30 milliLiter(s) Oral every 4 hours PRN Dyspepsia  dextrose Oral Gel 15 Gram(s) Oral once PRN Blood Glucose LESS THAN 70 milliGRAM(s)/deciliter  melatonin 3 milliGRAM(s) Oral at bedtime PRN Insomnia  ondansetron Injectable 4 milliGRAM(s) IV Push every 8 hours PRN Nausea and/or Vomiting          Assessment:  · Assessment    89 yo  F with PMH of afib on Xarelto, HFpEF on torsemide, HTN, GERD, DM, temporal arteritis on prednisone presents for LLE cellulitis,  worsening weakness and decrease oral intake. Patient now reports she is unable to transfer from bed to wheelchair    LLE cellulitis  leukocytosis  s/p abx in ED  continue abx doxy 100mg BID    Chronic AF   s/p failed DCCV  continue home meds  HFpEF  TTE EF >50% 3/2024  on Farxiga  on Torsemide  monitor urine output  strict I and O  cardiology/EP consult    DM2  no home meds  metformin recently discontinued  A1c  8.6 3/19/24  ISS, lantus, and premeal insulin  monitor FS    Temporal arteritis  Prednisone 20mg as per family and patient    Sacral ulcer POA  wound consult    hypoalbuminemia  nutrition consult  inadequate calorie intake    Weakness  recent hospital admission  deconditioned  PT consult     DVT ppx   Xarelto

## 2024-04-16 NOTE — CONSULT NOTE ADULT - ASSESSMENT
89 yo  F with PMH of afib on Xarelto, HFpEF on torsemide, HTN, GERD, DM, temporal arteritis on prednisone presents to the ED BIBEMS from home c/o moderate-severe intermittent diffuse upper abdominal pain worsening over the last  x3 weeks.  Patient with HH admission on 3/25 and 3/29-4/8.  As per family at bedside last admission was not due to diarrhea it was due to weakness from metoprolol but patient also complained of diarrhea secondary to  metformin. Medications were adjusted. Now she returns due to worsening weakness. She is unable to transfer from bed to her wheelchair. He has to lift her out of bed. Abdominal pain is not new and not associated with diarrhea. Diarrhea has resolved. She denies  fevers, chills, chest pain, shortness of breath, diarrhea, vomiting, nausea, dysuria.   Main complaint is  weakness. She also has decreased appetite with poor oral intake last 2 days.    She lives alone has home care services and visiting nurse for wound care.   Today home visiting nurse noticed decubitus sacral ulcer was worse than usual and  redness L anterior shin worsening, concerning for cellulitis.  Family is interested in rehab not NH.    #PAF - hc of CV in past, currently SR  #Sacral debcubitus. Cellulitis.   #Elevated Troponin - demand ischemia, unlikely ACS   #DMT2  #Hyperlipidemia  #HFpEF  #Hypertension    Monitor on tele x24 hours  EP to see patient  Change amiodarone to daily, discontinue digoxin  ABX per primary team  Recent echo above.  No further cardiac testing indicated    Will follow as needed, call w/ questions.       89 yo  F with PMH of afib on Xarelto, HFpEF on torsemide, HTN, GERD, DM, temporal arteritis on prednisone presents to the ED BIBEMS from home c/o moderate-severe intermittent diffuse upper abdominal pain worsening over the last  x3 weeks.  Patient with HH admission on 3/25 and 3/29-4/8.  As per family at bedside last admission was not due to diarrhea it was due to weakness from metoprolol but patient also complained of diarrhea secondary to  metformin. Medications were adjusted. Now she returns due to worsening weakness. She is unable to transfer from bed to her wheelchair. He has to lift her out of bed. Abdominal pain is not new and not associated with diarrhea. Diarrhea has resolved. She denies  fevers, chills, chest pain, shortness of breath, diarrhea, vomiting, nausea, dysuria.   Main complaint is  weakness. She also has decreased appetite with poor oral intake last 2 days.    She lives alone has home care services and visiting nurse for wound care.   Today home visiting nurse noticed decubitus sacral ulcer was worse than usual and  redness L anterior shin worsening, concerning for cellulitis.  Family is interested in rehab not NH.    #PAF - hc of CV in past, currently SR  #Sacral debcubitus. Cellulitis.   #Elevated Troponin - demand ischemia, unlikely ACS   #DMT2  #Hyperlipidemia  #HFpEF  #Hypertension    Monitor on tele x24 hours  EP to see patient  Change amiodarone to daily, discontinue digoxin  Continue Cardizem, Farxiga and Xarelto for stroke ppx  ABX per primary team  Recent echo above.  No further cardiac testing indicated    Will follow as needed, call w/ questions.

## 2024-04-16 NOTE — PHYSICAL THERAPY INITIAL EVALUATION ADULT - LIVES WITH, PROFILE
She lives alone has home care services and visiting nurse for wound care./alone She lives with dtr and CANDY,  has home care services and visiting nurse for wound care., 3 ADRIAN W/ RAIL/children

## 2024-04-16 NOTE — PHYSICAL THERAPY INITIAL EVALUATION ADULT - PERTINENT HX OF CURRENT PROBLEM, REHAB EVAL
89 yo  F with PMH of afib on Xarelto, HFpEF on torsemide, HTN, GERD, DM, temporal arteritis on prednisone presents to the ED BIBEMS from home c/o moderate-severe intermittent diffuse upper abdominal pain worsening over the last  x3 weeks.  Patient with HH admission on 3/25 and 3/29-4/8. Now she returns due to worsening weakness. She is unable to transfer from bed to her wheelchair. He has to lift her out of bed, Main complaint is  weakness. She also has decreased appetite with poor oral intake last 2 days, home visiting nurse noticed decubitus sacral ulcer was worse than usual and  redness L anterior shin worsening, concerning for cellulitis.

## 2024-04-16 NOTE — PHYSICAL THERAPY INITIAL EVALUATION ADULT - GENERAL OBSERVATIONS, REHAB EVAL
Pt was found lying in bed with primafit+, Pt has LLE redness/cellulitis, pt is c/o weakness, agreeable to participate in PT on much motivation

## 2024-04-16 NOTE — DIETITIAN INITIAL EVALUATION ADULT - NSFNSGIIOFT_GEN_A_CORE
I&O's Detail    15 Apr 2024 07:01  -  16 Apr 2024 07:00  --------------------------------------------------------  IN:  Total IN: 0 mL    OUT:    Voided (mL): 310 mL  Total OUT: 310 mL    Total NET: -310 mL

## 2024-04-16 NOTE — PHARMACOTHERAPY INTERVENTION NOTE - COMMENTS
Medication reconciliation completed.  Patient was unable to provide medication information, spoke to family at bedside and they provided current medication list; confirmed with Dr. [facility].

## 2024-04-16 NOTE — DIETITIAN INITIAL EVALUATION ADULT - ENTER FROM (ML/KG)
20
GENERAL: well-appearing, well nourished, no acute distress  HEENT: NCAT, ~1cm laceration noted on left temporal-parietal area with dried blood, no further than epidermis deep laceration, no contusion or active bleeding noted, conjunctiva clear and not injected, sclera non-icteric, PERRL, EACs clear, TMs nonbulging/nonerythematous, nares patent, mucous membranes moist, no mucosal lesions, pharynx nonerythematous, no tonsillar hypertrophy or exudate, neck supple, no cervical lymphadenopathy  HEART: RRR, S1, S2, no rubs, murmurs, or gallops, RP/DP present, cap refill <2 seconds  LUNG: CTAB, no wheezing, no ronchi, no crackles  ABDOMEN: +BS, soft, nontender, nondistended  NEURO/MSK: grossly intact  SKIN: good turgor, no rash, no bruising or prominent lesions  BACK: spine normal without deformity or tenderness

## 2024-04-17 NOTE — CONSULT NOTE ADULT - ASSESSMENT
89 Y/O F a/w LLE cellulitis and Sacral ulcer  - Offered Surgical debridement in the OR, Pt and family will decide if pt is agreeable to do surgery  - Local wound care until decision for surgery  - Sx will follow, D/W Dr. Garcia

## 2024-04-17 NOTE — CONSULT NOTE ADULT - NS ATTEND AMEND GEN_ALL_CORE FT
89 yo fem who has been very debilitated recently, developed sacral decubitus ulcer  -Unstageable sacral decubitus wound, necrotic skin, no cellulitis  -Patient offered OR surgical debridement vs conservative management. If OR is decided will need to stop anticoagulation, poss OR on Monday.  -Wound will likely never heals after debridement.   -Recommends increase nutritional support  -Constant patient rotation while on bed
88yo female with PMH as noted above who presented to ED for a worsening sacral decubiti and concern for LLE cellulitis.  She is s/p 2 failed cardioversion attempts.     Doubt will be able to maintain sinus rhythm.    Plan:   Discontinue Digoxin, patient in SR  Check Dig level  Continue Diltiazem 180mg PO daily  Decrease Amiodarone to 200mg PO daily  Continue Xarelto

## 2024-04-17 NOTE — CONSULT NOTE ADULT - CONVERSATION DETAILS
We discussed Palliative Care team being a supportive team when a patient has ongoing illnesses.  We also discussed that it is not an end of life care service, but can help navigate symptoms and emotional support throughout their hospital stay here.       Ms. Braswell and I had a discussion regarding advanced directives, including HCP and MOLSTs as well as What matters most to her.   She states that her life has been very full for many years.  She's 89 years old and wants to live life as full as she can- but her bottom line was if her heart or lungs were to stop working she would not want to have CPR or life support.     We discussed in depth what CPR or intubation (life support) is and risk/benefits.      She is aware she has heart problems and DM and these are progressive diseases.    We discussed that patients diabetes is a chronic condition characterized by high levels of glucose (sugar) in the blood. It typically progresses over time, often starting with insulin resistance, where the body's cells don't respond effectively to insulin. This leads to elevated blood sugar levels, causing symptoms like increased thirst, frequent urination, and fatigue.  It also effect different parts of the body in different ways.  Essentially the sugar can hurt nerves (neuropathy), blood vessels (vascular disease),  heart disease, kidney disease, our eyes and even the blood vessels in our brains.      We discussed the importance of close out patient follow up but also tight control of DM and other underlying diseases.       With that we discussed resuscitation and risk and benefits of CPR. Risks include, broken ribs, lung puncture, pain and discomfort.      They understand that DNR means NO CPR and that would allow natural death.  No CPR means no attempt to restart the heart once it has stopped.  Patient verbalized understanding.     We also discussed mechanical ventilation in  an event that they could no longer breath on their own.  Risk and benefits of mechanical ventilation were discussed at length.     Patient was able to verbalized understanding.     We discussed MOLST form and she feels this is something she absolutely wants to sign and put into working orders.   We discussed  that   If a patient lacks capacity to make their own medical decisions and there is NO HCP form available      As per Family Health Care Decision Act: The order of surrogate decision makers is as follows  1. Legal guardian appointed under Article 81   2. spouse or domestic partner   3. Adult child  4. Parent  5. Sibling  6. Close friend, age 18 or over, or a relative other than those listed above       She states that she wants to name her Son in law Adrien as her primary proxy.  Her daughter Chen she states can get overwhelmed and "emotional" over these conversations.     She states that if it came down to her being so sick her lungs or heart stopped working she would not want life support or CPR.  Obviously she  states that if she were to get sick and require coming to the hospital for  Abx, or workup or IVF would be important to her so long as treatment leads to improvement.     With that she clearly stated sh would absolutely NOT want FEEDING tubes or Dialysis.       She at first wanted to sign the form and place it on chart without talking to her family.   She didn't want to upset them, but also stated that her decision was made and this is what she would want come the time.    I recommended we talk to Adrien and ask if he was willing to be HCP and as a HCP its important to know what her wishes are so he knows he's acting in her best interest and according to her goals.     While at bedside Adrien called patient and she asked I speak with him and let him know her decisions and her request for HCP.    He was in agreement for HCP but did request they review the MOLST with patient prior to placing an order of DNR DNI (which at that time was DNR trial of intubation).  Kade very clearly said "its my decision and this is what I want, I don't want to wait to sign it and move forward".      I encouraged continued conversation, offered to have a family meeting at bedside vs/ over phone.  He stated at this time he did not want that.      I also stated that he understood SHE could change her wishes but someone else cannot revoke her current wishes. We discussed role of HCP and I also discussed my role to help encourage communication and supporting the patient and family during these different conversations.  That being said I will at this time respect the patients decision as she has full capacity and has made her self very clear.     She has clear insight into her medical problems and underlying disease.  She understands risk and benefits of CPR , HD, Feeding tubes in the event of becoming more dependent on machines and others is not what she would want.      She states very clearly, "when my time comes I want to go in peace"  She told her son in law this information as well, he also asked me about what happens if her heart stopped and it was on a monitor we would know and if we entered a room and she hear heart was not beating we would allow her to pass naturally.      If we noted an arrythmia we would still treat and our goal woul dbe to avoid cardiac or pulmonary arrest with the hard stop of if she needed CPR or INTUBATION to breath we would make her comfortable and allow her to pass peacefully.

## 2024-04-17 NOTE — GOALS OF CARE CONVERSATION - ADVANCED CARE PLANNING - CONVERSATION DETAILS
HPI:  88 yo  F with PMH of afib on Xarelto, HFpEF on torsemide, HTN, GERD, DM, temporal arteritis on prednisone presents to the ED BIBEMS from home c/o moderate-severe intermittent diffuse upper abdominal pain worsening over the last  x3 weeks.  Patient with HH admission on 3/25 and 3/29-4/8.  As per family at bedside last admission was not due to diarrhea it was due to weakness from metoprolol but patient also complained of diarrhea secondary to  metformin. Medications were adjusted. Now she returns due to worsening weakness. She is unable to transfer from bed to her wheelchair. He has to lift her out of bed. Abdominal pain is not new and not associated with diarrhea. Diarrhea has resolved. She denies  fevers, chills, chest pain, shortness of breath, diarrhea, vomiting, nausea, dysuria.   Main complaint is  weakness. She also has decreased appetite with poor oral intake last 2 days.    She lives alone has home care services and visiting nurse for wound care.   Today home visiting nurse noticed decubitus sacral ulcer was worse than usual and  redness L anterior shin worsening, concerning for cellulitis.   (15 Apr 2024 21:58)      PERTINENT PMH REVIEWED:  [ x ] YES [ ] NO           Primary Contact:  mike Jurado in , phone  #[phone]    HCP [ x ] Surrogate [   ] Guardian [   ]    Mental Status: [ x ] Alert  [ x ] Oriented [  ] Confused [  ] Lethargic  Concerns of Depression [  ] -none reported  Anxiety [   ] -none reported  Baseline ADLs (prior to admission):  Independent [ x ] moderately [ ] fully   Dependent   [ ] moderately [ ]fully    Family Meeting attendees: GOC discussed    Anticipated Grief: Patient[  ] Family [  ]    Caregiver [place] Assessed: Yes [ x  ] No [  ]    Jain: [employer].    Spiritual Concerns: Not identified,  available for support    Goals of Care: Comfort [  ] Rehabilitation [  x ] Curative [  ] Life Prolonging [  ]    Previous Services: [facility]    ADVANCE DIRECTIVES:    -Pt has capacity  -HCP completed to name son in Adrien luna as health care agent  -MOLST DNR DNI trial NIV     Anticipated D/C Plan: to be further determined.                     Summary:  This SW met with Pt at the bedside to discuss GOC, assist with planning and provide supportive counseling.  Emotional support provided.  Prior to hospitalization, Pt. resided at home with daughter and son in law.  Pt is alert and oriented, able to make needs known.  Pleasant with writer.  Denies feelings related to depression and anxiety.  Pts feelings explored.  Support provided.    Advance directives reviewed.  DNR/DNI, trial NIV MOLST in place.   Pt confirmed these wishes stating that in the event her heart stops she desires a natural passing and does not want any aggressive interventions as they are not her wishes.  HCP completed to name son in law, Adrien Bland.  Copy placed on the medical record and the original as well as a copy for Adrien given to Pt.      Plan to be further determined.  DNR/DNI, trial NIV in place.  HCP on file.  Emotional support provided.  Our team to continue to follow.

## 2024-04-17 NOTE — PROGRESS NOTE ADULT - SUBJECTIVE AND OBJECTIVE BOX
89 yo  F with PMH of afib on Xarelto, HFpEF on torsemide, HTN, GERD, DM, temporal arteritis on prednisone presents to the ED BIBEMS from home c/o moderate-severe intermittent diffuse upper abdominal pain worsening over the last  x3 weeks.  Patient with HH admission on 3/25 and 3/29-4/8.  As per family at bedside last admission was not due to diarrhea it was due to weakness from metoprolol but patient also complained of diarrhea secondary to  metformin. Medications were adjusted. Now she returns due to worsening weakness. She is unable to transfer from bed to her wheelchair. He has to lift her out of bed. Abdominal pain is not new and not associated with diarrhea. Diarrhea has resolved. She denies  fevers, chills, chest pain, shortness of breath, diarrhea, vomiting, nausea, dysuria.   Main complaint is  weakness. She also has decreased appetite with poor oral intake last 2 days.    She lives alone has home care services and visiting nurse for wound care.   Today home visiting nurse noticed decubitus sacral ulcer was worse than usual and  redness L anterior shin worsening, concerning for cellulitis.  Family is interested in rehab not NH.    04/16/24: Patient seen and examined. Complaining of weakness.   04/17/24: Patient seen and examined. Still feels weak. Denies any new complaints.       Vital Signs Last 24 Hrs  T(C): 36.4 (17 Apr 2024 07:35), Max: 36.4 (16 Apr 2024 20:20)  T(F): 97.5 (17 Apr 2024 07:35), Max: 97.5 (16 Apr 2024 20:20)  HR: 101 (17 Apr 2024 11:58) (63 - 101)  BP: 88/70 (17 Apr 2024 11:58) (88/70 - 106/54)  BP(mean): --  RR: 18 (16 Apr 2024 23:21) (18 - 18)  SpO2: 96% (17 Apr 2024 07:35) (96% - 100%)    Parameters below as of 17 Apr 2024 07:35  Patient On (Oxygen Delivery Method): room air          Physical Exam:       GENERAL: NAD, well-groomed, well-developed  HEAD:  Atraumatic, Normocephalic  NERVOUS SYSTEM:  Alert & Oriented X3, Good concentration   CHEST/LUNG: poor inspiratory effort, clear but diminished    HEART: s1s2  ABDOMEN: Soft, Nontender, Nondistended; Bowel sounds present  EXTREMITIES:  +b/l LE edema; LLE with erythema    SKIN: sacral ulcer        Labs:                                        13.1   10.98 )-----------( 144      ( 17 Apr 2024 07:16 )             41.0     17 Apr 2024 07:16    132    |  107    |  31     ----------------------------<  189    5.1     |  19     |  0.88     Ca    8.5        17 Apr 2024 07:16    TPro  5.0    /  Alb  2.4    /  TBili  1.1    /  DBili  x      /  AST  38     /  ALT  36     /  AlkPhos  49     16 Apr 2024 07:38    LIVER FUNCTIONS - ( 16 Apr 2024 07:38 )  Alb: 2.4 g/dL / Pro: 5.0 gm/dL / ALK PHOS: 49 U/L / ALT: 36 U/L / AST: 38 U/L / GGT: x           PT/INR - ( 15 Apr 2024 17:40 )   PT: 19.8 sec;   INR: 1.78 ratio         PTT - ( 15 Apr 2024 17:40 )  PTT:31.7 sec  CAPILLARY BLOOD GLUCOSE      POCT Blood Glucose.: 214 mg/dL (17 Apr 2024 12:03)  POCT Blood Glucose.: 227 mg/dL (17 Apr 2024 07:59)  POCT Blood Glucose.: 269 mg/dL (16 Apr 2024 21:47)  POCT Blood Glucose.: 127 mg/dL (16 Apr 2024 17:24)        Urinalysis Basic - ( 17 Apr 2024 07:16 )    Color: x / Appearance: x / SG: x / pH: x  Gluc: 189 mg/dL / Ketone: x  / Bili: x / Urobili: x   Blood: x / Protein: x / Nitrite: x   Leuk Esterase: x / RBC: x / WBC x   Sq Epi: x / Non Sq Epi: x / Bacteria: x              MEDICATIONS  (STANDING):  aMIOdarone    Tablet 200 milliGRAM(s) Oral daily  atorvastatin 40 milliGRAM(s) Oral at bedtime  dapagliflozin 10 milliGRAM(s) Oral every 24 hours  dextrose 10% Bolus 125 milliLiter(s) IV Bolus once  dextrose 5%. 1000 milliLiter(s) (100 mL/Hr) IV Continuous <Continuous>  dextrose 5%. 1000 milliLiter(s) (50 mL/Hr) IV Continuous <Continuous>  dextrose 50% Injectable 25 Gram(s) IV Push once  dextrose 50% Injectable 12.5 Gram(s) IV Push once  diltiazem    milliGRAM(s) Oral daily  doxycycline monohydrate Capsule 100 milliGRAM(s) Oral every 12 hours  folic acid 1 milliGRAM(s) Oral daily  glucagon  Injectable 1 milliGRAM(s) IntraMuscular once  insulin glargine Injectable (LANTUS) 9 Unit(s) SubCutaneous at bedtime  insulin lispro (ADMELOG) corrective regimen sliding scale   SubCutaneous at bedtime  insulin lispro Injectable (ADMELOG) 3 Unit(s) SubCutaneous three times a day before meals  pantoprazole    Tablet 40 milliGRAM(s) Oral before breakfast  predniSONE   Tablet 20 milliGRAM(s) Oral daily  rivaroxaban 15 milliGRAM(s) Oral with dinner  sertraline 25 milliGRAM(s) Oral daily  timolol 0.5% Solution 1 Drop(s) Both EYES daily  torsemide 20 milliGRAM(s) Oral daily    MEDICATIONS  (PRN):  acetaminophen     Tablet .. 650 milliGRAM(s) Oral every 6 hours PRN Temp greater or equal to 38C (100.4F), Mild Pain (1 - 3)  aluminum hydroxide/magnesium hydroxide/simethicone Suspension 30 milliLiter(s) Oral every 4 hours PRN Dyspepsia  dextrose Oral Gel 15 Gram(s) Oral once PRN Blood Glucose LESS THAN 70 milliGRAM(s)/deciliter  melatonin 3 milliGRAM(s) Oral at bedtime PRN Insomnia  ondansetron Injectable 4 milliGRAM(s) IV Push every 8 hours PRN Nausea and/or Vomiting          Assessment:  · Assessment    89 yo  F with PMH of afib on Xarelto, HFpEF on torsemide, HTN, GERD, DM, temporal arteritis on prednisone presents for LLE cellulitis,  worsening weakness and decrease oral intake. Patient now reports she is unable to transfer from bed to wheelchair    LLE cellulitis  leukocytosis  continue abx doxy 100mg BID    Chronic AF   s/p failed DCCV  continue home meds  HFpEF  TTE EF >50% 3/2024  on Farxiga  on Torsemide  monitor urine output  strict I and O  cardiology/EP consult appreciated  Continue amiodarone and cardizem    DM2  no home meds  metformin recently discontinued  A1c  8.6 3/19/24  ISS, lantus, and premeal insulin  monitor FS    Temporal arteritis  Prednisone 20mg as per family and patient    Sacral ulcer POA-necrotic  Surgery consult for possible debriement    hypoalbuminemia  nutrition consult  inadequate calorie intake    Right lateral hip subcutaneous hematoma -old  Follow clinically while on AC    Weakness  recent hospital admission  deconditioned  PT consult     DVT ppx   Xarelto    Palliative eval appreciated  DNR/DNI

## 2024-04-17 NOTE — CONSULT NOTE ADULT - SUBJECTIVE AND OBJECTIVE BOX
HPI:    "89 yo  F with PMH of afib on Xarelto, HFpEF on torsemide, HTN, GERD, DM, temporal arteritis on prednisone presents to the ED BIBEMS from home c/o moderate-severe intermittent diffuse upper abdominal pain worsening over the last  x3 weeks.  Patient with HH admission on 3/25 and 3/29-.  As per family at bedside last admission was not due to diarrhea it was due to weakness from metoprolol but patient also complained of diarrhea secondary to  metformin. Medications were adjusted. Now she returns due to worsening weakness. She is unable to transfer from bed to her wheelchair. He has to lift her out of bed. Abdominal pain is not new and not associated with diarrhea. Diarrhea has resolved. She denies  fevers, chills, chest pain, shortness of breath, diarrhea, vomiting, nausea, dysuria.   Main complaint is  weakness. She also has decreased appetite with poor oral intake last 2 days.    She lives alone has home care services and visiting nurse for wound care.   Today home visiting nurse noticed decubitus sacral ulcer was worse than usual and  redness L anterior shin worsening, concerning for cellulitis."         Patient was seen and examined.  Denies nausea, vomiting, shortness of breath, chest pain, headaches, abdominal pain, constipation     Patient lives at home w/ her daughter (Bridget) and son in law (Adrien)   She reports that she has a medical history of DM ,  afib and HF.  She states she's been very frustrated at her quality of life lately and is hoping to get better and stronger.  The weakness has been signficant and frustrating. h    She had a fall in March taht upon CT showed heamtoma that remained stable so Xeralto for AFib was restarted.       Patient today continues to have some pain that wraps from right side hip/abdomen and in cramping like sensation.  Comes and goes, nothing seems to make it better or worse.   She wondered if a medication ws causing it but at this time is not on metformin.     Pt denies constipation had some diahrrea which improved a bit last admisison .    Upon this admission CT abd.pelvis did also show some Mild mural prominence of distal jejunal ileal folds, which may represent   nonspecific enteritis. No bowel obstruction.  It also did show remnants of hematoma which as not yet resolved but is a little smaller .        CT FINDINGS discussed w/ Primary Team     pt currently on doxycycline for Lower extremity cellulitis      PAIN: ( )Yes   ( )No  Level:  Location:  Intensity:    /10  Quality:  Aggravating Factors:  Alleviating Factors:  Radiation:  Duration/Timing:  Impact on ADLs:    DYSPNEA: ( ) Yes  ( ) No  Level:    PAST MEDICAL & SURGICAL HISTORY:  Temporal arteritis      Atrial fibrillation      Acute CHF          SOCIAL HX:    Hx opiate tolerance ( )YES  ( )NO    Baseline ADLs  (Prior to Admission)  ( ) Independent   ( )Dependent    FAMILY HISTORY:      Review of Systems:    Anxiety-  Depression-  Physical Discomfort-  Dyspnea-  Constipation-  Diarrhea-  Nausea-  Vomiting-  Anorexia-  Weight Loss-   Cough-  Secretions-  Fatigue-  Weakness-  Delirium-    All other systems reviewed and negative  Unable to obtain/Limited due to:      PHYSICAL EXAM:    Vital Signs Last 24 Hrs  T(C): 36.4 (2024 07:35), Max: 36.4 (2024 20:20)  T(F): 97.5 (2024 07:35), Max: 97.5 (2024 20:20)  HR: 100 (2024 07:35) (63 - 100)  BP: 96/55 (2024 07:35) (96/55 - 125/57)  BP(mean): 77 (2024 11:35) (77 - 77)  RR: 18 (2024 23:21) (18 - 18)  SpO2: 96% (2024 07:35) (96% - 100%)    Parameters below as of 2024 07:35  Patient On (Oxygen Delivery Method): room air      Daily     Daily Weight in k.5 (2024 06:17)    PPSV2:   %  FAST:    General:  Mental Status:  HEENT:  Lungs:  Cardiac:  GI:  :  Ext:  Neuro:      LABS:                        13.1   10.98 )-----------( 144      ( 2024 07:16 )             41.0     04-17    132<L>  |  107  |  31<H>  ----------------------------<  189<H>  5.1   |  19<L>  |  0.88    Ca    8.5      2024 07:16    TPro  5.0<L>  /  Alb  2.4<L>  /  TBili  1.1  /  DBili  x   /  AST  38<H>  /  ALT  36  /  AlkPhos  49  -16    PT/INR - ( 15 Apr 2024 17:40 )   PT: 19.8 sec;   INR: 1.78 ratio         PTT - ( 15 Apr 2024 17:40 )  PTT:31.7 sec  Albumin: Albumin: 2.4 g/dL ( @ 07:38)      Allergies    penicillin (Swelling)    Intolerances    metformin (Diarrhea)  Metoprolol Tartrate (Other; Sedation/Somnol)    MEDICATIONS  (STANDING):  aMIOdarone    Tablet 200 milliGRAM(s) Oral daily  atorvastatin 40 milliGRAM(s) Oral at bedtime  dapagliflozin 10 milliGRAM(s) Oral every 24 hours  dextrose 10% Bolus 125 milliLiter(s) IV Bolus once  dextrose 5%. 1000 milliLiter(s) (50 mL/Hr) IV Continuous <Continuous>  dextrose 5%. 1000 milliLiter(s) (100 mL/Hr) IV Continuous <Continuous>  dextrose 50% Injectable 25 Gram(s) IV Push once  dextrose 50% Injectable 12.5 Gram(s) IV Push once  diltiazem    milliGRAM(s) Oral daily  doxycycline monohydrate Capsule 100 milliGRAM(s) Oral every 12 hours  folic acid 1 milliGRAM(s) Oral daily  glucagon  Injectable 1 milliGRAM(s) IntraMuscular once  insulin glargine Injectable (LANTUS) 9 Unit(s) SubCutaneous at bedtime  insulin lispro (ADMELOG) corrective regimen sliding scale   SubCutaneous at bedtime  insulin lispro Injectable (ADMELOG) 3 Unit(s) SubCutaneous three times a day before meals  pantoprazole    Tablet 40 milliGRAM(s) Oral before breakfast  predniSONE   Tablet 20 milliGRAM(s) Oral daily  rivaroxaban 15 milliGRAM(s) Oral with dinner  sertraline 25 milliGRAM(s) Oral daily  timolol 0.5% Solution 1 Drop(s) Both EYES daily  torsemide 20 milliGRAM(s) Oral daily    MEDICATIONS  (PRN):  acetaminophen     Tablet .. 650 milliGRAM(s) Oral every 6 hours PRN Temp greater or equal to 38C (100.4F), Mild Pain (1 - 3)  aluminum hydroxide/magnesium hydroxide/simethicone Suspension 30 milliLiter(s) Oral every 4 hours PRN Dyspepsia  dextrose Oral Gel 15 Gram(s) Oral once PRN Blood Glucose LESS THAN 70 milliGRAM(s)/deciliter  melatonin 3 milliGRAM(s) Oral at bedtime PRN Insomnia  ondansetron Injectable 4 milliGRAM(s) IV Push every 8 hours PRN Nausea and/or Vomiting      RADIOLOGY/ADDITIONAL STUDIES:   HPI:    "88 yo  F with PMH of afib on Xarelto, HFpEF on torsemide, HTN, GERD, DM, temporal arteritis on prednisone presents to the ED BIBEMS from home c/o moderate-severe intermittent diffuse upper abdominal pain worsening over the last  x3 weeks.  Patient with HH admission on 3/25 and 3/29-.  As per family at bedside last admission was not due to diarrhea it was due to weakness from metoprolol but patient also complained of diarrhea secondary to  metformin. Medications were adjusted. Now she returns due to worsening weakness. She is unable to transfer from bed to her wheelchair. He has to lift her out of bed. Abdominal pain is not new and not associated with diarrhea. Diarrhea has resolved. She denies  fevers, chills, chest pain, shortness of breath, diarrhea, vomiting, nausea, dysuria.   Main complaint is  weakness. She also has decreased appetite with poor oral intake last 2 days.    She lives alone has home care services and visiting nurse for wound care.   Today home visiting nurse noticed decubitus sacral ulcer was worse than usual and  redness L anterior shin worsening, concerning for cellulitis."         Patient was seen and examined.  Denies nausea, vomiting, shortness of breath, chest pain, headaches, abdominal pain, constipation     Patient lives at home w/ her daughter (Bridget) and son in law (Adrien)   She reports that she has a medical history of DM ,  afib and HF.  She states she's been very frustrated at her quality of life lately and is hoping to get better and stronger.  The weakness has been signficant and frustrating. h    She had a fall in March taht upon CT showed heamtoma that remained stable so Xeralto for AFib was restarted.       Patient today continues to have some pain that wraps from right side hip/abdomen and in cramping like sensation.  Comes and goes, nothing seems to make it better or worse.   She wondered if a medication ws causing it but at this time is not on metformin.     Pt denies constipation had some diahrrea which improved a bit last admisison .    Upon this admission CT abd.pelvis did also show some Mild mural prominence of distal jejunal ileal folds, which may represent   nonspecific enteritis. No bowel obstruction.  It also did show remnants of hematoma which as not yet resolved but is a little smaller .        CT FINDINGS discussed w/ Primary Team     pt currently on doxycycline for Lower extremity cellulitis            PAIN: (x )Yes   ( )No  Level: mild to moderate  Location: right side   Intensity:  5  /10  Quality: aching  Aggravating Factors: unknown  Alleviating Factors: time  Radiation: none  Duration/Timing: waves  Impact on ADLs: signficant    DYSPNEA: ( ) Yes  ( ) No  Level:    PAST MEDICAL & SURGICAL HISTORY:  Temporal arteritis      Atrial fibrillation      Acute CHF          SOCIAL HX:    Hx opiate tolerance ( )YES  ( x)NO    Baseline ADLs  (Prior to Admission)  ( ) Independent   (x )Dependent    FAMILY HISTORY:    Review of Systems:    Anxiety- denies  Depression-denies  Physical Discomfort- in NAD  Dyspnea-denies  Constipation-denies  Diarrhea-denies  Nausea-denies  Vomiting-denies  Anorexia-+  Weight Loss- denies  Cough-denies  Secretions-denies  Fatigue-+  Weakness-+  Delirium-denies    All other systems reviewed and negative       PHYSICAL EXAM:    Vital Signs Last 24 Hrs  T(C): 36.4 (2024 07:35), Max: 36.4 (2024 20:20)  T(F): 97.5 (2024 07:35), Max: 97.5 (2024 20:20)  HR: 100 (2024 07:35) (63 - 100)  BP: 96/55 (2024 07:35) (96/55 - 125/57)  BP(mean): 77 (2024 11:35) (77 - 77)  RR: 18 (2024 23:21) (18 - 18)  SpO2: 96% (2024 07:35) (96% - 100%)    Parameters below as of 2024 07:35  Patient On (Oxygen Delivery Method): room air      Daily     Daily Weight in k.5 (2024 06:17)    PPSV2: 40  %  FAST:    General: calm in NAD  Mental Status: awake alert oriented x3  HEENT: eomi, perrl  Lungs: ctabl b/l bs  Cardiac: s1s2 no mgr  GI: soft nontender +BS  : voids  Ext: moves all 4 extremities spontaneously  Neuro: no gross findings       LABS:                        13.1   10.98 )-----------( 144      ( 2024 07:16 )             41.0     04-17    132<L>  |  107  |  31<H>  ----------------------------<  189<H>  5.1   |  19<L>  |  0.88    Ca    8.5      2024 07:16    TPro  5.0<L>  /  Alb  2.4<L>  /  TBili  1.1  /  DBili  x   /  AST  38<H>  /  ALT  36  /  AlkPhos  49      PT/INR - ( 15 Apr 2024 17:40 )   PT: 19.8 sec;   INR: 1.78 ratio         PTT - ( 15 Apr 2024 17:40 )  PTT:31.7 sec  Albumin: Albumin: 2.4 g/dL ( @ 07:38)      Allergies    penicillin (Swelling)    Intolerances    metformin (Diarrhea)  Metoprolol Tartrate (Other; Sedation/Somnol)    MEDICATIONS  (STANDING):  aMIOdarone    Tablet 200 milliGRAM(s) Oral daily  atorvastatin 40 milliGRAM(s) Oral at bedtime  dapagliflozin 10 milliGRAM(s) Oral every 24 hours  dextrose 10% Bolus 125 milliLiter(s) IV Bolus once  dextrose 5%. 1000 milliLiter(s) (50 mL/Hr) IV Continuous <Continuous>  dextrose 5%. 1000 milliLiter(s) (100 mL/Hr) IV Continuous <Continuous>  dextrose 50% Injectable 25 Gram(s) IV Push once  dextrose 50% Injectable 12.5 Gram(s) IV Push once  diltiazem    milliGRAM(s) Oral daily  doxycycline monohydrate Capsule 100 milliGRAM(s) Oral every 12 hours  folic acid 1 milliGRAM(s) Oral daily  glucagon  Injectable 1 milliGRAM(s) IntraMuscular once  insulin glargine Injectable (LANTUS) 9 Unit(s) SubCutaneous at bedtime  insulin lispro (ADMELOG) corrective regimen sliding scale   SubCutaneous at bedtime  insulin lispro Injectable (ADMELOG) 3 Unit(s) SubCutaneous three times a day before meals  pantoprazole    Tablet 40 milliGRAM(s) Oral before breakfast  predniSONE   Tablet 20 milliGRAM(s) Oral daily  rivaroxaban 15 milliGRAM(s) Oral with dinner  sertraline 25 milliGRAM(s) Oral daily  timolol 0.5% Solution 1 Drop(s) Both EYES daily  torsemide 20 milliGRAM(s) Oral daily    MEDICATIONS  (PRN):  acetaminophen     Tablet .. 650 milliGRAM(s) Oral every 6 hours PRN Temp greater or equal to 38C (100.4F), Mild Pain (1 - 3)  aluminum hydroxide/magnesium hydroxide/simethicone Suspension 30 milliLiter(s) Oral every 4 hours PRN Dyspepsia  dextrose Oral Gel 15 Gram(s) Oral once PRN Blood Glucose LESS THAN 70 milliGRAM(s)/deciliter  melatonin 3 milliGRAM(s) Oral at bedtime PRN Insomnia  ondansetron Injectable 4 milliGRAM(s) IV Push every 8 hours PRN Nausea and/or Vomiting      RADIOLOGY/ADDITIONAL STUDIES:

## 2024-04-17 NOTE — CONSULT NOTE ADULT - ASSESSMENT
Process of Care  --Reviewed dx/treatment problems and alignment with Goals of Care    Physical Aspects of Care  --Pain  patient denies at this time  c/w current managment    --Bowel Regimen  denies constipation  risk for constipation d/t immobility  daily dulcolax    --Dyspnea  No SOB at this time  comfortable and in NAD    --Nausea Vomiting  denies    --Weakness  PT as tolerated     Psychological and Psychiatric Aspects of Care:   --Greif/Bereavment: emotional support provided  --Hx of psychiatric dx: none  -Pastoral Care Available PRN     Social Aspects of Care  -SW involved     Cultural Aspects  -Primary Language: English    Goals of Care:     We discussed Palliative Care team being a supportive team when a patient has ongoing illnesses.  We also discussed that it is not an end of life care service, but can help navigate symptoms and emotional support througout their hospital stay here.    Please refer to goc above.     Prognosis: Death can occur at anytime, but if disease continues to progress naturally patient likely has days to weeks.    Ethical and Legal Aspects:   NA        Capacity: pt with capacity for complex decision making  HCP/Surrogate: Adrien Bland  Code Status: DNR DNI TRIAL NIV   MOLST: DNR DNI TRIAL NIV  NO FEEDING TUBE, NO HD,   TRIAL ABX, TRIAL IVF  Dispo Plan:    Discussed With: Case coordinated with attending and SW and RN     Time Spent: 90 minutes including the care, coordination and counseling of this patient, 50% of which was spent coordinating and counseling.

## 2024-04-17 NOTE — CONSULT NOTE ADULT - CONSULT REASON
Afib
Sacral decubitus ulcer
Hx of AF, s/p failed cardioversion, recent med adjustment, c/o worsening weakness.
complex goals of care and symptom management

## 2024-04-17 NOTE — CONSULT NOTE ADULT - SUBJECTIVE AND OBJECTIVE BOX
89 yo  F with PMH of afib on Xarelto, HFpEF on torsemide, HTN, GERD, DM, temporal arteritis on prednisone presents for LLE cellulitis,  worsening weakness and decrease oral intake. Patient now reports she is unable to transfer from bed to wheelchair. Gen sx c/s for Sacral ulcer. poor nutrition status.    Review of system- Rest of the review of system are negative except mentioned in HPI    OBJECTIVE:   T(C): 36.4 (24 @ 15:26), Max: 36.4 (24 @ 20:20)  HR: 88 (24 @ 15:26) (66 - 101)  BP: 97/65 (24 @ 15:26) (88/70 - 106/54)  RR: 16 (24 @ 15:26) (16 - 18)  SpO2: 99% (24 @ 15:26) (96% - 100%)  Wt(kg): --  Daily     Daily Weight in k.5 (2024 06:17)    PHYSICAL EXAM:  GENERAL: NAD, well-groomed, well-developed  HEAD:  Atraumatic, Normocephalic  NERVOUS SYSTEM:  Alert & Oriented X3, Good concentration   CHEST/LUNG: poor inspiratory effort, clear but diminished    HEART: s1s2  ABDOMEN: Soft, Nontender, Nondistended; Bowel sounds present  EXTREMITIES:  +b/l LE edema; LLE with erythema    SKIN: sacral ulcer  LABS: all reviewed                         13.1   10.98 )-----------( 144      ( 2024 07:16 )             41.0     -    132<L>  |  107  |  31<H>  ----------------------------<  189<H>  5.1   |  19<L>  |  0.88    Ca    8.5      2024 07:16    TPro  5.0<L>  /  Alb  2.4<L>  /  TBili  1.1  /  DBili  x   /  AST  38<H>  /  ALT  36  /  AlkPhos  49  04-16    PT/INR - ( 15 Apr 2024 17:40 )   PT: 19.8 sec;   INR: 1.78 ratio         PTT - ( 15 Apr 2024 17:40 )  PTT:31.7 sec      Urinalysis Basic - ( 2024 07:16 )    Color: x / Appearance: x / SG: x / pH: x  Gluc: 189 mg/dL / Ketone: x  / Bili: x / Urobili: x   Blood: x / Protein: x / Nitrite: x   Leuk Esterase: x / RBC: x / WBC x   Sq Epi: x / Non Sq Epi: x / Bacteria: x        CAPILLARY BLOOD GLUCOSE      POCT Blood Glucose.: 214 mg/dL (2024 12:03)  POCT Blood Glucose.: 227 mg/dL (2024 07:59)  POCT Blood Glucose.: 269 mg/dL (2024 21:47)  POCT Blood Glucose.: 127 mg/dL (2024 17:24)        RECENT CULTURES:    RADIOLOGY & ADDITIONAL TESTS: all reviewed   EKG reviewed        Current medications:  acetaminophen     Tablet .. 650 milliGRAM(s) Oral every 6 hours PRN  aluminum hydroxide/magnesium hydroxide/simethicone Suspension 30 milliLiter(s) Oral every 4 hours PRN  aMIOdarone    Tablet 200 milliGRAM(s) Oral daily  atorvastatin 40 milliGRAM(s) Oral at bedtime  dapagliflozin 10 milliGRAM(s) Oral every 24 hours  dextrose 10% Bolus 125 milliLiter(s) IV Bolus once  dextrose 5%. 1000 milliLiter(s) IV Continuous <Continuous>  dextrose 5%. 1000 milliLiter(s) IV Continuous <Continuous>  dextrose 50% Injectable 25 Gram(s) IV Push once  dextrose 50% Injectable 12.5 Gram(s) IV Push once  dextrose Oral Gel 15 Gram(s) Oral once PRN  diltiazem    milliGRAM(s) Oral daily  doxycycline monohydrate Capsule 100 milliGRAM(s) Oral every 12 hours  folic acid 1 milliGRAM(s) Oral daily  glucagon  Injectable 1 milliGRAM(s) IntraMuscular once  insulin glargine Injectable (LANTUS) 9 Unit(s) SubCutaneous at bedtime  insulin lispro (ADMELOG) corrective regimen sliding scale   SubCutaneous at bedtime  insulin lispro Injectable (ADMELOG) 3 Unit(s) SubCutaneous three times a day before meals  melatonin 3 milliGRAM(s) Oral at bedtime PRN  ondansetron Injectable 4 milliGRAM(s) IV Push every 8 hours PRN  pantoprazole    Tablet 40 milliGRAM(s) Oral before breakfast  predniSONE   Tablet 20 milliGRAM(s) Oral daily  sertraline 25 milliGRAM(s) Oral daily  timolol 0.5% Solution 1 Drop(s) Both EYES daily  torsemide 20 milliGRAM(s) Oral daily

## 2024-04-18 NOTE — PROGRESS NOTE ADULT - SUBJECTIVE AND OBJECTIVE BOX
88 yo  F with PMH of afib on Xarelto, HFpEF on torsemide, HTN, GERD, DM, temporal arteritis on prednisone presents to the ED BIBEMS from home c/o moderate-severe intermittent diffuse upper abdominal pain worsening over the last  x3 weeks.  Patient with HH admission on 3/25 and 3/29-4/8.  As per family at bedside last admission was not due to diarrhea it was due to weakness from metoprolol but patient also complained of diarrhea secondary to  metformin. Medications were adjusted. Now she returns due to worsening weakness. She is unable to transfer from bed to her wheelchair. He has to lift her out of bed. Abdominal pain is not new and not associated with diarrhea. Diarrhea has resolved. She denies  fevers, chills, chest pain, shortness of breath, diarrhea, vomiting, nausea, dysuria.   Main complaint is  weakness. She also has decreased appetite with poor oral intake last 2 days.    She lives alone has home care services and visiting nurse for wound care.   Today home visiting nurse noticed decubitus sacral ulcer was worse than usual and  redness L anterior shin worsening, concerning for cellulitis.  Family is interested in rehab not NH.    04/16/24: Patient seen and examined. Complaining of weakness.   04/17/24: Patient seen and examined. Still feels weak. Denies any new complaints.   04/18/24: No new issues. Possible sacral wound debridement on Monday.       Vital Signs Last 24 Hrs  T(C): 36.4 (18 Apr 2024 07:55), Max: 36.4 (17 Apr 2024 15:26)  T(F): 97.5 (18 Apr 2024 07:55), Max: 97.5 (17 Apr 2024 15:26)  HR: 92 (18 Apr 2024 07:55) (88 - 109)  BP: 92/55 (18 Apr 2024 07:55) (92/55 - 97/65)  BP(mean): --  RR: 18 (17 Apr 2024 23:00) (16 - 18)  SpO2: 92% (18 Apr 2024 07:55) (92% - 99%)    Parameters below as of 18 Apr 2024 07:55  Patient On (Oxygen Delivery Method): room air        Physical Exam:       GENERAL: NAD, well-groomed, well-developed  HEAD:  Atraumatic, Normocephalic  NERVOUS SYSTEM:  Alert & Oriented X3, Good concentration   CHEST/LUNG: poor inspiratory effort, clear but diminished    HEART: s1s2  ABDOMEN: Soft, Nontender, Nondistended; Bowel sounds present  EXTREMITIES:  +b/l LE edema; LLE with erythema    SKIN: sacral ulcer        Labs:                                        13.1   10.98 )-----------( 144      ( 17 Apr 2024 07:16 )             41.0     17 Apr 2024 07:16    132    |  107    |  31     ----------------------------<  189    5.1     |  19     |  0.88     Ca    8.5        17 Apr 2024 07:16          CAPILLARY BLOOD GLUCOSE      POCT Blood Glucose.: 252 mg/dL (18 Apr 2024 11:32)  POCT Blood Glucose.: 154 mg/dL (18 Apr 2024 08:01)  POCT Blood Glucose.: 307 mg/dL (17 Apr 2024 21:47)  POCT Blood Glucose.: 331 mg/dL (17 Apr 2024 16:46)  POCT Blood Glucose.: 214 mg/dL (17 Apr 2024 12:03)        Urinalysis Basic - ( 17 Apr 2024 07:16 )    Color: x / Appearance: x / SG: x / pH: x  Gluc: 189 mg/dL / Ketone: x  / Bili: x / Urobili: x   Blood: x / Protein: x / Nitrite: x   Leuk Esterase: x / RBC: x / WBC x   Sq Epi: x / Non Sq Epi: x / Bacteria: x          MEDICATIONS  (STANDING):  aMIOdarone    Tablet 200 milliGRAM(s) Oral daily  atorvastatin 40 milliGRAM(s) Oral at bedtime  dapagliflozin 10 milliGRAM(s) Oral every 24 hours  dextrose 10% Bolus 125 milliLiter(s) IV Bolus once  dextrose 5%. 1000 milliLiter(s) (100 mL/Hr) IV Continuous <Continuous>  dextrose 5%. 1000 milliLiter(s) (50 mL/Hr) IV Continuous <Continuous>  dextrose 50% Injectable 25 Gram(s) IV Push once  dextrose 50% Injectable 12.5 Gram(s) IV Push once  diltiazem    milliGRAM(s) Oral daily  doxycycline monohydrate Capsule 100 milliGRAM(s) Oral every 12 hours  folic acid 1 milliGRAM(s) Oral daily  glucagon  Injectable 1 milliGRAM(s) IntraMuscular once  insulin glargine Injectable (LANTUS) 9 Unit(s) SubCutaneous at bedtime  insulin lispro (ADMELOG) corrective regimen sliding scale   SubCutaneous at bedtime  insulin lispro Injectable (ADMELOG) 3 Unit(s) SubCutaneous three times a day before meals  pantoprazole    Tablet 40 milliGRAM(s) Oral before breakfast  predniSONE   Tablet 20 milliGRAM(s) Oral daily  rivaroxaban 15 milliGRAM(s) Oral with dinner  sertraline 25 milliGRAM(s) Oral daily  timolol 0.5% Solution 1 Drop(s) Both EYES daily  torsemide 20 milliGRAM(s) Oral daily    MEDICATIONS  (PRN):  acetaminophen     Tablet .. 650 milliGRAM(s) Oral every 6 hours PRN Temp greater or equal to 38C (100.4F), Mild Pain (1 - 3)  aluminum hydroxide/magnesium hydroxide/simethicone Suspension 30 milliLiter(s) Oral every 4 hours PRN Dyspepsia  dextrose Oral Gel 15 Gram(s) Oral once PRN Blood Glucose LESS THAN 70 milliGRAM(s)/deciliter  melatonin 3 milliGRAM(s) Oral at bedtime PRN Insomnia  ondansetron Injectable 4 milliGRAM(s) IV Push every 8 hours PRN Nausea and/or Vomiting          Assessment:  · Assessment    88 yo  F with PMH of afib on Xarelto, HFpEF on torsemide, HTN, GERD, DM, temporal arteritis on prednisone presents for LLE cellulitis,  worsening weakness and decrease oral intake. Patient now reports she is unable to transfer from bed to wheelchair    LLE cellulitis  leukocytosis  continue abx doxy 100mg BID    Chronic AF   s/p failed DCCV  continue home meds  HFpEF  TTE EF >50% 3/2024  on Farxiga  on Torsemide  monitor urine output  strict I and O  cardiology/EP consult appreciated  Continue amiodarone and cardizem    DM2  no home meds  metformin recently discontinued  A1c  8.6 3/19/24  ISS, lantus, and premeal insulin  monitor FS    Temporal arteritis  Prednisone 20mg as per family and patient    Sacral ulcer POA-necrotic  Surgery consult appreciated  Possible debridement on Monday  Hold eliquis    hypoalbuminemia  nutrition consult  inadequate calorie intake    Right lateral hip subcutaneous hematoma -old  Follow clinically while on AC    Weakness  recent hospital admission  deconditioned  Continue PT     DVT ppx   Xarelto    Palliative eval appreciated  DNR/DNI

## 2024-04-18 NOTE — PROGRESS NOTE ADULT - SUBJECTIVE AND OBJECTIVE BOX
89 yo fem sacral decubitus ulcer, debilitated    unstageable sacral decubitus wound          04-18-24 @ 11:18    Vital Signs Last 24 Hrs  T(C): 36.4 (18 Apr 2024 07:55), Max: 36.4 (17 Apr 2024 15:26)  T(F): 97.5 (18 Apr 2024 07:55), Max: 97.5 (17 Apr 2024 15:26)  HR: 92 (18 Apr 2024 07:55) (88 - 109)  BP: 92/55 (18 Apr 2024 07:55) (88/70 - 97/65)  BP(mean): --  RR: 18 (17 Apr 2024 23:00) (16 - 18)  SpO2: 92% (18 Apr 2024 07:55) (92% - 99%)    Parameters below as of 18 Apr 2024 07:55  Patient On (Oxygen Delivery Method): room air                              13.1   10.98 )-----------( 144      ( 17 Apr 2024 07:16 )             41.0       04-17    132<L>  |  107  |  31<H>  ----------------------------<  189<H>  5.1   |  19<L>  |  0.88    Ca    8.5      17 Apr 2024 07:16            MEDICATIONS  (STANDING):  aMIOdarone    Tablet 200 milliGRAM(s) Oral daily  atorvastatin 40 milliGRAM(s) Oral at bedtime  dapagliflozin 10 milliGRAM(s) Oral every 24 hours  dextrose 10% Bolus 125 milliLiter(s) IV Bolus once  dextrose 5%. 1000 milliLiter(s) (50 mL/Hr) IV Continuous <Continuous>  dextrose 5%. 1000 milliLiter(s) (100 mL/Hr) IV Continuous <Continuous>  dextrose 50% Injectable 25 Gram(s) IV Push once  dextrose 50% Injectable 12.5 Gram(s) IV Push once  diltiazem    milliGRAM(s) Oral daily  doxycycline monohydrate Capsule 100 milliGRAM(s) Oral every 12 hours  folic acid 1 milliGRAM(s) Oral daily  glucagon  Injectable 1 milliGRAM(s) IntraMuscular once  insulin glargine Injectable (LANTUS) 9 Unit(s) SubCutaneous at bedtime  insulin lispro (ADMELOG) corrective regimen sliding scale   SubCutaneous at bedtime  insulin lispro (ADMELOG) corrective regimen sliding scale   SubCutaneous three times a day before meals  insulin lispro Injectable (ADMELOG) 3 Unit(s) SubCutaneous three times a day before meals  pantoprazole    Tablet 40 milliGRAM(s) Oral before breakfast  predniSONE   Tablet 20 milliGRAM(s) Oral daily  sertraline 25 milliGRAM(s) Oral daily  timolol 0.5% Solution 1 Drop(s) Both EYES daily  torsemide 20 milliGRAM(s) Oral daily    MEDICATIONS  (PRN):  acetaminophen     Tablet .. 650 milliGRAM(s) Oral every 6 hours PRN Temp greater or equal to 38C (100.4F), Mild Pain (1 - 3)  aluminum hydroxide/magnesium hydroxide/simethicone Suspension 30 milliLiter(s) Oral every 4 hours PRN Dyspepsia  dextrose Oral Gel 15 Gram(s) Oral once PRN Blood Glucose LESS THAN 70 milliGRAM(s)/deciliter  melatonin 3 milliGRAM(s) Oral at bedtime PRN Insomnia  ondansetron Injectable 4 milliGRAM(s) IV Push every 8 hours PRN Nausea and/or Vomiting      MEDICATIONS  (STANDING):  aMIOdarone    Tablet 200 milliGRAM(s) Oral daily  atorvastatin 40 milliGRAM(s) Oral at bedtime  dapagliflozin 10 milliGRAM(s) Oral every 24 hours  dextrose 10% Bolus 125 milliLiter(s) IV Bolus once  dextrose 5%. 1000 milliLiter(s) (100 mL/Hr) IV Continuous <Continuous>  dextrose 5%. 1000 milliLiter(s) (50 mL/Hr) IV Continuous <Continuous>  dextrose 50% Injectable 25 Gram(s) IV Push once  dextrose 50% Injectable 12.5 Gram(s) IV Push once  diltiazem    milliGRAM(s) Oral daily  doxycycline monohydrate Capsule 100 milliGRAM(s) Oral every 12 hours  folic acid 1 milliGRAM(s) Oral daily  glucagon  Injectable 1 milliGRAM(s) IntraMuscular once  insulin glargine Injectable (LANTUS) 9 Unit(s) SubCutaneous at bedtime  insulin lispro (ADMELOG) corrective regimen sliding scale   SubCutaneous at bedtime  insulin lispro (ADMELOG) corrective regimen sliding scale   SubCutaneous three times a day before meals  insulin lispro Injectable (ADMELOG) 3 Unit(s) SubCutaneous three times a day before meals  pantoprazole    Tablet 40 milliGRAM(s) Oral before breakfast  predniSONE   Tablet 20 milliGRAM(s) Oral daily  sertraline 25 milliGRAM(s) Oral daily  timolol 0.5% Solution 1 Drop(s) Both EYES daily  torsemide 20 milliGRAM(s) Oral daily

## 2024-04-19 NOTE — PROGRESS NOTE ADULT - SUBJECTIVE AND OBJECTIVE BOX
88 yo  F with PMH of afib on Xarelto, HFpEF on torsemide, HTN, GERD, DM, temporal arteritis on prednisone presents to the ED BIBEMS from home c/o moderate-severe intermittent diffuse upper abdominal pain worsening over the last  x3 weeks.  Patient with HH admission on 3/25 and 3/29-4/8.  As per family at bedside last admission was not due to diarrhea it was due to weakness from metoprolol but patient also complained of diarrhea secondary to  metformin. Medications were adjusted. Now she returns due to worsening weakness. She is unable to transfer from bed to her wheelchair. He has to lift her out of bed. Abdominal pain is not new and not associated with diarrhea. Diarrhea has resolved. She denies  fevers, chills, chest pain, shortness of breath, diarrhea, vomiting, nausea, dysuria.   Main complaint is  weakness. She also has decreased appetite with poor oral intake last 2 days.    She lives alone has home care services and visiting nurse for wound care.   Today home visiting nurse noticed decubitus sacral ulcer was worse than usual and  redness L anterior shin worsening, concerning for cellulitis.  Family is interested in rehab not NH.    04/16/24: Patient seen and examined. Complaining of weakness.   04/17/24: Patient seen and examined. Still feels weak. Denies any new complaints.   04/18/24: No new issues. Possible sacral wound debridement on Monday.   04/19/24: Leg redness improving. Discussed with patient regarding management plan.      Vital Signs Last 24 Hrs  T(C): 36.5 (19 Apr 2024 08:25), Max: 36.7 (18 Apr 2024 15:45)  T(F): 97.7 (19 Apr 2024 08:25), Max: 98.1 (18 Apr 2024 15:45)  HR: 89 (19 Apr 2024 08:25) (89 - 93)  BP: 104/58 (19 Apr 2024 08:25) (90/59 - 106/60)  BP(mean): --  RR: 17 (19 Apr 2024 08:25) (17 - 18)  SpO2: 100% (19 Apr 2024 08:25) (97% - 100%)    Parameters below as of 19 Apr 2024 08:25  Patient On (Oxygen Delivery Method): room air        Physical Exam:       GENERAL: NAD, well-groomed, well-developed  HEAD:  Atraumatic, Normocephalic  NERVOUS SYSTEM:  Alert & Oriented X3, Good concentration   CHEST/LUNG: poor inspiratory effort, clear but diminished    HEART: s1s2  ABDOMEN: Soft, Nontender, Nondistended; Bowel sounds present  EXTREMITIES:  +b/l LE edema; LLE with erythema    SKIN: sacral ulcer        Labs:                                                     12.9   12.43 )-----------( 146      ( 19 Apr 2024 07:11 )             39.8     19 Apr 2024 07:11    139    |  108    |  53     ----------------------------<  244    3.2     |  22     |  1.11     Ca    9.3        19 Apr 2024 07:11          CAPILLARY BLOOD GLUCOSE      POCT Blood Glucose.: 143 mg/dL (19 Apr 2024 12:04)  POCT Blood Glucose.: 265 mg/dL (19 Apr 2024 08:17)  POCT Blood Glucose.: 269 mg/dL (18 Apr 2024 21:33)  POCT Blood Glucose.: 277 mg/dL (18 Apr 2024 17:19)        Urinalysis Basic - ( 19 Apr 2024 07:11 )    Color: x / Appearance: x / SG: x / pH: x  Gluc: 244 mg/dL / Ketone: x  / Bili: x / Urobili: x   Blood: x / Protein: x / Nitrite: x   Leuk Esterase: x / RBC: x / WBC x   Sq Epi: x / Non Sq Epi: x / Bacteria: x            MEDICATIONS  (STANDING):  aMIOdarone    Tablet 200 milliGRAM(s) Oral daily  atorvastatin 40 milliGRAM(s) Oral at bedtime  dapagliflozin 10 milliGRAM(s) Oral every 24 hours  dextrose 10% Bolus 125 milliLiter(s) IV Bolus once  dextrose 5%. 1000 milliLiter(s) (100 mL/Hr) IV Continuous <Continuous>  dextrose 5%. 1000 milliLiter(s) (50 mL/Hr) IV Continuous <Continuous>  dextrose 50% Injectable 25 Gram(s) IV Push once  dextrose 50% Injectable 12.5 Gram(s) IV Push once  diltiazem    milliGRAM(s) Oral daily  doxycycline monohydrate Capsule 100 milliGRAM(s) Oral every 12 hours  folic acid 1 milliGRAM(s) Oral daily  glucagon  Injectable 1 milliGRAM(s) IntraMuscular once  insulin glargine Injectable (LANTUS) 9 Unit(s) SubCutaneous at bedtime  insulin lispro (ADMELOG) corrective regimen sliding scale   SubCutaneous at bedtime  insulin lispro Injectable (ADMELOG) 3 Unit(s) SubCutaneous three times a day before meals  pantoprazole    Tablet 40 milliGRAM(s) Oral before breakfast  predniSONE   Tablet 20 milliGRAM(s) Oral daily  rivaroxaban 15 milliGRAM(s) Oral with dinner  sertraline 25 milliGRAM(s) Oral daily  timolol 0.5% Solution 1 Drop(s) Both EYES daily  torsemide 20 milliGRAM(s) Oral daily    MEDICATIONS  (PRN):  acetaminophen     Tablet .. 650 milliGRAM(s) Oral every 6 hours PRN Temp greater or equal to 38C (100.4F), Mild Pain (1 - 3)  aluminum hydroxide/magnesium hydroxide/simethicone Suspension 30 milliLiter(s) Oral every 4 hours PRN Dyspepsia  dextrose Oral Gel 15 Gram(s) Oral once PRN Blood Glucose LESS THAN 70 milliGRAM(s)/deciliter  melatonin 3 milliGRAM(s) Oral at bedtime PRN Insomnia  ondansetron Injectable 4 milliGRAM(s) IV Push every 8 hours PRN Nausea and/or Vomiting          Assessment:  · Assessment    88 yo  F with PMH of afib on Xarelto, HFpEF on torsemide, HTN, GERD, DM, temporal arteritis on prednisone presents for LLE cellulitis,  worsening weakness and decrease oral intake. Patient now reports she is unable to transfer from bed to wheelchair    LLE cellulitis  leukocytosis  continue abx doxy 100mg BID    Chronic AF   s/p failed DCCV  continue home meds  HFpEF  TTE EF >50% 3/2024  on Farxiga  on Torsemide  monitor urine output  strict I and O  cardiology/EP consult appreciated  Continue amiodarone and cardizem    DM2  no home meds  metformin recently discontinued  A1c  8.6 3/19/24  ISS, lantus, and premeal insulin  monitor FS    Temporal arteritis  Prednisone 20mg as per family and patient    Sacral ulcer POA-necrotic  Surgery consult appreciated  Possible debridement on Monday  Hold eliquis    hypoalbuminemia  nutrition consult  inadequate calorie intake    Right lateral hip subcutaneous hematoma -old  Follow clinically while on AC    Weakness  recent hospital admission  deconditioned  Continue PT     DVT ppx   Xarelto    Palliative eval appreciated  DNR/DNI

## 2024-04-20 NOTE — PROGRESS NOTE ADULT - SUBJECTIVE AND OBJECTIVE BOX
89 yo  F with PMH of afib on Xarelto, HFpEF on torsemide, HTN, GERD, DM, temporal arteritis on prednisone presents to the ED BIBEMS from home c/o moderate-severe intermittent diffuse upper abdominal pain worsening over the last  x3 weeks.  Patient with HH admission on 3/25 and 3/29-4/8.  As per family at bedside last admission was not due to diarrhea it was due to weakness from metoprolol but patient also complained of diarrhea secondary to  metformin. Medications were adjusted. Now she returns due to worsening weakness. She is unable to transfer from bed to her wheelchair. He has to lift her out of bed. Abdominal pain is not new and not associated with diarrhea. Diarrhea has resolved. She denies  fevers, chills, chest pain, shortness of breath, diarrhea, vomiting, nausea, dysuria.   Main complaint is  weakness. She also has decreased appetite with poor oral intake last 2 days.    She lives alone has home care services and visiting nurse for wound care.   Today home visiting nurse noticed decubitus sacral ulcer was worse than usual and  redness L anterior shin worsening, concerning for cellulitis.  Family is interested in rehab not NH.    04/16/24: Patient seen and examined. Complaining of weakness.   04/17/24: Patient seen and examined. Still feels weak. Denies any new complaints.   04/18/24: No new issues. Possible sacral wound debridement on Monday.   04/19/24: Leg redness improving. Discussed with patient regarding management plan.  04/20/24: No new issues. Patient aware of sacral wound debridement on Monday      Vital Signs Last 24 Hrs  T(C): 36.3 (20 Apr 2024 07:37), Max: 36.9 (19 Apr 2024 15:40)  T(F): 97.3 (20 Apr 2024 07:37), Max: 98.4 (19 Apr 2024 15:40)  HR: 76 (20 Apr 2024 07:37) (70 - 102)  BP: 104/66 (20 Apr 2024 07:37) (104/66 - 114/63)  BP(mean): --  RR: 16 (19 Apr 2024 22:15) (16 - 17)  SpO2: 92% (20 Apr 2024 07:37) (92% - 99%)    Parameters below as of 20 Apr 2024 07:37  Patient On (Oxygen Delivery Method): room air          Physical Exam:       GENERAL: NAD, well-groomed, well-developed  HEAD:  Atraumatic, Normocephalic  NERVOUS SYSTEM:  Alert & Oriented X3, Good concentration   CHEST/LUNG: poor inspiratory effort, clear but diminished    HEART: s1s2  ABDOMEN: Soft, Nontender, Nondistended; Bowel sounds present  EXTREMITIES:  +b/l LE edema; LLE with erythema    SKIN: sacral ulcer        Labs:                                                     12.9   12.43 )-----------( 146      ( 19 Apr 2024 07:11 )             39.8     19 Apr 2024 07:11    139    |  108    |  53     ----------------------------<  244    3.2     |  22     |  1.11     Ca    9.3        19 Apr 2024 07:11          CAPILLARY BLOOD GLUCOSE      POCT Blood Glucose.: 143 mg/dL (19 Apr 2024 12:04)  POCT Blood Glucose.: 265 mg/dL (19 Apr 2024 08:17)  POCT Blood Glucose.: 269 mg/dL (18 Apr 2024 21:33)  POCT Blood Glucose.: 277 mg/dL (18 Apr 2024 17:19)        Urinalysis Basic - ( 19 Apr 2024 07:11 )    Color: x / Appearance: x / SG: x / pH: x  Gluc: 244 mg/dL / Ketone: x  / Bili: x / Urobili: x   Blood: x / Protein: x / Nitrite: x   Leuk Esterase: x / RBC: x / WBC x   Sq Epi: x / Non Sq Epi: x / Bacteria: x            MEDICATIONS  (STANDING):  aMIOdarone    Tablet 200 milliGRAM(s) Oral daily  atorvastatin 40 milliGRAM(s) Oral at bedtime  dapagliflozin 10 milliGRAM(s) Oral every 24 hours  dextrose 10% Bolus 125 milliLiter(s) IV Bolus once  dextrose 5%. 1000 milliLiter(s) (100 mL/Hr) IV Continuous <Continuous>  dextrose 5%. 1000 milliLiter(s) (50 mL/Hr) IV Continuous <Continuous>  dextrose 50% Injectable 25 Gram(s) IV Push once  dextrose 50% Injectable 12.5 Gram(s) IV Push once  diltiazem    milliGRAM(s) Oral daily  doxycycline monohydrate Capsule 100 milliGRAM(s) Oral every 12 hours  folic acid 1 milliGRAM(s) Oral daily  glucagon  Injectable 1 milliGRAM(s) IntraMuscular once  insulin glargine Injectable (LANTUS) 9 Unit(s) SubCutaneous at bedtime  insulin lispro (ADMELOG) corrective regimen sliding scale   SubCutaneous at bedtime  insulin lispro Injectable (ADMELOG) 3 Unit(s) SubCutaneous three times a day before meals  pantoprazole    Tablet 40 milliGRAM(s) Oral before breakfast  predniSONE   Tablet 20 milliGRAM(s) Oral daily  rivaroxaban 15 milliGRAM(s) Oral with dinner  sertraline 25 milliGRAM(s) Oral daily  timolol 0.5% Solution 1 Drop(s) Both EYES daily  torsemide 20 milliGRAM(s) Oral daily    MEDICATIONS  (PRN):  acetaminophen     Tablet .. 650 milliGRAM(s) Oral every 6 hours PRN Temp greater or equal to 38C (100.4F), Mild Pain (1 - 3)  aluminum hydroxide/magnesium hydroxide/simethicone Suspension 30 milliLiter(s) Oral every 4 hours PRN Dyspepsia  dextrose Oral Gel 15 Gram(s) Oral once PRN Blood Glucose LESS THAN 70 milliGRAM(s)/deciliter  melatonin 3 milliGRAM(s) Oral at bedtime PRN Insomnia  ondansetron Injectable 4 milliGRAM(s) IV Push every 8 hours PRN Nausea and/or Vomiting          Assessment:  · Assessment    89 yo  F with PMH of afib on Xarelto, HFpEF on torsemide, HTN, GERD, DM, temporal arteritis on prednisone presents for LLE cellulitis,  worsening weakness and decrease oral intake. Patient now reports she is unable to transfer from bed to wheelchair    LLE cellulitis  leukocytosis  continue abx doxy 100mg BID    Chronic AF   s/p failed DCCV  continue home meds  HFpEF  TTE EF >50% 3/2024  on Farxiga  on Torsemide  monitor urine output  strict I and O  cardiology/EP consult appreciated  Continue amiodarone and cardizem  Hold xarelto for debridement     DM2  no home meds  metformin recently discontinued  A1c  8.6 3/19/24  ISS, lantus, and premeal insulin  monitor FS    Temporal arteritis  Prednisone 20mg as per family and patient    Sacral ulcer POA-necrotic  Surgery consult appreciated  Possible debridement on Monday  Hold AC    hypoalbuminemia  nutrition consult  inadequate calorie intake    Right lateral hip subcutaneous hematoma -old  Follow clinically while on AC    Weakness  recent hospital admission  deconditioned  Continue PT     DVT ppx   Subq heparin    Palliative eval appreciated  DNR/DNI

## 2024-04-21 NOTE — PROGRESS NOTE ADULT - SUBJECTIVE AND OBJECTIVE BOX
89 yo  F with PMH of afib on Xarelto, HFpEF on torsemide, HTN, GERD, DM, temporal arteritis on prednisone presents to the ED BIBEMS from home c/o moderate-severe intermittent diffuse upper abdominal pain worsening over the last  x3 weeks.  Patient with HH admission on 3/25 and 3/29-4/8.  As per family at bedside last admission was not due to diarrhea it was due to weakness from metoprolol but patient also complained of diarrhea secondary to  metformin. Medications were adjusted. Now she returns due to worsening weakness. She is unable to transfer from bed to her wheelchair. He has to lift her out of bed. Abdominal pain is not new and not associated with diarrhea. Diarrhea has resolved. She denies  fevers, chills, chest pain, shortness of breath, diarrhea, vomiting, nausea, dysuria.   Main complaint is  weakness. She also has decreased appetite with poor oral intake last 2 days.    She lives alone has home care services and visiting nurse for wound care.   Today home visiting nurse noticed decubitus sacral ulcer was worse than usual and  redness L anterior shin worsening, concerning for cellulitis.  Family is interested in rehab not NH.    04/16/24: Patient seen and examined. Complaining of weakness.   04/17/24: Patient seen and examined. Still feels weak. Denies any new complaints.   04/18/24: No new issues. Possible sacral wound debridement on Monday.   04/19/24: Leg redness improving. Discussed with patient regarding management plan.  04/20/24: No new issues. Patient aware of sacral wound debridement on Monday.  04/21/24: Sacral wound debridement tomorrow. No new issues. Discussed with patient       Vital Signs Last 24 Hrs  T(C): 36.2 (21 Apr 2024 07:45), Max: 36.4 (20 Apr 2024 15:57)  T(F): 97.2 (21 Apr 2024 07:45), Max: 97.5 (20 Apr 2024 15:57)  HR: 59 (21 Apr 2024 07:45) (59 - 86)  BP: 109/56 (21 Apr 2024 07:45) (90/51 - 109/56)  BP(mean): --  RR: 17 (20 Apr 2024 21:00) (17 - 17)  SpO2: 100% (21 Apr 2024 07:45) (98% - 100%)    Parameters below as of 21 Apr 2024 07:45  Patient On (Oxygen Delivery Method): room air        Physical Exam:       GENERAL: NAD, well-groomed, well-developed  HEAD:  Atraumatic, Normocephalic  NERVOUS SYSTEM:  Alert & Oriented X3, Good concentration   CHEST/LUNG: poor inspiratory effort, clear but diminished    HEART: s1s2  ABDOMEN: Soft, Nontender, Nondistended; Bowel sounds present  EXTREMITIES:  +b/l LE edema; LLE with erythema    SKIN: sacral ulcer        Labs:                               12.6   10.48 )-----------( 163      ( 21 Apr 2024 06:46 )             39.5     21 Apr 2024 06:46    139    |  104    |  46     ----------------------------<  84     3.5     |  29     |  1.04     Ca    9.1        21 Apr 2024 06:46        PT/INR - ( 20 Apr 2024 08:41 )   PT: 10.2 sec;   INR: 0.90 ratio           CAPILLARY BLOOD GLUCOSE      POCT Blood Glucose.: 78 mg/dL (21 Apr 2024 08:45)  POCT Blood Glucose.: 177 mg/dL (20 Apr 2024 20:53)  POCT Blood Glucose.: 167 mg/dL (20 Apr 2024 17:31)  POCT Blood Glucose.: 163 mg/dL (20 Apr 2024 13:10)        Urinalysis Basic - ( 21 Apr 2024 06:46 )    Color: x / Appearance: x / SG: x / pH: x  Gluc: 84 mg/dL / Ketone: x  / Bili: x / Urobili: x   Blood: x / Protein: x / Nitrite: x   Leuk Esterase: x / RBC: x / WBC x   Sq Epi: x / Non Sq Epi: x / Bacteria: x            MEDICATIONS  (STANDING):  aMIOdarone    Tablet 200 milliGRAM(s) Oral daily  atorvastatin 40 milliGRAM(s) Oral at bedtime  dapagliflozin 10 milliGRAM(s) Oral every 24 hours  dextrose 10% Bolus 125 milliLiter(s) IV Bolus once  dextrose 5%. 1000 milliLiter(s) (100 mL/Hr) IV Continuous <Continuous>  dextrose 5%. 1000 milliLiter(s) (50 mL/Hr) IV Continuous <Continuous>  dextrose 50% Injectable 25 Gram(s) IV Push once  dextrose 50% Injectable 12.5 Gram(s) IV Push once  diltiazem    milliGRAM(s) Oral daily  doxycycline monohydrate Capsule 100 milliGRAM(s) Oral every 12 hours  folic acid 1 milliGRAM(s) Oral daily  glucagon  Injectable 1 milliGRAM(s) IntraMuscular once  insulin glargine Injectable (LANTUS) 9 Unit(s) SubCutaneous at bedtime  insulin lispro (ADMELOG) corrective regimen sliding scale   SubCutaneous at bedtime  insulin lispro Injectable (ADMELOG) 3 Unit(s) SubCutaneous three times a day before meals  pantoprazole    Tablet 40 milliGRAM(s) Oral before breakfast  predniSONE   Tablet 20 milliGRAM(s) Oral daily  rivaroxaban 15 milliGRAM(s) Oral with dinner  sertraline 25 milliGRAM(s) Oral daily  timolol 0.5% Solution 1 Drop(s) Both EYES daily  torsemide 20 milliGRAM(s) Oral daily    MEDICATIONS  (PRN):  acetaminophen     Tablet .. 650 milliGRAM(s) Oral every 6 hours PRN Temp greater or equal to 38C (100.4F), Mild Pain (1 - 3)  aluminum hydroxide/magnesium hydroxide/simethicone Suspension 30 milliLiter(s) Oral every 4 hours PRN Dyspepsia  dextrose Oral Gel 15 Gram(s) Oral once PRN Blood Glucose LESS THAN 70 milliGRAM(s)/deciliter  melatonin 3 milliGRAM(s) Oral at bedtime PRN Insomnia  ondansetron Injectable 4 milliGRAM(s) IV Push every 8 hours PRN Nausea and/or Vomiting          Assessment:  · Assessment    89 yo  F with PMH of afib on Xarelto, HFpEF on torsemide, HTN, GERD, DM, temporal arteritis on prednisone presents for LLE cellulitis,  worsening weakness and decrease oral intake. Patient now reports she is unable to transfer from bed to wheelchair    LLE cellulitis  leukocytosis  continue abx doxy 100mg BID    Chronic AF   s/p failed DCCV  continue home meds  HFpEF  TTE EF >50% 3/2024  on Farxiga  on Torsemide  monitor urine output  strict I and O  cardiology/EP consult appreciated  Continue amiodarone and cardizem  Hold xarelto for debridement     DM2  no home meds  metformin recently discontinued  A1c  8.6 3/19/24  ISS, lantus, and premeal insulin  monitor FS    Temporal arteritis  Prednisone 20mg as per family and patient    Sacral ulcer POA-necrotic  Surgery consult appreciated  Possible debridement tomorrow  Hold AC  Medically optimized for debridement    hypoalbuminemia  nutrition consult  inadequate calorie intake    Right lateral hip subcutaneous hematoma -old  Follow clinically while on AC    Weakness  recent hospital admission  deconditioned  Continue PT     DVT ppx   Subq heparin    Palliative eval appreciated  DNR/DNI

## 2024-04-22 NOTE — PROGRESS NOTE ADULT - SUBJECTIVE AND OBJECTIVE BOX
89 yo  F with PMH of afib on Xarelto, HFpEF on torsemide, HTN, GERD, DM, temporal arteritis on prednisone presents to the ED BIBEMS from home c/o moderate-severe intermittent diffuse upper abdominal pain worsening over the last  x3 weeks.  Patient with HH admission on 3/25 and 3/29-4/8.  As per family at bedside last admission was not due to diarrhea it was due to weakness from metoprolol but patient also complained of diarrhea secondary to  metformin. Medications were adjusted. Now she returns due to worsening weakness. She is unable to transfer from bed to her wheelchair. He has to lift her out of bed. Abdominal pain is not new and not associated with diarrhea. Diarrhea has resolved. She denies  fevers, chills, chest pain, shortness of breath, diarrhea, vomiting, nausea, dysuria.   Main complaint is  weakness. She also has decreased appetite with poor oral intake last 2 days.    She lives alone has home care services and visiting nurse for wound care.   Today home visiting nurse noticed decubitus sacral ulcer was worse than usual and  redness L anterior shin worsening, concerning for cellulitis.  Family is interested in rehab not NH.    04/16/24: Patient seen and examined. Complaining of weakness.   04/17/24: Patient seen and examined. Still feels weak. Denies any new complaints.   04/18/24: No new issues. Possible sacral wound debridement on Monday.   04/19/24: Leg redness improving. Discussed with patient regarding management plan.  04/20/24: No new issues. Patient aware of sacral wound debridement on Monday.  04/21/24: Sacral wound debridement tomorrow. No new issues. Discussed with patient   04/22/24: Patient seen and examined. OR cancelled by anesthesia today because  she was on Farxiga, rescheduled on Thursday, has to be off for >72 hours.       Vital Signs Last 24 Hrs  T(C): 36.4 (22 Apr 2024 07:59), Max: 36.7 (21 Apr 2024 21:43)  T(F): 97.5 (22 Apr 2024 07:59), Max: 98.1 (21 Apr 2024 21:43)  HR: 55 (22 Apr 2024 07:59) (55 - 79)  BP: 110/54 (22 Apr 2024 07:59) (97/59 - 110/54)  BP(mean): --  RR: 18 (22 Apr 2024 07:59) (17 - 18)  SpO2: 100% (22 Apr 2024 07:59) (97% - 100%)    Parameters below as of 22 Apr 2024 07:57  Patient On (Oxygen Delivery Method): room air        Physical Exam:       GENERAL: NAD, well-groomed, well-developed  HEAD:  Atraumatic, Normocephalic  NERVOUS SYSTEM:  Alert & Oriented X3, Good concentration   CHEST/LUNG: poor inspiratory effort, clear but diminished    HEART: s1s2  ABDOMEN: Soft, Nontender, Nondistended; Bowel sounds present  EXTREMITIES:  +b/l LE edema; LLE with erythema    SKIN: sacral ulcer        Labs:                                          12.6   10.48 )-----------( 163      ( 21 Apr 2024 06:46 )             39.5     21 Apr 2024 06:46    139    |  104    |  46     ----------------------------<  84     3.5     |  29     |  1.04     Ca    9.1        21 Apr 2024 06:46          CAPILLARY BLOOD GLUCOSE      POCT Blood Glucose.: 239 mg/dL (22 Apr 2024 12:04)  POCT Blood Glucose.: 112 mg/dL (22 Apr 2024 05:36)  POCT Blood Glucose.: 195 mg/dL (21 Apr 2024 20:58)  POCT Blood Glucose.: 206 mg/dL (21 Apr 2024 17:32)        Urinalysis Basic - ( 21 Apr 2024 06:46 )    Color: x / Appearance: x / SG: x / pH: x  Gluc: 84 mg/dL / Ketone: x  / Bili: x / Urobili: x   Blood: x / Protein: x / Nitrite: x   Leuk Esterase: x / RBC: x / WBC x   Sq Epi: x / Non Sq Epi: x / Bacteria: x          MEDICATIONS  (STANDING):  aMIOdarone    Tablet 200 milliGRAM(s) Oral daily  ascorbic acid 500 milliGRAM(s) Oral daily  atorvastatin 40 milliGRAM(s) Oral at bedtime  dextrose 10% Bolus 125 milliLiter(s) IV Bolus once  dextrose 5%. 1000 milliLiter(s) (100 mL/Hr) IV Continuous <Continuous>  dextrose 5%. 1000 milliLiter(s) (50 mL/Hr) IV Continuous <Continuous>  dextrose 50% Injectable 12.5 Gram(s) IV Push once  dextrose 50% Injectable 25 Gram(s) IV Push once  diltiazem    milliGRAM(s) Oral daily  folic acid 1 milliGRAM(s) Oral daily  glucagon  Injectable 1 milliGRAM(s) IntraMuscular once  heparin   Injectable 5000 Unit(s) SubCutaneous every 8 hours  insulin glargine Injectable (LANTUS) 9 Unit(s) SubCutaneous at bedtime  insulin lispro (ADMELOG) corrective regimen sliding scale   SubCutaneous at bedtime  insulin lispro (ADMELOG) corrective regimen sliding scale   SubCutaneous three times a day before meals  insulin lispro Injectable (ADMELOG) 3 Unit(s) SubCutaneous three times a day before meals  multivitamin/minerals 1 Tablet(s) Oral daily  pantoprazole    Tablet 40 milliGRAM(s) Oral before breakfast  predniSONE   Tablet 20 milliGRAM(s) Oral daily  sertraline 25 milliGRAM(s) Oral daily  timolol 0.5% Solution 1 Drop(s) Both EYES daily  torsemide 20 milliGRAM(s) Oral daily  zinc sulfate 220 milliGRAM(s) Oral daily    MEDICATIONS  (PRN):  acetaminophen     Tablet .. 650 milliGRAM(s) Oral every 6 hours PRN Temp greater or equal to 38C (100.4F), Mild Pain (1 - 3)  aluminum hydroxide/magnesium hydroxide/simethicone Suspension 30 milliLiter(s) Oral every 4 hours PRN Dyspepsia  dextrose Oral Gel 15 Gram(s) Oral once PRN Blood Glucose LESS THAN 70 milliGRAM(s)/deciliter  melatonin 3 milliGRAM(s) Oral at bedtime PRN Insomnia  ondansetron Injectable 4 milliGRAM(s) IV Push every 8 hours PRN Nausea and/or Vomiting            Assessment:  · Assessment    89 yo  F with PMH of afib on Xarelto, HFpEF on torsemide, HTN, GERD, DM, temporal arteritis on prednisone presents for LLE cellulitis,  worsening weakness and decrease oral intake. Patient now reports she is unable to transfer from bed to wheelchair    LLE cellulitis-improved  leukocytosis  Completed doxy today    Chronic AF   s/p failed DCCV  continue home meds  HFpEF  TTE EF >50% 3/2024  Hold  Farxiga for OR debridement  on Torsemide  monitor urine output  strict I and O  cardiology/EP consult appreciated  Continue amiodarone and cardizem  Hold xarelto for debridement     DM2  no home meds  metformin recently discontinued  A1c  8.6 3/19/24  ISS, lantus, and premeal insulin  monitor FS    Temporal arteritis  Prednisone 20mg as per family and patient    Sacral ulcer POA-necrotic  Surgery consult appreciated  Possible debridement on Thursday  Hold AC  Medically optimized for debridement    hypoalbuminemia  nutrition consult  inadequate calorie intake    Right lateral hip subcutaneous hematoma -old  Follow clinically     Weakness  recent hospital admission  deconditioned  Continue PT     DVT ppx   Subq heparin    Palliative eval appreciated  DNR/DNI

## 2024-04-22 NOTE — PROGRESS NOTE ADULT - SUBJECTIVE AND OBJECTIVE BOX
Anesthesiologist cancelled surgery due to patient being on dapaglifozin.   Will reschedule OR debridement for thursady Anesthesiologist cancelled surgery due to patient being on dapaglifozin.   Will reschedule OR debridement for thursday

## 2024-04-23 NOTE — PROGRESS NOTE ADULT - ASSESSMENT
88 yo  F with PMH of afib on Xarelto, HFpEF on torsemide, HTN, GERD, DM, temporal arteritis on prednisone admitted for:        presents for LLE cellulitis,  worsening weakness and decrease oral intake. Patient now reports she is unable to transfer from bed to wheelchair    LLE cellulitis  leukocytosis  Completed  doxy 100mg BID x 7 days     Chronic AF   s/p failed DCCV  continue amio, CArdizem   HFpEF  TTE EF >50% 3/2024  on Farxiga  on Torsemide  monitor urine output  strict I and O  cardiology/EP consult appreciated  Hold xarelto for debridement     DM2  no home meds  metformin recently discontinued  A1c  8.6 3/19/24  ISS,   C/w lantus  9U, and premeal  3U insulin  monitor FS    Temporal arteritis  On prednisone taper, now on  20mg QD, due to taper down to 17.5mg  as per family  Start Prednisone 17.5mg QD x 1 week       Sacral ulcer POA-necrotic  Surgery consult appreciated  Plan for debridement   On Thursday   Hold AC and Farxiga   Medically optimized for debridement    hypoalbuminemia.  severe protein calorie malnutrition   regular diet  Add MVI, Armando  Protein supplements     Right lateral hip subcutaneous hematoma -old  Follow clinically while on AC    Weakness  recent hospital admission  deconditioned  Continue PT     DVT ppx   Subq heparin    Palliative eval appreciated  DNR/DNI     88 yo  F with PMH of afib on Xarelto, HFpEF on torsemide, HTN, GERD, DM, temporal arteritis on prednisone admitted for:     1. LLE cellulitis  leukocytosis  Completed  doxy 100mg BID x 7 days       2. Chronic AF   s/p failed DCCV  continue amio, CArdizem       3. Chronic HFpEF, stable   TTE EF >50% 3/2024  on Farxiga, held for procedure   on Torsemide, hold today 2/2 low BP   monitor urine output  strict I and O  cardiology/EP consult appreciated  Hold xarelto for debridement     DM2  no home meds  metformin recently discontinued  A1c  8.6 3/19/24  ISS,   C/w lantus  9U, and premeal  3U insulin  monitor FS    Temporal arteritis  On prednisone taper, now on  20mg QD, due to taper down to 17.5mg  as per family  Start Prednisone 17.5mg QD x 1 week       Sacral ulcer POA-necrotic  Surgery consult appreciated  Plan for debridement   On Thursday   Hold AC and Farxiga   Medically optimized for debridement    hypoalbuminemia.  severe protein calorie malnutrition   regular diet  Add MVI, Armando  Protein supplements     Right lateral hip subcutaneous hematoma -old  Follow clinically while on AC    Weakness  recent hospital admission  deconditioned  Continue PT     DVT ppx   Subq heparin    Palliative eval appreciated  DNR/DNI

## 2024-04-23 NOTE — PROGRESS NOTE ADULT - SUBJECTIVE AND OBJECTIVE BOX
CC: cellulitis and FTT (22 Apr 2024 14:49)    HPI: 89 yo  F with PMH of afib on Xarelto, HFpEF on torsemide, HTN, GERD, DM, temporal arteritis on prednisone presented  to the ED BIBEMS from home c/o moderate-severe intermittent diffuse upper abdominal pain worsening over the last  x3 weeks.  Patient with HH admission on 3/25 and 3/29-4/8.  As per family at bedside last admission was not due to diarrhea it was due to weakness from metoprolol but patient also complained of diarrhea secondary to  metformin. Medications were adjusted. Now she returns due to worsening weakness. She is unable to transfer from bed to her wheelchair. He has to lift her out of bed. Abdominal pain is not new and not associated with diarrhea. Diarrhea has resolved. She denies  fevers, chills, chest pain, shortness of breath, diarrhea, vomiting, nausea, dysuria.   Main complaint is  weakness. She also has decreased appetite with poor oral intake last 2 days.    She lives alone has home care services and visiting nurse for wound care.   Today home visiting nurse noticed decubitus sacral ulcer was worse than usual and  redness L anterior shin worsening, concerning for cellulitis.  Family is interested in rehab not NH.      INTERVAL HPI/OVERNIGHT EVENTS:    Vital Signs Last 24 Hrs  T(C): 36.3 (23 Apr 2024 15:10), Max: 36.3 (22 Apr 2024 21:06)  T(F): 97.3 (23 Apr 2024 15:10), Max: 97.3 (22 Apr 2024 21:06)  HR: 89 (23 Apr 2024 15:10) (65 - 100)  BP: 96/54 (23 Apr 2024 15:10) (92/59 - 105/51)  RR: 17 (23 Apr 2024 15:10) (17 - 17)  SpO2: 95% (23 Apr 2024 15:10) (95% - 100%)    Parameters below as of 23 Apr 2024 15:10  Patient On (Oxygen Delivery Method): room air        REVIEW OF SYSTEMS:  All other review of systems is negative unless indicated above.          PHYSICAL EXAM:  General: in no acute distress  Eyes: PERRLA, EOMI; conjunctiva and sclera clear  Head: Normocephalic; atraumatic  ENMT: No nasal discharge; airway clear  Neck: Supple; non tender; no masses  Respiratory: No wheezes, rales or rhonchi  Cardiovascular: Regular rate and rhythm. S1 and S2 Normal; No murmurs, gallops or rubs  Gastrointestinal: Soft non-tender non-distended; Normal bowel sounds  Genitourinary: No  suprapubic  tenderness  Extremities: Normal range of motion, No clubbing, cyanosis or edema  Vascular: Peripheral pulses palpable 2+ bilaterally  Neurological: Alert and oriented x4  Skin: Warm and dry. No acute rash  Lymph Nodes: No acute cervical adenopathy  Musculoskeletal: Normal muscle tone, without deformities  Psychiatric: Cooperative and appropriate      A1C with Estimated Average Glucose (03.19.24 @ 07:42)   A1C with Estimated Average Glucose Result: 8.6:       CAPILLARY BLOOD GLUCOSE  POCT Blood Glucose.: 226 mg/dL (23 Apr 2024 12:19)  POCT Blood Glucose.: 130 mg/dL (23 Apr 2024 07:35)  POCT Blood Glucose.: 165 mg/dL (22 Apr 2024 21:20)  POCT Blood Glucose.: 234 mg/dL (22 Apr 2024 16:43)          MEDICATIONS  (STANDING):  aMIOdarone    Tablet 200 milliGRAM(s) Oral daily  ascorbic acid 500 milliGRAM(s) Oral daily  atorvastatin 40 milliGRAM(s) Oral at bedtime  dextrose 10% Bolus 125 milliLiter(s) IV Bolus once  dextrose 5%. 1000 milliLiter(s) (100 mL/Hr) IV Continuous <Continuous>  dextrose 5%. 1000 milliLiter(s) (50 mL/Hr) IV Continuous <Continuous>  dextrose 50% Injectable 25 Gram(s) IV Push once  dextrose 50% Injectable 12.5 Gram(s) IV Push once  diltiazem    milliGRAM(s) Oral daily  folic acid 1 milliGRAM(s) Oral daily  glucagon  Injectable 1 milliGRAM(s) IntraMuscular once  heparin   Injectable 5000 Unit(s) SubCutaneous every 8 hours  insulin glargine Injectable (LANTUS) 9 Unit(s) SubCutaneous at bedtime  insulin lispro (ADMELOG) corrective regimen sliding scale   SubCutaneous three times a day before meals  insulin lispro (ADMELOG) corrective regimen sliding scale   SubCutaneous at bedtime  insulin lispro Injectable (ADMELOG) 3 Unit(s) SubCutaneous three times a day before meals  multivitamin/minerals 1 Tablet(s) Oral daily  pantoprazole    Tablet 40 milliGRAM(s) Oral before breakfast  predniSONE   Tablet 20 milliGRAM(s) Oral daily  sertraline 25 milliGRAM(s) Oral daily  timolol 0.5% Solution 1 Drop(s) Both EYES daily  torsemide 20 milliGRAM(s) Oral daily  zinc sulfate 220 milliGRAM(s) Oral daily    MEDICATIONS  (PRN):  acetaminophen     Tablet .. 650 milliGRAM(s) Oral every 6 hours PRN Temp greater or equal to 38C (100.4F), Mild Pain (1 - 3)  aluminum hydroxide/magnesium hydroxide/simethicone Suspension 30 milliLiter(s) Oral every 4 hours PRN Dyspepsia  dextrose Oral Gel 15 Gram(s) Oral once PRN Blood Glucose LESS THAN 70 milliGRAM(s)/deciliter  melatonin 3 milliGRAM(s) Oral at bedtime PRN Insomnia  ondansetron Injectable 4 milliGRAM(s) IV Push every 8 hours PRN Nausea and/or Vomiting      RADIOLOGY & ADDITIONAL TESTS:     CC: cellulitis and FTT (22 Apr 2024 14:49)    HPI: 89 yo  F with PMH of afib on Xarelto, HFpEF on torsemide, HTN, GERD, DM, temporal arteritis on prednisone presented  to the ED BIBEMS from home c/o moderate-severe intermittent diffuse upper abdominal pain worsening over the last  x3 weeks.  Patient with HH admission on 3/25 and 3/29-4/8.  As per family at bedside last admission was not due to diarrhea it was due to weakness from metoprolol but patient also complained of diarrhea secondary to  metformin. Medications were adjusted. Now she returns due to worsening weakness. She is unable to transfer from bed to her wheelchair. He has to lift her out of bed. Abdominal pain is not new and not associated with diarrhea. Diarrhea has resolved. She denies  fevers, chills, chest pain, shortness of breath, diarrhea, vomiting, nausea, dysuria.   Main complaint is  weakness. She also has decreased appetite with poor oral intake last 2 days.    She lives alone has home care services and visiting nurse for wound care.   Today home visiting nurse noticed decubitus sacral ulcer was worse than usual and  redness L anterior shin worsening, concerning for cellulitis.  Family is interested in rehab not NH.      INTERVAL HPI/ OVERNIGHT EVENTS:  chart reviewed, Pt was seen and examined, feels fine, no new complains. Pain controlled.  Awaiting for procedure on Thursday.  Son at bedside, reports that Pt on steroid taper, due to decrease tomorrow     Vital Signs Last 24 Hrs  T(C): 36.3 (23 Apr 2024 15:10), Max: 36.3 (22 Apr 2024 21:06)  T(F): 97.3 (23 Apr 2024 15:10), Max: 97.3 (22 Apr 2024 21:06)  HR: 89 (23 Apr 2024 15:10) (65 - 100)  BP: 96/54 (23 Apr 2024 15:10) (92/59 - 105/51)  RR: 17 (23 Apr 2024 15:10) (17 - 17)  SpO2: 95% (23 Apr 2024 15:10) (95% - 100%)    Parameters below as of 23 Apr 2024 15:10  Patient On (Oxygen Delivery Method): room air        REVIEW OF SYSTEMS:  All other review of systems is negative unless indicated above.          PHYSICAL EXAM:  General: in no acute distress  Eyes: PERRLA, EOMI; conjunctiva and sclera clear  Head: Normocephalic; atraumatic  ENMT: No nasal discharge; airway clear  Respiratory: No wheezes, rales or rhonchi  Cardiovascular: Regular rate and rhythm. S1 and S2 Normal;  Gastrointestinal: Soft non-tender non-distended; Normal bowel sounds  Genitourinary: No  suprapubic  tenderness  Extremities: No edema  Vascular: Peripheral pulses palpable 2+ bilaterally  Neurological: Alert and oriented x3, non focal   Musculoskeletal: Normal muscle tone, without deformities  Psychiatric: Cooperative and appropriate      A1C with Estimated Average Glucose (03.19.24 @ 07:42)   A1C with Estimated Average Glucose Result: 8.6:       CAPILLARY BLOOD GLUCOSE  POCT Blood Glucose.: 226 mg/dL (23 Apr 2024 12:19)  POCT Blood Glucose.: 130 mg/dL (23 Apr 2024 07:35)  POCT Blood Glucose.: 165 mg/dL (22 Apr 2024 21:20)  POCT Blood Glucose.: 234 mg/dL (22 Apr 2024 16:43)          MEDICATIONS  (STANDING):  aMIOdarone    Tablet 200 milliGRAM(s) Oral daily  ascorbic acid 500 milliGRAM(s) Oral daily  atorvastatin 40 milliGRAM(s) Oral at bedtime  dextrose 10% Bolus 125 milliLiter(s) IV Bolus once  dextrose 5%. 1000 milliLiter(s) (100 mL/Hr) IV Continuous <Continuous>  dextrose 5%. 1000 milliLiter(s) (50 mL/Hr) IV Continuous <Continuous>  dextrose 50% Injectable 25 Gram(s) IV Push once  dextrose 50% Injectable 12.5 Gram(s) IV Push once  diltiazem    milliGRAM(s) Oral daily  folic acid 1 milliGRAM(s) Oral daily  glucagon  Injectable 1 milliGRAM(s) IntraMuscular once  heparin   Injectable 5000 Unit(s) SubCutaneous every 8 hours  insulin glargine Injectable (LANTUS) 9 Unit(s) SubCutaneous at bedtime  insulin lispro (ADMELOG) corrective regimen sliding scale   SubCutaneous three times a day before meals  insulin lispro (ADMELOG) corrective regimen sliding scale   SubCutaneous at bedtime  insulin lispro Injectable (ADMELOG) 3 Unit(s) SubCutaneous three times a day before meals  multivitamin/minerals 1 Tablet(s) Oral daily  pantoprazole    Tablet 40 milliGRAM(s) Oral before breakfast  predniSONE   Tablet 20 milliGRAM(s) Oral daily  sertraline 25 milliGRAM(s) Oral daily  timolol 0.5% Solution 1 Drop(s) Both EYES daily  torsemide 20 milliGRAM(s) Oral daily  zinc sulfate 220 milliGRAM(s) Oral daily    MEDICATIONS  (PRN):  acetaminophen     Tablet .. 650 milliGRAM(s) Oral every 6 hours PRN Temp greater or equal to 38C (100.4F), Mild Pain (1 - 3)  aluminum hydroxide/magnesium hydroxide/simethicone Suspension 30 milliLiter(s) Oral every 4 hours PRN Dyspepsia  dextrose Oral Gel 15 Gram(s) Oral once PRN Blood Glucose LESS THAN 70 milliGRAM(s)/deciliter  melatonin 3 milliGRAM(s) Oral at bedtime PRN Insomnia  ondansetron Injectable 4 milliGRAM(s) IV Push every 8 hours PRN Nausea and/or Vomiting      RADIOLOGY & ADDITIONAL TESTS:

## 2024-04-24 NOTE — PROGRESS NOTE ADULT - SUBJECTIVE AND OBJECTIVE BOX
CC: cellulitis and FTT (22 Apr 2024 14:49)    HPI: 88 yo  F with PMH of afib on Xarelto, HFpEF on torsemide, HTN, GERD, DM, temporal arteritis on prednisone presented  to the ED BIBEMS from home c/o moderate-severe intermittent diffuse upper abdominal pain worsening over the last  x3 weeks.  Patient with HH admission on 3/25 and 3/29-4/8.  As per family at bedside last admission was not due to diarrhea it was due to weakness from metoprolol but patient also complained of diarrhea secondary to  metformin. Medications were adjusted. Now she returns due to worsening weakness. She is unable to transfer from bed to her wheelchair. He has to lift her out of bed. Abdominal pain is not new and not associated with diarrhea. Diarrhea has resolved. She denies  fevers, chills, chest pain, shortness of breath, diarrhea, vomiting, nausea, dysuria.   Main complaint is  weakness. She also has decreased appetite with poor oral intake last 2 days.    She lives alone has home care services and visiting nurse for wound care.   Today home visiting nurse noticed decubitus sacral ulcer was worse than usual and  redness L anterior shin worsening, concerning for cellulitis.  Family is interested in rehab not NH.      INTERVAL HPI/ OVERNIGHT EVENTS: , Pt was seen and examined      Vital Signs Last 24 Hrs  T(C): 36.6 (24 Apr 2024 08:20), Max: 36.6 (24 Apr 2024 08:20)  T(F): 97.9 (24 Apr 2024 08:20), Max: 97.9 (24 Apr 2024 08:20)  HR: 60 (24 Apr 2024 08:09) (60 - 93)  BP: 985/- (24 Apr 2024 08:09) (92/59 - 985/-)  BP(mean): --  RR: 18 (24 Apr 2024 08:20) (17 - 18)  SpO2: 97% (24 Apr 2024 08:20) (95% - 97%)    Parameters below as of 24 Apr 2024 08:20  Patient On (Oxygen Delivery Method): room air        REVIEW OF SYSTEMS:  All other review of systems is negative unless indicated above.          PHYSICAL EXAM:  General: in no acute distress  Eyes: PERRLA, EOMI; conjunctiva and sclera clear  Head: Normocephalic; atraumatic  ENMT: No nasal discharge; airway clear  Respiratory: No wheezes, rales or rhonchi  Cardiovascular: Regular rate and rhythm. S1 and S2 Normal;  Gastrointestinal: Soft non-tender non-distended; Normal bowel sounds  Genitourinary: No  suprapubic  tenderness  Extremities: No edema  Vascular: Peripheral pulses palpable 2+ bilaterally  Neurological: Alert and oriented x3, non focal   Musculoskeletal: Normal muscle tone, without deformities  Psychiatric: Cooperative and appropriate      A1C with Estimated Average Glucose (03.19.24 @ 07:42)   A1C with Estimated Average Glucose Result: 8.6:       CAPILLARY BLOOD GLUCOSE  POCT Blood Glucose.: 226 mg/dL (23 Apr 2024 12:19)  POCT Blood Glucose.: 130 mg/dL (23 Apr 2024 07:35)  POCT Blood Glucose.: 165 mg/dL (22 Apr 2024 21:20)  POCT Blood Glucose.: 234 mg/dL (22 Apr 2024 16:43)          MEDICATIONS  (STANDING):  aMIOdarone    Tablet 200 milliGRAM(s) Oral daily  ascorbic acid 500 milliGRAM(s) Oral daily  atorvastatin 40 milliGRAM(s) Oral at bedtime  dextrose 10% Bolus 125 milliLiter(s) IV Bolus once  dextrose 5%. 1000 milliLiter(s) (100 mL/Hr) IV Continuous <Continuous>  dextrose 5%. 1000 milliLiter(s) (50 mL/Hr) IV Continuous <Continuous>  dextrose 50% Injectable 25 Gram(s) IV Push once  dextrose 50% Injectable 12.5 Gram(s) IV Push once  diltiazem    milliGRAM(s) Oral daily  folic acid 1 milliGRAM(s) Oral daily  glucagon  Injectable 1 milliGRAM(s) IntraMuscular once  heparin   Injectable 5000 Unit(s) SubCutaneous every 8 hours  insulin glargine Injectable (LANTUS) 9 Unit(s) SubCutaneous at bedtime  insulin lispro (ADMELOG) corrective regimen sliding scale   SubCutaneous three times a day before meals  insulin lispro (ADMELOG) corrective regimen sliding scale   SubCutaneous at bedtime  insulin lispro Injectable (ADMELOG) 3 Unit(s) SubCutaneous three times a day before meals  multivitamin/minerals 1 Tablet(s) Oral daily  pantoprazole    Tablet 40 milliGRAM(s) Oral before breakfast  predniSONE   Tablet 20 milliGRAM(s) Oral daily  sertraline 25 milliGRAM(s) Oral daily  timolol 0.5% Solution 1 Drop(s) Both EYES daily  torsemide 20 milliGRAM(s) Oral daily  zinc sulfate 220 milliGRAM(s) Oral daily    MEDICATIONS  (PRN):  acetaminophen     Tablet .. 650 milliGRAM(s) Oral every 6 hours PRN Temp greater or equal to 38C (100.4F), Mild Pain (1 - 3)  aluminum hydroxide/magnesium hydroxide/simethicone Suspension 30 milliLiter(s) Oral every 4 hours PRN Dyspepsia  dextrose Oral Gel 15 Gram(s) Oral once PRN Blood Glucose LESS THAN 70 milliGRAM(s)/deciliter  melatonin 3 milliGRAM(s) Oral at bedtime PRN Insomnia  ondansetron Injectable 4 milliGRAM(s) IV Push every 8 hours PRN Nausea and/or Vomiting      RADIOLOGY & ADDITIONAL TESTS:     CC: cellulitis and FTT (22 Apr 2024 14:49)    HPI: 88 yo  F with PMH of afib on Xarelto, HFpEF on torsemide, HTN, GERD, DM, temporal arteritis on prednisone presented  to the ED BIBEMS from home c/o moderate-severe intermittent diffuse upper abdominal pain worsening over the last  x3 weeks.  Patient with HH admission on 3/25 and 3/29-4/8.  As per family at bedside last admission was not due to diarrhea it was due to weakness from metoprolol but patient also complained of diarrhea secondary to  metformin. Medications were adjusted. Now she returns due to worsening weakness. She is unable to transfer from bed to her wheelchair. He has to lift her out of bed. Abdominal pain is not new and not associated with diarrhea. Diarrhea has resolved. She denies  fevers, chills, chest pain, shortness of breath, diarrhea, vomiting, nausea, dysuria.   Main complaint is  weakness. She also has decreased appetite with poor oral intake last 2 days.    She lives alone has home care services and visiting nurse for wound care.   Today home visiting nurse noticed decubitus sacral ulcer was worse than usual and  redness L anterior shin worsening, concerning for cellulitis.  Family is interested in rehab not NH.      INTERVAL HPI/ OVERNIGHT EVENTS: , Pt was seen and examined, no new complains,  pain controlled, no SOB. Good appetite. POC discussed       Vital Signs Last 24 Hrs  T(C): 36.6 (24 Apr 2024 08:20), Max: 36.6 (24 Apr 2024 08:20)  T(F): 97.9 (24 Apr 2024 08:20), Max: 97.9 (24 Apr 2024 08:20)  HR: 60 (24 Apr 2024 08:09) (60 - 93)  BP: 985/- (24 Apr 2024 08:09) (92/59 - 985/-)  RR: 18 (24 Apr 2024 08:20) (17 - 18)  SpO2: 97% (24 Apr 2024 08:20) (95% - 97%)    Parameters below as of 24 Apr 2024 08:20  Patient On (Oxygen Delivery Method): room air        REVIEW OF SYSTEMS:  All other review of systems is negative unless indicated above.          PHYSICAL EXAM:  General: in no acute distress  Eyes: PERRLA, EOMI; conjunctiva and sclera clear  Head: Normocephalic; atraumatic  ENMT: No nasal discharge; airway clear  Respiratory: No wheezes, rales or rhonchi  Cardiovascular: Regular rate and rhythm. S1 and S2 Normal;  Gastrointestinal: Soft non-tender non-distended; Normal bowel sounds  Genitourinary: No  suprapubic  tenderness  Extremities: No edema  Vascular: Peripheral pulses palpable 2+ bilaterally  Neurological: Alert and oriented x3, non focal   Musculoskeletal: Normal muscle tone, without deformities  Psychiatric: Cooperative and appropriate      A1C with Estimated Average Glucose (03.19.24 @ 07:42)   A1C with Estimated Average Glucose Result: 8.6:       CAPILLARY BLOOD GLUCOSE  POCT Blood Glucose.: 226 mg/dL (23 Apr 2024 12:19)  POCT Blood Glucose.: 130 mg/dL (23 Apr 2024 07:35)  POCT Blood Glucose.: 165 mg/dL (22 Apr 2024 21:20)  POCT Blood Glucose.: 234 mg/dL (22 Apr 2024 16:43)          MEDICATIONS  (STANDING):  aMIOdarone    Tablet 200 milliGRAM(s) Oral daily  ascorbic acid 500 milliGRAM(s) Oral daily  atorvastatin 40 milliGRAM(s) Oral at bedtime  dextrose 10% Bolus 125 milliLiter(s) IV Bolus once  dextrose 5%. 1000 milliLiter(s) (100 mL/Hr) IV Continuous <Continuous>  dextrose 5%. 1000 milliLiter(s) (50 mL/Hr) IV Continuous <Continuous>  dextrose 50% Injectable 25 Gram(s) IV Push once  dextrose 50% Injectable 12.5 Gram(s) IV Push once  diltiazem    milliGRAM(s) Oral daily  folic acid 1 milliGRAM(s) Oral daily  glucagon  Injectable 1 milliGRAM(s) IntraMuscular once  heparin   Injectable 5000 Unit(s) SubCutaneous every 8 hours  insulin glargine Injectable (LANTUS) 9 Unit(s) SubCutaneous at bedtime  insulin lispro (ADMELOG) corrective regimen sliding scale   SubCutaneous three times a day before meals  insulin lispro (ADMELOG) corrective regimen sliding scale   SubCutaneous at bedtime  insulin lispro Injectable (ADMELOG) 3 Unit(s) SubCutaneous three times a day before meals  multivitamin/minerals 1 Tablet(s) Oral daily  pantoprazole    Tablet 40 milliGRAM(s) Oral before breakfast  predniSONE   Tablet 20 milliGRAM(s) Oral daily  sertraline 25 milliGRAM(s) Oral daily  timolol 0.5% Solution 1 Drop(s) Both EYES daily  torsemide 20 milliGRAM(s) Oral daily  zinc sulfate 220 milliGRAM(s) Oral daily    MEDICATIONS  (PRN):  acetaminophen     Tablet .. 650 milliGRAM(s) Oral every 6 hours PRN Temp greater or equal to 38C (100.4F), Mild Pain (1 - 3)  aluminum hydroxide/magnesium hydroxide/simethicone Suspension 30 milliLiter(s) Oral every 4 hours PRN Dyspepsia  dextrose Oral Gel 15 Gram(s) Oral once PRN Blood Glucose LESS THAN 70 milliGRAM(s)/deciliter  melatonin 3 milliGRAM(s) Oral at bedtime PRN Insomnia  ondansetron Injectable 4 milliGRAM(s) IV Push every 8 hours PRN Nausea and/or Vomiting      RADIOLOGY & ADDITIONAL TESTS:

## 2024-04-24 NOTE — PROGRESS NOTE ADULT - ASSESSMENT
88 yo  F with PMH of afib on Xarelto, HFpEF on torsemide, HTN, GERD, DM, temporal arteritis on prednisone admitted for:     1. LLE cellulitis  leukocytosis  Completed  doxy 100mg BID x 7 days       2. Chronic AF   s/p failed DCCV  continue amio, CArdizem       3. Chronic HFpEF, stable   TTE EF >50% 3/2024  on Farxiga, held for procedure   on Torsemide, hold today 2/2 low BP   monitor urine output  strict I and O  cardiology/EP consult appreciated  Hold xarelto for debridement     DM2  no home meds  metformin recently discontinued  A1c  8.6 3/19/24  ISS,   C/w lantus  9U, and premeal  3U insulin  monitor FS    Temporal arteritis  On prednisone taper, now on  20mg QD, due to taper down to 17.5mg  as per family  Start Prednisone 17.5mg QD x 1 week       Sacral ulcer POA-necrotic  Surgery consult appreciated  Plan for debridement   On Thursday   Hold AC and Farxiga   Medically optimized for debridement    hypoalbuminemia.  severe protein calorie malnutrition   regular diet  Add MVI, Armando  Protein supplements     Right lateral hip subcutaneous hematoma -old  Follow clinically while on AC    Weakness  recent hospital admission  deconditioned  Continue PT     DVT ppx   Subq heparin    Palliative eval appreciated  DNR/DNI     89 yo  F with PMH of afib on Xarelto, HFpEF on torsemide, HTN, GERD, DM, temporal arteritis on prednisone admitted for:     1. LLE cellulitis  leukocytosis  Completed  doxy 100mg BID x 7 days       2. Chronic AF   s/p failed DCCV  continue amio, CArdizem       3. Chronic HFpEF, stable   TTE EF >50% 3/2024  on Farxiga, held for procedure   on Torsemide, continue   monitor urine output  strict I and O  cardiology/EP consult appreciated  Hold xarelto for debridement     DM2  no home meds  metformin recently discontinued  A1c  8.6 3/19/24  ISS,   C/w lantus  9U, and premeal  3U insulin  monitor FS    Temporal arteritis  On prednisone taper, now on  20mg QD, due to taper down to 17.5mg  as per family  Start Prednisone 17.5mg QD x 1 week       Sacral ulcer POA-necrotic  Surgery consult appreciated  Plan for debridement   On Thursday   Hold AC and Farxiga   Medically optimized for debridement    hypoalbuminemia.  severe protein calorie malnutrition   regular diet  Add MVI, Armando  Protein supplements     Right lateral hip subcutaneous hematoma -old  Follow clinically while on AC    Weakness  recent hospital admission  deconditioned  Continue PT     DVT ppx   Subq heparin    Palliative eval appreciated  DNR/DNI      Dispo; labs in am, NPO after midnight, plan for sacral wound debridement in am. Pt is medically optimized

## 2024-04-25 NOTE — PROGRESS NOTE ADULT - ASSESSMENT
89 yo  F with PMH of afib on Xarelto, HFpEF on torsemide, HTN, GERD, DM, temporal arteritis on prednisone admitted for:     1. LLE cellulitis  leukocytosis  Completed  doxy 100mg BID x 7 days       2. Chronic AF   s/p failed DCCV  continue amio, CArdizem       3. Chronic HFpEF, stable   TTE EF >50% 3/2024  on Farxiga, held for procedure   on Torsemide, held this am, will reevaluate tomorrow   monitor urine output  strict I and O  cardiology/EP consult appreciated  Hold xarelto for debridement     DM2  no home meds  metformin recently discontinued  A1c  8.6 3/19/24  ISS,   C/w lantus decrease to 5U  C/w  premeal  3U insulin, when PO   monitor FS, was low this am     Temporal arteritis  On prednisone taper, now on  20mg QD, due to taper down to 17.5mg  as per family  Start Prednisone 17.5mg QD x 1 week       Sacral ulcer POA-necrotic  Surgery consult appreciated  Plan for debridement   On today   NPO, start IVF   Hold AC and Farxiga   Medically optimized for debridement    hypoalbuminemia.  severe protein calorie malnutrition   regular diet  Add MVI, Armando  Protein supplements     Right lateral hip subcutaneous hematoma -old  Follow clinically while on AC    Weakness  recent hospital admission  deconditioned  Continue PT     Hypokalemia   replace, 3 KCL ordered     DVT ppx   Subq heparin    Palliative eval appreciated  DNR/DNI      Dispo;  NPO  for OR today

## 2024-04-25 NOTE — PROGRESS NOTE ADULT - SUBJECTIVE AND OBJECTIVE BOX
CC: cellulitis and FTT (22 Apr 2024 14:49)    HPI: 88 yo  F with PMH of afib on Xarelto, HFpEF on torsemide, HTN, GERD, DM, temporal arteritis on prednisone presented  to the ED BIBEMS from home c/o moderate-severe intermittent diffuse upper abdominal pain worsening over the last  x3 weeks.  Patient with HH admission on 3/25 and 3/29-4/8.  As per family at bedside last admission was not due to diarrhea it was due to weakness from metoprolol but patient also complained of diarrhea secondary to  metformin. Medications were adjusted. Now she returns due to worsening weakness. She is unable to transfer from bed to her wheelchair. He has to lift her out of bed. Abdominal pain is not new and not associated with diarrhea. Diarrhea has resolved. She denies  fevers, chills, chest pain, shortness of breath, diarrhea, vomiting, nausea, dysuria.   Main complaint is  weakness. She also has decreased appetite with poor oral intake last 2 days.    She lives alone has home care services and visiting nurse for wound care.   Today home visiting nurse noticed decubitus sacral ulcer was worse than usual and  redness L anterior shin worsening, concerning for cellulitis.  Family is interested in rehab not NH.      INTERVAL HPI/ OVERNIGHT EVENTS: Pt was seen and examined,  states "i am ready for procedure", reports some burning at site of  IV KCL, no new complains. Also  code status discussed Pt confirmed that  voided MOLST, full code now     Vital Signs Last 24 Hrs  T(C): 36.6 (24 Apr 2024 08:20), Max: 36.6 (24 Apr 2024 08:20)  T(F): 97.9 (24 Apr 2024 08:20), Max: 97.9 (24 Apr 2024 08:20)  HR: 60 (24 Apr 2024 08:09) (60 - 93)  BP: 985/- (24 Apr 2024 08:09) (92/59 - 985/-)  RR: 18 (24 Apr 2024 08:20) (17 - 18)  SpO2: 97% (24 Apr 2024 08:20) (95% - 97%)    Parameters below as of 24 Apr 2024 08:20  Patient On (Oxygen Delivery Method): room air        REVIEW OF SYSTEMS:  All other review of systems is negative unless indicated above.          PHYSICAL EXAM:  General: in no acute distress  Eyes: PERRLA, EOMI; conjunctiva and sclera clear  Head: Normocephalic; atraumatic  ENMT: No nasal discharge; airway clear  Respiratory: No wheezes, rales or rhonchi  Cardiovascular: Regular rate and rhythm. S1 and S2 Normal;  Gastrointestinal: Soft non-tender non-distended; Normal bowel sounds  Genitourinary: No  suprapubic  tenderness  Extremities: No edema  Vascular: Peripheral pulses palpable 2+ bilaterally  Neurological: Alert and oriented x3, non focal   Musculoskeletal: Normal muscle tone, without deformities  Psychiatric: Cooperative and appropriate      A1C with Estimated Average Glucose (03.19.24 @ 07:42)   A1C with Estimated Average Glucose Result: 8.6:     CAPILLARY BLOOD GLUCOSE:  POCT Blood Glucose.: 101 mg/dL (25 Apr 2024 16:21)  POCT Blood Glucose.: 93 mg/dL (25 Apr 2024 13:08)  POCT Blood Glucose.: 78 mg/dL (25 Apr 2024 10:03)  POCT Blood Glucose.: 78 mg/dL (25 Apr 2024 05:50)  POCT Blood Glucose.: 115 mg/dL (24 Apr 2024 22:41)  POCT Blood Glucose.: 136 mg/dL (24 Apr 2024 21:11)        MEDICATIONS  (STANDING):  aMIOdarone    Tablet 200 milliGRAM(s) Oral daily  ascorbic acid 500 milliGRAM(s) Oral daily  atorvastatin 40 milliGRAM(s) Oral at bedtime  dextrose 10% Bolus 125 milliLiter(s) IV Bolus once  dextrose 5%. 1000 milliLiter(s) (100 mL/Hr) IV Continuous <Continuous>  dextrose 5%. 1000 milliLiter(s) (50 mL/Hr) IV Continuous <Continuous>  dextrose 50% Injectable 25 Gram(s) IV Push once  dextrose 50% Injectable 12.5 Gram(s) IV Push once  diltiazem    milliGRAM(s) Oral daily  folic acid 1 milliGRAM(s) Oral daily  glucagon  Injectable 1 milliGRAM(s) IntraMuscular once  heparin   Injectable 5000 Unit(s) SubCutaneous every 8 hours  insulin glargine Injectable (LANTUS) 9 Unit(s) SubCutaneous at bedtime  insulin lispro (ADMELOG) corrective regimen sliding scale   SubCutaneous three times a day before meals  insulin lispro (ADMELOG) corrective regimen sliding scale   SubCutaneous at bedtime  insulin lispro Injectable (ADMELOG) 3 Unit(s) SubCutaneous three times a day before meals  multivitamin/minerals 1 Tablet(s) Oral daily  pantoprazole    Tablet 40 milliGRAM(s) Oral before breakfast  predniSONE   Tablet 20 milliGRAM(s) Oral daily  sertraline 25 milliGRAM(s) Oral daily  timolol 0.5% Solution 1 Drop(s) Both EYES daily  torsemide 20 milliGRAM(s) Oral daily  zinc sulfate 220 milliGRAM(s) Oral daily    MEDICATIONS  (PRN):  acetaminophen     Tablet .. 650 milliGRAM(s) Oral every 6 hours PRN Temp greater or equal to 38C (100.4F), Mild Pain (1 - 3)  aluminum hydroxide/magnesium hydroxide/simethicone Suspension 30 milliLiter(s) Oral every 4 hours PRN Dyspepsia  dextrose Oral Gel 15 Gram(s) Oral once PRN Blood Glucose LESS THAN 70 milliGRAM(s)/deciliter  melatonin 3 milliGRAM(s) Oral at bedtime PRN Insomnia  ondansetron Injectable 4 milliGRAM(s) IV Push every 8 hours PRN Nausea and/or Vomiting      RADIOLOGY & ADDITIONAL TESTS:

## 2024-04-26 NOTE — PROCEDURE NOTE - NSTOLERANCE_GEN_A_CORE
Patient notified of normal ACMG autosomal recessive carrier panel screening results. ( Cystic Fibrosis (CF), Spinal Muscular Atrophy (SMA))    This significantly reduces the likelihood of the patient having a child with any of the disorders tested for.    Patient's reproductive partner, Melody, was found to be a carrier for spinal muscular atrophy. Residual risk for them to have an affected child estimated to be 1 in 3000       Patient tolerated procedure well.

## 2024-04-26 NOTE — PROCEDURE NOTE - NSICDXPROCEDURE_GEN_ALL_CORE_FT
PROCEDURES:  Wound vacuum assisted closure (VAC) therapy for total wound surface area greater than 5 square centimeters 26-Apr-2024 12:55:20  Terese Gregory

## 2024-04-26 NOTE — PROGRESS NOTE ADULT - SUBJECTIVE AND OBJECTIVE BOX
89 yo fem s/p sacral wound debridement, no pain    Packed sacral wound          04-26-24 @ 09:43    Vital Signs Last 24 Hrs  T(C): 36.3 (26 Apr 2024 08:20), Max: 37.1 (25 Apr 2024 20:00)  T(F): 97.3 (26 Apr 2024 08:20), Max: 98.8 (25 Apr 2024 20:00)  HR: 63 (26 Apr 2024 08:20) (59 - 83)  BP: 111/56 (26 Apr 2024 08:20) (90/57 - 127/63)  BP(mean): 68 (25 Apr 2024 10:07) (68 - 68)  RR: 18 (26 Apr 2024 08:20) (14 - 18)  SpO2: 100% (26 Apr 2024 08:20) (97% - 100%)    Parameters below as of 26 Apr 2024 08:20  Patient On (Oxygen Delivery Method): room air                              12.1   11.51 )-----------( 203      ( 26 Apr 2024 08:10 )             38.7       04-26    135  |  104  |  39<H>  ----------------------------<  164<H>  4.3   |  24  |  0.80    Ca    8.5      26 Apr 2024 08:10            MEDICATIONS  (STANDING):  aMIOdarone    Tablet 200 milliGRAM(s) Oral daily  ascorbic acid 500 milliGRAM(s) Oral daily  atorvastatin 40 milliGRAM(s) Oral at bedtime  dextrose 10% Bolus 125 milliLiter(s) IV Bolus once  dextrose 5%. 1000 milliLiter(s) (50 mL/Hr) IV Continuous <Continuous>  dextrose 5%. 1000 milliLiter(s) (100 mL/Hr) IV Continuous <Continuous>  dextrose 50% Injectable 25 Gram(s) IV Push once  dextrose 50% Injectable 12.5 Gram(s) IV Push once  diltiazem    milliGRAM(s) Oral daily  folic acid 1 milliGRAM(s) Oral daily  glucagon  Injectable 1 milliGRAM(s) IntraMuscular once  heparin   Injectable 5000 Unit(s) SubCutaneous every 8 hours  insulin glargine Injectable (LANTUS) 5 Unit(s) SubCutaneous at bedtime  insulin lispro (ADMELOG) corrective regimen sliding scale   SubCutaneous three times a day before meals  insulin lispro (ADMELOG) corrective regimen sliding scale   SubCutaneous at bedtime  insulin lispro Injectable (ADMELOG) 3 Unit(s) SubCutaneous three times a day before meals  multivitamin/minerals 1 Tablet(s) Oral daily  pantoprazole    Tablet 40 milliGRAM(s) Oral before breakfast  predniSONE   Tablet 17.5 milliGRAM(s) Oral daily  sertraline 25 milliGRAM(s) Oral daily  timolol 0.5% Solution 1 Drop(s) Both EYES daily  zinc sulfate 220 milliGRAM(s) Oral daily    MEDICATIONS  (PRN):  acetaminophen     Tablet .. 650 milliGRAM(s) Oral every 6 hours PRN Temp greater or equal to 38C (100.4F), Mild Pain (1 - 3)  aluminum hydroxide/magnesium hydroxide/simethicone Suspension 30 milliLiter(s) Oral every 4 hours PRN Dyspepsia  dextrose Oral Gel 15 Gram(s) Oral once PRN Blood Glucose LESS THAN 70 milliGRAM(s)/deciliter  melatonin 3 milliGRAM(s) Oral at bedtime PRN Insomnia  ondansetron Injectable 4 milliGRAM(s) IV Push every 8 hours PRN Nausea and/or Vomiting      MEDICATIONS  (STANDING):  aMIOdarone    Tablet 200 milliGRAM(s) Oral daily  ascorbic acid 500 milliGRAM(s) Oral daily  atorvastatin 40 milliGRAM(s) Oral at bedtime  dextrose 10% Bolus 125 milliLiter(s) IV Bolus once  dextrose 5%. 1000 milliLiter(s) (100 mL/Hr) IV Continuous <Continuous>  dextrose 5%. 1000 milliLiter(s) (50 mL/Hr) IV Continuous <Continuous>  dextrose 50% Injectable 25 Gram(s) IV Push once  dextrose 50% Injectable 12.5 Gram(s) IV Push once  diltiazem    milliGRAM(s) Oral daily  folic acid 1 milliGRAM(s) Oral daily  glucagon  Injectable 1 milliGRAM(s) IntraMuscular once  heparin   Injectable 5000 Unit(s) SubCutaneous every 8 hours  insulin glargine Injectable (LANTUS) 5 Unit(s) SubCutaneous at bedtime  insulin lispro (ADMELOG) corrective regimen sliding scale   SubCutaneous at bedtime  insulin lispro (ADMELOG) corrective regimen sliding scale   SubCutaneous three times a day before meals  insulin lispro Injectable (ADMELOG) 3 Unit(s) SubCutaneous three times a day before meals  multivitamin/minerals 1 Tablet(s) Oral daily  pantoprazole    Tablet 40 milliGRAM(s) Oral before breakfast  predniSONE   Tablet 17.5 milliGRAM(s) Oral daily  sertraline 25 milliGRAM(s) Oral daily  timolol 0.5% Solution 1 Drop(s) Both EYES daily  zinc sulfate 220 milliGRAM(s) Oral daily

## 2024-04-26 NOTE — PROGRESS NOTE ADULT - ASSESSMENT
88 yo fem s/p sacral wound debridement  -Will order [facility]ound [facility]  -Will need to follow with Wound center

## 2024-04-26 NOTE — PROGRESS NOTE ADULT - ASSESSMENT
88 yo  F with PMH of afib on Xarelto, HFpEF on torsemide, HTN, GERD, DM, temporal arteritis on prednisone admitted for:     1. LLE cellulitis  leukocytosis  Completed  doxy 100mg BID x 7 days       2. Chronic AF   s/p failed DCCV  continue amio, CArdizem       3. Chronic HFpEF, stable   TTE EF >50% 3/2024  on Farxiga, held for procedure   on Torsemide, held this am, will reevaluate tomorrow   monitor urine output  strict I and O  cardiology/EP consult appreciated  Hold xarelto for debridement     DM2  no home meds  metformin recently discontinued  A1c  8.6 3/19/24  ISS,   C/w lantus decrease to 5U  C/w  premeal  3U insulin, when PO   monitor FS, was low this am     Temporal arteritis  On prednisone taper, now on  20mg QD, due to taper down to 17.5mg  as per family  Start Prednisone 17.5mg QD x 1 week       Sacral ulcer POA-necrotic  Surgery consult appreciated  Plan for debridement   On today   NPO, start IVF   Hold AC and Farxiga   Medically optimized for debridement    hypoalbuminemia.  severe protein calorie malnutrition   regular diet  Add MVI, Armando  Protein supplements     Right lateral hip subcutaneous hematoma -old  Follow clinically while on AC    Weakness  recent hospital admission  deconditioned  Continue PT     Hypokalemia   replace, 3 KCL ordered     DVT ppx   Subq heparin    Palliative eval appreciated  DNR/DNI      Dispo;  NPO  for OR today  89 yo  F with PMH of afib on Xarelto, HFpEF on torsemide, HTN, GERD, DM, temporal arteritis on prednisone admitted for:     1. LLE cellulitis  leukocytosis  Completed  doxy 100mg BID x 7 days       2. Chronic AF   s/p failed DCCV  continue amio, Cardizem   Restart on Xarelto       3 Acute on chronic   HFpEF, stable.   TTE EF >50% 3/2024  on Farxiga, will resume when switched back to PO diuretics   Will give IV lasix  today, reeval daily   Trend BNP  monitor weight   strict I and O    DM2  no home meds  metformin recently discontinued  A1c  8.6 3/19/24  ISS,   C/w lantus increase   to 9U  C/w  premeal  3U insulin, when PO   monitor FS, was low this am     Temporal arteritis  On prednisone taper, now on  20mg QD, due to taper down to 17.5mg  as per family  Start Prednisone 17.5mg QD x 1 week, then taper down by 2.5mg Q weekly until 10mg QD, then f/u with rheum       Sacral ulcer POA-necrotic  S/p debridement   in OR on 4/25. Wound vac placed . Care as per Sx recs    OOB/PT    hypoalbuminemia.  severe protein calorie malnutrition   regular diet  Add MVI, Armando  Protein supplements         Spastic abd pain, suspect musculoskeletal   CT Abd reviewed, GB with sludge,  Slight interval enlargement of   right lateral hip subcutaneous hematoma in anteroposterior dimension with  liquefaction and organization.  Right lateral hip subcutaneous hematoma -old  Follow clinically while on AC  Will order RUQ sono and LFTS         Weakness  recent hospital admission  deconditioned  Continue PT     Hypokalemia   replace, 3 KCL ordered     DVT ppx   Subq heparin    Palliative eval appreciated  DNR/DNI      Dispo;  IV Diuresis, consider alb IV. Monitor LAbs. DC Planning when ok with Sx

## 2024-04-26 NOTE — PROGRESS NOTE ADULT - SUBJECTIVE AND OBJECTIVE BOX
CC: cellulitis and FTT (22 Apr 2024 14:49)    HPI: 88 yo  F with PMH of afib on Xarelto, HFpEF on torsemide, HTN, GERD, DM, temporal arteritis on prednisone presented  to the ED BIBEMS from home c/o moderate-severe intermittent diffuse upper abdominal pain worsening over the last  x3 weeks.  Patient with HH admission on 3/25 and 3/29-4/8.  As per family at bedside last admission was not due to diarrhea it was due to weakness from metoprolol but patient also complained of diarrhea secondary to  metformin. Medications were adjusted. Now she returns due to worsening weakness. She is unable to transfer from bed to her wheelchair. He has to lift her out of bed. Abdominal pain is not new and not associated with diarrhea. Diarrhea has resolved. She denies  fevers, chills, chest pain, shortness of breath, diarrhea, vomiting, nausea, dysuria.   Main complaint is  weakness. She also has decreased appetite with poor oral intake last 2 days.    She lives alone has home care services and visiting nurse for wound care.   Today home visiting nurse noticed decubitus sacral ulcer was worse than usual and  redness L anterior shin worsening, concerning for cellulitis.  Family is interested in rehab not NH.      INTERVAL HPI/ OVERNIGHT EVENTS: Pt was seen and examined,  states "i am ready for procedure", reports some burning at site of  IV KCL, no new complains. Also  code status discussed Pt confirmed that  voided MOLST, full code now     Vital Signs Last 24 Hrs  T(C): 36.3 (26 Apr 2024 08:20), Max: 37.1 (25 Apr 2024 20:00)  T(F): 97.3 (26 Apr 2024 08:20), Max: 98.8 (25 Apr 2024 20:00)  HR: 63 (26 Apr 2024 08:20) (59 - 65)  BP: 111/56 (26 Apr 2024 08:20) (102/64 - 127/63)  RR: 18 (26 Apr 2024 08:20) (14 - 18)  SpO2: 100% (26 Apr 2024 08:20) (97% - 100%)    Parameters below as of 26 Apr 2024 08:20  Patient On (Oxygen Delivery Method): room air        REVIEW OF SYSTEMS:  All other review of systems is negative unless indicated above.          PHYSICAL EXAM:  General: in no acute distress  Eyes: PERRLA, EOMI; conjunctiva and sclera clear  Head: Normocephalic; atraumatic  ENMT: No nasal discharge; airway clear  Respiratory: No wheezes, rales or rhonchi  Cardiovascular: Regular rate and rhythm. S1 and S2 Normal;  Gastrointestinal: Soft non-tender non-distended; Normal bowel sounds  Genitourinary: No  suprapubic  tenderness  Extremities: No edema  Vascular: Peripheral pulses palpable 2+ bilaterally  Neurological: Alert and oriented x3, non focal   Musculoskeletal: Normal muscle tone, without deformities  Psychiatric: Cooperative and appropriate      A1C with Estimated Average Glucose (03.19.24 @ 07:42)   A1C with Estimated Average Glucose Result: 8.6:     CAPILLARY BLOOD GLUCOSE:  POCT Blood Glucose.: 101 mg/dL (25 Apr 2024 16:21)  POCT Blood Glucose.: 93 mg/dL (25 Apr 2024 13:08)  POCT Blood Glucose.: 78 mg/dL (25 Apr 2024 10:03)  POCT Blood Glucose.: 78 mg/dL (25 Apr 2024 05:50)  POCT Blood Glucose.: 115 mg/dL (24 Apr 2024 22:41)  POCT Blood Glucose.: 136 mg/dL (24 Apr 2024 21:11)        MEDICATIONS  (STANDING):  aMIOdarone    Tablet 200 milliGRAM(s) Oral daily  ascorbic acid 500 milliGRAM(s) Oral daily  atorvastatin 40 milliGRAM(s) Oral at bedtime  dextrose 10% Bolus 125 milliLiter(s) IV Bolus once  dextrose 5%. 1000 milliLiter(s) (100 mL/Hr) IV Continuous <Continuous>  dextrose 5%. 1000 milliLiter(s) (50 mL/Hr) IV Continuous <Continuous>  dextrose 50% Injectable 25 Gram(s) IV Push once  dextrose 50% Injectable 12.5 Gram(s) IV Push once  diltiazem    milliGRAM(s) Oral daily  folic acid 1 milliGRAM(s) Oral daily  glucagon  Injectable 1 milliGRAM(s) IntraMuscular once  heparin   Injectable 5000 Unit(s) SubCutaneous every 8 hours  insulin glargine Injectable (LANTUS) 9 Unit(s) SubCutaneous at bedtime  insulin lispro (ADMELOG) corrective regimen sliding scale   SubCutaneous three times a day before meals  insulin lispro (ADMELOG) corrective regimen sliding scale   SubCutaneous at bedtime  insulin lispro Injectable (ADMELOG) 3 Unit(s) SubCutaneous three times a day before meals  multivitamin/minerals 1 Tablet(s) Oral daily  pantoprazole    Tablet 40 milliGRAM(s) Oral before breakfast  predniSONE   Tablet 20 milliGRAM(s) Oral daily  sertraline 25 milliGRAM(s) Oral daily  timolol 0.5% Solution 1 Drop(s) Both EYES daily  torsemide 20 milliGRAM(s) Oral daily  zinc sulfate 220 milliGRAM(s) Oral daily    MEDICATIONS  (PRN):  acetaminophen     Tablet .. 650 milliGRAM(s) Oral every 6 hours PRN Temp greater or equal to 38C (100.4F), Mild Pain (1 - 3)  aluminum hydroxide/magnesium hydroxide/simethicone Suspension 30 milliLiter(s) Oral every 4 hours PRN Dyspepsia  dextrose Oral Gel 15 Gram(s) Oral once PRN Blood Glucose LESS THAN 70 milliGRAM(s)/deciliter  melatonin 3 milliGRAM(s) Oral at bedtime PRN Insomnia  ondansetron Injectable 4 milliGRAM(s) IV Push every 8 hours PRN Nausea and/or Vomiting      RADIOLOGY & ADDITIONAL TESTS:     CC: cellulitis and FTT (22 Apr 2024 14:49)    HPI: 89 yo  F with PMH of afib on Xarelto, HFpEF on torsemide, HTN, GERD, DM, temporal arteritis on prednisone presented  to the ED BIBEMS from home c/o moderate-severe intermittent diffuse upper abdominal pain worsening over the last  x3 weeks.  Patient with HH admission on 3/25 and 3/29-4/8.  As per family at bedside last admission was not due to diarrhea it was due to weakness from metoprolol but patient also complained of diarrhea secondary to  metformin. Medications were adjusted. Now she returns due to worsening weakness. She is unable to transfer from bed to her wheelchair. He has to lift her out of bed. Abdominal pain is not new and not associated with diarrhea. Diarrhea has resolved. She denies  fevers, chills, chest pain, shortness of breath, diarrhea, vomiting, nausea, dysuria.   Main complaint is  weakness. She also has decreased appetite with poor oral intake last 2 days.    She lives alone has home care services and visiting nurse for wound care.   Today home visiting nurse noticed decubitus sacral ulcer was worse than usual and  redness L anterior shin worsening, concerning for cellulitis.  Family is interested in rehab not NH.      INTERVAL HPI/ OVERNIGHT EVENTS: Pt was seen and examined, reports R sided upper abd episodic, spastic  pain, goes then across the abd and to the bacl. resolves in 1-2 min, not related to meals.  states  started  few weeks ago. No Other complains. Denies CP or SOB     Vital Signs Last 24 Hrs  T(C): 36.3 (26 Apr 2024 08:20), Max: 37.1 (25 Apr 2024 20:00)  T(F): 97.3 (26 Apr 2024 08:20), Max: 98.8 (25 Apr 2024 20:00)  HR: 63 (26 Apr 2024 08:20) (59 - 65)  BP: 111/56 (26 Apr 2024 08:20) (102/64 - 127/63)  RR: 18 (26 Apr 2024 08:20) (14 - 18)  SpO2: 100% (26 Apr 2024 08:20) (97% - 100%)    Parameters below as of 26 Apr 2024 08:20  Patient On (Oxygen Delivery Method): room air        REVIEW OF SYSTEMS:  All other review of systems is negative unless indicated above.          PHYSICAL EXAM:  General: in no acute distress  Eyes:  EOMI; conjunctiva and sclera clear  Head: Normocephalic; atraumatic  ENMT: No nasal discharge; airway clear  Respiratory: Decreased BS at bases. No wheezes, rales or rhonchi  Cardiovascular: Irregular rate and rhythm. S1 and S2 Normal;  Gastrointestinal: Soft non-tender non-distended; Normal bowel sounds  Genitourinary: No  suprapubic  tenderness  Extremities: + lower abd and thigh  edema, less distal extremities   Skin: R flank, R abd fold ecchymotic changes   Neurological: Alert and oriented x3, non focal   Musculoskeletal: Normal muscle tone, without deformities  Psychiatric: Cooperative and appropriate      A1C with Estimated Average Glucose (03.19.24 @ 07:42)   A1C with Estimated Average Glucose Result: 8.6:     CAPILLARY BLOOD GLUCOSE  POCT Blood Glucose.: 284 mg/dL (26 Apr 2024 17:29)  POCT Blood Glucose.: 169 mg/dL (26 Apr 2024 13:13)  POCT Blood Glucose.: 173 mg/dL (26 Apr 2024 08:57)          MEDICATIONS  (STANDING):  aMIOdarone    Tablet 200 milliGRAM(s) Oral daily  ascorbic acid 500 milliGRAM(s) Oral daily  atorvastatin 40 milliGRAM(s) Oral at bedtime  dextrose 10% Bolus 125 milliLiter(s) IV Bolus once  dextrose 5%. 1000 milliLiter(s) (100 mL/Hr) IV Continuous <Continuous>  dextrose 5%. 1000 milliLiter(s) (50 mL/Hr) IV Continuous <Continuous>  dextrose 50% Injectable 25 Gram(s) IV Push once  dextrose 50% Injectable 12.5 Gram(s) IV Push once  diltiazem    milliGRAM(s) Oral daily  folic acid 1 milliGRAM(s) Oral daily  glucagon  Injectable 1 milliGRAM(s) IntraMuscular once  heparin   Injectable 5000 Unit(s) SubCutaneous every 8 hours  insulin glargine Injectable (LANTUS) 9 Unit(s) SubCutaneous at bedtime  insulin lispro (ADMELOG) corrective regimen sliding scale   SubCutaneous three times a day before meals  insulin lispro (ADMELOG) corrective regimen sliding scale   SubCutaneous at bedtime  insulin lispro Injectable (ADMELOG) 3 Unit(s) SubCutaneous three times a day before meals  multivitamin/minerals 1 Tablet(s) Oral daily  pantoprazole    Tablet 40 milliGRAM(s) Oral before breakfast  predniSONE   Tablet 20 milliGRAM(s) Oral daily  sertraline 25 milliGRAM(s) Oral daily  timolol 0.5% Solution 1 Drop(s) Both EYES daily  torsemide 20 milliGRAM(s) Oral daily  zinc sulfate 220 milliGRAM(s) Oral daily    MEDICATIONS  (PRN):  acetaminophen     Tablet .. 650 milliGRAM(s) Oral every 6 hours PRN Temp greater or equal to 38C (100.4F), Mild Pain (1 - 3)  aluminum hydroxide/magnesium hydroxide/simethicone Suspension 30 milliLiter(s) Oral every 4 hours PRN Dyspepsia  dextrose Oral Gel 15 Gram(s) Oral once PRN Blood Glucose LESS THAN 70 milliGRAM(s)/deciliter  melatonin 3 milliGRAM(s) Oral at bedtime PRN Insomnia  ondansetron Injectable 4 milliGRAM(s) IV Push every 8 hours PRN Nausea and/or Vomiting      RADIOLOGY & ADDITIONAL TESTS:

## 2024-04-27 NOTE — PROGRESS NOTE ADULT - SUBJECTIVE AND OBJECTIVE BOX
HOSPITALIST ATTENDING PROGRESS NOTE    Chart and meds reviewed.  Patient seen and examined.    CC/ HPI Patient is a 89y old  Female who presents with a chief complaint of cellulitis and FTT (27 Apr 2024 10:34)      Subjective: Seen resting in bed comfortably.     All other systems reviewed and found to be negative with the exception of what has been described above.    MEDICATIONS:  MEDICATIONS  (STANDING):  aMIOdarone    Tablet 200 milliGRAM(s) Oral daily  ascorbic acid 500 milliGRAM(s) Oral daily  atorvastatin 40 milliGRAM(s) Oral at bedtime  dextrose 10% Bolus 125 milliLiter(s) IV Bolus once  dextrose 5%. 1000 milliLiter(s) (50 mL/Hr) IV Continuous <Continuous>  dextrose 5%. 1000 milliLiter(s) (100 mL/Hr) IV Continuous <Continuous>  dextrose 50% Injectable 25 Gram(s) IV Push once  dextrose 50% Injectable 12.5 Gram(s) IV Push once  diltiazem    milliGRAM(s) Oral daily  folic acid 1 milliGRAM(s) Oral daily  furosemide   Injectable 20 milliGRAM(s) IV Push daily  glucagon  Injectable 1 milliGRAM(s) IntraMuscular once  insulin glargine Injectable (LANTUS) 9 Unit(s) SubCutaneous at bedtime  insulin lispro (ADMELOG) corrective regimen sliding scale   SubCutaneous at bedtime  insulin lispro (ADMELOG) corrective regimen sliding scale   SubCutaneous three times a day before meals  insulin lispro Injectable (ADMELOG) 3 Unit(s) SubCutaneous three times a day before meals  multivitamin/minerals 1 Tablet(s) Oral daily  pantoprazole    Tablet 40 milliGRAM(s) Oral before breakfast  predniSONE   Tablet 17.5 milliGRAM(s) Oral daily  rivaroxaban 15 milliGRAM(s) Oral with dinner  sertraline 25 milliGRAM(s) Oral daily  timolol 0.5% Solution 1 Drop(s) Both EYES daily  zinc sulfate 220 milliGRAM(s) Oral daily      Vital Signs Last 24 Hrs  T(C): 36.5 (27 Apr 2024 15:52), Max: 36.5 (26 Apr 2024 23:30)  T(F): 97.7 (27 Apr 2024 15:52), Max: 97.7 (26 Apr 2024 23:30)  HR: 94 (27 Apr 2024 15:52) (60 - 94)  BP: 113/75 (27 Apr 2024 15:52) (98/53 - 113/75)  BP(mean): 88 (27 Apr 2024 15:52) (88 - 88)  RR: 18 (27 Apr 2024 15:52) (17 - 18)  SpO2: 97% (27 Apr 2024 15:52) (96% - 100%)    Parameters below as of 27 Apr 2024 15:52  Patient On (Oxygen Delivery Method): room air        I&O's Summary    26 Apr 2024 07:01  -  27 Apr 2024 07:00  --------------------------------------------------------  IN: 0 mL / OUT: 800 mL / NET: -800 mL    27 Apr 2024 07:01  -  27 Apr 2024 17:35  --------------------------------------------------------  IN: 0 mL / OUT: 800 mL / NET: -800 mL        CAPILLARY BLOOD GLUCOSE      POCT Blood Glucose.: 230 mg/dL (27 Apr 2024 16:42)  POCT Blood Glucose.: 191 mg/dL (27 Apr 2024 12:29)  POCT Blood Glucose.: 147 mg/dL (27 Apr 2024 08:28)  POCT Blood Glucose.: 268 mg/dL (26 Apr 2024 20:59)      PHYSICAL EXAM:    Constitutional: NAD, awake and alert, well-developed  HEENT:  EOMI, Normal Hearing, MMM  Neck: Soft and supple, No LAD, No JVD  Respiratory: Breath sounds are clear bilaterally, No wheezing, rales or rhonchi  Cardiovascular: S1 and S2, irregular rate and rhythm, no Murmurs, gallops or rubs  Gastrointestinal: Bowel Sounds present, soft, nontender, nondistended, no guarding, no rebound  Extremities: No peripheral edema  Vascular: 2+ peripheral pulses  Neurological: A/O x 3, no focal deficits  Musculoskeletal: 5/5 strength b/l upper and lower extremities  Skin: No rashes        LABS: All Labs Reviewed:                        11.6   8.79  )-----------( 146      ( 27 Apr 2024 08:30 )             38.4     04-27    135  |  104  |  38<H>  ----------------------------<  146<H>  3.9   |  25  |  0.81    Ca    8.6      27 Apr 2024 08:30    TPro  5.0<L>  /  Alb  2.0<L>  /  TBili  0.5  /  DBili  0.1  /  AST  49<H>  /  ALT  41  /  AlkPhos  49  04-26          Blood Culture:     RADIOLOGY/EKG:    DVT PPX:    ADVANCED DIRECTIVE:    DISPOSITION: HOSPITALIST ATTENDING PROGRESS NOTE    Chart and meds reviewed.  Patient seen and examined.    CC/ HPI Patient is a 89y old  Female who presents with a chief complaint of cellulitis and FTT (27 Apr 2024 10:34)      Subjective: Seen resting in bed comfortably.     All other systems reviewed and found to be negative with the exception of what has been described above.    MEDICATIONS:  MEDICATIONS  (STANDING):  aMIOdarone    Tablet 200 milliGRAM(s) Oral daily  ascorbic acid 500 milliGRAM(s) Oral daily  atorvastatin 40 milliGRAM(s) Oral at bedtime  dextrose 10% Bolus 125 milliLiter(s) IV Bolus once  dextrose 5%. 1000 milliLiter(s) (50 mL/Hr) IV Continuous <Continuous>  dextrose 5%. 1000 milliLiter(s) (100 mL/Hr) IV Continuous <Continuous>  dextrose 50% Injectable 25 Gram(s) IV Push once  dextrose 50% Injectable 12.5 Gram(s) IV Push once  diltiazem    milliGRAM(s) Oral daily  folic acid 1 milliGRAM(s) Oral daily  furosemide   Injectable 20 milliGRAM(s) IV Push daily  glucagon  Injectable 1 milliGRAM(s) IntraMuscular once  insulin glargine Injectable (LANTUS) 9 Unit(s) SubCutaneous at bedtime  insulin lispro (ADMELOG) corrective regimen sliding scale   SubCutaneous at bedtime  insulin lispro (ADMELOG) corrective regimen sliding scale   SubCutaneous three times a day before meals  insulin lispro Injectable (ADMELOG) 3 Unit(s) SubCutaneous three times a day before meals  multivitamin/minerals 1 Tablet(s) Oral daily  pantoprazole    Tablet 40 milliGRAM(s) Oral before breakfast  predniSONE   Tablet 17.5 milliGRAM(s) Oral daily  rivaroxaban 15 milliGRAM(s) Oral with dinner  sertraline 25 milliGRAM(s) Oral daily  timolol 0.5% Solution 1 Drop(s) Both EYES daily  zinc sulfate 220 milliGRAM(s) Oral daily      Vital Signs Last 24 Hrs  T(C): 36.5 (27 Apr 2024 15:52), Max: 36.5 (26 Apr 2024 23:30)  T(F): 97.7 (27 Apr 2024 15:52), Max: 97.7 (26 Apr 2024 23:30)  HR: 94 (27 Apr 2024 15:52) (60 - 94)  BP: 113/75 (27 Apr 2024 15:52) (98/53 - 113/75)  BP(mean): 88 (27 Apr 2024 15:52) (88 - 88)  RR: 18 (27 Apr 2024 15:52) (17 - 18)  SpO2: 97% (27 Apr 2024 15:52) (96% - 100%)    Parameters below as of 27 Apr 2024 15:52  Patient On (Oxygen Delivery Method): room air        I&O's Summary    26 Apr 2024 07:01  -  27 Apr 2024 07:00  --------------------------------------------------------  IN: 0 mL / OUT: 800 mL / NET: -800 mL    27 Apr 2024 07:01  -  27 Apr 2024 17:35  --------------------------------------------------------  IN: 0 mL / OUT: 800 mL / NET: -800 mL        CAPILLARY BLOOD GLUCOSE      POCT Blood Glucose.: 230 mg/dL (27 Apr 2024 16:42)  POCT Blood Glucose.: 191 mg/dL (27 Apr 2024 12:29)  POCT Blood Glucose.: 147 mg/dL (27 Apr 2024 08:28)  POCT Blood Glucose.: 268 mg/dL (26 Apr 2024 20:59)      PHYSICAL EXAM:    Constitutional: NAD, awake and alert, thin  HEENT:  EOMI, Normal Hearing, MMM  Neck: Soft and supple, No LAD, No JVD  Respiratory: Breath sounds are clear bilaterally, No wheezing, rales or rhonchi  Cardiovascular: S1 and S2, irregular rate and rhythm, no Murmurs, gallops or rubs  Gastrointestinal: Bowel Sounds present, soft, nontender, nondistended, no guarding, no rebound  Extremities: No peripheral edema  Vascular: 2+ peripheral pulses  Neurological: no focal deficits  Skin: No rashes        LABS: All Labs Reviewed:                        11.6   8.79  )-----------( 146      ( 27 Apr 2024 08:30 )             38.4     04-27    135  |  104  |  38<H>  ----------------------------<  146<H>  3.9   |  25  |  0.81    Ca    8.6      27 Apr 2024 08:30    TPro  5.0<L>  /  Alb  2.0<L>  /  TBili  0.5  /  DBili  0.1  /  AST  49<H>  /  ALT  41  /  AlkPhos  49  04-26          Blood Culture:     RADIOLOGY/EKG:    DVT PPX:    ADVANCED DIRECTIVE:    DISPOSITION:

## 2024-04-27 NOTE — CHART NOTE - NSCHARTNOTEFT_GEN_A_CORE
Dietitian Brief Note:  Consulted for: "elevated sugars, on ensure"   88 y/o F with PMH of afib on Xarelto, HFpEF on torsemide, HTN, GERD, DM, temporal arteritis on prednisone presents to the ED BIBEMS from home c/o moderate-severe intermittent diffuse upper abdominal pain worsening over the last x3 weeks. Patient with HH admission on 3/25 and 3/29-4/8. Now she returns due to worsening weakness. She is unable to transfer from bed to her wheelchair. Abdominal pain is not new and not associated with diarrhea. Diarrhea has resolved.  GOC: DNR/ trial intubation.        *Current Status: 4/19 - Full initial completed on 4/16; will discontinue ensure + HP shake BID (350kcal, 20g protein) 2/2 POCTs elevated x 24 hrs (265, 269, 277, 252). Given 9 units of lantus and 20 units corrective insulin x 24 hrs; BGL likely elevated 2/2 currently on prednisone. Seen by RD today on 4/19 - pt agreeable to trial premier protein shake 1x/day (provides 160kcal, 30g protein) and c/w Rafa BID 2/2 unstageable sacrum and stage 2 PI on elbow. STRONGLY rec'd to liberalize diet to LOW SODIUM ONLY to maximize caloric and nutrient intake 2/2 severe edema (+3 L/R leg edema). See below for other recommendations.    Malnutrition dx: Pt meets criteria for severe protein-calorie malnutrition in context of acute on chronic disease r/t decreased ability to meet increased nutrient needs 2/2 weakness, poor appetite/ PI AEB mild-severe muscle/fat wasting, <50% ENN x >5 days      Estimated Needs: Based on 63.5Kg   Calories: 5960-3934 Kcal (30-35Kcal/Kg)  Protein: 89-102g (1.4-1.6g/Kg)  Fluids: 1270-1587mL ( mL/Kg)    Recommendations:   1) Liberalize diet to only low sodium to maximize caloric and protein intake  2) Add premier protein shake 1x/day (provides 160kcal, 30g protein)  3) Recommend to add MVI w/minerals, Vit C 500 mg BID, add Zinc Sulfate 220 mg x 10 days to promote wound healing.   4) Consider adding thiamine 100 mg daily 2/2 poor PO intake/ malnutrition   5) Monitor daily wt to track/trend changes; strict I/Os   6) Consider to obtain vitamin D 25OH level to assess nutriture   7) Confirm goals of care regarding nutrition support   RD will continue to monitor PO intake, labs, hydration, and wt prn.    Kiera Zuniga, MS, RDN [phone]
Sacral wound present for more than 30 days
Hx of Afib on xarelto, pt has been in NSR for the past few days, however he converted back to Afib around 2300 on 04/16. Rate is controlled 70-90s, EKG done.

## 2024-04-27 NOTE — PROGRESS NOTE ADULT - ASSESSMENT
88 yo  F with PMH of afib on Xarelto, HFpEF on torsemide, HTN, GERD, DM, temporal arteritis on prednisone admitted for:

## 2024-04-28 NOTE — PROGRESS NOTE ADULT - SUBJECTIVE AND OBJECTIVE BOX
HOSPITALIST ATTENDING PROGRESS NOTE    Chart and meds reviewed.  Patient seen and examined.    CC/ HPI Patient is a 89y old  Female who presents with a chief complaint of cellulitis and FTT (28 Apr 2024 09:32)      Subjective: complaining of rectal discomfort.     All other systems reviewed and found to be negative with the exception of what has been described above.    MEDICATIONS:  MEDICATIONS  (STANDING):  aMIOdarone    Tablet 200 milliGRAM(s) Oral daily  ascorbic acid 500 milliGRAM(s) Oral daily  atorvastatin 40 milliGRAM(s) Oral at bedtime  dapagliflozin 10 milliGRAM(s) Oral daily  dextrose 10% Bolus 125 milliLiter(s) IV Bolus once  dextrose 5%. 1000 milliLiter(s) (100 mL/Hr) IV Continuous <Continuous>  dextrose 5%. 1000 milliLiter(s) (50 mL/Hr) IV Continuous <Continuous>  dextrose 50% Injectable 25 Gram(s) IV Push once  dextrose 50% Injectable 12.5 Gram(s) IV Push once  diltiazem    milliGRAM(s) Oral daily  folic acid 1 milliGRAM(s) Oral daily  glucagon  Injectable 1 milliGRAM(s) IntraMuscular once  insulin glargine Injectable (LANTUS) 9 Unit(s) SubCutaneous at bedtime  insulin lispro (ADMELOG) corrective regimen sliding scale   SubCutaneous three times a day before meals  insulin lispro (ADMELOG) corrective regimen sliding scale   SubCutaneous at bedtime  insulin lispro Injectable (ADMELOG) 3 Unit(s) SubCutaneous three times a day before meals  multivitamin/minerals 1 Tablet(s) Oral daily  pantoprazole    Tablet 40 milliGRAM(s) Oral before breakfast  polyethylene glycol 3350 17 Gram(s) Oral daily  predniSONE   Tablet 17.5 milliGRAM(s) Oral daily  rivaroxaban 15 milliGRAM(s) Oral with dinner  sertraline 25 milliGRAM(s) Oral daily  timolol 0.5% Solution 1 Drop(s) Both EYES daily  zinc sulfate 220 milliGRAM(s) Oral daily      Vital Signs Last 24 Hrs  T(C): 36.6 (28 Apr 2024 15:28), Max: 36.6 (27 Apr 2024 23:27)  T(F): 97.9 (28 Apr 2024 15:28), Max: 97.9 (27 Apr 2024 23:27)  HR: 78 (28 Apr 2024 15:28) (61 - 93)  BP: 96/72 (28 Apr 2024 15:28) (95/64 - 119/77)  BP(mean): 82 (27 Apr 2024 23:27) (82 - 82)  RR: 18 (28 Apr 2024 15:28) (18 - 18)  SpO2: 98% (28 Apr 2024 15:28) (96% - 98%)    Parameters below as of 28 Apr 2024 15:28  Patient On (Oxygen Delivery Method): room air        I&O's Summary    27 Apr 2024 07:01  -  28 Apr 2024 07:00  --------------------------------------------------------  IN: 0 mL / OUT: 1750 mL / NET: -1750 mL    28 Apr 2024 07:01  -  28 Apr 2024 18:51  --------------------------------------------------------  IN: 0 mL / OUT: 800 mL / NET: -800 mL        CAPILLARY BLOOD GLUCOSE      POCT Blood Glucose.: 199 mg/dL (28 Apr 2024 17:03)  POCT Blood Glucose.: 140 mg/dL (28 Apr 2024 12:25)  POCT Blood Glucose.: 120 mg/dL (28 Apr 2024 08:31)  POCT Blood Glucose.: 238 mg/dL (27 Apr 2024 21:09)      PHYSICAL EXAM:    Constitutional: NAD, awake and alert, thin  HEENT:  EOMI, Normal Hearing, MMM  Neck: Soft and supple, No LAD, No JVD  Respiratory: Breath sounds are clear bilaterally, No wheezing, rales or rhonchi  Cardiovascular: S1 and S2, irregular rate and rhythm, no Murmurs, gallops or rubs  Gastrointestinal: Bowel Sounds present, soft, nontender, nondistended, no guarding, no rebound  Extremities: No peripheral edema  Vascular: 2+ peripheral pulses  Neurological: no focal deficits  Skin: No rashes        LABS: All Labs Reviewed:                        11.6   8.79  )-----------( 146      ( 27 Apr 2024 08:30 )             38.4     04-27    135  |  104  |  38<H>  ----------------------------<  146<H>  3.9   |  25  |  0.81    Ca    8.6      27 Apr 2024 08:30            Blood Culture:     RADIOLOGY/EKG:    DVT PPX:    ADVANCED DIRECTIVE:    DISPOSITION: DC planning

## 2024-04-28 NOTE — PROGRESS NOTE ADULT - PROBLEM SELECTOR PLAN 4
wound vac Well appearing, awake, alert, oriented to person, place, time/situation and in no apparent distress. normal...

## 2024-04-29 NOTE — PROGRESS NOTE ADULT - ASSESSMENT
89 yo  F with PMH of afib on Xarelto, HFpEF on torsemide, HTN, GERD, DM, temporal arteritis on prednisone admitted for:     1. LLE cellulitis, leukocytosis, improved   Completed  doxy 100mg BID x 7 days       2. Chronic AF   s/p failed DCCV  continue amio, Cardizem   C/w  Xarelto       3 Acute on chronic   HFpEF, exacerbation   TTE EF >50% 3/2024  on Farxiga, will resume when switched back to PO diuretics   C/w V lasix  today and IV albumin   Trend BNP  monitor weight   strict I and O    DM2  no home meds  metformin recently discontinued  A1c  8.6 3/19/24  ISS,   C/w lantus increase  9U  C/w  premeal  3U insulin  monitor FS    Temporal arteritis  On prednisone taper, now on  20mg QD, due to taper down to 17.5mg  as per family  C/w Prednisone 17.5mg QD x 1 week (day 6), then taper down by 2.5mg Q weekly until 10mg QD, then f/u with rheum       Sacral ulcer POA-necrotic  S/p debridement   in OR on 4/25. C/w  vac placed . Care as per Sx recs    OOB/PT    hypoalbuminemia.  severe protein calorie malnutrition   regular diet  MVI, Armando  Protein supplements       Spastic abd pain, suspect musculoskeletal   CT Abd reviewed, GB with sludge,  Slight interval enlargement of   right lateral hip subcutaneous hematoma in anteroposterior dimension with  liquefaction and organization.  RUQ sono neg   LFTs  wnl   Right lateral hip subcutaneous hematoma -old  Follow clinically while on AC  Start Lidocain patch and Flexiril PRN      Weakness  recent hospital admission  deconditioned  Continue PT     Hypokalemia   replaced, monitor    DVT ppx   Xarelto     Palliative eval appreciated  DNR/DNI      Dispo;  IV Diuresis, alb IV. LAbs in am, DC planning

## 2024-04-29 NOTE — PROGRESS NOTE ADULT - SUBJECTIVE AND OBJECTIVE BOX
CC: cellulitis and FTT (22 Apr 2024 14:49)    HPI: 88 yo  F with PMH of afib on Xarelto, HFpEF on torsemide, HTN, GERD, DM, temporal arteritis on prednisone presented  to the ED BIBEMS from home c/o moderate-severe intermittent diffuse upper abdominal pain worsening over the last  x3 weeks.  Patient with HH admission on 3/25 and 3/29-4/8.  As per family at bedside last admission was not due to diarrhea it was due to weakness from metoprolol but patient also complained of diarrhea secondary to  metformin. Medications were adjusted. Now she returns due to worsening weakness. She is unable to transfer from bed to her wheelchair. He has to lift her out of bed. Abdominal pain is not new and not associated with diarrhea. Diarrhea has resolved. She denies  fevers, chills, chest pain, shortness of breath, diarrhea, vomiting, nausea, dysuria.   Main complaint is  weakness. She also has decreased appetite with poor oral intake last 2 days.    She lives alone has home care services and visiting nurse for wound care.   Today home visiting nurse noticed decubitus sacral ulcer was worse than usual and  redness L anterior shin worsening, concerning for cellulitis.  Family is interested in rehab not NH.      INTERVAL HPI/ OVERNIGHT EVENTS: Pt was seen and examined,  reports no pain now but still with episodes of " charley horse" No SOB       Vital Signs Last 24 Hrs  T(C): 36.9 (29 Apr 2024 15:53), Max: 36.9 (29 Apr 2024 15:53)  T(F): 98.4 (29 Apr 2024 15:53), Max: 98.4 (29 Apr 2024 15:53)  HR: 104 (29 Apr 2024 15:53) (69 - 104)  BP: 99/58 (29 Apr 2024 15:53) (99/58 - 117/81)  RR: 18 (29 Apr 2024 15:53) (18 - 18)  SpO2: 95% (29 Apr 2024 15:53) (95% - 98%)    Parameters below as of 29 Apr 2024 15:53  Patient On (Oxygen Delivery Method): room air        REVIEW OF SYSTEMS:  All other review of systems is negative unless indicated above.          PHYSICAL EXAM:  General: in no acute distress  Eyes:  EOMI; conjunctiva and sclera clear  Head: Normocephalic; atraumatic  ENMT: No nasal discharge; airway clear  Respiratory: Decreased BS at bases. No wheezes, rales or rhonchi  Cardiovascular: Irregular rate and rhythm. S1 and S2 Normal;  Gastrointestinal: Soft non-tender non-distended; Normal bowel sounds  Genitourinary: No  suprapubic  tenderness  Extremities: + lower abd and thigh  edema, less distal extremities   Skin: R flank, R abd fold ecchymotic changes   Neurological: Alert and oriented x3, non focal   Musculoskeletal: Normal muscle tone, without deformities  Psychiatric: Cooperative and appropriate    LABS:                           12.2   9.33  )-----------( 211      ( 29 Apr 2024 10:58 )             39.1     04-29    139  |  110<H>  |  38<H>  ----------------------------<  148<H>  3.9   |  25  |  0.80    Ca    8.6      29 Apr 2024 10:58  Mg     2.0     04-29      A1C with Estimated Average Glucose (03.19.24 @ 07:42)   A1C with Estimated Average Glucose Result: 8.6:     CAPILLARY BLOOD GLUCOSE  POCT Blood Glucose.: 234 mg/dL (29 Apr 2024 17:15)  POCT Blood Glucose.: 153 mg/dL (29 Apr 2024 11:52)  POCT Blood Glucose.: 104 mg/dL (29 Apr 2024 08:00)  POCT Blood Glucose.: 238 mg/dL (28 Apr 2024 21:42)          MEDICATIONS  (STANDING):  albumin human 25% IVPB 50 milliLiter(s) IV Intermittent <User Schedule>  aMIOdarone    Tablet 200 milliGRAM(s) Oral daily  ascorbic acid 500 milliGRAM(s) Oral daily  atorvastatin 40 milliGRAM(s) Oral at bedtime  dapagliflozin 10 milliGRAM(s) Oral daily  dextrose 10% Bolus 125 milliLiter(s) IV Bolus once  dextrose 5%. 1000 milliLiter(s) (50 mL/Hr) IV Continuous <Continuous>  dextrose 5%. 1000 milliLiter(s) (100 mL/Hr) IV Continuous <Continuous>  dextrose 50% Injectable 25 Gram(s) IV Push once  dextrose 50% Injectable 12.5 Gram(s) IV Push once  diltiazem    milliGRAM(s) Oral daily  folic acid 1 milliGRAM(s) Oral daily  furosemide   Injectable 40 milliGRAM(s) IV Push daily  glucagon  Injectable 1 milliGRAM(s) IntraMuscular once  insulin glargine Injectable (LANTUS) 9 Unit(s) SubCutaneous at bedtime  insulin lispro (ADMELOG) corrective regimen sliding scale   SubCutaneous at bedtime  insulin lispro (ADMELOG) corrective regimen sliding scale   SubCutaneous three times a day before meals  insulin lispro Injectable (ADMELOG) 3 Unit(s) SubCutaneous three times a day before meals  lidocaine   4% Patch 1 Patch Transdermal daily  multivitamin/minerals 1 Tablet(s) Oral daily  pantoprazole    Tablet 40 milliGRAM(s) Oral before breakfast  polyethylene glycol 3350 17 Gram(s) Oral daily  predniSONE   Tablet 17.5 milliGRAM(s) Oral daily  rivaroxaban 15 milliGRAM(s) Oral with dinner  sertraline 25 milliGRAM(s) Oral daily  timolol 0.5% Solution 1 Drop(s) Both EYES daily  zinc sulfate 220 milliGRAM(s) Oral daily    MEDICATIONS  (PRN):  acetaminophen     Tablet .. 650 milliGRAM(s) Oral every 6 hours PRN Temp greater or equal to 38C (100.4F), Mild Pain (1 - 3)  aluminum hydroxide/magnesium hydroxide/simethicone Suspension 30 milliLiter(s) Oral every 4 hours PRN Dyspepsia  bisacodyl Suppository 10 milliGRAM(s) Rectal daily PRN Constipation  cyclobenzaprine 5 milliGRAM(s) Oral two times a day PRN Muscle Spasm  dextrose Oral Gel 15 Gram(s) Oral once PRN Blood Glucose LESS THAN 70 milliGRAM(s)/deciliter  hydrocortisone hemorrhoidal Suppository 1 Suppository(s) Rectal daily PRN rectal pain  melatonin 3 milliGRAM(s) Oral at bedtime PRN Insomnia  ondansetron Injectable 4 milliGRAM(s) IV Push every 8 hours PRN Nausea and/or Vomiting      RADIOLOGY & ADDITIONAL TESTS:

## 2024-04-30 NOTE — PROGRESS NOTE ADULT - ASSESSMENT
88 yo  F with PMH of afib on Xarelto, HFpEF on torsemide, HTN, GERD, DM, temporal arteritis on prednisone admitted for:     1. LLE cellulitis, leukocytosis, improved   Completed  doxy 100mg BID x 7 days       2. Chronic AF   s/p failed DCCV  continue amio, Cardizem   C/w  Xarelto       3 Acute on chronic   HFpEF, exacerbation   TTE EF >50% 3/2024  on Farxiga, will resume when switched back to PO diuretics   C/w IV lasix  today and IV albumin, will give additional  dose this afternoon if BP tolerates   Trend BNP  monitor weight   strict I and O      4. DM2  no home meds  metformin recently discontinued  A1c  8.6 3/19/24, likely steroid induced   ISS,   C/w lantus increase  9U  C/w  premeal  3U insulin  monitor FS    Temporal arteritis  On prednisone taper   17.5mg  C/w Prednisone 17.5mg QD x 1 week (day 7),  change to 15mg  in am.  taper down by 2.5mg Q weekly until 10mg QD, then f/u with rheum       Sacral ulcer POA-necrotic  S/p debridement   in OR on 4/25. C/w wound  vac  Care as per Sx recs    OOB/PT    hypoalbuminemia.  severe protein calorie malnutrition   regular diet  MVI, Armando  Protein supplements       Spastic abd pain, suspect musculoskeletal   CT Abd reviewed, GB with sludge,  Slight interval enlargement of   right lateral hip subcutaneous hematoma in anteroposterior dimension with  liquefaction and organization.  RUQ sono neg   LFTs  wnl   Right lateral hip subcutaneous hematoma -old  Follow clinically while on AC  Start Lidocain patch and Flexiril PRN      Weakness  recent hospital admission  deconditioned  Continue PT     Hypokalemia   replaced, monitor    DVT ppx   Xarelto     Palliative eval appreciated  DNR/DNI      Dispo;  C/w IV Diuresis, alb IV. D/w CM to arrange wound care at home.

## 2024-04-30 NOTE — PROGRESS NOTE ADULT - SUBJECTIVE AND OBJECTIVE BOX
CC: cellulitis and FTT (22 Apr 2024 14:49)    HPI: 88 yo  F with PMH of afib on Xarelto, HFpEF on torsemide, HTN, GERD, DM, temporal arteritis on prednisone presented  to the ED BIBEMS from home c/o moderate-severe intermittent diffuse upper abdominal pain worsening over the last  x3 weeks.  Patient with HH admission on 3/25 and 3/29-4/8.  As per family at bedside last admission was not due to diarrhea it was due to weakness from metoprolol but patient also complained of diarrhea secondary to  metformin. Medications were adjusted. Now she returns due to worsening weakness. She is unable to transfer from bed to her wheelchair. He has to lift her out of bed. Abdominal pain is not new and not associated with diarrhea. Diarrhea has resolved. She denies  fevers, chills, chest pain, shortness of breath, diarrhea, vomiting, nausea, dysuria.   Main complaint is  weakness. She also has decreased appetite with poor oral intake last 2 days.    She lives alone has home care services and visiting nurse for wound care.   Today home visiting nurse noticed decubitus sacral ulcer was worse than usual and  redness L anterior shin worsening, concerning for cellulitis.  Family is interested in rehab not NH.      INTERVAL HPI/ OVERNIGHT EVENTS: Pt was seen and examined,   reports no new complains, urinates a lot. Plan discussed.  Encouraged to get OOB to chair       Vital Signs Last 24 Hrs  T(C): 36.3 (30 Apr 2024 08:01), Max: 36.9 (29 Apr 2024 15:53)  T(F): 97.3 (30 Apr 2024 08:01), Max: 98.4 (29 Apr 2024 15:53)  HR: 93 (30 Apr 2024 08:01) (93 - 104)  BP: 105/70 (30 Apr 2024 08:01) (99/58 - 114/75)  RR: 17 (30 Apr 2024 08:01) (17 - 18)  SpO2: 99% (30 Apr 2024 08:01) (95% - 99%)    Parameters below as of 30 Apr 2024 08:01  Patient On (Oxygen Delivery Method): room air        REVIEW OF SYSTEMS:  All other review of systems is negative unless indicated above.        PHYSICAL EXAM:  General: in no acute distress  Eyes:  EOMI; conjunctiva and sclera clear  Head: Normocephalic; atraumatic  ENMT: No nasal discharge; airway clear  Respiratory: Decreased BS at bases. No wheezes, rales or rhonchi  Cardiovascular: Irregular rate and rhythm. S1 and S2 Normal;  Gastrointestinal: Soft non-tender non-distended; Normal bowel sounds  Genitourinary: No  suprapubic  tenderness  Extremities: + lower abd and thigh  edema, less distal extremities   Skin: R flank, R abd fold ecchymotic changes   Neurological: Alert and oriented x3, non focal   Musculoskeletal: Normal muscle tone, without deformities  Psychiatric: Cooperative and appropriate    LABS:                         12.0   9.67  )-----------( 199      ( 30 Apr 2024 07:59 )             38.1     04-30    139  |  107  |  37<H>  ----------------------------<  111<H>  3.7   |  28  |  0.70    Ca    8.9      30 Apr 2024 07:59  Mg     2.0     04-29                          12.2   9.33  )-----------( 211      ( 29 Apr 2024 10:58 )             39.1     04-29    139  |  110<H>  |  38<H>  ----------------------------<  148<H>  3.9   |  25  |  0.80    Ca    8.6      29 Apr 2024 10:58  Mg     2.0     04-29      A1C with Estimated Average Glucose (03.19.24 @ 07:42)   A1C with Estimated Average Glucose Result: 8.6:       CAPILLARY BLOOD GLUCOSE  POCT Blood Glucose.: 115 mg/dL (30 Apr 2024 08:00)  POCT Blood Glucose.: 195 mg/dL (29 Apr 2024 22:40)  POCT Blood Glucose.: 234 mg/dL (29 Apr 2024 17:15)  POCT Blood Glucose.: 153 mg/dL (29 Apr 2024 11:52)        MEDICATIONS  (STANDING):  albumin human 25% IVPB 50 milliLiter(s) IV Intermittent <User Schedule>  aMIOdarone    Tablet 200 milliGRAM(s) Oral daily  ascorbic acid 500 milliGRAM(s) Oral daily  atorvastatin 40 milliGRAM(s) Oral at bedtime  dapagliflozin 10 milliGRAM(s) Oral daily  dextrose 10% Bolus 125 milliLiter(s) IV Bolus once  dextrose 5%. 1000 milliLiter(s) (100 mL/Hr) IV Continuous <Continuous>  dextrose 5%. 1000 milliLiter(s) (50 mL/Hr) IV Continuous <Continuous>  dextrose 50% Injectable 25 Gram(s) IV Push once  dextrose 50% Injectable 12.5 Gram(s) IV Push once  diltiazem    milliGRAM(s) Oral daily  folic acid 1 milliGRAM(s) Oral daily  furosemide   Injectable 40 milliGRAM(s) IV Push daily  glucagon  Injectable 1 milliGRAM(s) IntraMuscular once  insulin glargine Injectable (LANTUS) 9 Unit(s) SubCutaneous at bedtime  insulin lispro (ADMELOG) corrective regimen sliding scale   SubCutaneous three times a day before meals  insulin lispro (ADMELOG) corrective regimen sliding scale   SubCutaneous at bedtime  insulin lispro Injectable (ADMELOG) 3 Unit(s) SubCutaneous three times a day before meals  lidocaine   4% Patch 1 Patch Transdermal daily  multivitamin/minerals 1 Tablet(s) Oral daily  pantoprazole    Tablet 40 milliGRAM(s) Oral before breakfast  polyethylene glycol 3350 17 Gram(s) Oral daily  predniSONE   Tablet 17.5 milliGRAM(s) Oral daily  rivaroxaban 15 milliGRAM(s) Oral with dinner  sertraline 25 milliGRAM(s) Oral daily  timolol 0.5% Solution 1 Drop(s) Both EYES daily  zinc sulfate 220 milliGRAM(s) Oral daily    MEDICATIONS  (PRN):  acetaminophen     Tablet .. 650 milliGRAM(s) Oral every 6 hours PRN Temp greater or equal to 38C (100.4F), Mild Pain (1 - 3)  aluminum hydroxide/magnesium hydroxide/simethicone Suspension 30 milliLiter(s) Oral every 4 hours PRN Dyspepsia  bisacodyl Suppository 10 milliGRAM(s) Rectal daily PRN Constipation  cyclobenzaprine 5 milliGRAM(s) Oral two times a day PRN Muscle Spasm  dextrose Oral Gel 15 Gram(s) Oral once PRN Blood Glucose LESS THAN 70 milliGRAM(s)/deciliter  hydrocortisone hemorrhoidal Suppository 1 Suppository(s) Rectal daily PRN rectal pain  melatonin 3 milliGRAM(s) Oral at bedtime PRN Insomnia  ondansetron Injectable 4 milliGRAM(s) IV Push every 8 hours PRN Nausea and/or Vomiting      RADIOLOGY & ADDITIONAL TESTS:

## 2024-05-01 NOTE — DISCHARGE NOTE NURSING/CASE MANAGEMENT/SOCIAL WORK - PATIENT PORTAL LINK FT
You can access the FollowMyHealth Patient Portal offered by [facility] by registering at the following website: [email or web address]. By joining [employer]’s FollowMyHealth portal, you will also be able to view your health information using other applications (apps) compatible with our system.

## 2024-05-01 NOTE — PROGRESS NOTE ADULT - SUBJECTIVE AND OBJECTIVE BOX
CC: cellulitis and FTT (22 Apr 2024 14:49)    HPI: 88 yo  F with PMH of afib on Xarelto, HFpEF on torsemide, HTN, GERD, DM, temporal arteritis on prednisone presented  to the ED BIBEMS from home c/o moderate-severe intermittent diffuse upper abdominal pain worsening over the last  x3 weeks.  Patient with HH admission on 3/25 and 3/29-4/8.  As per family at bedside last admission was not due to diarrhea it was due to weakness from metoprolol but patient also complained of diarrhea secondary to  metformin. Medications were adjusted. Now she returns due to worsening weakness. She is unable to transfer from bed to her wheelchair. He has to lift her out of bed. Abdominal pain is not new and not associated with diarrhea. Diarrhea has resolved. She denies  fevers, chills, chest pain, shortness of breath, diarrhea, vomiting, nausea, dysuria.   Main complaint is  weakness. She also has decreased appetite with poor oral intake last 2 days.    She lives alone has home care services and visiting nurse for wound care.   Today home visiting nurse noticed decubitus sacral ulcer was worse than usual and  redness L anterior shin worsening, concerning for cellulitis.  Family is interested in rehab not NH.      INTERVAL HPI/ OVERNIGHT EVENTS: Pt was seen and examined, reports doesn't feel well this am, very tired and sleepy. BP low this am, but improved after albumin.       Vital Signs Last 24 Hrs  T(C): 36.3 (30 Apr 2024 08:01), Max: 36.9 (29 Apr 2024 15:53)  T(F): 97.3 (30 Apr 2024 08:01), Max: 98.4 (29 Apr 2024 15:53)  HR: 93 (30 Apr 2024 08:01) (93 - 104)  BP: 105/70 (30 Apr 2024 08:01) (99/58 - 114/75)  RR: 17 (30 Apr 2024 08:01) (17 - 18)  SpO2: 99% (30 Apr 2024 08:01) (95% - 99%)    Parameters below as of 30 Apr 2024 08:01  Patient On (Oxygen Delivery Method): room air        REVIEW OF SYSTEMS:  All other review of systems is negative unless indicated above.        PHYSICAL EXAM:  General: in no acute distress, face is flashed, looks weak   Eyes:  EOMI; conjunctiva and sclera clear  Head: Normocephalic; atraumatic  ENMT: No nasal discharge; airway clear  Respiratory: Decreased BS at bases. No wheezes, rales or rhonchi  Cardiovascular: Irregular rate and rhythm. S1 and S2 Normal;  Gastrointestinal: Soft non-tender non-distended; Normal bowel sounds  Genitourinary: No  suprapubic  tenderness  Extremities: + lower abd and thigh  edema, improved  Skin: R flank, R abd fold ecchymotic changes   Neurological: Alert and oriented x3, non focal   Musculoskeletal: Normal muscle tone, without deformities  Psychiatric: Cooperative and appropriate    LABS:                         11.9   8.02  )-----------( 191      ( 01 May 2024 07:32 )             39.0     05-01    139  |  107  |  38<H>  ----------------------------<  74  3.7   |  28  |  0.72    Ca    8.9      01 May 2024 07:32  Mg     2.3     05-01        Urinalysis Basic - ( 01 May 2024 07:32 )  Color: x / Appearance: x / SG: x / pH: x  Gluc: 74 mg/dL / Ketone: x  / Bili: x / Urobili: x   Blood: x / Protein: x / Nitrite: x   Leuk Esterase: x / RBC: x / WBC x   Sq Epi: x / Non Sq Epi: x / Bacteria: x                          12.0   9.67  )-----------( 199      ( 30 Apr 2024 07:59 )             38.1     04-30    139  |  107  |  37<H>  ----------------------------<  111<H>  3.7   |  28  |  0.70    Ca    8.9      30 Apr 2024 07:59  Mg     2.0     04-29                          12.2   9.33  )-----------( 211      ( 29 Apr 2024 10:58 )             39.1     04-29    139  |  110<H>  |  38<H>  ----------------------------<  148<H>  3.9   |  25  |  0.80    Ca    8.6      29 Apr 2024 10:58  Mg     2.0     04-29      A1C with Estimated Average Glucose (03.19.24 @ 07:42)   A1C with Estimated Average Glucose Result: 8.6:       CAPILLARY BLOOD GLUCOSE  POCT Blood Glucose.: 212 mg/dL (01 May 2024 17:27)  POCT Blood Glucose.: 157 mg/dL (01 May 2024 13:12)  POCT Blood Glucose.: 131 mg/dL (01 May 2024 09:25)  POCT Blood Glucose.: 196 mg/dL (30 Apr 2024 20:55)      MEDICATIONS  (STANDING):  aMIOdarone    Tablet 200 milliGRAM(s) Oral daily  ascorbic acid 500 milliGRAM(s) Oral daily  atorvastatin 40 milliGRAM(s) Oral at bedtime  dextrose 10% Bolus 125 milliLiter(s) IV Bolus once  dextrose 5%. 1000 milliLiter(s) (50 mL/Hr) IV Continuous <Continuous>  dextrose 5%. 1000 milliLiter(s) (100 mL/Hr) IV Continuous <Continuous>  dextrose 50% Injectable 25 Gram(s) IV Push once  dextrose 50% Injectable 12.5 Gram(s) IV Push once  diltiazem    milliGRAM(s) Oral daily  folic acid 1 milliGRAM(s) Oral daily  furosemide   Injectable 40 milliGRAM(s) IV Push daily  glucagon  Injectable 1 milliGRAM(s) IntraMuscular once  insulin glargine Injectable (LANTUS) 9 Unit(s) SubCutaneous at bedtime  insulin lispro (ADMELOG) corrective regimen sliding scale   SubCutaneous at bedtime  insulin lispro (ADMELOG) corrective regimen sliding scale   SubCutaneous three times a day before meals  insulin lispro Injectable (ADMELOG) 3 Unit(s) SubCutaneous three times a day before meals  lidocaine   4% Patch 1 Patch Transdermal daily  multivitamin/minerals 1 Tablet(s) Oral daily  pantoprazole    Tablet 40 milliGRAM(s) Oral before breakfast  polyethylene glycol 3350 17 Gram(s) Oral daily  predniSONE   Tablet 15 milliGRAM(s) Oral daily  rivaroxaban 15 milliGRAM(s) Oral with dinner  sertraline 25 milliGRAM(s) Oral daily  timolol 0.5% Solution 1 Drop(s) Both EYES daily  zinc sulfate 220 milliGRAM(s) Oral daily    MEDICATIONS  (PRN):  acetaminophen     Tablet .. 650 milliGRAM(s) Oral every 6 hours PRN Temp greater or equal to 38C (100.4F), Mild Pain (1 - 3)  aluminum hydroxide/magnesium hydroxide/simethicone Suspension 30 milliLiter(s) Oral every 4 hours PRN Dyspepsia  bisacodyl Suppository 10 milliGRAM(s) Rectal daily PRN Constipation  cyclobenzaprine 5 milliGRAM(s) Oral two times a day PRN Muscle Spasm  dextrose Oral Gel 15 Gram(s) Oral once PRN Blood Glucose LESS THAN 70 milliGRAM(s)/deciliter  hydrocortisone hemorrhoidal Suppository 1 Suppository(s) Rectal daily PRN rectal pain  melatonin 3 milliGRAM(s) Oral at bedtime PRN Insomnia  ondansetron Injectable 4 milliGRAM(s) IV Push every 8 hours PRN Nausea and/or Vomiting  traMADol 25 milliGRAM(s) Oral every 6 hours PRN Moderate Pain (4 - 6)      RADIOLOGY & ADDITIONAL TESTS:

## 2024-05-01 NOTE — PROGRESS NOTE ADULT - ASSESSMENT
89 yo  F with PMH of afib on Xarelto, HFpEF on torsemide, HTN, GERD, DM, temporal arteritis on prednisone admitted for:     1. LLE cellulitis, leukocytosis, improved   Completed  doxy 100mg BID x 7 days       2. Chronic AF   s/p failed DCCV  continue amio, Cardizem with parameters   C/w  Xarelto       3 Acute on chronic   HFpEF, exacerbation   TTE EF >50% 3/2024  on Farxiga, will resume when switched back to PO diuretics   C/w IV lasix  today and IV albumin, will give additional  dose this afternoon if BP tolerates   Trend BNP  monitor weight   strict I and O      4. DM2  no home meds  metformin recently discontinued  A1c  8.6 3/19/24, likely steroid induced   ISS,   C/w lantus   9U  C/w  premeal  3U insulin  monitor FS    Temporal arteritis  On prednisone taper   17.5mg  C/w Prednisone 17.5mg QD x 1 week (day 7),  change to 15mg  in am.  taper down by 2.5mg Q weekly until 10mg QD, then f/u with rheum       Sacral ulcer POA-necrotic  S/p debridement   in OR on 4/25. C/w wound  vac  Care as per Sx recs    OOB/PT    hypoalbuminemia.  severe protein calorie malnutrition   regular diet  MVI, Armando  Protein supplements       Spastic abd pain, suspect musculoskeletal   CT Abd reviewed, GB with sludge,  Slight interval enlargement of   right lateral hip subcutaneous hematoma in anteroposterior dimension with  liquefaction and organization.  RUQ sono neg   LFTs  wnl   Right lateral hip subcutaneous hematoma -old  Follow clinically while on AC  Start Lidocain patch and Flexiril PRN      Weakness  recent hospital admission  deconditioned  Continue PT     Hypokalemia   replaced, monitor    DVT ppx   Xarelto     Palliative eval appreciated  DNR/DNI      Dispo;  Monitor BP, supportive care, labs in am, dc planning home if stable

## 2024-05-01 NOTE — PROGRESS NOTE ADULT - REASON FOR ADMISSION
cellulitis and FTT

## 2024-05-01 NOTE — PROGRESS NOTE ADULT - NUTRITIONAL ASSESSMENT
This patient has been assessed with a concern for Malnutrition and has been determined to have a diagnosis/diagnoses of Severe protein-calorie malnutrition.    This patient is being managed with:   Diet DASH/TLC-  Sodium & Cholesterol Restricted  Entered: Apr 15 2024 10:38PM    The following pending diet order is being considered for treatment of Severe protein-calorie malnutrition:  Diet Regular-  Low Sodium  Supplement Feeding Modality:  Oral  Ensure Plus High Protein Cans or Servings Per Day:  1       Frequency:  Two Times a day  Entered: Apr 16 2024 12:11PM  
This patient has been assessed with a concern for Malnutrition and has been determined to have a diagnosis/diagnoses of Severe protein-calorie malnutrition.    This patient is being managed with:   Diet Regular-  Low Sodium  Supplement Feeding Modality:  Oral  Ensure Plus High Protein Cans or Servings Per Day:  1       Frequency:  Two Times a day  Entered: Apr 16 2024 12:11PM  
This patient has been assessed with a concern for Malnutrition and has been determined to have a diagnosis/diagnoses of Severe protein-calorie malnutrition.    This patient is being managed with:   Diet Regular-  Low Sodium  Supplement Feeding Modality:  Oral  Ensure Plus High Protein Cans or Servings Per Day:  1       Frequency:  Two Times a day  Entered: Apr 16 2024 12:11PM  
This patient has been assessed with a concern for Malnutrition and has been determined to have a diagnosis/diagnoses of Severe protein-calorie malnutrition.    This patient is being managed with:   Diet DASH/TLC-  Sodium & Cholesterol Restricted  Entered: Apr 15 2024 10:38PM    The following pending diet order is being considered for treatment of Severe protein-calorie malnutrition:  Diet Regular-  Low Sodium  Supplement Feeding Modality:  Oral  Ensure Plus High Protein Cans or Servings Per Day:  1       Frequency:  Two Times a day  Entered: Apr 16 2024 12:11PM  
This patient has been assessed with a concern for Malnutrition and has been determined to have a diagnosis/diagnoses of Severe protein-calorie malnutrition.    This patient is being managed with:   Diet Regular-  Low Sodium  Supplement Feeding Modality:  Oral  Ensure Plus High Protein Cans or Servings Per Day:  1       Frequency:  Two Times a day  Entered: Apr 16 2024 12:11PM  
This patient has been assessed with a concern for Malnutrition and has been determined to have a diagnosis/diagnoses of Severe protein-calorie malnutrition.    This patient is being managed with:   Diet NPO after Midnight-     NPO Start Date: 24-Apr-2024   NPO Start Time: 23:59  Entered: Apr 24 2024  3:52PM    Diet Regular-  Low Sodium  Supplement Feeding Modality:  Oral  Ensure Plus High Protein Cans or Servings Per Day:  1       Frequency:  Two Times a day  Entered: Apr 16 2024 12:11PM  
This patient has been assessed with a concern for Malnutrition and has been determined to have a diagnosis/diagnoses of Severe protein-calorie malnutrition.    This patient is being managed with:   Diet Regular-  Low Sodium  Supplement Feeding Modality:  Oral  Ensure Plus High Protein Cans or Servings Per Day:  1       Frequency:  Two Times a day  Entered: Apr 16 2024 12:11PM  
This patient has been assessed with a concern for Malnutrition and has been determined to have a diagnosis/diagnoses of Severe protein-calorie malnutrition.    This patient is being managed with:   Diet DASH/TLC-  Sodium & Cholesterol Restricted  Entered: Apr 15 2024 10:38PM    The following pending diet order is being considered for treatment of Severe protein-calorie malnutrition:  Diet Regular-  Low Sodium  Supplement Feeding Modality:  Oral  Ensure Plus High Protein Cans or Servings Per Day:  1       Frequency:  Two Times a day  Entered: Apr 16 2024 12:11PM  
This patient has been assessed with a concern for Malnutrition and has been determined to have a diagnosis/diagnoses of Severe protein-calorie malnutrition.    This patient is being managed with:   Diet Regular-  Low Sodium  Supplement Feeding Modality:  Oral  Ensure Plus High Protein Cans or Servings Per Day:  1       Frequency:  Two Times a day  Entered: Apr 16 2024 12:11PM  

## 2024-05-01 NOTE — PROGRESS NOTE ADULT - PROVIDER SPECIALTY LIST ADULT
Hospitalist
Surgery
Hospitalist
Surgery
Hospitalist

## 2024-05-02 NOTE — DISCHARGE NOTE PROVIDER - NSCORESITESY/N_GEN_A_CORE_RD
Siderails up x 2  Hourly rounding  Call light in reach  Instructed to call for assist before attempting out of bed. Remains free from falls and accidental injury at this time   Floor free from obstacles  Bed is locked and in lowest position  Adequate lighting provided. Pain level assessment complete. Patient educated on pain scale and control interventions  PRN pain medication given per patient request  Patient instructed to call out with new onset of pain or unrelieved pain. Yes

## 2024-05-02 NOTE — DISCHARGE NOTE PROVIDER - HOSPITAL COURSE
88 yo  F with PMH of afib on Xarelto, HFpEF on torsemide, HTN, GERD, DM, temporal arteritis on prednisone presented  to the ED BIBEMS from home c/o moderate-severe intermittent diffuse upper abdominal pain worsening over the last x 3 weeks.  Patient with HH admission on 3/25 and 3/29-4/8.  As per family at bedside last admission was not due to diarrhea it was due to weakness from metoprolol but patient also complained of diarrhea secondary to  metformin. Medications were adjusted. Now Pt returned due to worsening weakness.  Pt denied  fevers, chills, chest pain, shortness of breath, diarrhea, vomiting, nausea, dysuria.   She also had decreased appetite with poor oral intake past few days.    Pt presented with  decubitus sacral ulcer and as per report was worse than usual and also found to have   redness L anterior shin concerning for cellulitis.  In ED VS: hypothermic, rest is stable. Labs with leukocytosis. Pts BCX and UCX neg. Pt was started on IV Abxs and completed 7 days of Doxy. Pt was evaluated by Sx, s/p  sacral wound debridement, now with wound vac. Pt had CT abd/pelvis which showed non specific mild enteritis.  Reidentified lateral hip subcutaneous hematoma with some min enlargement.   Pt was on Iv diuresis for anasarca, acute HFrEF, now with improved voluem status, difficulty diuresis due to borderline BP and hypoalbuminemia, 3rd spacing. C/w PO lasix, resume Farxiga. Pt was seen by cardio, meds adjusted now on CArdizem, Amio daily, off dig.   Pt is on Steroid taper as per outPt rheum,  for GCA.  Now down to 15mg QD ( started on 5/1), taper 2.5mg QD until down to 10mg PO qd, f/u with rheum   Today Pt reports no complains, dc planning discussed     Vital Signs Last 24 Hrs  T(C): 36.4 (02 May 2024 07:41), Max: 36.7 (01 May 2024 15:32)  T(F): 97.5 (02 May 2024 07:41), Max: 98.1 (01 May 2024 15:32)  HR: 59 (02 May 2024 07:41) (59 - 98)  BP: 119/77 (02 May 2024 07:41) (100/70 - 119/77)  RR: 18 (02 May 2024 07:41) (18 - 18)  SpO2: 96% (02 May 2024 07:41) (95% - 96%)    PHYSICAL EXAM:  General: in no acute distress, face is flashed, looks weak   Eyes:  EOMI; conjunctiva and sclera clear  Head: Normocephalic; atraumatic  ENMT: No nasal discharge; airway clear  Respiratory: Decreased BS at bases. No wheezes, rales or rhonchi  Cardiovascular: Irregular rate and rhythm. S1 and S2 Normal;  Gastrointestinal: Soft non-tender non-distended; Normal bowel sounds  Genitourinary: No  suprapubic  tenderness  Extremities: + lower abd and thigh  edema, improved  Skin: R flank, R abd fold ecchymotic changes   Neurological: Alert and oriented x3, non focal   Musculoskeletal: Normal muscle tone, without deformities  Psychiatric: Cooperative and appropriate      88 yo  F with PMH of afib on Xarelto, HFpEF on torsemide, HTN, GERD, DM, temporal arteritis on prednisone admitted for:     1. LLE cellulitis, leukocytosis, improved   Completed  doxy 100mg BID x 7 days     2. Chronic AF   s/p failed DCCV  continue amio, Cardizem with parameters   C/w  Xarelto     3 Acute on chronic   HFpEF, exacerbation   TTE EF >50% 3/2024  on Farxiga, was on hold for IV diuresis, will resume at dc   S/p IV lasix  and IV albumin, c/w PO lasix 40mg QD   monitor weight   F/u with cardio outPt       4. DM2  metformin recently discontinued  A1c  8.6 3/19/24, likely steroid induced   C/w lantus   9U  C/w  premeal  3U insulin  monitor FS    Temporal arteritis  On prednisone taper   17.5mg  C/w Prednisone 1tapered down to  15mg day #2,   taper down by 2.5mg Q weekly until 10mg QD, then f/u with rheum     Sacral ulcer POA-necrotic  S/p debridement   in OR on 4/25. C/w wound  vac  Care as per Sx recs    OOB/PT    hypoalbuminemia.  severe protein calorie malnutrition   regular diet  MVI, Armando  Protein supplements     Spastic abd pain, suspect musculoskeletal   CT Abd reviewed, GB with sludge,  Slight interval enlargement of   right lateral hip subcutaneous hematoma in anteroposterior dimension with  liquefaction and organization.  RUQ sono neg   LFTs  wnl   Right lateral hip subcutaneous hematoma -old  Follow clinically while on AC  Lidocain patch and Flexiril PRN    Weakness  recent hospital admission, deconditioned  Continue PT     DVT ppx   Xarelto     Palliative eval appreciated  DNR/DNI    Dispo; stable for dc   Fax dc summary to PCP  Total time 50 min 88 yo  F with PMH of afib on Xarelto, HFpEF on torsemide, HTN, GERD, DM, temporal arteritis on prednisone presented  to the ED BIBEMS from home c/o moderate-severe intermittent diffuse upper abdominal pain worsening over the last x 3 weeks.  Patient with HH admission on 3/25 and 3/29-4/8.  As per family at bedside last admission was not due to diarrhea it was due to weakness from metoprolol but patient also complained of diarrhea secondary to  metformin. Medications were adjusted. Now Pt returned due to worsening weakness.  Pt denied  fevers, chills, chest pain, shortness of breath, diarrhea, vomiting, nausea, dysuria.   She also had decreased appetite with poor oral intake past few days.    Pt presented with  decubitus sacral ulcer and as per report was worse than usual and also found to have   redness L anterior shin concerning for cellulitis.  In ED VS: hypothermic, rest is stable. Labs with leukocytosis. Pts BCX and UCX neg. Pt was started on IV Abxs and completed 7 days of Doxy. Pt was evaluated by Sx, s/p  sacral wound debridement, now with wound vac. Pt had CT abd/pelvis which showed non specific mild enteritis.  Reidentified lateral hip subcutaneous hematoma with some min enlargement.   Pt was on Iv diuresis for anasarca, acute HFrEF, now with improved voluem status, difficulty diuresis due to borderline BP and hypoalbuminemia, 3rd spacing. C/w PO lasix, resume Farxiga. Pt was seen by cardio, meds adjusted now on CArdizem, Amio daily, off dig.   Pt is on Steroid taper as per outPt rheum,  for GCA.  Now down to 15mg QD ( started on 5/1), taper 2.5mg QD until down to 10mg PO qd, f/u with rheum   Today Pt reports no complains, dc planning discussed     Vital Signs Last 24 Hrs  T(C): 36.4 (02 May 2024 07:41), Max: 36.7 (01 May 2024 15:32)  T(F): 97.5 (02 May 2024 07:41), Max: 98.1 (01 May 2024 15:32)  HR: 59 (02 May 2024 07:41) (59 - 98)  BP: 119/77 (02 May 2024 07:41) (100/70 - 119/77)  RR: 18 (02 May 2024 07:41) (18 - 18)  SpO2: 96% (02 May 2024 07:41) (95% - 96%)    PHYSICAL EXAM:  General: in no acute distress, face is flashed, looks weak   Eyes:  EOMI; conjunctiva and sclera clear  Head: Normocephalic; atraumatic  ENMT: No nasal discharge; airway clear  Respiratory: Decreased BS at bases. No wheezes, rales or rhonchi  Cardiovascular: Irregular rate and rhythm. S1 and S2 Normal;  Gastrointestinal: Soft non-tender non-distended; Normal bowel sounds  Genitourinary: No  suprapubic  tenderness  Extremities: + lower abd and thigh  edema, improved  Skin: R flank, R abd fold ecchymotic changes   Neurological: Alert and oriented x3, non focal   Musculoskeletal: Normal muscle tone, without deformities  Psychiatric: Cooperative and appropriate      88 yo  F with PMH of afib on Xarelto, HFpEF on torsemide, HTN, GERD, DM, temporal arteritis on prednisone admitted for:     1. LLE cellulitis, leukocytosis, improved   Completed  doxy 100mg BID x 7 days     2. Chronic AF   s/p failed DCCV  continue amio, Cardizem with parameters   C/w  Xarelto     3 Acute on chronic   HFpEF, exacerbation   TTE EF >50% 3/2024  on Farxiga, was on hold for IV diuresis, will resume at dc   S/p IV lasix  and IV albumin, c/w PO lasix 40mg QD   monitor weight   F/u with cardio outPt       4. DM2  metformin recently discontinued as didnt christophere  A1c  8.6 3/19/24, likely steroid induced   On  lantus   9U,  premeal  3U insulin  start Januvia and resume farxiga    F/u with  PCP     Temporal arteritis  On prednisone taper   17.5mg  C/w Prednisone 1tapered down to  15mg day #2,   taper down by 2.5mg Q weekly until 10mg QD, then f/u with rheum     Sacral ulcer POA-necrotic  S/p debridement   in OR on 4/25. C/w wound  vac  Care as per Sx recs    OOB/PT    hypoalbuminemia.  severe protein calorie malnutrition   regular diet  MVI, Armando  Protein supplements     Spastic abd pain, suspect musculoskeletal   CT Abd reviewed, GB with sludge,  Slight interval enlargement of   right lateral hip subcutaneous hematoma in anteroposterior dimension with  liquefaction and organization.  RUQ sono neg   LFTs  wnl   Right lateral hip subcutaneous hematoma -old  Follow clinically while on AC  Lidocain patch and Flexiril PRN    Weakness  recent hospital admission, deconditioned  Continue PT     DVT ppx   Xarelto     Palliative eval appreciated  DNR/DNI    Dispo; stable for dc   Fax dc summary to PCP  Total time 50 min

## 2024-05-02 NOTE — DISCHARGE NOTE PROVIDER - DETAILS OF MALNUTRITION DIAGNOSIS/DIAGNOSES
This patient has been assessed with a concern for Malnutrition and was treated during this hospitalization for the following Nutrition diagnosis/diagnoses:     -  04/16/2024: Severe protein-calorie malnutrition

## 2024-05-02 NOTE — DISCHARGE NOTE PROVIDER - CARE PROVIDER_API CALL
Cash Laureano  Internal Medicine  [address] [place] [address]  Phone: [phone]  Fax: [phone]  Follow Up Time: 1 week    Efrain Salomon  Cardiology  [address] [place] [address]  Phone: [phone]  Fax: [phone]  Follow Up Time: 1 week

## 2024-05-02 NOTE — DISCHARGE NOTE PROVIDER - NSDCFUSCHEDAPPT_GEN_ALL_CORE_FT
Luisana Maritnes  [facility]  [address]  Scheduled Appointment: 05/10/2024

## 2024-05-02 NOTE — DISCHARGE NOTE PROVIDER - CARE PROVIDERS DIRECT ADDRESSES
[email or web address]

## 2024-05-02 NOTE — DISCHARGE NOTE PROVIDER - PROVIDER TOKENS
PROVIDER:[TOKEN:[[id number]],FOLLOWUP:[1 week]],PROVIDER:[TOKEN:[[id number]],FOLLOWUP:[1 week]]

## 2024-05-02 NOTE — DISCHARGE NOTE PROVIDER - NSDCCPCAREPLAN_GEN_ALL_CORE_FT
PRINCIPAL DISCHARGE DIAGNOSIS  Diagnosis: Cellulitis of left anterior lower leg  Assessment and Plan of Treatment: completed abxs      SECONDARY DISCHARGE DIAGNOSES  Diagnosis: General weakness  Assessment and Plan of Treatment: PT    Diagnosis: Sacral wound  Assessment and Plan of Treatment: c/w wound vac   Home care    Diagnosis: GCA (giant cell arteritis)  Assessment and Plan of Treatment: C/w prednsione taper as [er rheumathology recommendations    Diagnosis: Acute systolic congestive heart failure  Assessment and Plan of Treatment: monitor weight  C/w lasix, FArxiga  F/u with cardio in 1 week    Diagnosis: Afib  Assessment and Plan of Treatment: do not take digoxin   C/w amiodarone daily  C/w Cardizem   F/u with CArdio

## 2024-05-02 NOTE — DISCHARGE NOTE PROVIDER - NSDCMRMEDTOKEN_GEN_ALL_CORE_FT
acetaminophen 325 mg oral tablet: 2 tab(s) orally every 6 hours As needed Temp greater or equal to 38C (100.4F), Mild Pain (1 - 3)  amiodarone 200 mg oral tablet: 1 tab(s) orally once a day  ascorbic acid 500 mg oral tablet: 1 tab(s) orally once a day  bisacodyl 10 mg rectal suppository: 1 suppository(ies) rectal once a day As needed Constipation  dilTIAZem 180 mg/24 hours oral capsule, extended release: 1 cap(s) orally once a day  Farxiga 10 mg oral tablet: 1 tab(s) orally once a day  folic acid 1 mg oral tablet: 1 tab(s) orally once a day  furosemide 40 mg oral tablet: 1 tab(s) orally once a day  hydrocortisone 25 mg rectal suppository: 1 suppository(ies) rectal once a day As needed rectal pain  insulin glargine 100 units/mL subcutaneous solution: 10 unit(s) subcutaneous once a day (at bedtime)  insulin lispro 100 units/mL injectable solution: 3 unit(s) injectable 3 times a day (before meals)  ipratropium 21 mcg/inh (0.03%) nasal spray: 2 spray(s) intranasally once a day  lidocaine 4% topical film: Apply topically to affected area once a day RUQ pain  Multiple Vitamins with Minerals oral tablet: 1 tab(s) orally once a day  pantoprazole 40 mg oral delayed release tablet: 1 tab(s) orally once a day  polyethylene glycol 3350 oral powder for reconstitution: 17 gram(s) orally once a day  predniSONE 5 mg oral tablet: 3 tab(s) orally once a day  rosuvastatin 40 mg oral tablet: 1 tab(s) orally once a day  sertraline 25 mg oral tablet: 1 tab(s) orally once a day (in the afternoon)  timolol maleate 0.5% ophthalmic solution: 1 drop(s) in each eye once a day  traMADol 50 mg oral tablet: 0.5 tab(s) orally every 6 hours As needed Moderate Pain (4 - 6)  Xarelto 15 mg oral tablet: 1 tab(s) orally once a day  zinc sulfate 220 mg oral capsule: 1 cap(s) orally once a day stop after 5 days   acetaminophen 325 mg oral tablet: 2 tab(s) orally every 6 hours As needed Temp greater or equal to 38C (100.4F), Mild Pain (1 - 3)  amiodarone 200 mg oral tablet: 1 tab(s) orally once a day  ascorbic acid 500 mg oral tablet: 1 tab(s) orally once a day  bisacodyl 10 mg rectal suppository: 1 suppository(ies) rectal once a day as needed for Constipation  dilTIAZem 180 mg/24 hours oral capsule, extended release: 1 cap(s) orally once a day  Farxiga 10 mg oral tablet: 1 tab(s) orally once a day  folic acid 1 mg oral tablet: 1 tab(s) orally once a day  furosemide 40 mg oral tablet: 1 tab(s) orally once a day  insulin glargine 100 units/mL subcutaneous solution: 10 unit(s) subcutaneous once a day (at bedtime)  ipratropium 21 mcg/inh (0.03%) nasal spray: 2 spray(s) intranasally once a day  Januvia 50 mg oral tablet: 1 tab(s) orally once a day  Multiple Vitamins with Minerals oral tablet: 1 tab(s) orally once a day  pantoprazole 40 mg oral delayed release tablet: 1 tab(s) orally once a day  polyethylene glycol 3350 oral powder for reconstitution: 17 gram(s) orally once a day  predniSONE 5 mg oral tablet: 3 tab(s) orally once a day taper to 12.5  on  5/8, and the as directed by rheumatologist  rosuvastatin 40 mg oral tablet: 1 tab(s) orally once a day  sertraline 25 mg oral tablet: 1 tab(s) orally once a day (in the afternoon)  timolol maleate 0.5% ophthalmic solution: 1 drop(s) in each eye once a day  traMADol 50 mg oral tablet: 0.5 tab(s) orally 3 times a day as needed for Moderate Pain (4 - 6) MDD: 1.5tab  Xarelto 15 mg oral tablet: 1 tab(s) orally once a day   acetaminophen 325 mg oral tablet: 2 tab(s) orally every 6 hours As needed Temp greater or equal to 38C (100.4F), Mild Pain (1 - 3)  amiodarone 200 mg oral tablet: 1 tab(s) orally once a day  ascorbic acid 500 mg oral tablet: 1 tab(s) orally once a day  bisacodyl 10 mg rectal suppository: 1 suppository(ies) rectal once a day as needed for Constipation  dilTIAZem 180 mg/24 hours oral capsule, extended release: 1 cap(s) orally once a day  Farxiga 10 mg oral tablet: 1 tab(s) orally once a day  folic acid 1 mg oral tablet: 1 tab(s) orally once a day  furosemide 40 mg oral tablet: 1 tab(s) orally once a day  ipratropium 21 mcg/inh (0.03%) nasal spray: 2 spray(s) intranasally once a day  Januvia 50 mg oral tablet: 1 tab(s) orally once a day  lidocaine 4% topical film: Apply topically to affected area once a day RUQ pain  Multiple Vitamins with Minerals oral tablet: 1 tab(s) orally once a day  pantoprazole 40 mg oral delayed release tablet: 1 tab(s) orally once a day  polyethylene glycol 3350 oral powder for reconstitution: 17 gram(s) orally once a day  predniSONE 5 mg oral tablet: 3 tab(s) orally once a day taper to 12.5  on  5/8, and the as directed by rheumatologist  rosuvastatin 40 mg oral tablet: 1 tab(s) orally once a day  sertraline 25 mg oral tablet: 1 tab(s) orally once a day (in the afternoon)  timolol maleate 0.5% ophthalmic solution: 1 drop(s) in each eye once a day  traMADol 50 mg oral tablet: 0.5 tab(s) orally 3 times a day as needed for Moderate Pain (4 - 6) MDD: 1.5tab  Xarelto 15 mg oral tablet: 1 tab(s) orally once a day  zinc sulfate 220 mg oral capsule: 1 cap(s) orally once a day stop after 5 days

## 2024-05-20 PROBLEM — E11.9 TYPE 2 DIABETES MELLITUS WITHOUT COMPLICATION, WITHOUT LONG-TERM CURRENT USE OF INSULIN: Status: ACTIVE | Noted: 2024-01-01

## 2024-05-20 PROBLEM — I48.91 AFIB: Status: ACTIVE | Noted: 2024-01-01

## 2024-05-20 PROBLEM — I50.32 CHRONIC DIASTOLIC (CONGESTIVE) HEART FAILURE: Status: ACTIVE | Noted: 2024-01-01

## 2024-05-20 PROBLEM — M31.6 TEMPORAL ARTERITIS: Status: ACTIVE | Noted: 2024-01-01

## 2024-05-20 PROBLEM — L89.150 PRESSURE INJURY OF SACRAL REGION, UNSTAGEABLE: Status: ACTIVE | Noted: 2024-01-01

## 2024-05-20 NOTE — REVIEW OF SYSTEMS
[Feeling Fatigued] : feeling fatigued [Joint Pain] : joint pain [Joint Stiffness] : joint stiffness [Weakness] : weakness [Negative] : Respiratory

## 2024-05-20 NOTE — DISCUSSION/SUMMARY
[FreeTextEntry1] : 89-year-old female with persistent A-fib HFpEF recent deconditioning following hospitalization for temporal arteritis on prednisone and continues to have pressure ulcer and not ambulating.  MCT shows mostly A-fib some RVR episodes Will stop amiodarone as it may be contributing to some weakness and patient denies palpitations.   Take Cardizem 120 mg daily if BP greater than 110 mmHg can alternate with 180 mg of Cardizem.  Patient did not tolerate metoprolol and digoxin previously due to increased weakness Encouraged to follow with wound care PT Continue Xarelto Follow-up again in 3 months depending on her functional status we may consider repeat monitor to assess A-fib rates.   I spent a total of 45 minutes on the encounter evaluating and discussing treatment options with the patient, as well as counseling and coordination of care as stated above.

## 2024-05-20 NOTE — HISTORY OF PRESENT ILLNESS
[FreeTextEntry1] : 89-year-old female with history of persistent A-fib on Xarelto 15 mg daily HFpEF HTN GERD diabetes severe temporal arteritis about 5 months ago requiring high-dose prednisone therapy patient has severe weakness deconditioned and unfortunately now she is bedbound with sacral decubitus ulcer she was admitted at [facility] in March as well as in May for weakness.  Patient noted to have A-fib RVR underwent cardioversion in April with early recurrence of A-fib.  She has been on amiodarone  200 mg daily and Cardizem 180 mg daily sometimes does not take Cardizem due to soft BP.  TTE from 3/2024 shows preserved LVEF 50% patient had MCT 20-day monitoring from 4/2024 showing predominant atrial fibrillation with brief episodes of sinus rhythm some A-fib RVR and average heart rate 103 bpm some aberrant AF conduction versus NSVT. Patient continues to feel weak unable to walk denies palpitations chest pain shortness of breath.

## 2024-05-27 NOTE — H&P ADULT - NSHPPHYSICALEXAM_GEN_ALL_CORE
O:    Adult lying in bed  Appears acute on chronically ill  No JVD trachea midline  Normocephalic, atraumatic  S1S2+  CTA B/L  Abd soft NTND  + anasarca  Awake, confused  + large stage 4 sacral decubitus ulcer noted  + purple bottom of feet and toes, appears demarcated

## 2024-05-27 NOTE — H&P ADULT - ASSESSMENT
A:    89F    Here for:    1. Septic shock POA  2. Sacral decubitus  3. DM  4. Dehydration/hypovolemia  5. AF  6. Protein calorie malnutrition  7. NAGMA  8. Lactic acidosis    This patient requires critical care for support of one or more vital organ systems with a high probability of imminent or life threatening deterioration in his/her condition    P:    Septic shock POA  Has a large stage 4 decubitus ulcer s/p resection several weeks ago by Dr Singer; seen by surgery  Pending UA, CT of A/P    Sepsis bundle; s/p 1500cc IVF, give add'l 500cc to make 30cc/kg; Hx of CHF on diuretics at home, but Hgb near 16, 11 on d/c several weeks ago, appears dehydrated  PCN allergy; cefepime/vancomycin x 1, f/u Cx's  HD monitoring, start levophed, titrate pressors to maintain MAP > 65  NPO + meds  Med rec  BG < 180, ISS  Avoid deleriogenic meds  Suspect metabolic encephalopathy 2/2 sepsis  PIV x 2; both with good blood return and easy flush; OK to use for pressor snow; if dose excalates may require central vascular access  On prednisone at home for temporal arteritis; stress dose hydrocortisone 100mg IV TID  Hold CCB, cont amiodarone for AF  On DOAC for AF; hold, start heparin drip until surgical plan delineated  No s/s acute bleeding  f/u labs, replete lytes PRN  Cont IVF    Dispo: Admit to critical care in CCU.    Pt has MOLST DNR/DNI from last visit; now rescinds this and full code; will place palliative consult back as this is an 89 year old, chronically ill, bed bound    Date of entry of this note is equal to the date of services rendered    TOTAL CRITICAL CARE TIME: 110 minutes  (EXCLUSIVE of any non bundled procedures)    Note: This is a critically ill patient. Time spent has been in salvage of life, limb and vital organ systems. This time INCLUDES time spent directly as this patient's bedside with evaluation and management, review of chart including review of laboratory and imaging studies, interpretation of vital signs and cardiac output measurements, any necessary ventilator management, and time spent discussing plan of care with patient and family, including goals of care discussion. This also includes time spent in multidisciplinary discussion with care team and various consultants to optimize treatment plan. This time may NOT include various procedures, which will be noted seperately.    A:    89F    Here for:    1. Septic shock POA  2. Sacral decubitus  3. DM  4. Dehydration/hypovolemia  5. AF  6. Protein calorie malnutrition  7. NAGMA  8. Lactic acidosis    This patient requires critical care for support of one or more vital organ systems with a high probability of imminent or life threatening deterioration in his/her condition    P:    Septic shock POA  Has a large stage 4 decubitus ulcer s/p resection several weeks ago by Dr Singer; seen by surgery  Pending UA, CT of A/P    Sepsis bundle; s/p 1500cc IVF, give add'l 500cc to make 30cc/kg; Hx of CHF on diuretics at home, but Hgb near 16, 11 on d/c several weeks ago, appears dehydrated  PCN allergy; cefepime/vancomycin x 1, f/u Cx's  HD monitoring, start levophed, titrate pressors to maintain MAP > 65  NPO + meds  Med rec  BG < 180, ISS  Avoid deleriogenic meds  Suspect metabolic encephalopathy 2/2 sepsis  PIV x 2; both with good blood return and easy flush; OK to use for pressor snow; if dose excalates may require central vascular access  On prednisone at home for temporal arteritis; stress dose hydrocortisone 100mg IV TID  Hold CCB, cont amiodarone for AF  On DOAC for AF; hold, start heparin drip until surgical plan delineated  No s/s acute bleeding  f/u labs, replete lytes PRN  Cont IVF    Dispo: Admit to critical care in CCU.    Pt has MOLST DNR/DNI from last visit; now rescinds this and full code; will place palliative consult back for continued discussions as this is an 89 year old, chronically ill, bed bound, multiple recent hospitalizations    Date of entry of this note is equal to the date of services rendered    TOTAL CRITICAL CARE TIME: 110 minutes  (EXCLUSIVE of any non bundled procedures)    Note: This is a critically ill patient. Time spent has been in salvage of life, limb and vital organ systems. This time INCLUDES time spent directly as this patient's bedside with evaluation and management, review of chart including review of laboratory and imaging studies, interpretation of vital signs and cardiac output measurements, any necessary ventilator management, and time spent discussing plan of care with patient and family, including goals of care discussion. This also includes time spent in multidisciplinary discussion with care team and various consultants to optimize treatment plan. This time may NOT include various procedures, which will be noted seperately.

## 2024-05-27 NOTE — ED ADULT NURSE NOTE - NSFALLHARMRISKINTERV_ED_ALL_ED

## 2024-05-27 NOTE — ED ADULT NURSE NOTE - OBJECTIVE STATEMENT
Pt BIBEMS from home, c/o multiple medical complaints. As per EMS, c/o shortness of breath, generalized weakness, and body aches. Arrives with wound vac. Pt slow to respond in triage. As per EMS, no recent falls- bedbound.

## 2024-05-27 NOTE — ED PROVIDER NOTE - CLINICAL SUMMARY MEDICAL DECISION MAKING FREE TEXT BOX
Confusion, tachycardia - likely sepsis secondary to sacral decub; will eval for pneumonia, UTI  Plan: fluids, abx, labs, CCM consult, CT abd/pelvis to eval for deep space infection, CT head given AMS

## 2024-05-27 NOTE — H&P ADULT - HISTORY OF PRESENT ILLNESS
CCU Admit Note    S:    Pt seen and examined  89F hx afib on Xarelto, HFpEF on torsemide, HTN, GERD, DM, 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 Hypothermic, ill appearing.  Large sacral decub w/wound vac - likely source  ICU consult called for hypotension    5/27: Large, deep decub noted, seen by surgery. Pending CT, workup. IVF, pressors, abx. Son in law at bedside.

## 2024-05-27 NOTE — H&P ADULT - NSHPLABSRESULTS_GEN_ALL_CORE
LABS:    CBC Full  -  ( 27 May 2024 13:58 )  WBC Count : 22.50 K/uL  RBC Count : 4.73 M/uL  Hemoglobin : 15.2 g/dL  Hematocrit : 47.6 %  Platelet Count - Automated : 280 K/uL  Mean Cell Volume : 100.6 fl  Mean Cell Hemoglobin : 32.1 pg  Mean Cell Hemoglobin Concentration : 31.9 gm/dL  Auto Neutrophil # : 20.48 K/uL  Auto Lymphocyte # : 0.90 K/uL  Auto Monocyte # : 0.68 K/uL  Auto Eosinophil # : 0.00 K/uL  Auto Basophil # : 0.00 K/uL  Auto Neutrophil % : 89.0 %  Auto Lymphocyte % : 4.0 %  Auto Monocyte % : 3.0 %  Auto Eosinophil % : 0.0 %  Auto Basophil % : 0.0 %        136  |  103  |  59<H>  ----------------------------<  208<H>  3.8   |  19<L>  |  1.09    Ca    9.4      27 May 2024 13:58    TPro  6.0  /  Alb  2.1<L>  /  TBili  0.5  /  DBili  x   /  AST  36  /  ALT  32  /  AlkPhos  66      PT/INR - ( 27 May 2024 13:58 )   PT: 15.2 sec;   INR: 1.36 ratio         PTT - ( 27 May 2024 13:58 )  PTT:31.8 sec  Urinalysis Basic - ( 27 May 2024 14:36 )    Color: Yellow / Appearance: Clear / S.019 / pH: x  Gluc: x / Ketone: Negative mg/dL  / Bili: Negative / Urobili: 0.2 mg/dL   Blood: x / Protein: Trace mg/dL / Nitrite: Negative   Leuk Esterase: Negative / RBC: x / WBC x   Sq Epi: x / Non Sq Epi: x / Bacteria: x      CAPILLARY BLOOD GLUCOSE      POCT Blood Glucose.: 192 mg/dL (27 May 2024 12:56)        LIVER FUNCTIONS - ( 27 May 2024 13:58 )  Alb: 2.1 g/dL / Pro: 6.0 gm/dL / ALK PHOS: 66 U/L / ALT: 32 U/L / AST: 36 U/L / GGT: x

## 2024-05-27 NOTE — ED PROVIDER NOTE - CARE PLAN
1 Principal Discharge DX:	Sepsis  Secondary Diagnosis:	Atrial fibrillation, rapid   Principal Discharge DX:	Sepsis  Secondary Diagnosis:	Atrial fibrillation, rapid  Secondary Diagnosis:	Sacral decubitus ulcer

## 2024-05-27 NOTE — CONSULT NOTE ADULT - SUBJECTIVE AND OBJECTIVE BOX
88 yo  F with PMH of afib on Xarelto, HFpEF on torsemide, HTN, GERD, DM, temporal arteritis on prednisone presents with delirium, weakness, tachycardia. Concern of sepsis from sacral wound. Previous OR debridement 4/25/24. Since followed at wound care center since discharge with wound vac. Patient confused unable to elicit history. Unstable for CT, awaiting results.      PAST MEDICAL & SURGICAL HISTORY:  Temporal arteritis      Atrial fibrillation      Acute CHF      ALLERGIES: Metoprolol Tartrate (Other; Sedation/Somnol)  metformin (Diarrhea)  penicillin (Swelling)    --------------------------------------------------------------------------------------------    Vitals:   T(C): 35.4 (05-27-24 @ 13:34), Max: 35.4 (05-27-24 @ 13:34)  HR: 131 (05-27-24 @ 12:50) (131 - 131)  BP: 141/88 (05-27-24 @ 12:50) (141/88 - 141/88)  RR: 12 (05-27-24 @ 12:50) (12 - 12)  SpO2: 100% (05-27-24 @ 12:50) (100% - 100%)  CAPILLARY BLOOD GLUCOSE      POCT Blood Glucose.: 192 mg/dL (27 May 2024 12:56)    CAPILLARY BLOOD GLUCOSE      POCT Blood Glucose.: 192 mg/dL (27 May 2024 12:56)      Height (cm): 160 (05-27 @ 12:50)  Weight (kg): 63.503 (05-27 @ 14:12)  BMI (kg/m2): 24.8 (05-27 @ 14:12)  BSA (m2): 1.66 (05-27 @ 14:12)    FOCUSED PHYSICAL EXAM: ill appearing elderly female, hallucinating  General: NAD  Skin: Wound vac removed to reveal an 8x8 cm sacral decubitus wound with depth to sacrum without undermining. Minimal to moderate necrotic tissue on superficial edges. Wound vac cannister with minimal serosanguinous drainage.  Bilateral lower extremities with significant edema  --------------------------------------------------------------------------------------------    LABS    BMP (05-27 @ 13:58)             136     |  103     |  59<H> 		Ca++ --      Ca 9.4                ---------------------------------( 208<H>		Mg --                 3.8     |  19<L>   |  1.09  			Ph --        LFTs (05-27 @ 13:58)      TPro 6.0 / Alb 2.1<L> / TBili 0.5 / DBili -- / AST 36 / ALT 32 / AlkPhos 66        ABG (05-27 @ 13:58)      /  /  /  /  / %     Lactate:  2.3<H>      --------------------------------------------------------------------------------------------    MICROBIOLOGY  Urinalysis (05-27 @ 13:58):     Color:  / Appearance:  / SG:  / pH:  / Gluc: 208<H> / Ketones:  / Bili:  / Urobili:  / Protein : / Nitrites:  / Leuk.Est:  / RBC:  / WBC:  / Sq Epi:  / Non Sq Epi:  / Bacteria          --------------------------------------------------------------------------------------------    IMAGING  pending    ASSESSMENT: Patient is a 89y old f with afib on Xarelto, HFpEF on torsemide, HTN, GERD, DM, temporal arteritis on prednisone, chronic decubitus ulcer with wound vac with concern of sepsis.      PLAN:   -Patient discussed with Dr. Singer, awaiting labs and imaging.  -Sacral wound does not appear to be primary source of infection   89 yo  F with PMH of afib on Xarelto, HFpEF on torsemide, HTN, GERD, DM, temporal arteritis on prednisone presents with delirium, weakness, tachycardia. Concern of sepsis from sacral wound. Previous OR debridement 4/25/24. Since followed at wound care center since discharge with wound vac. Patient confused unable to elicit history. Unstable for CT, awaiting results.      PAST MEDICAL & SURGICAL HISTORY:  Temporal arteritis      Atrial fibrillation      Acute CHF      ALLERGIES: Metoprolol Tartrate (Other; Sedation/Somnol)  metformin (Diarrhea)  penicillin (Swelling)    --------------------------------------------------------------------------------------------    Vitals:   T(C): 35.4 (05-27-24 @ 13:34), Max: 35.4 (05-27-24 @ 13:34)  HR: 131 (05-27-24 @ 12:50) (131 - 131)  BP: 141/88 (05-27-24 @ 12:50) (141/88 - 141/88)  RR: 12 (05-27-24 @ 12:50) (12 - 12)  SpO2: 100% (05-27-24 @ 12:50) (100% - 100%)  CAPILLARY BLOOD GLUCOSE      POCT Blood Glucose.: 192 mg/dL (27 May 2024 12:56)    CAPILLARY BLOOD GLUCOSE      POCT Blood Glucose.: 192 mg/dL (27 May 2024 12:56)      Height (cm): 160 (05-27 @ 12:50)  Weight (kg): 63.503 (05-27 @ 14:12)  BMI (kg/m2): 24.8 (05-27 @ 14:12)  BSA (m2): 1.66 (05-27 @ 14:12)    FOCUSED PHYSICAL EXAM: ill appearing elderly female, hallucinating  General: NAD  Skin: Wound vac removed to reveal an 8x8 cm sacral decubitus wound with depth to sacrum without undermining. Minimal to moderate necrotic tissue on superficial edges. Wound vac cannister with minimal serosanguinous drainage. Vac removed and wet to dry dressing applied.  Bilateral lower extremities with significant edema, purple toes   --------------------------------------------------------------------------------------------    LABS    BMP (05-27 @ 13:58)             136     |  103     |  59<H> 		Ca++ --      Ca 9.4                ---------------------------------( 208<H>		Mg --                 3.8     |  19<L>   |  1.09  			Ph --        LFTs (05-27 @ 13:58)      TPro 6.0 / Alb 2.1<L> / TBili 0.5 / DBili -- / AST 36 / ALT 32 / AlkPhos 66        ABG (05-27 @ 13:58)      /  /  /  /  / %     Lactate:  2.3<H>      --------------------------------------------------------------------------------------------    MICROBIOLOGY  Urinalysis (05-27 @ 13:58):     Color:  / Appearance:  / SG:  / pH:  / Gluc: 208<H> / Ketones:  / Bili:  / Urobili:  / Protein : / Nitrites:  / Leuk.Est:  / RBC:  / WBC:  / Sq Epi:  / Non Sq Epi:  / Bacteria          --------------------------------------------------------------------------------------------    IMAGING  pending    ASSESSMENT: Patient is a 89y old f with afib on Xarelto, HFpEF on torsemide, HTN, GERD, DM, temporal arteritis on prednisone, chronic decubitus ulcer with concern of sepsis.      PLAN:   -Patient discussed with Dr. Singer, awaiting labs and imaging.  -Sacral wound does not appear to be primary source of infection

## 2024-05-27 NOTE — ED ADULT TRIAGE NOTE - CHIEF COMPLAINT QUOTE
Pt BIBEMS from home, c/o multiple medical complaints. As per EMS, c/o shortness of breath, generalized weakness, and body aches. Arrives with wound vac. Pt slow to respond in triage. As per EMS, no recent falls- bedbound. . STAT EKG to be completed. Primary RN and MD aware.

## 2024-05-27 NOTE — PROVIDER CONTACT NOTE (OTHER) - SITUATION
notified service; spoke to Cassandra
notified DIANE Diaz via email
notified service; spoke to Cristofer

## 2024-05-27 NOTE — ED PROVIDER NOTE - OBJECTIVE STATEMENT
89F hx afib on Xarelto, HFpEF on torsemide, HTN, GERD, DM, 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 Hypothermic, ill appearing.  Large sacral decub w/wound vac - likely source.   Son at bedside -- patient is full code.

## 2024-05-28 NOTE — PROGRESS NOTE ADULT - SUBJECTIVE AND OBJECTIVE BOX
89F hx afib on Xarelto, HFpEF on torsemide, HTN, GERD, DM, 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 Hypothermic, ill appearing.  Large sacral decub w/wound vac - likely source  ICU consult called for hypotension    5/27: Large, deep decub noted, seen by surgery. Pending CT, workup. IVF, pressors, abx.   5/28 Case discussed on CCU rounds.      ICU Vital Signs Last 24 Hrs  T(C): 35.3 (28 May 2024 16:00), Max: 35.6 (28 May 2024 01:05)  T(F): 95.6 (28 May 2024 16:00), Max: 96 (28 May 2024 01:05)  HR: 53 (28 May 2024 17:00) (45 - 149)  BP: 110/81 (28 May 2024 17:00) (78/59 - 127/56)  BP(mean): 91 (28 May 2024 17:00) (51 - 96)  RR: 20 (28 May 2024 17:00) (13 - 26)  SpO2: 100% (28 May 2024 17:00) (93% - 100%)    O2 Parameters below as of 28 May 2024 06:07  Patient On (Oxygen Delivery Method): nasal cannula  O2 Flow (L/min): 2                            14.2   26.04 )-----------( 247      ( 28 May 2024 05:54 )             44.6         05-28    140  |  107  |  57<H>  ----------------------------<  174<H>  3.3<L>   |  24  |  0.97    Ca    9.0      28 May 2024 05:54  Phos  3.3     05-28  Mg     1.9     05-28

## 2024-05-28 NOTE — CONSULT NOTE ADULT - SUBJECTIVE AND OBJECTIVE BOX
HPI:   "  89F hx afib on Xarelto, HFpEF on torsemide, HTN, GERD, DM, 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 Hypothermic, ill appearing.  Large sacral decub w/wound vac - likely source  ICU consult called for hypotension "      pt seen and examined in NAD confused uanble to participate in GOC / ROS   pt known to palliative team had a very lengthy goc in April for which she opted DNR DNI  prior to my seeing her, after that seh confirmed DNR DNI again with some further limitations (trials of IV abx and IVF)   this was discussed at length w/ CANDY over phone w/ patient at bedside. and MOLST was completed as per patients wishes.       WBC rising to 26, lactate is 2 today (down from 4.1)   It apepars MOLST was scanned voided? unclear when /who revoked it  or why.     Will reach out to CANDY Adrien Bland who is the HCP to further discuss      PAIN: ( ) YES  ( X)  NO  Pt unable to characterise d/t clinical status     DYSPNEA ( ) Yes ( X) No  Patient unable to characterise d/t clinical status     PAST MEDICAL & SURGICAL HISTORY:  Temporal arteritis      Atrial fibrillation      Acute CHF          SOCIAL HX:    Hx opiate tolerance ( )YES  ( )NO    Baseline ADLs  (Prior to Admission)  ( ) Independent   ( )Dependent    FAMILY HISTORY:      Review of Systems:     Unable to obtain/Limited due to: fatigued in NAD unable to participate in full ROS      PHYSICAL EXAM:    Vital Signs Last 24 Hrs  T(C): 35.6 (28 May 2024 06:06), Max: 36.5 (27 May 2024 16:22)  T(F): 96 (28 May 2024 06:06), Max: 97.7 (27 May 2024 16:22)  HR: 81 (28 May 2024 08:22) (45 - 149)  BP: 96/41 (28 May 2024 08:22) (69/26 - 145/127)  BP(mean): 59 (28 May 2024 08:22) (36 - 134)  RR: 18 (28 May 2024 08:22) (12 - 36)  SpO2: 100% (28 May 2024 08:22) (75% - 100%)    Parameters below as of 28 May 2024 06:07  Patient On (Oxygen Delivery Method): nasal cannula  O2 Flow (L/min): 2    Daily Height in cm: 160.02 (27 May 2024 12:50)    Daily Weight in k (28 May 2024 06:06)    PPSV2: 30 %  FAST:  General: calm in NAD  Mental Status: lethargic  HEENT:  , perrl  Lungs: ctabl b/l bs no rhonchi or rales  Cardiac: s1s2 no mgr  GI: soft nontender +BS  : voids  Ext: moves all 4 extremities spontaneously  Neuro unable to participate in fullexam d/t ams    LABS:                        14.2   26.04 )-----------( 247      ( 28 May 2024 05:54 )             44.6         140  |  107  |  57<H>  ----------------------------<  174<H>  3.3<L>   |  24  |  0.97    Ca    9.0      28 May 2024 05:54  Phos  3.3       Mg     1.9         TPro  6.0  /  Alb  2.1<L>  /  TBili  0.5  /  DBili  x   /  AST  36  /  ALT  32  /  AlkPhos  66      PT/INR - ( 27 May 2024 13:58 )   PT: 15.2 sec;   INR: 1.36 ratio         PTT - ( 27 May 2024 13:58 )  PTT:31.8 sec  Albumin: Albumin: 2.1 g/dL ( @ 13:58)      Allergies    penicillin (Swelling)    Intolerances    Metoprolol Tartrate (Other; Sedation/Somnol)  metformin (Diarrhea)    MEDICATIONS  (STANDING):  aMIOdarone    Tablet 200 milliGRAM(s) Oral daily  ascorbic acid 500 milliGRAM(s) Oral daily  atorvastatin 80 milliGRAM(s) Oral at bedtime  cefepime  Injectable. 2000 milliGRAM(s) IV Push every 12 hours  dextrose 50% Injectable 12.5 Gram(s) IV Push once  dextrose 50% Injectable 25 Gram(s) IV Push once  dextrose 50% Injectable 25 Gram(s) IV Push once  dextrose Oral Gel 15 Gram(s) Oral once  diltiazem Infusion 5 mG/Hr (5 mL/Hr) IV Continuous <Continuous>  enoxaparin Injectable 70 milliGRAM(s) SubCutaneous every 12 hours  folic acid 1 milliGRAM(s) Oral daily  glucagon  Injectable 1 milliGRAM(s) IntraMuscular once  hydrocortisone sodium succinate Injectable 100 milliGRAM(s) IV Push three times a day  insulin lispro (ADMELOG) corrective regimen sliding scale   SubCutaneous every 6 hours  lactated ringers. 1000 milliLiter(s) (75 mL/Hr) IV Continuous <Continuous>  multivitamin/minerals 1 Tablet(s) Oral daily  pantoprazole    Tablet 40 milliGRAM(s) Oral before breakfast  phenylephrine    Infusion 0.3 MICROgram(s)/kG/Min (7.62 mL/Hr) IV Continuous <Continuous>  polyethylene glycol 3350 17 Gram(s) Oral daily  sertraline 25 milliGRAM(s) Oral daily  timolol 0.5% Solution 1 Drop(s) Both EYES daily  zinc sulfate 220 milliGRAM(s) Oral daily    MEDICATIONS  (PRN):  acetaminophen     Tablet .. 650 milliGRAM(s) Oral every 6 hours PRN Temp greater or equal to 38C (100.4F), Mild Pain (1 - 3), Moderate Pain (4 - 6)  bisacodyl Suppository 10 milliGRAM(s) Rectal daily PRN Constipation      RADIOLOGY/ADDITIONAL STUDIES:

## 2024-05-28 NOTE — PROGRESS NOTE ADULT - SUBJECTIVE AND OBJECTIVE BOX
89 yo fem with change of mental status on levophed    Packed sacral wound with some necrotic skin edges, rest of wound looks good          05-28-24 @ 10:48    Vital Signs Last 24 Hrs  T(C): 34.7 (28 May 2024 09:00), Max: 36.5 (27 May 2024 16:22)  T(F): 94.5 (28 May 2024 09:00), Max: 97.7 (27 May 2024 16:22)  HR: 45 (28 May 2024 10:30) (45 - 149)  BP: 92/44 (28 May 2024 10:30) (69/26 - 145/127)  BP(mean): 60 (28 May 2024 10:30) (36 - 134)  RR: 15 (28 May 2024 10:30) (12 - 36)  SpO2: 100% (28 May 2024 10:30) (75% - 100%)    Parameters below as of 28 May 2024 06:07  Patient On (Oxygen Delivery Method): nasal cannula  O2 Flow (L/min): 2                            14.2   26.04 )-----------( 247      ( 28 May 2024 05:54 )             44.6       05-28    140  |  107  |  57<H>  ----------------------------<  174<H>  3.3<L>   |  24  |  0.97    Ca    9.0      28 May 2024 05:54  Phos  3.3     05-28  Mg     1.9     05-28    TPro  6.0  /  Alb  2.1<L>  /  TBili  0.5  /  DBili  x   /  AST  36  /  ALT  32  /  AlkPhos  66  05-27      LIVER FUNCTIONS - ( 27 May 2024 13:58 )  Alb: 2.1 g/dL / Pro: 6.0 gm/dL / ALK PHOS: 66 U/L / ALT: 32 U/L / AST: 36 U/L / GGT: x             MEDICATIONS  (STANDING):  aMIOdarone    Tablet 200 milliGRAM(s) Oral daily  ascorbic acid 500 milliGRAM(s) Oral daily  atorvastatin 80 milliGRAM(s) Oral at bedtime  cefepime  Injectable. 2000 milliGRAM(s) IV Push every 12 hours  dextrose 50% Injectable 25 Gram(s) IV Push once  dextrose 50% Injectable 25 Gram(s) IV Push once  dextrose 50% Injectable 12.5 Gram(s) IV Push once  dextrose Oral Gel 15 Gram(s) Oral once  enoxaparin Injectable 70 milliGRAM(s) SubCutaneous every 12 hours  folic acid 1 milliGRAM(s) Oral daily  glucagon  Injectable 1 milliGRAM(s) IntraMuscular once  hydrocortisone sodium succinate Injectable 100 milliGRAM(s) IV Push three times a day  insulin lispro (ADMELOG) corrective regimen sliding scale   SubCutaneous every 6 hours  lactated ringers. 1000 milliLiter(s) (75 mL/Hr) IV Continuous <Continuous>  multivitamin/minerals 1 Tablet(s) Oral daily  pantoprazole    Tablet 40 milliGRAM(s) Oral before breakfast  phenylephrine    Infusion 0.3 MICROgram(s)/kG/Min (7.62 mL/Hr) IV Continuous <Continuous>  polyethylene glycol 3350 17 Gram(s) Oral daily  sertraline 25 milliGRAM(s) Oral daily  timolol 0.5% Solution 1 Drop(s) Both EYES daily  zinc sulfate 220 milliGRAM(s) Oral daily    MEDICATIONS  (PRN):  acetaminophen     Tablet .. 650 milliGRAM(s) Oral every 6 hours PRN Temp greater or equal to 38C (100.4F), Mild Pain (1 - 3), Moderate Pain (4 - 6)  bisacodyl Suppository 10 milliGRAM(s) Rectal daily PRN Constipation      MEDICATIONS  (STANDING):  aMIOdarone    Tablet 200 milliGRAM(s) Oral daily  ascorbic acid 500 milliGRAM(s) Oral daily  atorvastatin 80 milliGRAM(s) Oral at bedtime  cefepime  Injectable. 2000 milliGRAM(s) IV Push every 12 hours  dextrose 50% Injectable 25 Gram(s) IV Push once  dextrose 50% Injectable 25 Gram(s) IV Push once  dextrose 50% Injectable 12.5 Gram(s) IV Push once  dextrose Oral Gel 15 Gram(s) Oral once  enoxaparin Injectable 70 milliGRAM(s) SubCutaneous every 12 hours  folic acid 1 milliGRAM(s) Oral daily  glucagon  Injectable 1 milliGRAM(s) IntraMuscular once  hydrocortisone sodium succinate Injectable 100 milliGRAM(s) IV Push three times a day  insulin lispro (ADMELOG) corrective regimen sliding scale   SubCutaneous every 6 hours  lactated ringers. 1000 milliLiter(s) (75 mL/Hr) IV Continuous <Continuous>  multivitamin/minerals 1 Tablet(s) Oral daily  pantoprazole    Tablet 40 milliGRAM(s) Oral before breakfast  phenylephrine    Infusion 0.3 MICROgram(s)/kG/Min (7.62 mL/Hr) IV Continuous <Continuous>  polyethylene glycol 3350 17 Gram(s) Oral daily  sertraline 25 milliGRAM(s) Oral daily  timolol 0.5% Solution 1 Drop(s) Both EYES daily  zinc sulfate 220 milliGRAM(s) Oral daily

## 2024-05-28 NOTE — DIETITIAN NUTRITION RISK NOTIFICATION - TREATMENT: THE FOLLOWING DIET HAS BEEN RECOMMENDED
Diet, NPO:   Except Medications     Special Instructions for Nursing:  Except Medications (05-27-24 @ 14:53) [Active]

## 2024-05-28 NOTE — CONSULT NOTE ADULT - ASSESSMENT
89F hx afib on Xarelto, HFpEF on torsemide, HTN, GERD, DM, 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 Hypothermic, ill appearing.  Large sacral decub w/wound vac - likely source. ICU consult called for hypotension. 5/27: Large, deep decub noted, seen by surgery. Pending CT, workup. IVF, pressors, abx. Son in law at bedside.  (27 May 2024 14:53)   89F hx afib on Xarelto, HFpEF on torsemide, HTN, GERD, DM, 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 Hypothermic, ill appearing.  Large sacral decub w/wound vac - likely source. ICU consult called for hypotension. 5/27: Large, deep decub noted, seen by surgery. Pending CT, workup. IVF, pressors, abx. Son in law at bedside.  (27 May 2024 14:53)    #Septic Shock. Sacral decubitus   #PAF w/ RVR   #DMT2  #Hyperlipidemia  #HFpEF  #Hypertension    On Pressors. ABX. ICU Care.  Monitor on tele - current SR-SB  Continue amiodarone, atorvastatin  Now off Cardizem gtt.  Lovenox for stroke ppx for now    Will follow

## 2024-05-28 NOTE — DIETITIAN INITIAL EVALUATION ADULT - NSFNSGIIOFT_GEN_A_CORE
I&O's Detail    27 May 2024 07:01  -  28 May 2024 07:00  --------------------------------------------------------  IN:    Diltiazem: 92.5 mL    Heparin Infusion: 48 mL    Lactated Ringers: 750 mL    Lactated Ringers Bolus: 500 mL    Phenylephrine: 39.3 mL    Sodium Chloride 0.9% Bolus: 1500 mL  Total IN: 2929.8 mL    OUT:    Indwelling Catheter - Urethral (mL): 520 mL    Voided (mL): 700 mL  Total OUT: 1220 mL    Total NET: 1709.8 mL

## 2024-05-28 NOTE — DIETITIAN INITIAL EVALUATION ADULT - PATIENT MEETS CRITERIA FOR MALNUTRITION
[facility]  [email or web address]  [phone]    Eloy Saini MD, FACOG     Follow-up OB visit    Chief Complaint   Patient presents with    Routine Prenatal Visit    Colposcopy       Vitals:    01/30/20 1357   BP: 120/68   Weight: 184 lb (83.5 kg)   Height: 5' 5\" (1.651 m)       Patient Active Problem List    Diagnosis Date Noted    Encounter for supervision of other normal pregnancy, first trimester 07/10/2014    Anemia 06/17/2014    Back pain complicating pregnancy 04/46/1917    Pyelonephritis, unspecified 06/24/2012    Nausea with vomiting 06/24/2012        The patient reports the following concerns: none    Prenatal Visit    Vitals:    01/30/20 1357   BP: 120/68   Weight: 184 lb (83.5 kg)   Height: 5' 5\" (1.651 m)     See PN flowsheet for exam      26 y.o. Simone Rochelle 16w2d   Encounter Diagnoses   Name Primary? 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 Yes    Encounter for supervision of other normal pregnancy, first trimester         [] SAB/bleeding precautions reviewed   [] PTL/PPROM precautions reviewed   [] Labor precautions reviewed   [] Fetal kick counts discussed   [] Labs reviewed with patient   [] Quintin Kand precautions reviewed   [] Consent reviewed   [] Handouts given to pt   [] Glucola handout    [] GBS/labor/Magic Hour handout   []    [] We reviewed CDC recommendations for Tdap for patient and close contacts and RBA of receiving in pregnancy, advised obtaining in third trimester   [] Reviewed healthy nutrition in pregnancy and good exercise practices   [] We disc safer medications in pregnancy and referred patient to [facility] resources   [] We reviewed CDC recommendations for flu vaccine and RBA of receiving in pregnancy   []    []    []           Orders Placed This Encounter    COLPOSCOPY,CERVIX W/ADJ [address] [facility] [place], [place]         [place]  [email or web address]  [phone]    Rashel Conner. Ruben Montejo MD, [address] [facility]       Ob/Gyn visit    Chief Complaint:   Chief Complaint   Patient presents with    Routine Prenatal Visit    Colposcopy       History of Present Illness: This is a new problem being evaluated by this provider. Elyse Pena is a , 26 y.o. female [facility]   She presents for an appointment for a pap smear abnormality consisting of HSIL in 2019. Cervical cancer risk assessment:  Tobacco use history: never  Current tobacco use: No  HPV vaccine history: No  Sexually transmitted disease exposure: No    Currently taking PNV/folic acid: YES    LMP: Patient's last menstrual period was 10/01/2019 (exact date).     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 2019    Pyelonephritis, unspecified 2012    Routine Papanicolaou smear 16 neg    Scoliosis     scoliosis     Past Surgical History:   Procedure Laterality Date    HX CHOLECYSTECTOMY  2016    HX OTHER SURGICAL  2016    Ureteroscopy     Family History   Problem Relation Age of Onset    Asthma Mother     Diabetes Father      Social History     Socioeconomic History    Marital status: SINGLE     Spouse name: Not on file    Number of children: Not on file    Years of education: Not on file    Highest education level: Not on file   Occupational History    Not on file   Social Needs    Financial resource strain: Not on file    Food insecurity:     Worry: Not on file     Inability: Not on file    Transportation needs:     Medical: Not on file     Non-medical: Not on file   Tobacco Use    Smoking status: Former Smoker    Smokeless tobacco: Never Used   Substance and Sexual Activity    Alcohol use: No    Drug use: No    Sexual activity: Yes     Partners: Male     Birth control/protection: None   Lifestyle    Physical activity:     Days per week: Not on file     Minutes per session: Not on file  Stress: Not on file   Relationships    Social connections:     Talks on phone: Not on file     Gets together: Not on file     Attends Yarsani service: Not on file     Active member of club or organization: Not on file     Attends meetings of clubs or organizations: Not on file     Relationship status: Not on file    Intimate partner violence:     Fear of current or ex partner: Not on file     Emotionally abused: Not on file     Physically abused: Not on file     Forced sexual activity: Not on file   Other Topics Concern    Not on file   Social History Narrative    Not on file       No Known Allergies  Current Outpatient Medications   Medication Sig    PNV [id number] fum/folic ac (PRENATAL PO) Take  by mouth. No current facility-administered medications for this visit. Review of Systems:  History obtained from the patient  Constitutional: negative for fevers, chills and weight loss  ENT ROS: negative for - hearing change, oral lesions or visual changes  Respiratory: negative for cough, wheezing or dyspnea on exertion  Cardiovascular: negative for chest pain, irregular heart beats, exertional chest pressure/discomfort  Gastrointestinal: negative for dysphagia, nausea and vomiting  Genito-Urinary ROS: no dysuria, trouble voiding, or hematuria  Inteument/breast: negative for rash, breast lump and nipple discharge  Musculoskeletal:negative for stiff joints, neck pain and muscle weakness  Endocrine ROS: negative for - breast changes, galactorrhea or temperature intolerance  Hematological and Lymphatic ROS: negative for - blood clots, bruising or swollen lymph nodes    Physical Exam:  Visit Vitals  /68   Ht 5' 5\" (1.651 m)   Wt 184 lb (83.5 kg)   BMI 30.62 kg/m²       GENERAL: alert, well appearing, and in no distress  HEAD: normocephalic, atraumatic.    ABDOMEN: soft, nontender, nondistended, no masses or organomegaly   EGBUS: no lesions, no inflammation, no masses  VULVA: normal appearing vulva with no masses, tenderness or lesions  VAGINA: normal appearing vagina with normal color, no lesions, thin white discharge  CERVIX: normal appearing cervix without discharge or lesions, non tender  UTERUS: uterus is enlarged size, shape, consistency and nontender   ADNEXA: normal adnexa in size, nontender and no masses  NEURO: alert, oriented, normal speech    Assessment:  Encounter Diagnoses   Name Primary? 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 We reviewed her pap smear results and risk factors for cervical cancer. We discussed r/b/a of colposcopy and pt electec to proceed with procedure. After being presented with the risks, benefits and alternatives has she signed a consent for the procedure. She states that she understands the need for the procedure and has no further questions. She was informed that she may experience discomfort.   Disc concerns re abnl pap smear, cx cancer risks, delivery options in preg *chyst if needed, ckc/leep  Disc plan for colpo, possible bx: pt amenable      Procedure Note: Colposcopy  Colposcopy procedure note:  Chart reviewed for the following:   Ayan CONNELLY MD, have reviewed the History, Physical and updated the Allergic reactions for [address] performed immediately prior to start of procedure:   Ayan CONNELLY MD, have performed the following reviews on [facility] prior to the start of the procedure:            * Patient was identified by name and date of birth   * Agreement on procedure being performed was verified  * Risks and Benefits explained to the patient  * Procedure site verified and marked as necessary  * Patient was positioned for comfort  * Consent was signed and verified  Time: 1:59pm  Date of procedure: 1/30/2020    Procedure performed by: Lindy Ritchie MD  Provider assisted by: Estelita Curry LPN  Patient assisted by: significant other  How tolerated by patient: tolerated the procedure well with no complications  Comments: none    She was positioned in the dorsal lithotomy position and a speculum was inserted into the vagina. Dilute acetic acid was applied to the cervix. The colposcope was used to visualize the cervix with white and green light. The transformation zone was completely visualized. This colposcopy was satisfactory. Findings:   The procedure was notable for white epithelium on the cervix. Biopsies were not taken from the cervix. Monsels was not applied to the cervix to obtain hemostasis. Endocervical currettage: An endocervical curettage was not performed because of pregnancy. Post Procedure Status: The patient tolerated the procedure well with minimal discomfort. She was observed for 10 minutes and released in good condition. She should notify us with any concerns, fevers or heavy bleeding. We will repeat colpo @ 28 wks  Disc option of bx: deferred because no HG appearing lesions at colpo. yes

## 2024-05-28 NOTE — DIETITIAN INITIAL EVALUATION ADULT - PERTINENT MEDS FT
MEDICATIONS  (STANDING):  aMIOdarone    Tablet 200 milliGRAM(s) Oral daily  ascorbic acid 500 milliGRAM(s) Oral daily  atorvastatin 80 milliGRAM(s) Oral at bedtime  cefepime  Injectable. 2000 milliGRAM(s) IV Push every 12 hours  dextrose 50% Injectable 25 Gram(s) IV Push once  dextrose 50% Injectable 12.5 Gram(s) IV Push once  dextrose 50% Injectable 25 Gram(s) IV Push once  dextrose Oral Gel 15 Gram(s) Oral once  diltiazem Infusion 5 mG/Hr (5 mL/Hr) IV Continuous <Continuous>  enoxaparin Injectable 70 milliGRAM(s) SubCutaneous every 12 hours  folic acid 1 milliGRAM(s) Oral daily  glucagon  Injectable 1 milliGRAM(s) IntraMuscular once  hydrocortisone sodium succinate Injectable 100 milliGRAM(s) IV Push three times a day  insulin lispro (ADMELOG) corrective regimen sliding scale   SubCutaneous every 6 hours  lactated ringers. 1000 milliLiter(s) (75 mL/Hr) IV Continuous <Continuous>  multivitamin/minerals 1 Tablet(s) Oral daily  pantoprazole    Tablet 40 milliGRAM(s) Oral before breakfast  phenylephrine    Infusion 0.3 MICROgram(s)/kG/Min (7.62 mL/Hr) IV Continuous <Continuous>  polyethylene glycol 3350 17 Gram(s) Oral daily  sertraline 25 milliGRAM(s) Oral daily  timolol 0.5% Solution 1 Drop(s) Both EYES daily  zinc sulfate 220 milliGRAM(s) Oral daily    MEDICATIONS  (PRN):  acetaminophen     Tablet .. 650 milliGRAM(s) Oral every 6 hours PRN Temp greater or equal to 38C (100.4F), Mild Pain (1 - 3), Moderate Pain (4 - 6)  bisacodyl Suppository 10 milliGRAM(s) Rectal daily PRN Constipation    Home Medications:  acetaminophen 325 mg oral tablet: 2 tab(s) orally every 6 hours As needed Temp greater or equal to 38C (100.4F), Mild Pain (1 - 3) (02 May 2024 14:02)  amiodarone 200 mg oral tablet: 1 tab(s) orally once a day (02 May 2024 14:02)  ascorbic acid 500 mg oral tablet: 1 tab(s) orally once a day (02 May 2024 14:02)  Farxiga 10 mg oral tablet: 1 tab(s) orally once a day (15 Apr 2024 23:03)  folic acid 1 mg oral tablet: 1 tab(s) orally once a day (15 Apr 2024 23:03)  ipratropium 21 mcg/inh (0.03%) nasal spray: 2 spray(s) intranasally once a day (15 Apr 2024 23:03)  Multiple Vitamins with Minerals oral tablet: 1 tab(s) orally once a day (02 May 2024 14:02)  pantoprazole 40 mg oral delayed release tablet: 1 tab(s) orally once a day (15 Apr 2024 23:03)  rosuvastatin 40 mg oral tablet: 1 tab(s) orally once a day (15 Apr 2024 23:03)  sertraline 25 mg oral tablet: 1 tab(s) orally once a day (in the afternoon) (16 Apr 2024 09:13)  timolol maleate 0.5% ophthalmic solution: 1 drop(s) in each eye once a day (15 Apr 2024 23:03)  Xarelto 15 mg oral tablet: 1 tab(s) orally once a day (15 Apr 2024 23:03)

## 2024-05-28 NOTE — CONSULT NOTE ADULT - SUBJECTIVE AND OBJECTIVE BOX
HPI:  89F hx afib on Xarelto, HFpEF on torsemide, HTN, GERD, DM, 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 Hypothermic, ill appearing.  Large sacral decub w/wound vac - likely source. ICU consult called for hypotension. 5/27: Large, deep decub noted, seen by surgery. Pending CT, workup. IVF, pressors, abx. EP asked to evaluate for history of Afib.  Patient seen at bedside, minimally responsive to stimuli, A&Ox1, hypothermic with bear hugger in place.    PAST MEDICAL & SURGICAL HISTORY:  Temporal arteritis  Atrial fibrillation  Acute CHF    MEDICATIONS  (STANDING):  aMIOdarone    Tablet 200 milliGRAM(s) Oral daily  ascorbic acid 500 milliGRAM(s) Oral daily  atorvastatin 80 milliGRAM(s) Oral at bedtime  cefepime  Injectable. 2000 milliGRAM(s) IV Push every 12 hours  dextrose 50% Injectable 25 Gram(s) IV Push once  dextrose 50% Injectable 12.5 Gram(s) IV Push once  dextrose 50% Injectable 25 Gram(s) IV Push once  dextrose Oral Gel 15 Gram(s) Oral once  diltiazem Infusion 5 mG/Hr (5 mL/Hr) IV Continuous <Continuous>  enoxaparin Injectable 70 milliGRAM(s) SubCutaneous every 12 hours  folic acid 1 milliGRAM(s) Oral daily  glucagon  Injectable 1 milliGRAM(s) IntraMuscular once  hydrocortisone sodium succinate Injectable 100 milliGRAM(s) IV Push three times a day  insulin lispro (ADMELOG) corrective regimen sliding scale   SubCutaneous every 6 hours  lactated ringers. 1000 milliLiter(s) (75 mL/Hr) IV Continuous <Continuous>  multivitamin/minerals 1 Tablet(s) Oral daily  pantoprazole    Tablet 40 milliGRAM(s) Oral before breakfast  phenylephrine    Infusion 0.3 MICROgram(s)/kG/Min (7.62 mL/Hr) IV Continuous <Continuous>  polyethylene glycol 3350 17 Gram(s) Oral daily  sertraline 25 milliGRAM(s) Oral daily  timolol 0.5% Solution 1 Drop(s) Both EYES daily  zinc sulfate 220 milliGRAM(s) Oral daily    MEDICATIONS  (PRN):  acetaminophen     Tablet .. 650 milliGRAM(s) Oral every 6 hours PRN Temp greater or equal to 38C (100.4F), Mild Pain (1 - 3), Moderate Pain (4 - 6)  bisacodyl Suppository 10 milliGRAM(s) Rectal daily PRN Constipation      Allergies    penicillin (Swelling)      Metoprolol Tartrate (Other; Sedation/Somnol)  metformin (Diarrhea)      SOCIAL HISTORY: Denies tobacco, etoh abuse or illicit drug use        Vital Signs Last 24 Hrs  T(C): 34.7 (28 May 2024 09:00), Max: 36.5 (27 May 2024 16:22)  T(F): 94.5 (28 May 2024 09:00), Max: 97.7 (27 May 2024 16:22)  HR: 59 (28 May 2024 10:00) (45 - 149)  BP: 89/59 (28 May 2024 10:00) (69/26 - 145/127)  BP(mean): 65 (28 May 2024 10:00) (36 - 134)  RR: 18 (28 May 2024 10:00) (12 - 36)  SpO2: 100% (28 May 2024 10:00) (75% - 100%)    Parameters below as of 28 May 2024 06:07  Patient On (Oxygen Delivery Method): nasal cannula  O2 Flow (L/min): 2      REVIEW OF SYSTEMS:    CONSTITUTIONAL:  As per HPI.  HEENT:  Eyes:  No diplopia or blurred vision. ENT:  No earache, sore throat or runny nose.  CARDIOVASCULAR:  No pressure, squeezing, strangling, tightness, heaviness or aching about the chest, neck, axilla or epigastrium.  RESPIRATORY:  No cough, shortness of breath, PND or orthopnea.  GASTROINTESTINAL:  No nausea, vomiting or diarrhea.  GENITOURINARY:  No dysuria, frequency or urgency.  MUSCULOSKELETAL:  As per HPI.  SKIN:  No change in skin, hair or nails.  NEUROLOGIC:  No paresthesias, fasciculations, seizures or weakness.  PSYCHIATRIC:  No disorder of thought or mood.  ENDOCRINE:  No heat or cold intolerance, polyuria or polydipsia.  HEMATOLOGICAL:  No easy bruising or bleedings:  .     PHYSICAL EXAMINATION:    GENERAL APPEARANCE:  NAD, lethargic  NECK:  Supple. No lymphadenopathy. Jugular venous pressure not elevated. Carotids equal.   HEART:   The cardiac impulse has a normal quality. There are no murmurs, rubs or gallops noted  CHEST:  Chest is clear to auscultation. Normal respiratory effort.    ABDOMEN:  Soft and nontender.   EXTREMITIES:  +anasarca, B/L LE edema      I&O's Summary    27 May 2024 07:01  -  28 May 2024 07:00  --------------------------------------------------------  IN: 2929.8 mL / OUT: 1220 mL / NET: 1709.8 mL        LABS:                        14.2   26.04 )-----------( 247      ( 28 May 2024 05:54 )             44.6     05-28    140  |  107  |  57<H>  ----------------------------<  174<H>  3.3<L>   |  24  |  0.97    Ca    9.0      28 May 2024 05:54  Phos  3.3     05-28  Mg     1.9     05-28    TPro  6.0  /  Alb  2.1<L>  /  TBili  0.5  /  DBili  x   /  AST  36  /  ALT  32  /  AlkPhos  66  05-27    LIVER FUNCTIONS - ( 27 May 2024 13:58 )  Alb: 2.1 g/dL / Pro: 6.0 gm/dL / ALK PHOS: 66 U/L / ALT: 32 U/L / AST: 36 U/L / GGT: x           PT/INR - ( 27 May 2024 13:58 )   PT: 15.2 sec;   INR: 1.36 ratio         PTT - ( 27 May 2024 13:58 )  PTT:31.8 sec      Urinalysis Basic - ( 28 May 2024 05:54 )    Color: x / Appearance: x / SG: x / pH: x  Gluc: 174 mg/dL / Ketone: x  / Bili: x / Urobili: x   Blood: x / Protein: x / Nitrite: x   Leuk Esterase: x / RBC: x / WBC x   Sq Epi: x / Non Sq Epi: x / Bacteria: x      EKG:     TELEMETRY: SB 40-50    CARDIAC TESTS:     Summary     Left ventricle systolic function appears preserved in the presence of a   cardiac arrhythmia. Left ventricle size and structure are within normal   limitations. Visual estimation of left ventricle ejection fraction is>50   %.   The left atrium is moderately dilated.   The right atrium appears moderately dilated.   The right ventricle exhibits mild dilation, mild diffuse hypokinesis, and   mild depression of contractility.   Mild aortic sclerosis is present with normalvalvular opening.   Minimal mitral annular calcification is present.   Mild (1+) mitral regurgitation is present.   Moderate to severe (3+) tricuspid valve regurgitation is present.   Mild pulmonic valvular regurgitation (1+) is present.     Signature     ----------------------------------------------------------------   Electronically signed by Cash Figueroa MD(Interpreting      RADIOLOGY & ADDITIONAL STUDIES:  IMPRESSION:  1.  Moderate (4.3 cm max trans x 6.4 cm sagittal)) lower   sacral/sacrococcygeal decubitus ulcer extends to bony surface, developed   from 4/15/2024. No associated loculated fluid or gas collections.  2.  Interval mild pleural effusions and progressed anasarca  --- End of Report ---    LISA VELIZ MD; Attending Radiologist  This document has been electronically signed.May 27 2024  3:53PM       HPI:  89F hx afib on Xarelto, HFpEF on torsemide, HTN, GERD, DM, 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 Hypothermic, ill appearing.  Large sacral decub w/wound vac - likely source. ICU consult called for hypotension. 5/27: Large, deep decub noted, seen by surgery. Pending CT, workup. IVF, pressors, abx. EP asked to evaluate for history of Afib.  Patient seen at bedside, minimally responsive to stimuli, A&Ox1, hypothermic with bear hugger in place.    telemetry shows AF RVR on 5.27., topday SB 40-50s bpm       PAST MEDICAL & SURGICAL HISTORY:  Temporal arteritis  Atrial fibrillation  Acute CHF    MEDICATIONS  (STANDING):  aMIOdarone    Tablet 200 milliGRAM(s) Oral daily  ascorbic acid 500 milliGRAM(s) Oral daily  atorvastatin 80 milliGRAM(s) Oral at bedtime  cefepime  Injectable. 2000 milliGRAM(s) IV Push every 12 hours  dextrose 50% Injectable 25 Gram(s) IV Push once  dextrose 50% Injectable 12.5 Gram(s) IV Push once  dextrose 50% Injectable 25 Gram(s) IV Push once  dextrose Oral Gel 15 Gram(s) Oral once  diltiazem Infusion 5 mG/Hr (5 mL/Hr) IV Continuous <Continuous>  enoxaparin Injectable 70 milliGRAM(s) SubCutaneous every 12 hours  folic acid 1 milliGRAM(s) Oral daily  glucagon  Injectable 1 milliGRAM(s) IntraMuscular once  hydrocortisone sodium succinate Injectable 100 milliGRAM(s) IV Push three times a day  insulin lispro (ADMELOG) corrective regimen sliding scale   SubCutaneous every 6 hours  lactated ringers. 1000 milliLiter(s) (75 mL/Hr) IV Continuous <Continuous>  multivitamin/minerals 1 Tablet(s) Oral daily  pantoprazole    Tablet 40 milliGRAM(s) Oral before breakfast  phenylephrine    Infusion 0.3 MICROgram(s)/kG/Min (7.62 mL/Hr) IV Continuous <Continuous>  polyethylene glycol 3350 17 Gram(s) Oral daily  sertraline 25 milliGRAM(s) Oral daily  timolol 0.5% Solution 1 Drop(s) Both EYES daily  zinc sulfate 220 milliGRAM(s) Oral daily    MEDICATIONS  (PRN):  acetaminophen     Tablet .. 650 milliGRAM(s) Oral every 6 hours PRN Temp greater or equal to 38C (100.4F), Mild Pain (1 - 3), Moderate Pain (4 - 6)  bisacodyl Suppository 10 milliGRAM(s) Rectal daily PRN Constipation      Allergies    penicillin (Swelling)      Metoprolol Tartrate (Other; Sedation/Somnol)  metformin (Diarrhea)      SOCIAL HISTORY: Denies tobacco, etoh abuse or illicit drug use        Vital Signs Last 24 Hrs  T(C): 34.7 (28 May 2024 09:00), Max: 36.5 (27 May 2024 16:22)  T(F): 94.5 (28 May 2024 09:00), Max: 97.7 (27 May 2024 16:22)  HR: 59 (28 May 2024 10:00) (45 - 149)  BP: 89/59 (28 May 2024 10:00) (69/26 - 145/127)  BP(mean): 65 (28 May 2024 10:00) (36 - 134)  RR: 18 (28 May 2024 10:00) (12 - 36)  SpO2: 100% (28 May 2024 10:00) (75% - 100%)    Parameters below as of 28 May 2024 06:07  Patient On (Oxygen Delivery Method): nasal cannula  O2 Flow (L/min): 2      REVIEW OF SYSTEMS:    CONSTITUTIONAL:  As per HPI.  HEENT:  Eyes:  No diplopia or blurred vision. ENT:  No earache, sore throat or runny nose.  CARDIOVASCULAR:  No pressure, squeezing, strangling, tightness, heaviness or aching about the chest, neck, axilla or epigastrium.  RESPIRATORY:  No cough, shortness of breath, PND or orthopnea.  GASTROINTESTINAL:  No nausea, vomiting or diarrhea.  GENITOURINARY:  No dysuria, frequency or urgency.  MUSCULOSKELETAL:  As per HPI.  SKIN:  No change in skin, hair or nails.  NEUROLOGIC:  No paresthesias, fasciculations, seizures or weakness.  PSYCHIATRIC:  No disorder of thought or mood.  ENDOCRINE:  No heat or cold intolerance, polyuria or polydipsia.  HEMATOLOGICAL:  No easy bruising or bleedings:  .     PHYSICAL EXAMINATION:    GENERAL APPEARANCE:  NAD, lethargic  NECK:  Supple. No lymphadenopathy. Jugular venous pressure not elevated. Carotids equal.   HEART:   The cardiac impulse has a normal quality. There are no murmurs, rubs or gallops noted  CHEST:  Chest is clear to auscultation. Normal respiratory effort.    ABDOMEN:  Soft and nontender.   EXTREMITIES:  +anasarca, B/L LE edema      I&O's Summary    27 May 2024 07:01  -  28 May 2024 07:00  --------------------------------------------------------  IN: 2929.8 mL / OUT: 1220 mL / NET: 1709.8 mL        LABS:                        14.2   26.04 )-----------( 247      ( 28 May 2024 05:54 )             44.6     05-28    140  |  107  |  57<H>  ----------------------------<  174<H>  3.3<L>   |  24  |  0.97    Ca    9.0      28 May 2024 05:54  Phos  3.3     05-28  Mg     1.9     05-28    TPro  6.0  /  Alb  2.1<L>  /  TBili  0.5  /  DBili  x   /  AST  36  /  ALT  32  /  AlkPhos  66  05-27    LIVER FUNCTIONS - ( 27 May 2024 13:58 )  Alb: 2.1 g/dL / Pro: 6.0 gm/dL / ALK PHOS: 66 U/L / ALT: 32 U/L / AST: 36 U/L / GGT: x           PT/INR - ( 27 May 2024 13:58 )   PT: 15.2 sec;   INR: 1.36 ratio         PTT - ( 27 May 2024 13:58 )  PTT:31.8 sec      Urinalysis Basic - ( 28 May 2024 05:54 )    Color: x / Appearance: x / SG: x / pH: x  Gluc: 174 mg/dL / Ketone: x  / Bili: x / Urobili: x   Blood: x / Protein: x / Nitrite: x   Leuk Esterase: x / RBC: x / WBC x   Sq Epi: x / Non Sq Epi: x / Bacteria: x        CARDIAC TESTS:     Summary     Left ventricle systolic function appears preserved in the presence of a   cardiac arrhythmia. Left ventricle size and structure are within normal   limitations. Visual estimation of left ventricle ejection fraction is>50   %.   The left atrium is moderately dilated.   The right atrium appears moderately dilated.   The right ventricle exhibits mild dilation, mild diffuse hypokinesis, and   mild depression of contractility.   Mild aortic sclerosis is present with normalvalvular opening.   Minimal mitral annular calcification is present.   Mild (1+) mitral regurgitation is present.   Moderate to severe (3+) tricuspid valve regurgitation is present.   Mild pulmonic valvular regurgitation (1+) is present.     Signature     ----------------------------------------------------------------   Electronically signed by Cash Figueroa MD(Interpreting      RADIOLOGY & ADDITIONAL STUDIES:  IMPRESSION:  1.  Moderate (4.3 cm max trans x 6.4 cm sagittal)) lower   sacral/sacrococcygeal decubitus ulcer extends to bony surface, developed   from 4/15/2024. No associated loculated fluid or gas collections.  2.  Interval mild pleural effusions and progressed anasarca  --- End of Report ---    LISA VELIZ MD; Attending Radiologist  This document has been electronically signed.May 27 2024  3:53PM

## 2024-05-28 NOTE — PROGRESS NOTE ADULT - ASSESSMENT
88 yo fem with change of mental status on levophed  -Januvia related metabolic acidosis?  -Sacral wound looks ok, not source of hypotension

## 2024-05-28 NOTE — PHARMACOTHERAPY INTERVENTION NOTE - COMMENTS
Medication history complete, reviewed medication with patients daughter Rosamaria at [phone] and confirmed with [facility].

## 2024-05-28 NOTE — DIETITIAN INITIAL EVALUATION ADULT - NS FNS DIET ORDER
Diet, NPO:   Except Medications     Special Instructions for Nursing:  Except Medications (05-27-24 @ 14:53)

## 2024-05-28 NOTE — DIETITIAN INITIAL EVALUATION ADULT - OTHER INFO
89 y/o F with a PMHx of afib on Xarelto, HFpEF on torsemide, HTN, GERD, DM, 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 Hypothermic, ill appearing. Large sacral decub w/wound vac - likely source. Admitted for septic shock d/t sacral decubitus, suspected metabolic encephalopathy, and dehydration; tx to CCU for pressor support. FULL CODE.    Unable to obtain meaningful information 2/2 pt's mental status; NPO at time of visit. RD obtained bedscale wt on 5/28- 144#; 3+ edema likely skewing bedscale wt. Weight hx reviewed: 140#  w/ 2+ and 3+ edema (taken by RD on 4/16/24; met criteria for severe malnutrition); weight gain likely d/t increased edema.  89 y/o F with a PMHx of afib on Xarelto, HFpEF on torsemide, HTN, GERD, DM, 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 Hypothermic, ill appearing. Large sacral decub w/wound vac - likely source. Admitted for septic shock d/t sacral decubitus, suspected metabolic encephalopathy, and dehydration; tx to CCU for pressor support. FULL CODE.    Unable to obtain meaningful information 2/2 pt's mental status; NPO at time of visit. RD obtained bedscale wt on 5/28- 144#; 3+ edema likely skewing bedscale wt. Weight hx reviewed: 140#  w/ 2+ and 3+ edema (taken by RD on 4/16/24; met criteria for severe malnutrition); weight gain likely d/t increased edema. NFPE limited d/t pt positioning and AMS. NFPE reveals mild to moderate muscle/ fat wasting, pt continues to meet criteria for PCM at this time. Consider SLP eval to assess for safest/ least restrictive diet consistency/ texture. Recommend to advance diet to Low Sodium + SLP recs as soon as medically feasible. Will trial Premier Protein BID when diet is is advanced. HgbA1c level 7.3% indicating good glycemic control pta for age and conditions. Recommend to keep diet as liberalized as possible when diet is advanced. If pt's diet cannot be advanced or pt is with poor po intake, recommend to establish nutrition GOC and consider placing corpak and initiating EN. Please see additional recommendations below.  88 y/o F with a PMHx of afib on Xarelto, HFpEF on torsemide, HTN, GERD, DM, 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 Hypothermic, ill appearing. Large sacral decub w/wound vac - likely source. Admitted for septic shock d/t sacral decubitus, suspected metabolic encephalopathy, and dehydration; tx to CCU for pressor support. FULL CODE.    Unable to obtain meaningful information 2/2 pt's mental status; NPO at time of visit. RD obtained bedscale wt on 5/28- 144#; 3+ edema likely skewing bedscale wt. Weight hx reviewed: 140#  w/ 2+ and 3+ edema (taken by RD on 4/16/24; met criteria for severe malnutrition); weight gain likely d/t increased edema. NFPE limited d/t pt positioning and AMS. NFPE reveals mild to moderate muscle/ fat wasting, pt continues to meet criteria for PCM at this time. Consider SLP eval to assess for safest/ least restrictive diet consistency/ texture. Recommend to advance diet to Low Sodium + SLP recs as soon as medically feasible. Will trial Premier Protein BID when diet is is advanced. Will also add Rafa BID (provides 90 kcal, 2.5 g collagen, 7 g L-Arginine, 7 g L-Glutamine/ 1 packet) to promote wound healing. Rafa is intended to support tissue building and collagen formation in the dietary management of wounds, which has been clinically shown to support wound healing (including pressure injuries, diabetic foot ulcers, surgical incisions, burns, and other acute/chronic wounds) by enhancing collagen formation in as little as 2 weeks. HgbA1c level 7.3% indicating good glycemic control pta for age and conditions. Recommend to keep diet as liberalized as possible when diet is advanced. If pt's diet cannot be advanced or pt is with poor po intake, recommend to establish nutrition GOC and consider placing corpak and initiating EN. Please see additional recommendations below.

## 2024-05-28 NOTE — DIETITIAN INITIAL EVALUATION ADULT - PERTINENT LABORATORY DATA
05-28    140  |  107  |  57<H>  ----------------------------<  174<H>  3.3<L>   |  24  |  0.97    Ca    9.0      28 May 2024 05:54  Phos  3.3     05-28  Mg     1.9     05-28    TPro  6.0  /  Alb  2.1<L>  /  TBili  0.5  /  DBili  x   /  AST  36  /  ALT  32  /  AlkPhos  66  05-27  POCT Blood Glucose.: 162 mg/dL (05-28-24 @ 05:30)  A1C with Estimated Average Glucose Result: 7.3 % (05-27-24 @ 17:10)  A1C with Estimated Average Glucose Result: 8.6 % (03-19-24 @ 07:42)

## 2024-05-28 NOTE — DIETITIAN INITIAL EVALUATION ADULT - NSFNSPHYEXAMSKINFT_GEN_A_CORE
Pressure Injury 1: sacrum, Unstageable  Pressure Injury 2: none, Stage I  Pressure Injury 3: Right:, heel, Stage I    Rodney Score 10.

## 2024-05-28 NOTE — DIETITIAN INITIAL EVALUATION ADULT - ADD RECOMMEND
1) Consider SLP eval to assess for safest/ least restrictive diet consistency/ texture   2) Advance diet  1) Consider SLP eval to assess for safest/ least restrictive diet consistency/ texture   2) Advance diet Low Sodium + SLP recs as soon as medically feasible  3) Premier protein shake BID to optimize nutritional needs (provides 160 kcal, 30 g protein/ shake) and Rafa BID (provides 90 kcal, 2.5 g collagen, 7 g L-Arginine, 7 g L-Glutamine/ 1 packet) to promote wound healing when diet is advanced.  4) Obtain vitamin D 25OH level to assess nutriture  5) Consider adding thiamine 100 mg daily 2/2 poor PO intake/ malnutrition   6) C/w MVI/MIN and vitamin C supplementation daily and Zinc Sulfate supplementation daily x10 days  7) Monitor and optimize BG levels between 140-180 mg/dL by medical management  8) Encourage protein-rich foods, maximize food preferences  9) Monitor bowel movements, if no BM for >3 days, consider implementing bowel regimen.  10) Confirm goals of care regarding nutrition support - consider placing corpak and initiating EN if pt remains altered or unable to swallow  RD will continue to monitor PO intake, labs, hydration, and wt prn.

## 2024-05-28 NOTE — CONSULT NOTE ADULT - ASSESSMENT
Process of Care  --Reviewed dx/treatment problems and alignment with Goals of Care  sepsis w/ hypotensive shock   broad spectrum abx  opent to pressors as per note  pt currently FULL CODE--> will reach out to hcp dt previously clearly stated wishes of DNR DNI w/ treatment of acute diseases      Physical Aspects of Care  --Pain  patient denies at this time  c/w current managment    --Bowel Regimen   risk for constipation d/t immobility  daily dulcolax    --Dyspnea  No SOB at this time  comfortable and in NAD    --Nausea Vomiting  denies    --Weakness  PT as tolerated     Psychological and Psychiatric Aspects of Care:   --Greif/Bereavment: emotional support provided  --Hx of psychiatric dx: none  -Pastoral Care Available PRN     Social Aspects of Care  -SW involved     Cultural Aspects  -Primary Language: English    Goals of Care:     We discussed Palliative Care team being a supportive team when a patient has ongoing illnesses.  We also discussed that it is not an end of life care service, but can help navigate symptoms and emotional support througout their hospital stay here.      Ethical and Legal Aspects:   NA        Capacity: none today - will continue to assess  HCP/Surrogate: Adrien Bland  (HCP and CANDY)  Code Status: full code  MOLST:  Dispo Plan: pending further treatment / discussion    Discussed With: Case coordinated with attending and SW and RN     Time Spent: 90 minutes including the care, coordination and counseling of this patient, 50% of which was spent coordinating and counseling.

## 2024-05-28 NOTE — CONSULT NOTE ADULT - ASSESSMENT
ASSESSMENT & PLAN: 89 year old female with history of PAF on Amiodarone who is admitted with sepsis likely due to sacral decubitus.       Patient currently SB  Continue Amiodarone for maintenance of SR  No further AV jillian blockers as patient is delvis and on phenylephrine gtt  Will allow for permissive tachycardia in setting of severe sepsis, on steroids for temporal arteritis  Continue Lovenox for CVA prophylaxis  Further management per critical care  GOC with palliative  Plan discussed with RN, cardiology and Dr. Martines     ASSESSMENT & PLAN: 89 year old female with history of PAF on Amiodarone who is admitted with sepsis likely due to sacral decubitus.     #pAF RVR and sepsis  Patient currently SB  Continue Amiodarone for maintenance of SR  No further AV jillian blockers as patient is delvis and on phenylephrine gtt  Will allow for permissive tachycardia in setting of severe sepsis, on steroids for temporal arteritis  Continue Lovenox for CVA prophylaxis  Further management per critical care  GOC with palliative  Plan discussed with RN, cardiology and Dr. Martines

## 2024-05-28 NOTE — CONSULT NOTE ADULT - NS ATTEND AMEND GEN_ALL_CORE FT
81-year-old female with history of PAF HFpEF moderate to severe TR poor functional status with sepsis due to sacral decubitus ulcer.  A-fib RVR while septic that converted to sinus bradycardia.  Continue amiodarone p.o.  Stop any AV jillian blocking agents can give Cardizem as needed for A-fib RVR  Treat sepsis  Obtain EKG  Continue anticoagulation for stroke prevention as tolerated  Consider palliative care   poor prognosis    I spent a total of 55 minutes on the encounter, including chart review, evaluating and discussing treatment options with the patient, as well as counseling and coordination as stated above.
89 yo fem bedbound, sacral decubitus wound hypotensive on Januvia  Sacral open wound with granulation tissue present with some necrotic skin edges  anasarca    89 yio fem hypotensive  -Januvia related acidosis?  -Sacral wound not the source of hypotension  -DKA?
Agree with plan above.

## 2024-05-28 NOTE — CHART NOTE - NSCHARTNOTEFT_GEN_A_CORE
Inability to draw PTT x 3 phlebotomist    Given this, will switch from UFH to LMWH 1mg/kg BID dosing

## 2024-05-28 NOTE — DIETITIAN INITIAL EVALUATION ADULT - ETIOLOGY
r/t decreased ability to meet increased nutrient needs 2/2 AMS, sepsis, wound healing, advanced age

## 2024-05-28 NOTE — CONSULT NOTE ADULT - CONVERSATION DETAILS
Pall team did discuss with son in law/HCP Adrien via phone to discuss goals of care, assist with planning and provide supportive counseling. Pt. known to our team from a previous admission where pt. was previously alert and able to make her own decisions. Pt. currently does not have capacity. Pt. has been residing home with her daughter and son in law who are her primary care takers. Pts son in law reports that pt. was doing well and making progress at home. She had [facility] care coming for her wounds and PT.     Goals of care and advanced directives discussed. Pt. shared that pt. came in with a infection and her current state is not her baseline. Pts son in law is HCP and a copy is in [employer]. We discussed how pt. previously had a DNR/DNI that she had put in place in the past however, it was voided. Pts son in law reports that he felt pt. was pushed into signing DNR/DNI at that time and after it was explained again to the pt. prior to having surgery and she revoked DNR/DNI and said she wanted to be full resuscitation. Pts son in law reports that pt. is still Full Code and they do not wish to put any limits in place at this time. He inquired if needed in the future if resuscitation was nto successful or pt. was declining further if as HCP he could make the decision to not continue or do those interventions. This SW explained that if pt. did not have capacity to make those decision she could put limits in place if he chose to. Pts son in law is clear that at this time however, pt. remains Full Resuscitation. Feelings explored and support provided.     Plan at this time to be further determined. No limits set currently. Emotional support provided. Our team will continue to follow.

## 2024-05-28 NOTE — CONSULT NOTE ADULT - SUBJECTIVE AND OBJECTIVE BOX
CHIEF COMPLAINT: Patient is a 89y old  Female who presents with a chief complaint of Sepsis (28 May 2024 08:47)    FROM H&P: 89F hx afib on Xarelto, HFpEF on torsemide, HTN, GERD, DM, 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 Hypothermic, ill appearing.  Large sacral decub w/wound vac - likely source. ICU consult called for hypotension. 5/27: Large, deep decub noted, seen by surgery. Pending CT, workup. IVF, pressors, abx. Son in law at bedside.  (27 May 2024 14:53)    5/28.      PAST MEDICAL & SURGICAL HISTORY:  Temporal arteritis  Atrial fibrillation  Acute CHF    PSH: Denies   Social Hx: Denies tobacco / etoh / drugs   Family Hx: Denies pertinent hx  (18 Mar 2024 23:17)    Allergies: Penicillin (Swelling), Intolerances    MEDICATIONS  (STANDING):  aMIOdarone    Tablet 200 milliGRAM(s) Oral daily  ascorbic acid 500 milliGRAM(s) Oral daily  atorvastatin 80 milliGRAM(s) Oral at bedtime  cefepime  Injectable. 2000 milliGRAM(s) IV Push every 12 hours  dextrose 50% Injectable 25 Gram(s) IV Push once  dextrose 50% Injectable 12.5 Gram(s) IV Push once  dextrose 50% Injectable 25 Gram(s) IV Push once  dextrose Oral Gel 15 Gram(s) Oral once  diltiazem Infusion 5 mG/Hr (5 mL/Hr) IV Continuous <Continuous>  enoxaparin Injectable 70 milliGRAM(s) SubCutaneous every 12 hours  folic acid 1 milliGRAM(s) Oral daily  glucagon  Injectable 1 milliGRAM(s) IntraMuscular once  hydrocortisone sodium succinate Injectable 100 milliGRAM(s) IV Push three times a day  insulin lispro (ADMELOG) corrective regimen sliding scale   SubCutaneous every 6 hours  lactated ringers. 1000 milliLiter(s) (75 mL/Hr) IV Continuous <Continuous>  multivitamin/minerals 1 Tablet(s) Oral daily  pantoprazole    Tablet 40 milliGRAM(s) Oral before breakfast  phenylephrine    Infusion 0.3 MICROgram(s)/kG/Min (7.62 mL/Hr) IV Continuous <Continuous>  polyethylene glycol 3350 17 Gram(s) Oral daily  sertraline 25 milliGRAM(s) Oral daily  timolol 0.5% Solution 1 Drop(s) Both EYES daily  zinc sulfate 220 milliGRAM(s) Oral daily    MEDICATIONS  (PRN):  acetaminophen     Tablet .. 650 milliGRAM(s) Oral every 6 hours PRN Temp greater or equal to 38C (100.4F), Mild Pain (1 - 3), Moderate Pain (4 - 6)  bisacodyl Suppository 10 milliGRAM(s) Rectal daily PRN Constipation    HOME MEDICATIONS:  acetaminophen 325 mg oral tablet: 2 tab(s) orally every 6 hours As needed Temp greater or equal to 38C (100.4F), Mild Pain (1 - 3) (02 May 2024 14:02)  amiodarone 200 mg oral tablet: 1 tab(s) orally once a day (02 May 2024 14:02)  ascorbic acid 500 mg oral tablet: 1 tab(s) orally once a day (02 May 2024 14:02)  Farxiga 10 mg oral tablet: 1 tab(s) orally once a day (15 Apr 2024 23:03)  folic acid 1 mg oral tablet: 1 tab(s) orally once a day (15 Apr 2024 23:03)  ipratropium 21 mcg/inh (0.03%) nasal spray: 2 spray(s) intranasally once a day (15 Apr 2024 23:03)  Multiple Vitamins with Minerals oral tablet: 1 tab(s) orally once a day (02 May 2024 14:02)  pantoprazole 40 mg oral delayed release tablet: 1 tab(s) orally once a day (15 Apr 2024 23:03)  rosuvastatin 40 mg oral tablet: 1 tab(s) orally once a day (15 Apr 2024 23:03)  sertraline 25 mg oral tablet: 1 tab(s) orally once a day (in the afternoon) (16 Apr 2024 09:13)  timolol maleate 0.5% ophthalmic solution: 1 drop(s) in each eye once a day (15 Apr 2024 23:03)  Xarelto 15 mg oral tablet: 1 tab(s) orally once a day (15 Apr 2024 23:03)    PHYSICAL EXAM:  T(C): 35.6 (28 May 2024 06:06), Max: 36.5 (27 May 2024 16:22)  T(F): 96 (28 May 2024 06:06), Max: 97.7 (27 May 2024 16:22)  HR: 81 (28 May 2024 08:22) (45 - 149)  BP: 96/41 (28 May 2024 08:22) (69/26 - 145/127)  BP(mean): 59 (28 May 2024 08:22) (36 - 134)  RR: 18 (28 May 2024 08:22) (12 - 36)  SpO2: 100% (28 May 2024 08:22) (75% - 100%)    Parameters below as of 28 May 2024 06:07  Patient On (Oxygen Delivery Method): nasal cannula  O2 Flow (L/min): 2    Constitutional: NAD, awake and alert  HEENT: PERR, EOMI, Normal Hearing, MMM  Neck: Soft and supple, No LAD, No JVD  Respiratory: Breath sounds are clear bilaterally, No wheezing, rales or rhonchi  Cardiovascular: S1 and S2, regular rate and rhythm, no Murmurs, gallops or rubs  Gastrointestinal: Bowel Sounds present, soft, nontender, nondistended, no guarding, no rebound  Extremities: No peripheral edema  Vascular: 2+ peripheral pulses  Neurological: A/O x 3, no focal deficits  Musculoskeletal: 5/5 strength b/l upper and lower extremities  Skin: No rashes    =======================================    INTERPRETATION OF TELEMETRY:    ECG:    ========================================    LABS:                        14.2   26.04 )-----------( 247      ( 28 May 2024 05:54 )             44.6     05-28    140  |  107  |  57<H>  ----------------------------<  174<H>  3.3<L>   |  24  |  0.97    Ca    9.0      28 May 2024 05:54  Phos  3.3     05-28  Mg     1.9     05-28    TPro  6.0  /  Alb  2.1<L>  /  TBili  0.5  /  DBili  x   /  AST  36  /  ALT  32  /  AlkPhos  66  05-27    PT/INR - ( 27 May 2024 13:58 )   PT: 15.2 sec;   INR: 1.36 ratio       PTT - ( 27 May 2024 13:58 )  PTT:31.8 sec    CARDIAC TESTING:    < from: TTE Echo Complete w/o Contrast w/ Doppler (03.19.24 @ 09:41) >   Left ventricle systolic function appears preserved in the presence of a   cardiac arrhythmia. Left ventricle size and structure are within normal   limitations. Visual estimation of left ventricle ejection fraction is>50   %.   The left atrium is moderately dilated.   The right atrium appears moderately dilated.   The right ventricle exhibits mild dilation, mild diffuse hypokinesis, and   mild depression of contractility.   Mild aortic sclerosis is present with normalvalvular opening.   Minimal mitral annular calcification is present.   Mild (1+) mitral regurgitation is present.   Moderate to severe (3+) tricuspid valve regurgitation is present.   Mild pulmonic valvular regurgitation (1+) is present.    RADIOLOGY & ADDITIONAL STUDIES:    < from: CT Abdomen and Pelvis w/ IV Cont (05.27.24 @ 15:39) >  1.  Moderate (4.3 cm max trans x 6.4 cm sagittal)) lower   sacral/sacrococcygeal decubitus ulcer extends to bony surface, developed   from 4/15/2024. No associated loculated fluid or gas collections.  2.  Interval mild pleural effusions and progressed anasarca    < from: CT Head No Cont (05.27.24 @ 15:36) >  Mild chronic microvascular changes without evidence of an   acute transcortical infarction or hemorrhage.    < from: Xray Chest 1 View- PORTABLE-Urgent (05.27.24 @ 15:14) >  Mediastinal adenopathy. No acute infiltrates   CHIEF COMPLAINT: Patient is a 89y old  Female who presents with a chief complaint of Sepsis (28 May 2024 08:47)    FROM H&P: 89F hx afib on Xarelto, HFpEF on torsemide, HTN, GERD, DM, 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 Hypothermic, ill appearing.  Large sacral decub w/wound vac - likely source. ICU consult called for hypotension. 5/27: Large, deep decub noted, seen by surgery. Pending CT, workup. IVF, pressors, abx. Son in law at bedside.  (27 May 2024 14:53)    5/28. Limited ROS given current clinical condition.   Lethargic      PAST MEDICAL & SURGICAL HISTORY:  Temporal arteritis  Atrial fibrillation  Acute CHF    PSH: Denies   Social Hx: Denies tobacco / etoh / drugs   Family Hx: Denies pertinent hx  (18 Mar 2024 23:17)    Allergies: Penicillin (Swelling), Intolerances    MEDICATIONS  (STANDING):  aMIOdarone    Tablet 200 milliGRAM(s) Oral daily  ascorbic acid 500 milliGRAM(s) Oral daily  atorvastatin 80 milliGRAM(s) Oral at bedtime  cefepime  Injectable. 2000 milliGRAM(s) IV Push every 12 hours  dextrose 50% Injectable 25 Gram(s) IV Push once  dextrose 50% Injectable 12.5 Gram(s) IV Push once  dextrose 50% Injectable 25 Gram(s) IV Push once  dextrose Oral Gel 15 Gram(s) Oral once  diltiazem Infusion 5 mG/Hr (5 mL/Hr) IV Continuous <Continuous>  enoxaparin Injectable 70 milliGRAM(s) SubCutaneous every 12 hours  folic acid 1 milliGRAM(s) Oral daily  glucagon  Injectable 1 milliGRAM(s) IntraMuscular once  hydrocortisone sodium succinate Injectable 100 milliGRAM(s) IV Push three times a day  insulin lispro (ADMELOG) corrective regimen sliding scale   SubCutaneous every 6 hours  lactated ringers. 1000 milliLiter(s) (75 mL/Hr) IV Continuous <Continuous>  multivitamin/minerals 1 Tablet(s) Oral daily  pantoprazole    Tablet 40 milliGRAM(s) Oral before breakfast  phenylephrine    Infusion 0.3 MICROgram(s)/kG/Min (7.62 mL/Hr) IV Continuous <Continuous>  polyethylene glycol 3350 17 Gram(s) Oral daily  sertraline 25 milliGRAM(s) Oral daily  timolol 0.5% Solution 1 Drop(s) Both EYES daily  zinc sulfate 220 milliGRAM(s) Oral daily    MEDICATIONS  (PRN):  acetaminophen     Tablet .. 650 milliGRAM(s) Oral every 6 hours PRN Temp greater or equal to 38C (100.4F), Mild Pain (1 - 3), Moderate Pain (4 - 6)  bisacodyl Suppository 10 milliGRAM(s) Rectal daily PRN Constipation    HOME MEDICATIONS:  acetaminophen 325 mg oral tablet: 2 tab(s) orally every 6 hours As needed Temp greater or equal to 38C (100.4F), Mild Pain (1 - 3) (02 May 2024 14:02)  amiodarone 200 mg oral tablet: 1 tab(s) orally once a day (02 May 2024 14:02)  ascorbic acid 500 mg oral tablet: 1 tab(s) orally once a day (02 May 2024 14:02)  Farxiga 10 mg oral tablet: 1 tab(s) orally once a day (15 Apr 2024 23:03)  folic acid 1 mg oral tablet: 1 tab(s) orally once a day (15 Apr 2024 23:03)  ipratropium 21 mcg/inh (0.03%) nasal spray: 2 spray(s) intranasally once a day (15 Apr 2024 23:03)  Multiple Vitamins with Minerals oral tablet: 1 tab(s) orally once a day (02 May 2024 14:02)  pantoprazole 40 mg oral delayed release tablet: 1 tab(s) orally once a day (15 Apr 2024 23:03)  rosuvastatin 40 mg oral tablet: 1 tab(s) orally once a day (15 Apr 2024 23:03)  sertraline 25 mg oral tablet: 1 tab(s) orally once a day (in the afternoon) (16 Apr 2024 09:13)  timolol maleate 0.5% ophthalmic solution: 1 drop(s) in each eye once a day (15 Apr 2024 23:03)  Xarelto 15 mg oral tablet: 1 tab(s) orally once a day (15 Apr 2024 23:03)    PHYSICAL EXAM:  T(C): 35.6 (28 May 2024 06:06), Max: 36.5 (27 May 2024 16:22)  T(F): 96 (28 May 2024 06:06), Max: 97.7 (27 May 2024 16:22)  HR: 81 (28 May 2024 08:22) (45 - 149)  BP: 96/41 (28 May 2024 08:22) (69/26 - 145/127)  BP(mean): 59 (28 May 2024 08:22) (36 - 134)  RR: 18 (28 May 2024 08:22) (12 - 36)  SpO2: 100% (28 May 2024 08:22) (75% - 100%)    Parameters below as of 28 May 2024 06:07  Patient On (Oxygen Delivery Method): nasal cannula  O2 Flow (L/min): 2    Constitutional: NAD lethargic  HEENT: PERR, EOMI, Normal Hearing, MMM  Neck: Soft and supple, No LAD, No JVD  Respiratory: Breath sounds are clear bilaterally, No wheezing, rales or rhonchi  Cardiovascular: S1 and S2, regular rate and rhythm, no Murmurs, gallops or rubs  Gastrointestinal: Bowel Sounds present, soft, nontender, nondistended, no guarding, no rebound  Extremities: anasarca  Vascular: 2+ peripheral pulses  Neurological: A/O x 1, lethargic  Musculoskeletal: 5/5 strength b/l upper and lower extremities  Skin: Pressure ulcer    =======================================    INTERPRETATION OF TELEMETRY: SR - SB    ========================================    LABS:                        14.2   26.04 )-----------( 247      ( 28 May 2024 05:54 )             44.6     05-28    140  |  107  |  57<H>  ----------------------------<  174<H>  3.3<L>   |  24  |  0.97    Ca    9.0      28 May 2024 05:54  Phos  3.3     05-28  Mg     1.9     05-28    TPro  6.0  /  Alb  2.1<L>  /  TBili  0.5  /  DBili  x   /  AST  36  /  ALT  32  /  AlkPhos  66  05-27    PT/INR - ( 27 May 2024 13:58 )   PT: 15.2 sec;   INR: 1.36 ratio       PTT - ( 27 May 2024 13:58 )  PTT:31.8 sec    CARDIAC TESTING:    < from: TTE Echo Complete w/o Contrast w/ Doppler (03.19.24 @ 09:41) >   Left ventricle systolic function appears preserved in the presence of a   cardiac arrhythmia. Left ventricle size and structure are within normal   limitations. Visual estimation of left ventricle ejection fraction is>50   %.   The left atrium is moderately dilated.   The right atrium appears moderately dilated.   The right ventricle exhibits mild dilation, mild diffuse hypokinesis, and   mild depression of contractility.   Mild aortic sclerosis is present with normalvalvular opening.   Minimal mitral annular calcification is present.   Mild (1+) mitral regurgitation is present.   Moderate to severe (3+) tricuspid valve regurgitation is present.   Mild pulmonic valvular regurgitation (1+) is present.    RADIOLOGY & ADDITIONAL STUDIES:    < from: CT Abdomen and Pelvis w/ IV Cont (05.27.24 @ 15:39) >  1.  Moderate (4.3 cm max trans x 6.4 cm sagittal)) lower   sacral/sacrococcygeal decubitus ulcer extends to bony surface, developed   from 4/15/2024. No associated loculated fluid or gas collections.  2.  Interval mild pleural effusions and progressed anasarca    < from: CT Head No Cont (05.27.24 @ 15:36) >  Mild chronic microvascular changes without evidence of an   acute transcortical infarction or hemorrhage.    < from: Xray Chest 1 View- PORTABLE-Urgent (05.27.24 @ 15:14) >  Mediastinal adenopathy. No acute infiltrates

## 2024-05-28 NOTE — DIETITIAN NUTRITION RISK NOTIFICATION - ADDITIONAL COMMENTS/DIETITIAN RECOMMENDATIONS
1) Consider SLP eval to assess for safest/ least restrictive diet consistency/ texture   2) Advance diet Low Sodium + SLP recs as soon as medically feasible  3) Premier protein shake BID to optimize nutritional needs (provides 160 kcal, 30 g protein/ shake) and Rafa BID (provides 90 kcal, 2.5 g collagen, 7 g L-Arginine, 7 g L-Glutamine/ 1 packet) to promote wound healing when diet is advanced.  4) Obtain vitamin D 25OH level to assess nutriture  5) Consider adding thiamine 100 mg daily 2/2 poor PO intake/ malnutrition   6) C/w MVI/MIN and vitamin C supplementation daily and Zinc Sulfate supplementation daily x10 days  7) Monitor and optimize BG levels between 140-180 mg/dL by medical management  8) Encourage protein-rich foods, maximize food preferences  9) Monitor bowel movements, if no BM for >3 days, consider implementing bowel regimen.  10) Confirm goals of care regarding nutrition support - consider placing corpak and initiating EN if pt remains altered or unable to swallow  RD will continue to monitor PO intake, labs, hydration, and wt prn.

## 2024-05-29 NOTE — PROGRESS NOTE ADULT - ASSESSMENT
Process of Care  --Reviewed dx/treatment problems and alignment with Goals of Care  sepsis w/ hypotensive shock   broad spectrum abx  opent to pressors as per note  pt currently FULL CODE--> will reach out to hcp dt previously clearly stated wishes of DNR DNI w/ treatment of acute diseases      Physical Aspects of Care  --Pain  patient denies at this time  c/w current managment    --Bowel Regimen   risk for constipation d/t immobility  daily dulcolax    --Dyspnea  No SOB at this time  comfortable and in NAD    --Nausea Vomiting  denies    --Weakness  PT as tolerated     Psychological and Psychiatric Aspects of Care:   --Greif/Bereavment: emotional support provided  --Hx of psychiatric dx: none  -Pastoral Care Available PRN     Social Aspects of Care  -SW involved     Cultural Aspects  -Primary Language: English    Goals of Care:     We discussed Palliative Care team being a supportive team when a patient has ongoing illnesses.  We also discussed that it is not an end of life care service, but can help navigate symptoms and emotional support througout their hospital stay here.      Ethical and Legal Aspects:   NA        Capacity: none today - will continue to assess  HCP/Surrogate: Adrien Bland  (HCP and CANDY)  Code Status: full code  MOLST:  Dispo Plan: pending further treatment / discussion    Discussed With: Case coordinated with attending and SW and RN     Time Spent: 50 minutes including the care, coordination and counseling of this patient, 50% of which was spent coordinating and counseling.

## 2024-05-29 NOTE — PROGRESS NOTE ADULT - ASSESSMENT
89F hx afib on Xarelto, HFpEF on torsemide, HTN, GERD, DM, 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 Hypothermic, ill appearing.  Large sacral decub w/wound vac - likely source. ICU consult called for hypotension. 5/27: Large, deep decub noted, seen by surgery. Pending CT, workup. IVF, pressors, abx. Son in law at bedside.  (27 May 2024 14:53)    #Septic Shock. Sacral decubitus   #PAF w/ RVR   #DMT2  #Hyperlipidemia  #HFpEF  #Hypertension    ICU Care. Off pressors  Monitor on tele - current SR   Continue amiodarone, atorvastatin  Now off Cardizem gtt.  Lovenox for stroke ppx for now, if no surgery indicated can transition back to DOAC    Will sign off, cardiac conditions stable. Call w/ heather

## 2024-05-29 NOTE — PROGRESS NOTE ADULT - ASSESSMENT
88 yo fem open sacral wound, elevated WBC  -I'm not convinced her septic picture is coming from open sacral wound, unless wound is colonized by bacteria. No foul smelling though.  -Dakin solution to clean wound, santyl to skin edges  -I think wound VAC could be reapplied  -Will wait and follow wound nurse recommendations

## 2024-05-29 NOTE — PROGRESS NOTE ADULT - SUBJECTIVE AND OBJECTIVE BOX
HPI:   "  89F hx afib on Xarelto, HFpEF on torsemide, HTN, GERD, DM, 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 Hypothermic, ill appearing.  Large sacral decub w/wound vac - likely source  ICU consult called for hypotension "      pt seen and examined in NAD confused uanble to participate in GOC / ROS   pt known to palliative team had a very lengthy goc in April for which she opted DNR DNI  prior to my seeing her, after that seh confirmed DNR DNI again with some further limitations (trials of IV abx and IVF)   this was discussed at length w/ CANDY over phone w/ patient at bedside. and MOLST was completed as per patients wishes.       WBC rising to 26, lactate is 2 today (down from 4.1)   It appears MOLST was scanned voided? unclear when /who revoked it  or why.     Will reach out to CANDY Adrien Bland who is the HCP to further discuss        Patient was seen and examined.   Denies nausea, vomiting, shortness of breath, chest pain, headaches, abdominal pain  pt in NAD today-  was more alert mentation is clearing  overall slow progress to improvement   will continue  to follow  no changes in goals at this time      PAIN: ( ) YES  ( X)  NO     DYSPNEA ( ) Yes ( X) No   Review of Systems:       Review of Systems:    Anxiety- denies  Depression-denies  Physical Discomfort- in NAD  Dyspnea-denies  Constipation-denies  Diarrhea-denies  Nausea-denies  Vomiting-denies  Anorexia-denies  Weight Loss- denies  Cough-denies  Secretions-denies  Fatigue-denies  Weakness-denies  Delirium-improving    All other systems reviewed and negative     PHYSICAL EXAM:    Vital Signs Last 24 Hrs  T(C): 35.6 (28 May 2024 06:06), Max: 36.5 (27 May 2024 16:22)  T(F): 96 (28 May 2024 06:06), Max: 97.7 (27 May 2024 16:22)  HR: 81 (28 May 2024 08:22) (45 - 149)  BP: 96/41 (28 May 2024 08:22) (69/26 - 145/127)  BP(mean): 59 (28 May 2024 08:22) (36 - 134)  RR: 18 (28 May 2024 08:22) (12 - 36)  SpO2: 100% (28 May 2024 08:22) (75% - 100%)    Parameters below as of 28 May 2024 06:07  Patient On (Oxygen Delivery Method): nasal cannula  O2 Flow (L/min): 2    Daily Height in cm: 160.02 (27 May 2024 12:50)    Daily Weight in k (28 May 2024 06:06)    PPSV2: 30 %  FAST:  General: calm in NAD  Mental Status: awake and oriented x 3  HEENT:  , perrl  Lungs: ctabl b/l bs no rhonchi or rales  Cardiac: s1s2 no mgr  GI: soft nontender +BS  : voids  Ext: moves all 4 extremities spontaneously  Neuro improving    LABS:                        14.2   26.04 )-----------( 247      ( 28 May 2024 05:54 )             44.6         140  |  107  |  57<H>  ----------------------------<  174<H>  3.3<L>   |  24  |  0.97    Ca    9.0      28 May 2024 05:54  Phos  3.3       Mg     1.9         TPro  6.0  /  Alb  2.1<L>  /  TBili  0.5  /  DBili  x   /  AST  36  /  ALT  32  /  AlkPhos  66  05-    PT/INR - ( 27 May 2024 13:58 )   PT: 15.2 sec;   INR: 1.36 ratio         PTT - ( 27 May 2024 13:58 )  PTT:31.8 sec  Albumin: Albumin: 2.1 g/dL ( @ 13:58)      Allergies    penicillin (Swelling)    Intolerances    Metoprolol Tartrate (Other; Sedation/Somnol)  metformin (Diarrhea)    MEDICATIONS  (STANDING):  aMIOdarone    Tablet 200 milliGRAM(s) Oral daily  ascorbic acid 500 milliGRAM(s) Oral daily  atorvastatin 80 milliGRAM(s) Oral at bedtime  cefepime  Injectable. 2000 milliGRAM(s) IV Push every 12 hours  dextrose 50% Injectable 12.5 Gram(s) IV Push once  dextrose 50% Injectable 25 Gram(s) IV Push once  dextrose 50% Injectable 25 Gram(s) IV Push once  dextrose Oral Gel 15 Gram(s) Oral once  diltiazem Infusion 5 mG/Hr (5 mL/Hr) IV Continuous <Continuous>  enoxaparin Injectable 70 milliGRAM(s) SubCutaneous every 12 hours  folic acid 1 milliGRAM(s) Oral daily  glucagon  Injectable 1 milliGRAM(s) IntraMuscular once  hydrocortisone sodium succinate Injectable 100 milliGRAM(s) IV Push three times a day  insulin lispro (ADMELOG) corrective regimen sliding scale   SubCutaneous every 6 hours  lactated ringers. 1000 milliLiter(s) (75 mL/Hr) IV Continuous <Continuous>  multivitamin/minerals 1 Tablet(s) Oral daily  pantoprazole    Tablet 40 milliGRAM(s) Oral before breakfast  phenylephrine    Infusion 0.3 MICROgram(s)/kG/Min (7.62 mL/Hr) IV Continuous <Continuous>  polyethylene glycol 3350 17 Gram(s) Oral daily  sertraline 25 milliGRAM(s) Oral daily  timolol 0.5% Solution 1 Drop(s) Both EYES daily  zinc sulfate 220 milliGRAM(s) Oral daily    MEDICATIONS  (PRN):  acetaminophen     Tablet .. 650 milliGRAM(s) Oral every 6 hours PRN Temp greater or equal to 38C (100.4F), Mild Pain (1 - 3), Moderate Pain (4 - 6)  bisacodyl Suppository 10 milliGRAM(s) Rectal daily PRN Constipation      RADIOLOGY/ADDITIONAL STUDIES:

## 2024-05-29 NOTE — ADVANCED PRACTICE NURSE CONSULT - ASSESSMENT
This is an 89 year old female and per MD H&P " hx afib on Xarelto, HFpEF on torsemide, HTN, GERD, DM, 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 Hypothermic, ill appearing.  Large sacral decub w/wound vac - likely source  ICU consult called for hypotension    5/27: Large, deep decub noted, seen by surgery. Pending CT, workup. IVF, pressors, abx. Son in law at bedside.     Patient was assessed by dr Singer see note;    Assessed patient on CCU and patient currently on total care mattress and recommend maintaining. Noted patient with anasarca and weeping from bilateral upper and lower extremities. WBC 27.1 and recently off of pressors.     Sacral wound is full thickness 8cm x 11cm x 1.5cm with 50% of brownish black loosely and firmly attached slough and 50% of pink/red viable tissue with no odor. Probed to the bone, No purulence noted. Based on the patients current medical condition/status and the wound with 50% of slough would recommend irrigating wound with Dakins, pat dry, apply collagenase to whole wound and lay Dakins Gauze over wound bed for enzymatic and chemical debridement. The Dakins also offer antimicrobial support. This can be a daily change    The goal is to reduce the slough to expose healthy tissue for granulation and decrease bioburden. Nutrition is paramount for wound healing.    Current Lab for Total Protein 5.2 and Albumin 1.6.   Patient with indwelling urinary catheter  Apply absorbant pads under bilateral arms and leg for weeping

## 2024-05-29 NOTE — PROGRESS NOTE ADULT - PROBLEM SELECTOR PLAN 1
Cefepime  f/u cultures  inotropic support
Cefepime  wound care  surgery on the case  off pressors  clinically improved

## 2024-05-29 NOTE — ADVANCED PRACTICE NURSE CONSULT - RECOMMEDATIONS
-Continue turning/positioning patient from side-to-side q2h while in bed, q1h when/if OOB chair, or in accordance w/ pt's plan of care. Utilize pillows and/or Spry positioner pillow to assist w/ turning/positioning. When/if OOB chair, utilize pillows or chair cushion to offload pressure.   -Continue to offload heels from bed surface with soft pillow under calfs or by applying offloading boots to BLEs.   -Continue applying Coloplast Ceasar Protect moisture barrier cream to buttock and perineal area daily and prn after each incontinent episode.    -Continue utilizing one underpad underneath patient to contain incontinence episodes; change pad when saturated/soiled.   -Consider utilizing female incontinence device/Primafit (if patient candidate) to contain urinary incontinence.  -Continue nutrition consult for optimal wound healing & nutritional status.   -Assess skin/wound qshift, report changes to primary provider.     Sacral Wound:  -Irrigate wound with Dakins  -No Sting barrier to periwound   -Collagenase to wound bed (Rosie thick or 2mm thick) to whole wound bed   -Lay Dakins gauze over the collagenase (Do not over pack )  -Secure with foam   daily change and prn if soiled.     Plan of Care: Primary RN made aware of above recs. Spoke w/ Dr Mares in regards to above. No further needs/recs from [facility] service at this time. Staff RN to perform routine skin/wound assessment and manage wound care. Questions or concerns or if wound worsens and reconsult needed, please contact ON

## 2024-05-29 NOTE — PROGRESS NOTE ADULT - ASSESSMENT
89F w/ pmhx of afib (on Xarelto), HFpEF, HTN, DM, temporal arteritis (on prednisone), GERD, BIBEMS to  ED 5/27/24 w/ multiple medical complaints, including SOB, generalized weakness, delirium. Pt admitted to CCU with:    1. Severe Sepsis 2/2 sacral decubitus ulcer?  2. Afib w/ RVR  3. Hypokalemia  4. Hypothermia    Plan:  Neuro:  - Alert, but agitated at times  - Avoid opiates for pain control  - Avoid Neuro Deliriogenic / sedative medications  - Aspiration Precautions, HOB > 30 degrees  - Will continue home Zoloft    CV:  - Off vasopressors, normotensive at this time, tachycardic but NSR  - Stress dose steroids in place of home prednisone  - Amiodarone for afib w/ RVR  - Home lipitor    Pulm:  - Sating well on room air. Will utilize supplemental oxygen as needed to maintain SpO2 > 92%  - Incentive spirometry                  GI:  - Continue Protonix 40mg Daily  - Enteral feeds as tolerated to avoid Stress ulceration and optimize nutritional state when indicated    Renal:  - Preserved Renal Function Continue to monitor Bun/Cr and UOP  - Replacing electrolytes as needed with Goal K> 4, PO> 3, Mg> 2               - Strict I&O's  - Avoid Nephro toxic medication  - Renally dose meds    Heme:  - SCDs for DVT/PE ppx   - *** AC    ID:  - WBC **,  remains afebrile with no antipyretics administered   - Continue ABX  ****  - Trend CBC with diff, CMP, and fever curve  - Avoiding antibiotics unless there is a concern for a bacterial/fungal infection    Endo:  - ISS for aggressive glycemic control to limit FS glucose to 140 - 180 mg/dl  - Keep Euthyroid    Case and plan to be discussed with intensivist in AM    Time spent on this patient encounter, which includes documenting this note in the electronic medical record, was*** minutes including assessing the presenting problems with associated risks, reviewing the medical record to prepare for the encounter, and meeting face to face with the patient to obtain additional history. I have also performed an appropriate physical exam, made interventions listed and ordered and interpreted appropriate diagnostic studies as documented. To improve  communication and patient safety, I have coordinated care with the multidisciplinary team including the bedside nurse, appropriate attending of record and consultants as needed.    Date of entry of this note is equal to the date of services rendered     89F w/ pmhx of afib (on Xarelto), HFpEF, HTN, DM, temporal arteritis (on prednisone), GERD, BIBEMS to  ED 5/27/24 w/ multiple medical complaints, including SOB, generalized weakness, delirium. Pt admitted to CCU with:    1. Severe Sepsis 2/2 sacral decubitus ulcer?  2. Afib w/ RVR  3. Hypokalemia  4. Hypothermia    Plan:  Neuro:  - Alert, but agitated at times  - Avoid opiates for pain control  - Avoid Neuro Deliriogenic / sedative medications  - Aspiration Precautions, HOB > 30 degrees  - Will continue home Zoloft    CV:  - Off vasopressors, normotensive at this time, tachycardic but NSR  - Stress dose steroids in place of home prednisone  - Amiodarone for afib w/ RVR  - Home lipitor    Pulm:  - Sating well on room air. Will utilize supplemental oxygen as needed to maintain SpO2 > 92%  - Incentive spirometry                  GI:  - Protonix daily for GERD  - PO Diet    Renal:  - Preserved Renal Function Continue to monitor Bun/Cr and UOP  - K 3.6 today, s/p repletion. Will f/u AM metabolic panel, mag, phos.  - Replacing electrolytes as needed with Goal K> 4, PO> 3, Mg> 2               - Pt w/ minimal urinary output today, will continue to monitor  - Albumin 1.6, will consider albumin for resuscitation if necessary  - Avoid Nephro toxic medication    Heme:  - Lovenox for full AC    ID:  - WBC 27.16 (22.50 on admission)  - Once again hypothermic, placed under warming blanket  - Continue cefepime for empiric abx, still no precise source of acute infection  - Dakins and collagenase for sacral decubitus  - Trend CBC with diff and temp    Endo:  - ISS for aggressive glycemic control to limit FS glucose to 140 - 180 mg/dl    Case and plan to be discussed with intensivist in AM    Time spent on this patient encounter, which includes documenting this note in the electronic medical record, was 52 minutes including assessing the presenting problems with associated risks, reviewing the medical record to prepare for the encounter, and meeting face to face with the patient to obtain additional history. I have also performed an appropriate physical exam, made interventions listed and ordered and interpreted appropriate diagnostic studies as documented. To improve  communication and patient safety, I have coordinated care with the multidisciplinary team including the bedside nurse, appropriate attending of record and consultants as needed.    Date of entry of this note is equal to the date of services rendered

## 2024-05-29 NOTE — PROGRESS NOTE ADULT - SUBJECTIVE AND OBJECTIVE BOX
Patient is a 89y old  Female who presents with a chief complaint of Sepsis (29 May 2024 15:46)      BRIEF HOSPITAL COURSE:  89F w/ pmhx of afib (on Xarelto), HFpEF, HTN, DM, temporal arteritis (on prednisone), GERD, BIBEMS to  ED 5/27/24 w/ multiple medical complaints, including SOB, generalized weakness, delirium. Pt noted to be hypothermic, ill appearing, and w/ large sacral decubitus ulcer w/ wound vac in place. Sacral wound was previously debrided 4/25/24. Pt admitted to CCU for sepsis, unknown source (suspected sacral wound), with need for IV vasopressors for hypotension. Pt was also placed on cardizem gtt for afib RVR but discontinued. UFH was switched to LMWH for AC d/t inability to draw PTTs.    Events last 24 hours:  Pt off riley as BP has stabilized. Pt in sinus rhythm but tachycardic at times d/t agitation/pain. Pt s/p debridement of sacral wound and morphine for pain. Pt was very lethargic after administration of morphine, will administer Ofirmev in place of morphine to allow pt to stay responsive/follow commands. Pt awake and alert but does not answer questions or follow commands, appears agitated. Surgery/wound care deferring wound vac at this time and suggest Dakins and collagenase.    PAST MEDICAL & SURGICAL HISTORY:  Temporal arteritis      Atrial fibrillation      Acute CHF        Allergies    penicillin (Swelling)    Intolerances    Metoprolol Tartrate (Other; Sedation/Somnol)  metformin (Diarrhea)    FAMILY HISTORY:    SOCIAL HISTORY:     Review of Systems:  [ ] A ten-point review of systems was otherwise negative except as noted.  [X] Due to altered mental status/intubation, subjective information were not able to be obtained from the patient. History was obtained, to the extent possible, from review of the chart and collateral sources of information.    Medications:  cefepime  Injectable. 2000 milliGRAM(s) IV Push every 12 hours    aMIOdarone    Tablet 200 milliGRAM(s) Oral daily  phenylephrine    Infusion 0.3 MICROgram(s)/kG/Min IV Continuous <Continuous>      acetaminophen     Tablet .. 650 milliGRAM(s) Oral every 6 hours PRN  acetaminophen   IVPB .. 1000 milliGRAM(s) IV Intermittent once  morphine  - Injectable 2 milliGRAM(s) IV Push every 4 hours PRN  ondansetron Injectable 4 milliGRAM(s) IV Push every 6 hours PRN  sertraline 25 milliGRAM(s) Oral daily      enoxaparin Injectable 70 milliGRAM(s) SubCutaneous every 12 hours    bisacodyl Suppository 10 milliGRAM(s) Rectal daily PRN  pantoprazole    Tablet 40 milliGRAM(s) Oral before breakfast  polyethylene glycol 3350 17 Gram(s) Oral daily      atorvastatin 80 milliGRAM(s) Oral at bedtime  dextrose 50% Injectable 25 Gram(s) IV Push once  dextrose 50% Injectable 12.5 Gram(s) IV Push once  dextrose Oral Gel 15 Gram(s) Oral once PRN  glucagon  Injectable 1 milliGRAM(s) IntraMuscular once  hydrocortisone sodium succinate Injectable 100 milliGRAM(s) IV Push three times a day  insulin lispro (ADMELOG) corrective regimen sliding scale   SubCutaneous three times a day before meals  insulin lispro (ADMELOG) corrective regimen sliding scale   SubCutaneous at bedtime    ascorbic acid 500 milliGRAM(s) Oral daily  dextrose 10% Bolus 125 milliLiter(s) IV Bolus once  dextrose 5%. 1000 milliLiter(s) IV Continuous <Continuous>  dextrose 5%. 1000 milliLiter(s) IV Continuous <Continuous>  folic acid 1 milliGRAM(s) Oral daily  lactated ringers. 1000 milliLiter(s) IV Continuous <Continuous>  multivitamin/minerals 1 Tablet(s) Oral daily  zinc sulfate 220 milliGRAM(s) Oral daily      chlorhexidine 4% Liquid 1 Application(s) Topical <User Schedule>  collagenase Ointment 1 Application(s) Topical daily  Dakins Solution - 1/4 Strength 1 Application(s) Topical daily  timolol 0.5% Solution 1 Drop(s) Both EYES daily            ICU Vital Signs Last 24 Hrs  T(C): 36.1 (29 May 2024 12:10), Max: 36.2 (29 May 2024 02:00)  T(F): 97 (29 May 2024 12:10), Max: 97.1 (29 May 2024 02:00)  HR: 71 (29 May 2024 19:00) (63 - 77)  BP: 95/56 (29 May 2024 19:00) (87/56 - 128/63)  BP(mean): 67 (29 May 2024 19:00) (67 - 101)  ABP: --  ABP(mean): --  RR: 17 (29 May 2024 19:00) (14 - 28)  SpO2: 99% (29 May 2024 19:00) (92% - 100%)    O2 Parameters below as of 29 May 2024 08:00  Patient On (Oxygen Delivery Method): room air          Vital Signs Last 24 Hrs  T(C): 36.1 (29 May 2024 12:10), Max: 36.2 (29 May 2024 02:00)  T(F): 97 (29 May 2024 12:10), Max: 97.1 (29 May 2024 02:00)  HR: 71 (29 May 2024 19:00) (63 - 77)  BP: 95/56 (29 May 2024 19:00) (87/56 - 128/63)  BP(mean): 67 (29 May 2024 19:00) (67 - 101)  RR: 17 (29 May 2024 19:00) (14 - 28)  SpO2: 99% (29 May 2024 19:00) (92% - 100%)    Parameters below as of 29 May 2024 08:00  Patient On (Oxygen Delivery Method): room air            I&O's Detail    28 May 2024 07:01  -  29 May 2024 07:00  --------------------------------------------------------  IN:    Phenylephrine: 58.4 mL  Total IN: 58.4 mL    OUT:    Indwelling Catheter - Urethral (mL): 740 mL  Total OUT: 740 mL    Total NET: -681.6 mL      29 May 2024 07:01  -  29 May 2024 20:25  --------------------------------------------------------  IN:    Oral Fluid: 360 mL  Total IN: 360 mL    OUT:    Indwelling Catheter - Urethral (mL): 200 mL  Total OUT: 200 mL    Total NET: 160 mL          LABS:                        13.8   27.16 )-----------( 208      ( 29 May 2024 05:27 )             42.8     05-29    140  |  108  |  52<H>  ----------------------------<  160<H>  3.6   |  20<L>  |  1.07    Ca    8.7      29 May 2024 06:07  Phos  3.2     05-29  Mg     2.1     05-29    TPro  5.2<L>  /  Alb  1.6<L>  /  TBili  0.6  /  DBili  x   /  AST  44<H>  /  ALT  46  /  AlkPhos  67  05-29          CAPILLARY BLOOD GLUCOSE      POCT Blood Glucose.: 177 mg/dL (29 May 2024 17:07)      Urinalysis Basic - ( 29 May 2024 06:07 )    Color: x / Appearance: x / SG: x / pH: x  Gluc: 160 mg/dL / Ketone: x  / Bili: x / Urobili: x   Blood: x / Protein: x / Nitrite: x   Leuk Esterase: x / RBC: x / WBC x   Sq Epi: x / Non Sq Epi: x / Bacteria: x        CULTURES:  Culture Results:   No growth at 48 Hours (05-27 @ 13:58)  Culture Results:   No growth at 48 Hours (05-27 @ 13:58)      Physical Examination:    General: Elderly female, laying in hospital bed w/ warming blanket  HEENT: Pupils equal, reactive to light. Symmetric. No scleral icterus or injection.  PULM: Clear to auscultation B/L. No wheezes, rales, or rhonchi appreciated. No significant sputum production or increased respiratory effort.  NECK: Supple  CVS: Regular rate and rhythm, +s1/s2.  ABD: Soft, nondistended, nontender, normoactive bowel sounds.  EXT: Profound anasarca w/ weeping from all extremities  SKIN: Hypothermic, large sacral decubitus ulcer  NEURO: Alert, agitated      RADIOLOGY:  < from: CT Head No Cont (05.27.24 @ 15:36) >    ACC: [id number] EXAM:  CT BRAIN   ORDERED BY: CASTRO HELLER     PROCEDURE DATE:  05/27/2024          INTERPRETATION:  CLINICAL INDICATIONS:  ams    COMPARISON: Head CT dated 3/18/2024    TECHNIQUE: Noncontrast CT of the head. Multiplanar reformations are   submitted.    FINDINGS:  Motion artifact limits interpretation. Enlarged partially empty sella.  There is periventricular and subcortical white matter hypodensity without   mass effect, nonspecific, likely representing mild chronic microvascular   ischemic changes. There is no compelling evidence for an acute   transcortical infarction. There is no evidence of mass, mass effect,   midline shift or extra-axial fluid collection. The lateral ventricles and   cortical sulci are age-appropriate in size and configuration. The orbits,   mastoid air cells and visualized paranasal sinuses are unremarkable. The   calvarium is intact. Consider MRI as clinically warranted.    IMPRESSION:  Mild chronic microvascular changes without evidence of an   acute transcortical infarction or hemorrhage.    --- End of Report ---            VIDA JARVIS MD; Attending Radiologist  This document has been electronically signed. May 27 2024  3:41PM    < end of copied text >  < from: CT Abdomen and Pelvis w/ IV Cont (05.27.24 @ 15:39) >    ACC: [id number] EXAM:  CT ABDOMEN AND PELVIS IC   ORDERED BY: CASTRO HELLER     PROCEDURE DATE:  05/27/2024          INTERPRETATION:  CLINICAL INFORMATION: Sepsis, sacral ulcer    COMPARISON: 4/15/2024    CONTRAST/COMPLICATIONS:  IV Contrast: Omnipaque 350  90 cc administered   0 cc discarded  Oral Contrast: NONE  Complications: None reported at time of study completion    PROCEDURE:  CT of the Abdomen and Pelvis was performed.  Sagittal and coronal reformats were performed.    FINDINGS:  LOWER CHEST: Mild dependent/layering pleural effusions (R>L) and adjacent   RLL passive atelectasis, developed. Chronic LV apex infarct    LIVER: Within normal limits.  BILE DUCTS: Normal caliber.  GALLBLADDER: Distended with minimal fluid /density level. No calcified   gallstones.  SPLEEN: Within normal limits.  PANCREAS: Diffuse pancreatic fatty infiltration  ADRENALS: Within normal limits.  KIDNEYS/URETERS: Symmetric renal enhancement without calculi/ obstructive   uropathy. Renal cysts, measure up to 2.4 cm on RIGHT and too small to   characterize LEFT hypodensity, of no significance.    BLADDER: Rodriguez catheter within collapsed/nonevaluable urinary bladder.  REPRODUCTIVE ORGANS: 2 cm calcified subserosal uterine myoma. No adnexal   mass.    BOWEL: No bowel obstruction. Nonvisualized appendix. No appendicitis. No   diverticulitis. Mild colonic stool burden. Rectosigmoid fecal distention   (6 cm max trans). Rectal electrode.  PERITONEUM: No ascites. Mild dependent presacral edema, developed  VESSELS: Atherosclerotic changes.  RETROPERITONEUM/LYMPH NODES: No lymphadenopathy.  ABDOMINAL WALL: Mild/moderate anasarca, progressed. 4.4 x 4.7 x 9 cm (TR   x AP x CC) loculated subcutaneous lateral upper RIGHT thigh fluid   collection/chronic hematoma, not significantly changed. Moderate (4.3 cm   max trans x 6.4 cm sagittal)  lower sacral/sacrococcygeal decubitus ulcer   extends to bony surface (without appreciable osteolysis), developed from   4/15/2024. No associated loculated fluid or gas collections. Mild   parapelvic muscle atrophy.  BONES: Osteopenia. Lower lumbar facet arthrosis.    IMPRESSION:  1.  Moderate (4.3 cm max trans x 6.4 cm sagittal)) lower   sacral/sacrococcygeal decubitus ulcer extends to bony surface, developed   from 4/15/2024. No associated loculated fluid or gas collections.  2.  Interval mild pleural effusions and progressed anasarca        --- End of Report ---            LISA VELIZ MD; Attending Radiologist  This document has been electronically signed.May 27 2024  3:53PM    < end of copied text >

## 2024-05-29 NOTE — PROGRESS NOTE ADULT - SUBJECTIVE AND OBJECTIVE BOX
88 yo fem elevated WBC, more awake, WBC 27,000    open sacral wound with necrotic skin edges, clean wound bed with granulat tissue          05-29-24 @ 09:45    Vital Signs Last 24 Hrs  T(C): 36.1 (29 May 2024 06:00), Max: 36.2 (29 May 2024 02:00)  T(F): 97 (29 May 2024 06:00), Max: 97.1 (29 May 2024 02:00)  HR: 69 (29 May 2024 06:00) (45 - 74)  BP: 101/57 (29 May 2024 06:00) (87/56 - 127/56)  BP(mean): 71 (29 May 2024 06:00) (58 - 92)  RR: 20 (29 May 2024 06:00) (13 - 25)  SpO2: 100% (29 May 2024 06:00) (100% - 100%)    Parameters below as of 29 May 2024 06:00  Patient On (Oxygen Delivery Method): nasal cannula  O2 Flow (L/min): 2                            13.8   27.16 )-----------( 208      ( 29 May 2024 05:27 )             42.8       05-29    140  |  108  |  52<H>  ----------------------------<  160<H>  3.6   |  20<L>  |  1.07    Ca    8.7      29 May 2024 06:07  Phos  3.2     05-29  Mg     2.1     05-29    TPro  5.2<L>  /  Alb  1.6<L>  /  TBili  0.6  /  DBili  x   /  AST  44<H>  /  ALT  46  /  AlkPhos  67  05-29      LIVER FUNCTIONS - ( 29 May 2024 06:07 )  Alb: 1.6 g/dL / Pro: 5.2 gm/dL / ALK PHOS: 67 U/L / ALT: 46 U/L / AST: 44 U/L / GGT: x             MEDICATIONS  (STANDING):  aMIOdarone    Tablet 200 milliGRAM(s) Oral daily  ascorbic acid 500 milliGRAM(s) Oral daily  atorvastatin 80 milliGRAM(s) Oral at bedtime  cefepime  Injectable. 2000 milliGRAM(s) IV Push every 12 hours  chlorhexidine 4% Liquid 1 Application(s) Topical <User Schedule>  collagenase Ointment 1 Application(s) Topical daily  Dakins Solution - 1/4 Strength 1 Application(s) Topical daily  dextrose 50% Injectable 25 Gram(s) IV Push once  dextrose 50% Injectable 12.5 Gram(s) IV Push once  dextrose 50% Injectable 25 Gram(s) IV Push once  dextrose Oral Gel 15 Gram(s) Oral once  enoxaparin Injectable 70 milliGRAM(s) SubCutaneous every 12 hours  folic acid 1 milliGRAM(s) Oral daily  glucagon  Injectable 1 milliGRAM(s) IntraMuscular once  hydrocortisone sodium succinate Injectable 100 milliGRAM(s) IV Push three times a day  insulin lispro (ADMELOG) corrective regimen sliding scale   SubCutaneous every 6 hours  lactated ringers. 1000 milliLiter(s) (75 mL/Hr) IV Continuous <Continuous>  multivitamin/minerals 1 Tablet(s) Oral daily  pantoprazole    Tablet 40 milliGRAM(s) Oral before breakfast  phenylephrine    Infusion 0.3 MICROgram(s)/kG/Min (7.62 mL/Hr) IV Continuous <Continuous>  polyethylene glycol 3350 17 Gram(s) Oral daily  sertraline 25 milliGRAM(s) Oral daily  timolol 0.5% Solution 1 Drop(s) Both EYES daily  zinc sulfate 220 milliGRAM(s) Oral daily    MEDICATIONS  (PRN):  acetaminophen     Tablet .. 650 milliGRAM(s) Oral every 6 hours PRN Temp greater or equal to 38C (100.4F), Mild Pain (1 - 3), Moderate Pain (4 - 6)  bisacodyl Suppository 10 milliGRAM(s) Rectal daily PRN Constipation  morphine  - Injectable 2 milliGRAM(s) IV Push every 4 hours PRN Severe Pain (7 - 10)      MEDICATIONS  (STANDING):  aMIOdarone    Tablet 200 milliGRAM(s) Oral daily  ascorbic acid 500 milliGRAM(s) Oral daily  atorvastatin 80 milliGRAM(s) Oral at bedtime  cefepime  Injectable. 2000 milliGRAM(s) IV Push every 12 hours  chlorhexidine 4% Liquid 1 Application(s) Topical <User Schedule>  collagenase Ointment 1 Application(s) Topical daily  Dakins Solution - 1/4 Strength 1 Application(s) Topical daily  dextrose 50% Injectable 12.5 Gram(s) IV Push once  dextrose 50% Injectable 25 Gram(s) IV Push once  dextrose 50% Injectable 25 Gram(s) IV Push once  dextrose Oral Gel 15 Gram(s) Oral once  enoxaparin Injectable 70 milliGRAM(s) SubCutaneous every 12 hours  folic acid 1 milliGRAM(s) Oral daily  glucagon  Injectable 1 milliGRAM(s) IntraMuscular once  hydrocortisone sodium succinate Injectable 100 milliGRAM(s) IV Push three times a day  insulin lispro (ADMELOG) corrective regimen sliding scale   SubCutaneous every 6 hours  lactated ringers. 1000 milliLiter(s) (75 mL/Hr) IV Continuous <Continuous>  multivitamin/minerals 1 Tablet(s) Oral daily  pantoprazole    Tablet 40 milliGRAM(s) Oral before breakfast  phenylephrine    Infusion 0.3 MICROgram(s)/kG/Min (7.62 mL/Hr) IV Continuous <Continuous>  polyethylene glycol 3350 17 Gram(s) Oral daily  sertraline 25 milliGRAM(s) Oral daily  timolol 0.5% Solution 1 Drop(s) Both EYES daily  zinc sulfate 220 milliGRAM(s) Oral daily

## 2024-05-29 NOTE — PROGRESS NOTE ADULT - SUBJECTIVE AND OBJECTIVE BOX
89F hx afib on Xarelto, HFpEF on torsemide, HTN, GERD, DM, 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 Hypothermic, ill appearing.  Large sacral decub w/wound vac - likely source  ICU consult called for hypotension    5/27: Large, deep decub noted, seen by surgery. Pending CT, workup. IVF, pressors, abx.   5/28 Case discussed on CCU rounds.  5/29 Case discussed on CCU rounds and with surgery. More alert and cooperative today. off inotropic support.    ICU Vital Signs Last 24 Hrs  T(C): 36.1 (29 May 2024 12:10), Max: 36.2 (29 May 2024 02:00)  T(F): 97 (29 May 2024 12:10), Max: 97.1 (29 May 2024 02:00)  HR: 73 (29 May 2024 11:00) (50 - 77)  BP: 118/49 (29 May 2024 11:00) (87/56 - 124/58)  BP(mean): 68 (29 May 2024 11:00) (58 - 91)  RR: 23 (29 May 2024 11:00) (14 - 28)  SpO2: 100% (29 May 2024 11:00) (97% - 100%)    O2 Parameters below as of 29 May 2024 08:00  Patient On (Oxygen Delivery Method): room air                                  13.8   27.16 )-----------( 208      ( 29 May 2024 05:27 )             42.8         05-29    140  |  108  |  52<H>  ----------------------------<  160<H>  3.6   |  20<L>  |  1.07    Ca    8.7      29 May 2024 06:07  Phos  3.2     05-29  Mg     2.1     05-29    TPro  5.2<L>  /  Alb  1.6<L>  /  TBili  0.6  /  DBili  x   /  AST  44<H>  /  ALT  46  /  AlkPhos  67  05-29    LIVER FUNCTIONS - ( 29 May 2024 06:07 )  Alb: 1.6 g/dL / Pro: 5.2 gm/dL / ALK PHOS: 67 U/L / ALT: 46 U/L / AST: 44 U/L / GGT: x

## 2024-05-29 NOTE — PROGRESS NOTE ADULT - SUBJECTIVE AND OBJECTIVE BOX
CHIEF COMPLAINT: Patient is a 89y old  Female who presents with a chief complaint of Sepsis (28 May 2024 08:47)    FROM H&P: 89F hx afib on Xarelto, HFpEF on torsemide, HTN, GERD, DM, 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 Hypothermic, ill appearing.  Large sacral decub w/wound vac - likely source. ICU consult called for hypotension. 5/27: Large, deep decub noted, seen by surgery. Pending CT, workup. IVF, pressors, abx. Son in law at bedside.  (27 May 2024 14:53)    5/28. Limited ROS given current clinical condition. Lethargic  5/29. Still lethargic, minimally responsive. ill appearing    PAST MEDICAL & SURGICAL HISTORY:  Temporal arteritis  Atrial fibrillation  Acute CHF    PSH: Denies   Social Hx: Denies tobacco / etoh / drugs   Family Hx: Denies pertinent hx  (18 Mar 2024 23:17)    Allergies: Penicillin (Swelling), Intolerances    MEDICATIONS  (STANDING):  aMIOdarone    Tablet 200 milliGRAM(s) Oral daily  ascorbic acid 500 milliGRAM(s) Oral daily  atorvastatin 80 milliGRAM(s) Oral at bedtime  cefepime  Injectable. 2000 milliGRAM(s) IV Push every 12 hours  chlorhexidine 4% Liquid 1 Application(s) Topical <User Schedule>  collagenase Ointment 1 Application(s) Topical daily  Dakins Solution - 1/4 Strength 1 Application(s) Topical daily  dextrose 50% Injectable 12.5 Gram(s) IV Push once  dextrose 50% Injectable 25 Gram(s) IV Push once  dextrose 50% Injectable 25 Gram(s) IV Push once  dextrose Oral Gel 15 Gram(s) Oral once  enoxaparin Injectable 70 milliGRAM(s) SubCutaneous every 12 hours  folic acid 1 milliGRAM(s) Oral daily  glucagon  Injectable 1 milliGRAM(s) IntraMuscular once  hydrocortisone sodium succinate Injectable 100 milliGRAM(s) IV Push three times a day  insulin lispro (ADMELOG) corrective regimen sliding scale   SubCutaneous every 6 hours  lactated ringers. 1000 milliLiter(s) (75 mL/Hr) IV Continuous <Continuous>  multivitamin/minerals 1 Tablet(s) Oral daily  pantoprazole    Tablet 40 milliGRAM(s) Oral before breakfast  phenylephrine    Infusion 0.3 MICROgram(s)/kG/Min (7.62 mL/Hr) IV Continuous <Continuous>  polyethylene glycol 3350 17 Gram(s) Oral daily  sertraline 25 milliGRAM(s) Oral daily  timolol 0.5% Solution 1 Drop(s) Both EYES daily  zinc sulfate 220 milliGRAM(s) Oral daily    MEDICATIONS  (PRN):  acetaminophen     Tablet .. 650 milliGRAM(s) Oral every 6 hours PRN Temp greater or equal to 38C (100.4F), Mild Pain (1 - 3), Moderate Pain (4 - 6)  bisacodyl Suppository 10 milliGRAM(s) Rectal daily PRN Constipation  morphine  - Injectable 2 milliGRAM(s) IV Push every 4 hours PRN Severe Pain (7 - 10)    HOME MEDICATIONS:  acetaminophen 325 mg oral tablet: 2 tab(s) orally every 6 hours As needed Temp greater or equal to 38C (100.4F), Mild Pain (1 - 3) (02 May 2024 14:02)  amiodarone 200 mg oral tablet: 1 tab(s) orally once a day (02 May 2024 14:02)  ascorbic acid 500 mg oral tablet: 1 tab(s) orally once a day (02 May 2024 14:02)  Farxiga 10 mg oral tablet: 1 tab(s) orally once a day (15 Apr 2024 23:03)  folic acid 1 mg oral tablet: 1 tab(s) orally once a day (15 Apr 2024 23:03)  ipratropium 21 mcg/inh (0.03%) nasal spray: 2 spray(s) intranasally once a day (15 Apr 2024 23:03)  Multiple Vitamins with Minerals oral tablet: 1 tab(s) orally once a day (02 May 2024 14:02)  pantoprazole 40 mg oral delayed release tablet: 1 tab(s) orally once a day (15 Apr 2024 23:03)  rosuvastatin 40 mg oral tablet: 1 tab(s) orally once a day (15 Apr 2024 23:03)  sertraline 25 mg oral tablet: 1 tab(s) orally once a day (in the afternoon) (16 Apr 2024 09:13)  timolol maleate 0.5% ophthalmic solution: 1 drop(s) in each eye once a day (15 Apr 2024 23:03)  Xarelto 15 mg oral tablet: 1 tab(s) orally once a day (15 Apr 2024 23:03)    PHYSICAL EXAM:  T(C): 36.1 (29 May 2024 06:00), Max: 36.2 (29 May 2024 02:00)  T(F): 97 (29 May 2024 06:00), Max: 97.1 (29 May 2024 02:00)  HR: 69 (29 May 2024 06:00) (45 - 74)  BP: 101/57 (29 May 2024 06:00) (87/56 - 127/56)  BP(mean): 71 (29 May 2024 06:00) (58 - 92)  RR: 20 (29 May 2024 06:00) (13 - 25)  SpO2: 100% (29 May 2024 06:00) (100% - 100%)    Parameters below as of 29 May 2024 06:00  Patient On (Oxygen Delivery Method): nasal cannula  O2 Flow (L/min): 2    Constitutional: NAD lethargic  HEENT: PERR, EOMI, Normal Hearing, MMM  Neck: Soft and supple, No LAD, No JVD  Respiratory: Breath sounds are clear bilaterally, No wheezing, rales or rhonchi  Cardiovascular: S1 and S2, regular rate and rhythm, no Murmurs, gallops or rubs  Gastrointestinal: Bowel Sounds present, soft, nontender, nondistended, no guarding, no rebound  Extremities: anasarca  Vascular: 2+ peripheral pulses  Neurological: A/O x 1, lethargic  Musculoskeletal: 5/5 strength b/l upper and lower extremities  Skin: Pressure ulcer    =======================================    INTERPRETATION OF TELEMETRY: SR - SB    ========================================    LABS: All Labs Reviewed:                        13.8 27.16 )-----------( 208      ( 29 May 2024 05:27 )             42.8     05-29    140  |  108  |  52<H>  ----------------------------<  160<H>  3.6   |  20<L>  |  1.07    Ca    8.7      29 May 2024 06:07  Phos  3.2     05-29  Mg     2.1     05-29    TPro  5.2<L>  /  Alb  1.6<L>  /  TBili  0.6  /  DBili  x   /  AST  44<H>  /  ALT  46  /  AlkPhos  67  05-29    PT/INR - ( 27 May 2024 13:58 )   PT: 15.2 sec;   INR: 1.36 ratio       PTT - ( 27 May 2024 13:58 )  PTT:31.8 sec    Troponin: 51.55 ng/L    CARDIAC TESTING:    < from: TTE Echo Complete w/o Contrast w/ Doppler (03.19.24 @ 09:41) >   Left ventricle systolic function appears preserved in the presence of a   cardiac arrhythmia. Left ventricle size and structure are within normal   limitations. Visual estimation of left ventricle ejection fraction is>50   %.   The left atrium is moderately dilated.   The right atrium appears moderately dilated.   The right ventricle exhibits mild dilation, mild diffuse hypokinesis, and   mild depression of contractility.   Mild aortic sclerosis is present with normalvalvular opening.   Minimal mitral annular calcification is present.   Mild (1+) mitral regurgitation is present.   Moderate to severe (3+) tricuspid valve regurgitation is present.   Mild pulmonic valvular regurgitation (1+) is present.    RADIOLOGY & ADDITIONAL STUDIES:    < from: CT Abdomen and Pelvis w/ IV Cont (05.27.24 @ 15:39) >  1.  Moderate (4.3 cm max trans x 6.4 cm sagittal)) lower   sacral/sacrococcygeal decubitus ulcer extends to bony surface, developed   from 4/15/2024. No associated loculated fluid or gas collections.  2.  Interval mild pleural effusions and progressed anasarca    < from: CT Head No Cont (05.27.24 @ 15:36) >  Mild chronic microvascular changes without evidence of an   acute transcortical infarction or hemorrhage.    < from: Xray Chest 1 View- PORTABLE-Urgent (05.27.24 @ 15:14) >  Mediastinal adenopathy. No acute infiltrates

## 2024-05-30 NOTE — PROGRESS NOTE ADULT - ASSESSMENT
A:    89yFemale  HD #    Here for:    1.    This patient requires critical care for support of one or more vital organ systems with a high probability of imminent or life threatening deterioration in his/her condition    P:    Neuro: GCS 15. Monitor for delirium.  Continue to optimize pain control. Serial Neurologic assessments.    HEENT: No issues.    CV: Continue hemodynamic monitoring    Pulm: Pulmonary toilet.  Continue incentive spirometer.  Chest PT.  Encourage OOB to chair and ambulation. Nebs. f/u ABG, CXR.    GI/Nutrition: Cont diet, bowel regimen.    /Renal: Monitor UOP. Monitor BMP.  Replete Lytes as needed.    HEME- Chemical and mechanical DVT ppx. f/u CBC. f/u coags as needed.    ID:  Cont abx. f/u Cx's.    Lines/Tubes:     Endo: Maintain euglycemia.    Skin:  Cont skin care, pressure ulcer prevention.    Dispo: Cont critical care.    Date of entry of this note is equal to the date of services rendered    TOTAL CRITICAL CARE TIME:   (EXCLUSIVE of any non bundled procedures)    Note: This is a critically ill patient. Time spent has been in salvage of life, limb and vital organ systems. This time INCLUDES time spent directly as this patient's bedside with evaluation and management, review of chart including review of laboratory and imaging studies, interpretation of vital signs and cardiac output measurements, any necessary ventilator management, and time spent discussing plan of care with patient and family, including goals of care discussion. This also includes time spent in multidisciplinary discussion with care team and various consultants to optimize treatment plan. This time may NOT include various procedures, which will be noted seperately.

## 2024-05-30 NOTE — PROGRESS NOTE ADULT - SUBJECTIVE AND OBJECTIVE BOX
Interval History:      Afib with episodes of NSVT      alert moaning,       no fevers      wbc 26    HPI:      89F hx afib on Xarelto, HFpEF on torsemide, HTN, GERD, DM, 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 Hypothermic, ill appearing.  Large sacral decub w/wound vac - likely source  ICU consult called for hypotension  now not on pressors  had debridement of Sacral decubitus  pressors off  creatinine increased to 1.5     PMH:       as above       MEDICATIONS  (STANDING):  aMIOdarone    Tablet 200 milliGRAM(s) Oral daily  ascorbic acid 500 milliGRAM(s) Oral daily  atorvastatin 80 milliGRAM(s) Oral at bedtime  cefepime  Injectable. 2000 milliGRAM(s) IV Push every 12 hours  chlorhexidine 4% Liquid 1 Application(s) Topical <User Schedule>  collagenase Ointment 1 Application(s) Topical daily  Dakins Solution - 1/4 Strength 1 Application(s) Topical daily  dextrose 10% Bolus 125 milliLiter(s) IV Bolus once  dextrose 5%. 1000 milliLiter(s) (50 mL/Hr) IV Continuous <Continuous>  dextrose 5%. 1000 milliLiter(s) (100 mL/Hr) IV Continuous <Continuous>  dextrose 50% Injectable 25 Gram(s) IV Push once  dextrose 50% Injectable 12.5 Gram(s) IV Push once  enoxaparin Injectable 70 milliGRAM(s) SubCutaneous every 12 hours  folic acid 1 milliGRAM(s) Oral daily  glucagon  Injectable 1 milliGRAM(s) IntraMuscular once  hydrocortisone sodium succinate Injectable 100 milliGRAM(s) IV Push three times a day  insulin lispro (ADMELOG) corrective regimen sliding scale   SubCutaneous three times a day before meals  insulin lispro (ADMELOG) corrective regimen sliding scale   SubCutaneous at bedtime  lactated ringers Bolus 1000 milliLiter(s) IV Bolus once  lactated ringers. 1000 milliLiter(s) (100 mL/Hr) IV Continuous <Continuous>  lactated ringers. 1000 milliLiter(s) (75 mL/Hr) IV Continuous <Continuous>  multivitamin/minerals 1 Tablet(s) Oral daily  pantoprazole    Tablet 40 milliGRAM(s) Oral before breakfast  phenylephrine    Infusion 0.3 MICROgram(s)/kG/Min (7.62 mL/Hr) IV Continuous <Continuous>  polyethylene glycol 3350 17 Gram(s) Oral daily  sertraline 25 milliGRAM(s) Oral daily  timolol 0.5% Solution 1 Drop(s) Both EYES daily  zinc sulfate 220 milliGRAM(s) Oral daily    MEDICATIONS  (PRN):  acetaminophen     Tablet .. 650 milliGRAM(s) Oral every 6 hours PRN Temp greater or equal to 38C (100.4F), Mild Pain (1 - 3), Moderate Pain (4 - 6)  bisacodyl Suppository 10 milliGRAM(s) Rectal daily PRN Constipation  dextrose Oral Gel 15 Gram(s) Oral once PRN Blood Glucose LESS THAN 70 milliGRAM(s)/deciliter  morphine  - Injectable 2 milliGRAM(s) IV Push every 4 hours PRN Severe Pain (7 - 10)  ondansetron Injectable 4 milliGRAM(s) IV Push every 6 hours PRN Nausea and/or Vomiting      ICU Vital Signs Last 24 Hrs  T(C): 36.4 (30 May 2024 06:29), Max: 36.4 (30 May 2024 06:29)  T(F): 97.5 (30 May 2024 06:29), Max: 97.5 (30 May 2024 06:29)  HR: 74 (30 May 2024 07:00) (68 - 149)  BP: 105/48 (30 May 2024 07:00) (81/71 - 128/63)  BP(mean): 65 (30 May 2024 07:00) (65 - 101)  ABP: --  ABP(mean): --  RR: 22 (30 May 2024 07:00) (14 - 31)  SpO2: 97% (30 May 2024 07:00) (92% - 100%)    O2 Parameters below as of 29 May 2024 20:30  Patient On (Oxygen Delivery Method): room air    Physical Exam    General - agitated  HEENT - nc/at  neck protuberant  lungs on room air, dec bs at bases  cv irreg,irreg  abdomen soft, non tender  extremeties edematous and ecchymotic  gu ocampo for decubetis    I&O's Summary    29 May 2024 07:01  -  30 May 2024 07:00  --------------------------------------------------------  IN: 360 mL / OUT: 200 mL / NET: 160 mL                          13.5   26.70 )-----------( 226      ( 30 May 2024 05:36 )             43.0       05-30    139  |  108  |  59<H>  ----------------------------<  221<H>  4.4   |  17<L>  |  1.49<H>    Ca    9.3      30 May 2024 05:36  Phos  4.0     05-30  Mg     2.2     05-30    TPro  5.2<L>  /  Alb  1.6<L>  /  TBili  0.6  /  DBili  x   /  AST  44<H>  /  ALT  46  /  AlkPhos  67  05-29        Urinalysis Basic - ( 30 May 2024 05:36 )    Color: x / Appearance: x / SG: x / pH: x  Gluc: 221 mg/dL / Ketone: x  / Bili: x / Urobili: x   Blood: x / Protein: x / Nitrite: x   Leuk Esterase: x / RBC: x / WBC x   Sq Epi: x / Non Sq Epi: x / Bacteria: x    DVT Prophylaxis:  xarelto                                                               Advanced Directives: Full Code         Labs, Notes, Orders, radiologic studies reviewed and care coordinated with multidisciplinary team

## 2024-05-30 NOTE — CHART NOTE - NSCHARTNOTEFT_GEN_A_CORE
PA called to the bedside by RN again for AF w/ RVR with runs of VT. Pt unable to communicate per wants/needs. Pt states she is nauseous but appears to be in obvious acute distress. Physical exam is benign except tachypnea and tachycardia. Abdominal exam benign and without withdrawal to palpation or guarding. Lung sounds clear and equal. Pt denies pain, but unclear if she understands.  LR at 100 cc/hr ordered, administered IV tylenol, and additional 150mg amio IV.  EKG ordered STAT d/t VT, please f/u read.  TTE ordered d/t runs of VT.  Will consider additional IV medication pending EKG.  AM labs revealed decrease in CO2 on metabolic panel to 17 from 20. Hgb stable.  Pt now with EDITA, most likely pre-renal.  ABG ordered as well given AMS and acidosis.  During evaluation pt now hypotensive to 80s/30s. Ordered 1L IVF bolus.  If remains hypotensive will start vasopressors with riley. Order still active from 5/28, f/u repeat BP.    CRITICAL CARE TIME SPENT:  32 minutes of critical care time spent providing medical care for patient's acute illness/conditions that impairs at least one vital organ system and/or poses a high risk of imminent or life threatening deterioration in the patient's condition. It includes time spent evaluating and treating the patient's acute illness as well as time spent reviewing labs, radiology, discussing goals of care with patient and/or patient's family, and discussing the case with a multidisciplinary team, in an effort to prevent further life threatening deterioration or end organ damage. This time is independent of any procedures performed.    Date of entry of this note is equal to the date of services rendered.

## 2024-05-30 NOTE — PROGRESS NOTE ADULT - SUBJECTIVE AND OBJECTIVE BOX
CCU Progress Note    S:    Pt seen and examined  89F hx afib on Xarelto, HFpEF on torsemide, HTN, GERD, DM, 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 Hypothermic, ill appearing.  Large sacral decub w/wound vac - likely source  ICU consult called for hypotension    5/27: Large, deep decub noted, seen by surgery. Pending CT, workup. IVF, pressors, abx. Son in law at bedside.   5/30: MITUL with several runs NSVT. Takem amiodarone in the past. Hypotensive, pressors may be needed.     ROS: Unable to obtain 2/2 Pt condition (eltahrgic)        Allergies    penicillin (Swelling)    Intolerances    Metoprolol Tartrate (Other; Sedation/Somnol)  metformin (Diarrhea)      MEDICATIONS  (STANDING):    aMIOdarone    Tablet 200 milliGRAM(s) Oral daily  ascorbic acid 500 milliGRAM(s) Oral daily  atorvastatin 80 milliGRAM(s) Oral at bedtime  bisacodyl 5 milliGRAM(s) Oral daily  cefepime  Injectable. 2000 milliGRAM(s) IV Push every 12 hours  chlorhexidine 0.12% Liquid 15 milliLiter(s) Oral Mucosa every 12 hours  chlorhexidine 4% Liquid 1 Application(s) Topical <User Schedule>  collagenase Ointment 1 Application(s) Topical daily  Dakins Solution - 1/4 Strength 1 Application(s) Topical daily  dextrose 10% Bolus 125 milliLiter(s) IV Bolus once  dextrose 5%. 1000 milliLiter(s) (50 mL/Hr) IV Continuous <Continuous>  dextrose 5%. 1000 milliLiter(s) (100 mL/Hr) IV Continuous <Continuous>  dextrose 50% Injectable 25 Gram(s) IV Push once  dextrose 50% Injectable 12.5 Gram(s) IV Push once  folic acid 1 milliGRAM(s) Oral daily  glucagon  Injectable 1 milliGRAM(s) IntraMuscular once  heparin  Infusion.  Unit(s)/Hr (13 mL/Hr) IV Continuous <Continuous>  hydrocortisone sodium succinate Injectable 100 milliGRAM(s) IV Push every 8 hours  insulin lispro (ADMELOG) corrective regimen sliding scale   SubCutaneous every 6 hours  lactated ringers. 1000 milliLiter(s) (75 mL/Hr) IV Continuous <Continuous>  multivitamin/minerals 1 Tablet(s) Oral daily  pantoprazole  Injectable 40 milliGRAM(s) IV Push daily  phenylephrine    Infusion 0.3 MICROgram(s)/kG/Min (7.62 mL/Hr) IV Continuous <Continuous>  polyethylene glycol 3350 17 Gram(s) Oral two times a day  propofol Infusion 10 MICROgram(s)/kG/Min (4.06 mL/Hr) IV Continuous <Continuous>  senna 2 Tablet(s) Oral at bedtime  timolol 0.5% Solution 1 Drop(s) Both EYES daily  zinc sulfate 220 milliGRAM(s) Oral daily    MEDICATIONS  (PRN):    acetaminophen     Tablet .. 650 milliGRAM(s) Oral every 6 hours PRN Temp greater or equal to 38C (100.4F), Mild Pain (1 - 3), Moderate Pain (4 - 6)  dextrose Oral Gel 15 Gram(s) Oral once PRN Blood Glucose LESS THAN 70 milliGRAM(s)/deciliter  heparin   Injectable 6000 Unit(s) IV Push every 6 hours PRN For aPTT less than 40  heparin   Injectable 3000 Unit(s) IV Push every 6 hours PRN For aPTT between 40 - 57  ondansetron Injectable 4 milliGRAM(s) IV Push every 6 hours PRN Nausea and/or Vomiting  sodium chloride 0.9% lock flush 10 milliLiter(s) IV Push every 1 hour PRN Pre/post blood products, medications, blood draw, and to maintain line patency      Drug Dosing Weight  Height (cm): 160 (27 May 2024 12:50)  Weight (kg): 74.1 (31 May 2024 12:00)  BMI (kg/m2): 28.9 (31 May 2024 12:00)  BSA (m2): 1.77 (31 May 2024 12:00)      PAST MEDICAL & SURGICAL HISTORY:  Temporal arteritis      Atrial fibrillation      Acute CHF          FAMILY HISTORY:        REVIEW OF SYSTEMS    UNLESS OTHERWISE NOTED IN HPI above:    Constitutional:  No Weight Change, No Fever, No Chills, No Night Sweats, No Fatigue, No Malaise  ENT/Mouth:  No Hearing Changes, No Ear Pain, No Nasal Congestion, No  Sinus Pain, No Hoarseness, No sore throat, No Rhinorrhea, No Swallowing  Difficulty  Eyes:  No Eye Pain, No Swelling, No Redness, No Foreign Body, No Discharge, No Vision Changes  Cardiovascular:  No Chest Pain, No SOB, No PND, No Dyspnea on Exertion,  No Orthopnea, No Claudication, No Edema, No Palpitations  Respiratory:  No Cough, No Sputum, No Wheezing, No Smoke Exposure, No Dyspnea  Gastrointestinal:  No Nausea, No Vomiting, No Diarrhea, No  Constipation, No Pain, No Heartburn, No Anorexia, No Dysphagia, No  Hematochezia, No Melena, No Flatulence, No Jaundice  Genitourinary:  No Dysmenorrhea, No DUB, No Dyspareunia, No Dysuria, No  Urinary Frequency, No Hematuria, No Urinary Incontinence, No Urgency,  No Flank Pain, No Urinary Flow Changes, No Hesitancy  Musculoskeletal:  No Arthralgias, No Myalgias, No Joint Swelling, No  Joint Stiffness, No Back Pain, No Neck Pain, No Injury History  Skin:  No Skin Lesions, No Pruritis, No Hair Changes, No Breast/Skin Changes, No Nipple Discharge  Neuro:  No Weakness, No Numbness, No Paresthesias, No Loss of  Consciousness, No Syncope, No Dizziness, No Headache, No Coordination  Changes, No Recent Falls  Psych:  No Anxiety/Panic, No Depression, No Insomnia, No Personality  Changes, No Delusions, No Rumination, No SI/HI/AH/VH, No Social Issues,  No Memory Changes, No Violence/Abuse Hx., No Eating Concerns  Heme/Lymph:  No Bruising, No Bleeding, No Transfusions History, No Lymphadenopathy  Endocrine:  No Polyuria, No Polydipsia, No Temperature Intolerance    O:    ICU Vital Signs Last 24 Hrs  T(C): 35.1 (31 May 2024 06:00), Max: 35.4 (30 May 2024 23:48)  T(F): 95.1 (31 May 2024 06:00), Max: 95.8 (30 May 2024 23:48)  HR: 67 (31 May 2024 09:15) (55 - 132)  BP: 80/37 (31 May 2024 09:15) (68/43 - 117/76)  BP(mean): 51 (31 May 2024 09:15) (42 - 90)  ABP: --  ABP(mean): --  RR: 21 (31 May 2024 09:15) (14 - 36)  SpO2: 94% (31 May 2024 09:15) (86% - 100%)        ABG - ( 31 May 2024 06:34 )  pH, Arterial: 7.29  pH, Blood: x     /  pCO2: 37    /  pO2: 224   / HCO3: 18    / Base Excess: -8.1  /  SaO2: 99                  I&O's Detail    30 May 2024 07:01  -  31 May 2024 07:00  --------------------------------------------------------  IN:    Glucerna: 100 mL    IV PiggyBack: 50 mL    Lactated Ringers Bolus: 2500 mL    Phenylephrine: 225 mL    sodium chloride 0.9%: 1150 mL  Total IN: 4025 mL    OUT:    Indwelling Catheter - Urethral (mL): 300 mL  Total OUT: 300 mL    Total NET: 3725 mL          Mode: AC/ CMV (Assist Control/ Continuous Mandatory Ventilation)  RR (machine): 22  TV (machine): 400  FiO2: 40  PEEP: 6  ITime: 0.8  MAP: 11  PIP: 29      PE:    Adult lying in bed  No JVD trachea midline  Normocephalic, atraumatic  S1S2+  CTA B/L  Abd soft NTND  No leg swelling/edema noted  Awake and alert  Skin pink, warm    LABS:    CBC Full  -  ( 31 May 2024 08:52 )  WBC Count : 16.85 K/uL  RBC Count : 3.43 M/uL  Hemoglobin : 11.1 g/dL  Hematocrit : 36.0 %  Platelet Count - Automated : 165 K/uL  Mean Cell Volume : 105.0 fl  Mean Cell Hemoglobin : 32.4 pg  Mean Cell Hemoglobin Concentration : 30.8 gm/dL  Auto Neutrophil # : x  Auto Lymphocyte # : x  Auto Monocyte # : x  Auto Eosinophil # : x  Auto Basophil # : x  Auto Neutrophil % : x  Auto Lymphocyte % : x  Auto Monocyte % : x  Auto Eosinophil % : x  Auto Basophil % : x    05-31    141  |  113<H>  |  68<H>  ----------------------------<  185<H>  4.3   |  20<L>  |  1.85<H>    Ca    7.6<L>      31 May 2024 08:52  Phos  3.3     05-31  Mg     1.9     05-31    TPro  4.3<L>  /  Alb  1.6<L>  /  TBili  0.6  /  DBili  x   /  AST  63<H>  /  ALT  43  /  AlkPhos  69  05-31    PTT - ( 31 May 2024 10:00 )  PTT:45.5 sec  Urinalysis Basic - ( 31 May 2024 08:52 )    Color: x / Appearance: x / SG: x / pH: x  Gluc: 185 mg/dL / Ketone: x  / Bili: x / Urobili: x   Blood: x / Protein: x / Nitrite: x   Leuk Esterase: x / RBC: x / WBC x   Sq Epi: x / Non Sq Epi: x / Bacteria: x      CAPILLARY BLOOD GLUCOSE      POCT Blood Glucose.: 172 mg/dL (31 May 2024 11:24)  POCT Blood Glucose.: 206 mg/dL (31 May 2024 05:59)  POCT Blood Glucose.: 218 mg/dL (30 May 2024 23:35)  POCT Blood Glucose.: 190 mg/dL (30 May 2024 17:33)        LIVER FUNCTIONS - ( 31 May 2024 08:52 )  Alb: 1.6 g/dL / Pro: 4.3 gm/dL / ALK PHOS: 69 U/L / ALT: 43 U/L / AST: 63 U/L / GGT: x

## 2024-05-30 NOTE — PROGRESS NOTE ADULT - SUBJECTIVE AND OBJECTIVE BOX
HPI:   "  89F hx afib on Xarelto, HFpEF on torsemide, HTN, GERD, DM, 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 Hypothermic, ill appearing.  Large sacral decub w/wound vac - likely source  ICU consult called for hypotension "      pt seen and examined in NAD confused uanble to participate in GOC / ROS   pt known to palliative team had a very lengthy goc in April for which she opted DNR DNI  prior to my seeing her, after that seh confirmed DNR DNI again with some further limitations (trials of IV abx and IVF)   this was discussed at length w/ CANDY over phone w/ patient at bedside. and MOLST was completed as per patients wishes.       WBC rising to 26, lactate is 2 today (down from 4.1)   It appears MOLST was scanned voided? unclear when /who revoked it  or why.     Will reach out to CANDY Adrien Bland who is the HCP to further discuss        Patient was seen and examined.   Denies nausea, vomiting, shortness of breath, chest pain, headaches, abdominal pain  pt in NAD today-  was more alert mentation is clearing  overall slow progress to improvement   will continue  to follow  no changes in goals at this time      pt more confused when compared to yesterday again  agree to avoid opiates when possible  Tylenol 975mg TID PO PRN for severe pain (1000mg IV if PO not possible)  if a situation occurs where opiates are necessary- would avoid morphine d/t combo of patients age and EDITA  would consider a renal spared opiate such as Dilaudid at a very low dose        PAIN: ( ) YES  ( X)  NO     DYSPNEA ( ) Yes ( X) No   Review of Systems:       Review of Systems:    Anxiety- denies  Depression-denies  Physical Discomfort- in NAD  Dyspnea-denies  Constipation-denies  Diarrhea-denies  Nausea-denies  Vomiting-denies  Anorexia-denies  Weight Loss- denies  Cough-denies  Secretions-denies  Fatigue-denies  Weakness-denies  Delirium-improving    All other systems reviewed and negative     PHYSICAL EXAM:    Vital Signs Last 24 Hrs  T(C): 35.6 (28 May 2024 06:06), Max: 36.5 (27 May 2024 16:22)  T(F): 96 (28 May 2024 06:06), Max: 97.7 (27 May 2024 16:22)  HR: 81 (28 May 2024 08:22) (45 - 149)  BP: 96/41 (28 May 2024 08:22) (69/26 - 145/127)  BP(mean): 59 (28 May 2024 08:22) (36 - 134)  RR: 18 (28 May 2024 08:22) (12 - 36)  SpO2: 100% (28 May 2024 08:22) (75% - 100%)    Parameters below as of 28 May 2024 06:07  Patient On (Oxygen Delivery Method): nasal cannula  O2 Flow (L/min): 2    Daily Height in cm: 160.02 (27 May 2024 12:50)    Daily Weight in k (28 May 2024 06:06)    PPSV2: 30 %  FAST:  General: calm in NAD  Mental Status: awake and oriented x 3  HEENT:  , perrl  Lungs: ctabl b/l bs no rhonchi or rales  Cardiac: s1s2 no mgr  GI: soft nontender +BS  : voids  Ext: moves all 4 extremities spontaneously  Neuro improving    LABS:                        14.2   26.04 )-----------( 247      ( 28 May 2024 05:54 )             44.6         140  |  107  |  57<H>  ----------------------------<  174<H>  3.3<L>   |  24  |  0.97    Ca    9.0      28 May 2024 05:54  Phos  3.3       Mg     1.9         TPro  6.0  /  Alb  2.1<L>  /  TBili  0.5  /  DBili  x   /  AST  36  /  ALT  32  /  AlkPhos  66      PT/INR - ( 27 May 2024 13:58 )   PT: 15.2 sec;   INR: 1.36 ratio         PTT - ( 27 May 2024 13:58 )  PTT:31.8 sec  Albumin: Albumin: 2.1 g/dL ( @ 13:58)      Allergies    penicillin (Swelling)    Intolerances    Metoprolol Tartrate (Other; Sedation/Somnol)  metformin (Diarrhea)    MEDICATIONS  (STANDING):  aMIOdarone    Tablet 200 milliGRAM(s) Oral daily  ascorbic acid 500 milliGRAM(s) Oral daily  atorvastatin 80 milliGRAM(s) Oral at bedtime  cefepime  Injectable. 2000 milliGRAM(s) IV Push every 12 hours  dextrose 50% Injectable 12.5 Gram(s) IV Push once  dextrose 50% Injectable 25 Gram(s) IV Push once  dextrose 50% Injectable 25 Gram(s) IV Push once  dextrose Oral Gel 15 Gram(s) Oral once  diltiazem Infusion 5 mG/Hr (5 mL/Hr) IV Continuous <Continuous>  enoxaparin Injectable 70 milliGRAM(s) SubCutaneous every 12 hours  folic acid 1 milliGRAM(s) Oral daily  glucagon  Injectable 1 milliGRAM(s) IntraMuscular once  hydrocortisone sodium succinate Injectable 100 milliGRAM(s) IV Push three times a day  insulin lispro (ADMELOG) corrective regimen sliding scale   SubCutaneous every 6 hours  lactated ringers. 1000 milliLiter(s) (75 mL/Hr) IV Continuous <Continuous>  multivitamin/minerals 1 Tablet(s) Oral daily  pantoprazole    Tablet 40 milliGRAM(s) Oral before breakfast  phenylephrine    Infusion 0.3 MICROgram(s)/kG/Min (7.62 mL/Hr) IV Continuous <Continuous>  polyethylene glycol 3350 17 Gram(s) Oral daily  sertraline 25 milliGRAM(s) Oral daily  timolol 0.5% Solution 1 Drop(s) Both EYES daily  zinc sulfate 220 milliGRAM(s) Oral daily    MEDICATIONS  (PRN):  acetaminophen     Tablet .. 650 milliGRAM(s) Oral every 6 hours PRN Temp greater or equal to 38C (100.4F), Mild Pain (1 - 3), Moderate Pain (4 - 6)  bisacodyl Suppository 10 milliGRAM(s) Rectal daily PRN Constipation      RADIOLOGY/ADDITIONAL STUDIES:

## 2024-05-30 NOTE — CHART NOTE - NSCHARTNOTEFT_GEN_A_CORE
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 Hypothermic, ill appearing. Large sacral decub w/wound vac - likely source. Admitted for septic shock d/t sacral decubitus, suspected metabolic encephalopathy, and dehydration; tx to CCU for pressor support. FULL CODE.  *: Unable to obtain meaningful information 2/2 pt's mental status; NPO at time of visit. RD obtained bedscale wt on - 144#; 3+ edema likely skewing bedscale wt. Weight hx reviewed: 140#  w/ 2+ and 3+ edema (taken by RD on 24; met criteria for severe malnutrition); weight gain likely d/t increased edema. NFPE limited d/t pt positioning and AMS. NFPE reveals mild to moderate muscle/ fat wasting, pt continues to meet criteria for PCM at this time. Consider SLP eval to assess for safest/ least restrictive diet consistency/ texture. Recommend to advance diet to Low Sodium + SLP recs as soon as medically feasible. Will trial Premier Protein BID when diet is is advanced. Will also add Rafa BID (provides 90 kcal, 2.5 g collagen, 7 g L-Arginine, 7 g L-Glutamine/ 1 packet) to promote wound healing. Rafa is intended to support tissue building and collagen formation in the dietary management of wounds, which has been clinically shown to support wound healing (including pressure injuries, diabetic foot ulcers, surgical incisions, burns, and other acute/chronic wounds) by enhancing collagen formation in as little as 2 weeks. HgbA1c level 7.3% indicating good glycemic control pta for age and conditions. Recommend to keep diet as liberalized as possible when diet is advanced. If pt's diet cannot be advanced or pt is with poor po intake, recommend to establish nutrition GOC and consider placing corpak and initiating EN. Please see additional recommendations below.    *current status:    *Labs Reviewed:      139  |  108  |  59<H>  ----------------------------<  221<H>  4.4   |  17<L>  |  1.49<H>    Ca    9.3      30 May 2024 05:36  Phos  4.0       Mg     2.2         TPro  5.2<L>  /  Alb  1.6<L>  /  TBili  0.6  /  DBili  x   /  AST  44<H>  /  ALT  46  /  AlkPhos  67      BMI: BMI (kg/m2): 26.4 (24 @ 15:45)  HbA1c: A1C with Estimated Average Glucose Result: 7.3 % (24 @ 17:10)    Glucose: POCT Blood Glucose.: 166 mg/dL (24 @ 22:22)    BP: 105/48 (24 @ 07:00) (69/26 - 145/127)Vital Signs Last 24 Hrs  T(C): 36.4 (24 @ 06:29), Max: 36.4 (24 @ 06:29)  T(F): 97.5 (24 @ 06:29), Max: 97.5 (24 @ 06:29)  HR: 74 (24 @ 07:00) (68 - 149)  BP: 105/48 (24 @ 07:00) (81/71 - 128/63)  BP(mean): 65 (24 @ 07:00) (65 - 101)  RR: 22 (24 @ 07:00) (14 - 31)  SpO2: 97% (24 @ 07:00) (92% - 100%)    Lipid Panel: Date/Time: 24 @ 07:42  Cholesterol, Serum: 143  LDL Cholesterol Calculated: 68  HDL Cholesterol, Serum: 46  Total Cholesterol/HDL Ration Measurement: --  Triglycerides, Serum: 175    POCT Blood Glucose.: 166 mg/dL (29 May 2024 22:22)  POCT Blood Glucose.: 177 mg/dL (29 May 2024 17:07)  POCT Blood Glucose.: 177 mg/dL (29 May 2024 12:26)    *pertinent meds: Amiodarone, Vitamin C, Lipitor, Abx, Cardizem, Folic Acid, Solu-Cortef, Admelog (4 units x 24 hours), Ativan, MVI/ MIN, Protonix, Miralax, Zoloft, Zinc Sulfate, Morphine, Zofran, LR     *I and O's:    24 @ 07:01  -  24 @ 07:00  --------------------------------------------------------  IN:    Oral Fluid: 360 mL  Total IN: 360 mL    OUT:    Indwelling Catheter - Urethral (mL): 200 mL  Total OUT: 200 mL    Total NET: 160 mL    *(+) BM on  - fecal incontinence; pt on bowel regimen (miralax).    *neena score of 9 : PUs documented - sacrum (unstageable), R heel (stage 1),L heel (stage 1), BL Sole of Feet from Toes to Heel (suspected DTI). 3+ generalized, labia, R hand, L hand, R leg, L leg  edema documented.    *PO intake, meeting ~ <50% of estimated nutr needs.    *Malnutrition dx: Pt continues to meet criteria for severe malnutrition in context of acute on chronic illness r/t decreased ability to meet increased nutrient needs 2/2 AMS, sepsis, wound healing, advanced age AEB mild to moderate muscle/ fat wasting     Diet, Consistent Carbohydrate w/Evening Snack (24 @ 11:19) [Active]    Estimated Needs: Based on Kg   Calories:  Kcal ( Kcal/Kg)  Protein:  g ( g/Kg)  Fluids:   mL ( mL/Kg)    *Wt Hx:  Daily Weight in k (30 May 2024 06:29)    Height (cm): 160 (24 @ 12:50)  Weight (kg): 67.7 (24 @ 15:45)  BMI (kg/m2): 26.4 (24 @ 15:45)  BSA (m2): 1.71 (24 @ 15:45)    Recommendations:          *will continue to monitor and follow up with adjustments to treatment plan prn*  Ping Ryan, MS, RDN, CDN, [facility] [phone]  Certified Nutrition  Clinical Nutrition Follow Up Note:    *89 y/o F with a PMHx of afib on Xarelto, HFpEF on torsemide, HTN, GERD, DM, 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 Hypothermic, ill appearing. Large sacral decub w/wound vac - likely source. Admitted for septic shock d/t sacral decubitus, suspected metabolic encephalopathy, and dehydration; tx to CCU for pressor support. FULL CODE.  *: Unable to obtain meaningful information 2/2 pt's mental status; NPO at time of visit. RD obtained bedscale wt on - 144#; 3+ edema likely skewing bedscale wt. Weight hx reviewed: 140#  w/ 2+ and 3+ edema (taken by RD on 24; met criteria for severe malnutrition); weight gain likely d/t increased edema. NFPE limited d/t pt positioning and AMS. NFPE reveals mild to moderate muscle/ fat wasting, pt continues to meet criteria for PCM at this time. Consider SLP eval to assess for safest/ least restrictive diet consistency/ texture. Recommend to advance diet to Low Sodium + SLP recs as soon as medically feasible. Will trial Premier Protein BID when diet is is advanced. Will also add Rafa BID (provides 90 kcal, 2.5 g collagen, 7 g L-Arginine, 7 g L-Glutamine/ 1 packet) to promote wound healing. Rafa is intended to support tissue building and collagen formation in the dietary management of wounds, which has been clinically shown to support wound healing (including pressure injuries, diabetic foot ulcers, surgical incisions, burns, and other acute/chronic wounds) by enhancing collagen formation in as little as 2 weeks. HgbA1c level 7.3% indicating good glycemic control pta for age and conditions. Recommend to keep diet as liberalized as possible when diet is advanced. If pt's diet cannot be advanced or pt is with poor po intake, recommend to establish nutrition GOC and consider placing corpak and initiating EN. Please see additional recommendations below.    *current status: Diet advanced to Consistent Carbohydrate (). Off pressors. Visited pt this morning, pt still confused and unable to provide any meaningful information. RD obtained bedscale wt on  - 146#; 3+ edema skewing bedscale weight. Discussed In IDR this morning, pt unable to swallow - plan to place corpak in effort to optimize PO intake. Recommend SLP eval to assess for safest/ least restrictive diet consistency/ texture; can bridge PO intake and TF if pt is able to swallow. Monitor closely for refeeding syndrome - please obtain BMP, Mg, and Phos levels. Monitor and replete K, Phos, Mg prn if begins to downtrend. If nutrition support is initiated, recommend to check refeeding labs BID x 2-3 days upon initiation and then will reevaluate. C/w MVI/MIN supplementation and consider adding 200mg of thiamine. Confirm goals of care regarding nutrition support. Please see additional recommendations below.     *Labs Reviewed:      139  |  108  |  59<H>  ----------------------------<  221<H>  4.4   |  17<L>  |  1.49<H>    Ca    9.3      30 May 2024 05:36  Phos  4.0       Mg     2.2         TPro  5.2<L>  /  Alb  1.6<L>  /  TBili  0.6  /  DBili  x   /  AST  44<H>  /  ALT  46  /  AlkPhos  67      BMI: BMI (kg/m2): 26.4 (24 @ 15:45)  HbA1c: A1C with Estimated Average Glucose Result: 7.3 % (24 @ 17:10)    Glucose: POCT Blood Glucose.: 166 mg/dL (24 @ 22:22)    BP: 105/48 (24 @ 07:00) (69/26 - 145/127)Vital Signs Last 24 Hrs  T(C): 36.4 (24 @ 06:29), Max: 36.4 (24 @ 06:29)  T(F): 97.5 (24 @ 06:29), Max: 97.5 (24 @ 06:29)  HR: 74 (24 @ 07:00) (68 - 149)  BP: 105/48 (24 @ 07:00) (81/71 - 128/63)  BP(mean): 65 (24 @ 07:00) (65 - 101)  RR: 22 (24 @ 07:00) (14 - 31)  SpO2: 97% (24 @ 07:00) (92% - 100%)    Lipid Panel: Date/Time: 24 @ 07:42  Cholesterol, Serum: 143  LDL Cholesterol Calculated: 68  HDL Cholesterol, Serum: 46  Total Cholesterol/HDL Ration Measurement: --  Triglycerides, Serum: 175    POCT Blood Glucose.: 166 mg/dL (29 May 2024 22:22)  POCT Blood Glucose.: 177 mg/dL (29 May 2024 17:07)  POCT Blood Glucose.: 177 mg/dL (29 May 2024 12:26)    *pertinent meds: Amiodarone, Vitamin C, Lipitor, Abx, Cardizem, Folic Acid, Solu-Cortef, Admelog (4 units x 24 hours), Ativan, MVI/ MIN, Protonix, Miralax, Zoloft, Zinc Sulfate, Morphine, Zofran, LR     *I and O's:    24 @ 07:01  -  24 @ 07:00  --------------------------------------------------------  IN:    Oral Fluid: 360 mL  Total IN: 360 mL    OUT:    Indwelling Catheter - Urethral (mL): 200 mL  Total OUT: 200 mL    Total NET: 160 mL    *(+) BM on  - fecal incontinence; pt on bowel regimen (miralax).    *neena score of 9 : PUs documented - sacrum (unstageable), R heel (stage 1),L heel (stage 1), BL Sole of Feet from Toes to Heel (suspected DTI). 3+ generalized, labia, R hand, L hand, R leg, L leg  edema documented.    *PO intake, meeting ~ <50% of estimated nutr needs.    *Malnutrition dx: Pt continues to meet criteria for severe malnutrition in context of acute on chronic illness r/t decreased ability to meet increased nutrient needs 2/2 AMS, sepsis, wound healing, advanced age AEB mild to moderate muscle/ fat wasting     Diet, Consistent Carbohydrate w/Evening Snack (24 @ 11:19) [Active]    Estimated Needs: Based on 52.3Kg   Calories: 6595-8718 Kcal (35-40 Kcal/Kg)  Protein:  g (1.8-2.2 g/Kg)  Fluids: 1832-6198 mL (30-35 mL/Kg)    *Wt Hx:  Daily Weight in k (30 May 2024 06:29)    Height (cm): 160 (24 @ 12:50)  Weight (kg): 67.7 (24 @ 15:45)  BMI (kg/m2): 26.4 (24 @ 15:45)  BSA (m2): 1.71 (24 @ 15:45)    Recommendations:  1) Consider SLP eval to assess for safest/ least restrictive diet consistency/ texture; may be able to bridge PO intake + TF if pt able to swallow   2) Initiate Glucerna 1.5 @ 20 cc/hr, increase by 10 cc/hr q6 hours until goal rate of 60cc/hr met with 1 packet of Prosource TF. Will provide ~ 1840 kcal, 110 g protein, and 911 mL free water. Will add Rafa BID (provides 90 kcal, 2.5 g collagen, 7 g L-Arginine, 7 g L-Glutamine/ 1 packet) to promote wound healing. Rafa is intended to support tissue building and collagen formation in the dietary management of wounds, which has been clinically shown to support wound healing (including pressure injuries, diabetic foot ulcers, surgical incisions, burns, and other acute/chronic wounds) by enhancing collagen formation in as little as 2 weeks.   3) monitor hydration status; adjust free water flushes prn, consider free water flushes of 25mL/hr (which provides ~ 500mL of water per day)  4) monitor TF tolerance; keep back of bed > 35 degrees  5) monitor BM: if > 3 days without BM, add bowel regimen prn  6) daily wt checks to track/trend changes  7) Obtain vitamin D 25OH level to assess nutriture   8) Monitor and optimize BG levels between 140-180 mg/dL by medical management  9) C/w MVI/MIN, vitamin C supplementation daily and Zinc Sulfate x10 days (7 more days)  10) Consider adding thiamine 200 mg daily 2/2 poor PO intake/ malnutrition   11) Monitor closely for refeeding syndrome - please obtain BMP, Mg, and Phos levels. Monitor and replete K, Phos, Mg prn if begins to downtrend. If nutrition support is initiated, recommend to check refeeding labs BID x 2-3 days upon initiation and then will reevaluate.   12) Confirm goals of care regarding nutrition support     *will continue to monitor and follow up with adjustments to treatment plan prn*  Ping Ryan MS, RDN, CDN, [facility] [phone]  Certified Nutrition

## 2024-05-30 NOTE — PROGRESS NOTE ADULT - ASSESSMENT
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    PAF with RVR while septic that converted to sinus bradycardia, last night again with episodes of rapid afib.  this afternoon converted to SB.    continue amiodarone for rate control  agree with cardizem - can change to IV PRN for rapid afib   Treat sepsis and cont medical optimazation  continue lovenox for storke prevention   poor prognosis  EP will sign off

## 2024-05-30 NOTE — PROGRESS NOTE ADULT - ASSESSMENT
Patient with hx. dementia  admitted with weakness, hypotension septic shock  rapid afib  hx. DM  Septic shock    Plan     1. neuro - baseline dementia, likley with some degreee of super imposed encephalopathy    2. respiratory on room air    3. cv septic shock, off pressures, Afib with rvr, on amiodarone, add lopressor    4. GI cant swallow, will start tube feeds chito need corpack    5. Renal EDITA, hydration creatinine inc 1.5    6. ID on cefepime day 2 for decubitus, u/a negative    7. sliding scale for diabetes Patient with hx. dementia  admitted with weakness, hypotension septic shock  rapid afib  hx. DM  Septic shock    Plan     1. neuro - baseline dementia, likley with some degreee of super imposed encephalopathy    2. respiratory on room air    3. cv septic shock, off pressures, Afib with rvr, on amiodarone, restart cardizem    4. GI cant swallow, will start tube feeds chito need corpack    5. Renal EDITA, hydration creatinine inc 1.5    6. ID on cefepime day 2 for decubitus, u/a negative    7. sliding scale for diabetes    8. d/c stress steroids go to home prednisone Patient with hx. dementia  admitted with weakness, hypotension septic shock  rapid afib  hx. DM  Septic shock    Plan     1. neuro - baseline dementia, likley with some degreee of super imposed encephalopathy, not at baseline, got morphine for pain overnight    2. respiratory on room air    3. cv septic shock, off pressures, Afib with rvr, on amiodarone, restart cardizem    4. GI cant swallow, will start tube feeds chito need corpack    5. Renal EDITA, hydration creatinine inc 1.5    6. ID on cefepime day 2 for decubitus, u/a negative    7. sliding scale for diabetes    8. d/c stress steroids go to home prednisone

## 2024-05-30 NOTE — CHART NOTE - NSCHARTNOTEFT_GEN_A_CORE
PA called by RN for pt tachycardic to 130s. Pt has sustained above 130bpm and in irregular rhythm. BP soft at 94/65. Pt known to be intolerant to lopressor.  Will administer 5mg Cardizem for afib w/ RVR. Will reassess hemodynamic status after Cardizem bolus.

## 2024-05-30 NOTE — PROGRESS NOTE ADULT - SUBJECTIVE AND OBJECTIVE BOX
HPI:  89yo female PMH afib on Xarelto, HFpEF on torsemide, HTN, GERD, DM, 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 Hypothermic, ill appearing.  Large sacral decub w/wound vac - likely source. ICU consult called for hypotension. 5/27: Large, deep decub noted, seen by surgery. Pending CT, workup. IVF, pressors, abx. EP asked to evaluate for history of Afib.  Patient seen at bedside, minimally responsive to stimuli, A&Ox1, hypothermic with bear hugger in place.  telemetry shows AF RVR on 5.27., topday SB 40-50s bpm   Of note, over last several months hospitalized several times with weakness, lower extremity cellulitis and had multiple attempts at rhythm control with cardioversions have failed, she was amio loaded previously and had intermittent sinus rhythm but primarily afib.  She has since developed sacral decubitus.         Subjective: pt is ill appearing, confused, mimimally responsive     TELE: last 24hrs afib wtih RVR overnight, presently sinus rhythm 58-60bpm    MEDICATIONS  (STANDING):  aMIOdarone    Tablet 200 milliGRAM(s) Oral daily  ascorbic acid 500 milliGRAM(s) Oral daily  atorvastatin 80 milliGRAM(s) Oral at bedtime  cefepime  Injectable. 2000 milliGRAM(s) IV Push every 12 hours  chlorhexidine 4% Liquid 1 Application(s) Topical <User Schedule>  collagenase Ointment 1 Application(s) Topical daily  Dakins Solution - 1/4 Strength 1 Application(s) Topical daily  dextrose 10% Bolus 125 milliLiter(s) IV Bolus once  dextrose 5%. 1000 milliLiter(s) (50 mL/Hr) IV Continuous <Continuous>  dextrose 5%. 1000 milliLiter(s) (100 mL/Hr) IV Continuous <Continuous>  dextrose 50% Injectable 25 Gram(s) IV Push once  dextrose 50% Injectable 12.5 Gram(s) IV Push once  diltiazem    Tablet 30 milliGRAM(s) Oral every 6 hours  enoxaparin Injectable 70 milliGRAM(s) SubCutaneous every 12 hours  folic acid 1 milliGRAM(s) Oral daily  glucagon  Injectable 1 milliGRAM(s) IntraMuscular once  insulin lispro (ADMELOG) corrective regimen sliding scale   SubCutaneous three times a day before meals  insulin lispro (ADMELOG) corrective regimen sliding scale   SubCutaneous at bedtime  lactated ringers. 1000 milliLiter(s) (100 mL/Hr) IV Continuous <Continuous>  lactated ringers. 1000 milliLiter(s) (75 mL/Hr) IV Continuous <Continuous>  multivitamin/minerals 1 Tablet(s) Oral daily  pantoprazole  Injectable 40 milliGRAM(s) IV Push daily  polyethylene glycol 3350 17 Gram(s) Oral daily  predniSONE   Tablet 5 milliGRAM(s) Oral daily  sertraline 25 milliGRAM(s) Oral daily  sodium chloride 0.9%. 1000 milliLiter(s) (50 mL/Hr) IV Continuous <Continuous>  timolol 0.5% Solution 1 Drop(s) Both EYES daily  zinc sulfate 220 milliGRAM(s) Oral daily    MEDICATIONS  (PRN):  acetaminophen     Tablet .. 650 milliGRAM(s) Oral every 6 hours PRN Temp greater or equal to 38C (100.4F), Mild Pain (1 - 3), Moderate Pain (4 - 6)  bisacodyl Suppository 10 milliGRAM(s) Rectal daily PRN Constipation  dextrose Oral Gel 15 Gram(s) Oral once PRN Blood Glucose LESS THAN 70 milliGRAM(s)/deciliter  morphine  - Injectable 2 milliGRAM(s) IV Push every 4 hours PRN Severe Pain (7 - 10)  ondansetron Injectable 4 milliGRAM(s) IV Push every 6 hours PRN Nausea and/or Vomiting      Allergies    penicillin (Swelling)    Intolerances    Metoprolol Tartrate (Other; Sedation/Somnol)  metformin (Diarrhea)      Vital Signs Last 24 Hrs  T(C): 36.4 (30 May 2024 11:39), Max: 36.4 (30 May 2024 06:29)  T(F): 97.5 (30 May 2024 11:39), Max: 97.5 (30 May 2024 06:29)  HR: 109 (30 May 2024 14:00) (70 - 149)  BP: 98/66 (30 May 2024 14:00) (81/71 - 128/63)  BP(mean): 76 (30 May 2024 14:00) (65 - 101)  RR: 16 (30 May 2024 14:00) (14 - 34)  SpO2: 98% (30 May 2024 14:00) (92% - 100%)    Parameters below as of 29 May 2024 20:30  Patient On (Oxygen Delivery Method): room air      LABS:                        13.5   26.70 )-----------( 226      ( 30 May 2024 05:36 )             43.0     05-30    139  |  108  |  59<H>  ----------------------------<  221<H>  4.4   |  17<L>  |  1.49<H>    Ca    9.3      30 May 2024 05:36  Phos  4.0     05-30  Mg     2.2     05-30    TPro  5.2<L>  /  Alb  1.6<L>  /  TBili  0.6  /  DBili  x   /  AST  44<H>  /  ALT  46  /  AlkPhos  67  05-29      Urinalysis Basic - ( 30 May 2024 05:36 )    Color: x / Appearance: x / SG: x / pH: x  Gluc: 221 mg/dL / Ketone: x  / Bili: x / Urobili: x   Blood: x / Protein: x / Nitrite: x   Leuk Esterase: x / RBC: x / WBC x   Sq Epi: x / Non Sq Epi: x / Bacteria: x          RADIOLOGY & ADDITIONAL TESTS:     HPI:  88yo female PMH afib on Xarelto, HFpEF on torsemide, HTN, GERD, DM, 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 Hypothermic, ill appearing.  Large sacral decub w/wound vac - likely source. ICU consult called for hypotension. 5/27: Large, deep decub noted, seen by surgery. Pending CT, workup. IVF, pressors, abx. EP asked to evaluate for history of Afib.  Patient seen at bedside, minimally responsive to stimuli, A&Ox1, hypothermic with bear hugger in place.  telemetry shows AF RVR on 5.27., topday SB 40-50s bpm   Of note, over last several months hospitalized several times with weakness, lower extremity cellulitis and had multiple attempts at rhythm control with cardioversions have failed, she was amio loaded previously and had intermittent sinus rhythm but primarily afib.  She has since developed sacral decubitus.         Subjective: pt is ill appearing, confused, mimimally responsive     TELE: last 24hrs afib wtih RVR overnight, presently sinus rhythm 58-60bpm    MEDICATIONS  (STANDING):  aMIOdarone    Tablet 200 milliGRAM(s) Oral daily  ascorbic acid 500 milliGRAM(s) Oral daily  atorvastatin 80 milliGRAM(s) Oral at bedtime  cefepime  Injectable. 2000 milliGRAM(s) IV Push every 12 hours  chlorhexidine 4% Liquid 1 Application(s) Topical <User Schedule>  collagenase Ointment 1 Application(s) Topical daily  Dakins Solution - 1/4 Strength 1 Application(s) Topical daily  dextrose 10% Bolus 125 milliLiter(s) IV Bolus once  dextrose 5%. 1000 milliLiter(s) (50 mL/Hr) IV Continuous <Continuous>  dextrose 5%. 1000 milliLiter(s) (100 mL/Hr) IV Continuous <Continuous>  dextrose 50% Injectable 25 Gram(s) IV Push once  dextrose 50% Injectable 12.5 Gram(s) IV Push once  diltiazem    Tablet 30 milliGRAM(s) Oral every 6 hours  enoxaparin Injectable 70 milliGRAM(s) SubCutaneous every 12 hours  folic acid 1 milliGRAM(s) Oral daily  glucagon  Injectable 1 milliGRAM(s) IntraMuscular once  insulin lispro (ADMELOG) corrective regimen sliding scale   SubCutaneous three times a day before meals  insulin lispro (ADMELOG) corrective regimen sliding scale   SubCutaneous at bedtime  lactated ringers. 1000 milliLiter(s) (100 mL/Hr) IV Continuous <Continuous>  lactated ringers. 1000 milliLiter(s) (75 mL/Hr) IV Continuous <Continuous>  multivitamin/minerals 1 Tablet(s) Oral daily  pantoprazole  Injectable 40 milliGRAM(s) IV Push daily  polyethylene glycol 3350 17 Gram(s) Oral daily  predniSONE   Tablet 5 milliGRAM(s) Oral daily  sertraline 25 milliGRAM(s) Oral daily  sodium chloride 0.9%. 1000 milliLiter(s) (50 mL/Hr) IV Continuous <Continuous>  timolol 0.5% Solution 1 Drop(s) Both EYES daily  zinc sulfate 220 milliGRAM(s) Oral daily    MEDICATIONS  (PRN):  acetaminophen     Tablet .. 650 milliGRAM(s) Oral every 6 hours PRN Temp greater or equal to 38C (100.4F), Mild Pain (1 - 3), Moderate Pain (4 - 6)  bisacodyl Suppository 10 milliGRAM(s) Rectal daily PRN Constipation  dextrose Oral Gel 15 Gram(s) Oral once PRN Blood Glucose LESS THAN 70 milliGRAM(s)/deciliter  morphine  - Injectable 2 milliGRAM(s) IV Push every 4 hours PRN Severe Pain (7 - 10)  ondansetron Injectable 4 milliGRAM(s) IV Push every 6 hours PRN Nausea and/or Vomiting      Allergies    penicillin (Swelling)    Intolerances    Metoprolol Tartrate (Other; Sedation/Somnol)  metformin (Diarrhea)      Vital Signs Last 24 Hrs  T(C): 36.4 (30 May 2024 11:39), Max: 36.4 (30 May 2024 06:29)  T(F): 97.5 (30 May 2024 11:39), Max: 97.5 (30 May 2024 06:29)  HR: 109 (30 May 2024 14:00) (70 - 149)  BP: 98/66 (30 May 2024 14:00) (81/71 - 128/63)  BP(mean): 76 (30 May 2024 14:00) (65 - 101)  RR: 16 (30 May 2024 14:00) (14 - 34)  SpO2: 98% (30 May 2024 14:00) (92% - 100%)    Parameters below as of 29 May 2024 20:30  Patient On (Oxygen Delivery Method): room air      LABS:                        13.5   26.70 )-----------( 226      ( 30 May 2024 05:36 )             43.0     05-30    139  |  108  |  59<H>  ----------------------------<  221<H>  4.4   |  17<L>  |  1.49<H>    Ca    9.3      30 May 2024 05:36  Phos  4.0     05-30  Mg     2.2     05-30    TPro  5.2<L>  /  Alb  1.6<L>  /  TBili  0.6  /  DBili  x   /  AST  44<H>  /  ALT  46  /  AlkPhos  67  05-29      Urinalysis Basic - ( 30 May 2024 05:36 )    Color: x / Appearance: x / SG: x / pH: x  Gluc: 221 mg/dL / Ketone: x  / Bili: x / Urobili: x   Blood: x / Protein: x / Nitrite: x   Leuk Esterase: x / RBC: x / WBC x   Sq Epi: x / Non Sq Epi: x / Bacteria: x          RADIOLOGY & ADDITIONAL TESTS:    ummary     Left ventricle systolic function appears preserved in the presence of a   cardiac arrhythmia. Left ventricle size and structure are within normal   limitations. Visual estimation of left ventricle ejection fraction is>50   %.   The left atrium is moderately dilated.   The right atrium appears moderately dilated.   The right ventricle exhibits mild dilation, mild diffuse hypokinesis, and   mild depression of contractility.   Mild aortic sclerosis is present with normalvalvular opening.   Minimal mitral annular calcification is present.   Mild (1+) mitral regurgitation is present.   Moderate to severe (3+) tricuspid valve regurgitation is present.   Mild pulmonic valvular regurgitation (1+) is present.     Signature     ----------------------------------------------------------------   Electronically signed by Cash Figueroa MD(Interpreting      RADIOLOGY & ADDITIONAL STUDIES:  IMPRESSION:  1.  Moderate (4.3 cm max trans x 6.4 cm sagittal)) lower   sacral/sacrococcygeal decubitus ulcer extends to bony surface, developed   from 4/15/2024. No associated loculated fluid or gas collections.  2.  Interval mild pleural effusions and progressed anasarca  --- End of Report ---    LISA VELIZ MD; Attending Radiologist  This document has been electronically signed.May 27 2024  3:53PM

## 2024-05-31 NOTE — PROGRESS NOTE ADULT - SUBJECTIVE AND OBJECTIVE BOX
Patient is a 89y old  Female who presents with a chief complaint of Sepsis (30 May 2024 14:33)    24 hour events:     Allergies    penicillin (Swelling)    Intolerances    Metoprolol Tartrate (Other; Sedation/Somnol)  metformin (Diarrhea)    REVIEW OF SYSTEMS: SEE BELOW       ICU Vital Signs Last 24 Hrs  T(C): 35.1 (31 May 2024 06:00), Max: 36.4 (30 May 2024 11:39)  T(F): 95.1 (31 May 2024 06:00), Max: 97.5 (30 May 2024 11:39)  HR: 67 (31 May 2024 09:15) (55 - 137)  BP: 80/37 (31 May 2024 09:15) (68/43 - 117/76)  BP(mean): 51 (31 May 2024 09:15) (42 - 90)  ABP: --  ABP(mean): --  RR: 21 (31 May 2024 09:15) (14 - 36)  SpO2: 94% (31 May 2024 09:15) (86% - 100%)        CAPILLARY BLOOD GLUCOSE      POCT Blood Glucose.: 172 mg/dL (31 May 2024 11:24)  POCT Blood Glucose.: 206 mg/dL (31 May 2024 05:59)  POCT Blood Glucose.: 218 mg/dL (30 May 2024 23:35)  POCT Blood Glucose.: 190 mg/dL (30 May 2024 17:33)  POCT Blood Glucose.: 203 mg/dL (30 May 2024 12:11)      I&O's Summary    30 May 2024 07:01  -  31 May 2024 07:00  --------------------------------------------------------  IN: 4025 mL / OUT: 300 mL / NET: 3725 mL        Mode: AC/ CMV (Assist Control/ Continuous Mandatory Ventilation)  RR (machine): 22  TV (machine): 400  FiO2: 40  PEEP: 6  ITime: 0.8  MAP: 11  PIP: 29      MEDICATIONS  (STANDING):  aMIOdarone    Tablet 200 milliGRAM(s) Oral daily  ascorbic acid 500 milliGRAM(s) Oral daily  atorvastatin 80 milliGRAM(s) Oral at bedtime  bisacodyl 5 milliGRAM(s) Oral daily  cefepime  Injectable. 2000 milliGRAM(s) IV Push every 12 hours  chlorhexidine 0.12% Liquid 15 milliLiter(s) Oral Mucosa every 12 hours  chlorhexidine 4% Liquid 1 Application(s) Topical <User Schedule>  collagenase Ointment 1 Application(s) Topical daily  Dakins Solution - 1/4 Strength 1 Application(s) Topical daily  dextrose 10% Bolus 125 milliLiter(s) IV Bolus once  dextrose 5%. 1000 milliLiter(s) (50 mL/Hr) IV Continuous <Continuous>  dextrose 5%. 1000 milliLiter(s) (100 mL/Hr) IV Continuous <Continuous>  dextrose 50% Injectable 25 Gram(s) IV Push once  dextrose 50% Injectable 12.5 Gram(s) IV Push once  folic acid 1 milliGRAM(s) Oral daily  glucagon  Injectable 1 milliGRAM(s) IntraMuscular once  hydrocortisone sodium succinate Injectable 100 milliGRAM(s) IV Push every 8 hours  insulin lispro (ADMELOG) corrective regimen sliding scale   SubCutaneous at bedtime  insulin lispro (ADMELOG) corrective regimen sliding scale   SubCutaneous three times a day before meals  lactated ringers. 1000 milliLiter(s) (100 mL/Hr) IV Continuous <Continuous>  lactated ringers. 1000 milliLiter(s) (75 mL/Hr) IV Continuous <Continuous>  multivitamin/minerals 1 Tablet(s) Oral daily  pantoprazole  Injectable 40 milliGRAM(s) IV Push daily  phenylephrine    Infusion 0.3 MICROgram(s)/kG/Min (7.62 mL/Hr) IV Continuous <Continuous>  polyethylene glycol 3350 17 Gram(s) Oral two times a day  propofol Infusion 10 MICROgram(s)/kG/Min (4.06 mL/Hr) IV Continuous <Continuous>  senna 2 Tablet(s) Oral at bedtime  sodium chloride 0.9%. 1000 milliLiter(s) (50 mL/Hr) IV Continuous <Continuous>  timolol 0.5% Solution 1 Drop(s) Both EYES daily  zinc sulfate 220 milliGRAM(s) Oral daily      MEDICATIONS  (PRN):  acetaminophen     Tablet .. 650 milliGRAM(s) Oral every 6 hours PRN Temp greater or equal to 38C (100.4F), Mild Pain (1 - 3), Moderate Pain (4 - 6)  dextrose Oral Gel 15 Gram(s) Oral once PRN Blood Glucose LESS THAN 70 milliGRAM(s)/deciliter  ondansetron Injectable 4 milliGRAM(s) IV Push every 6 hours PRN Nausea and/or Vomiting  sodium chloride 0.9% lock flush 10 milliLiter(s) IV Push every 1 hour PRN Pre/post blood products, medications, blood draw, and to maintain line patency      PHYSICAL EXAM: SEE BELOW                          11.1   16.85 )-----------( 165      ( 31 May 2024 08:52 )             36.0       05-31    141  |  113<H>  |  68<H>  ----------------------------<  185<H>  4.3   |  20<L>  |  1.85<H>    Ca    7.6<L>      31 May 2024 08:52  Phos  3.3     05-31  Mg     1.9     05-31    TPro  4.3<L>  /  Alb  1.6<L>  /  TBili  0.6  /  DBili  x   /  AST  63<H>  /  ALT  43  /  AlkPhos  69  05-31          PTT - ( 31 May 2024 10:00 )  PTT:45.5 sec  Urinalysis Basic - ( 31 May 2024 08:52 )    Color: x / Appearance: x / SG: x / pH: x  Gluc: 185 mg/dL / Ketone: x  / Bili: x / Urobili: x   Blood: x / Protein: x / Nitrite: x   Leuk Esterase: x / RBC: x / WBC x   Sq Epi: x / Non Sq Epi: x / Bacteria: x      .Blood None   No growth at 72 Hours -- 05-27 @ 13:58

## 2024-05-31 NOTE — PROCEDURE NOTE - NSINFORMCONSENT_GEN_A_CORE
This was an emergent procedure.
Telephone consent/Benefits, risks, and possible complications of procedure explained to patient/caregiver who verbalized understanding and gave verbal consent.
This was an emergent procedure.

## 2024-05-31 NOTE — PROGRESS NOTE ADULT - CARDIOVASCULAR
regular rate and rhythm/S1 S2 present/peripheral edema
regular rate and rhythm
regular rate and rhythm

## 2024-05-31 NOTE — PROGRESS NOTE ADULT - ASSESSMENT
89 y/o female PMH pA.fib on amio and Xarelto, temporal arteritis on prednisone, HFpEF, HTN, DM2, large sacral decubitus s/p debridement on 4/25       Admitted for severe sepsis with septic shock (POA) from infected sacral wound, suspected OM   Course complicated by acute hypoxic resp failure, EDITA from ATN, acute metabolic encephalopathy     Intubated 5/31       -continue vent support, on AC vol 22/400/6/40%, wean fio2 to maintain sats>90%, not ready for SBT 89 y/o female PMH pA.fib on amio and Xarelto, temporal arteritis on prednisone, HFpEF, HTN, DM2, large sacral decubitus s/p debridement on 4/25       Admitted for severe sepsis with septic shock (POA) from infected sacral wound, suspected OM   Course complicated by acute hypoxic resp failure, EDITA from ATN, acute metabolic encephalopathy     Intubated 5/31       -continue vent support, on AC vol 22/400/6/40%, wean fio2 to maintain sats>90%, not ready for SBT  -unable to diurese due to significant HD instability  -on high dose Zach, SBP 70-80s, will switch prednisone to stress steroid, no indication for a 2nd pressor - outcome will not change   -remains in NSR, d/w EP - continue amio   -switch Lovenox to iv heparin given EDITA   -UO and renal fn worsening, not a candidate for dialysis   -avoid nephrotoxic meds   -maintain Rodriguez   -continue cefepime, BCx neg, WBC decreasing   -wound care  -seen by surgery   -on prop for sedation, appears sedated, wean prop for goal RASS 0- to -1   -DVT ppx with iv heparin       Prognosis extremely poor, patient unlikely to survive this illness despite max aggressive care, d/w son-in-law at bedside, updated on today's events, plan and prognosis, he seems to be thinking about comfort measures, I explained what that means, also suggested considering DNR for now at least as CPR will not change outcome to favorable - he seems amenable to it but wants to wait for his brother (arriving from [place]) and wife. Will follow up

## 2024-05-31 NOTE — GOALS OF CARE CONVERSATION - ADVANCED CARE PLANNING - CONVERSATION DETAILS
HPI:  Pt seen and examined  89F hx afib on Xarelto, HFpEF on torsemide, HTN, GERD, DM, 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 Hypothermic, ill appearing.  (27 May 2024 14:53)      PERTINENT PMH REVIEWED:  [  X ] YES [ ] NO           Primary Contact:  HCP son in law Jurado     HCP [ X  ] Surrogate [   ] Guardian [   ]    Mental Status: [ ] Alert  [  ] Oriented [  ] Confused [ X ] Lethargic [ X  ]  Concerns of Depression [  ]  Anxiety [   ]  Baseline ADLs (prior to admission):  Independent [ ] moderately [ ] fully   Dependent   [ X ] moderately [ ]fully    Anticipated Grief: Patient [  ] Family [ X ]    Caregiver [place] Assessed: Yes [ X  ] No [  ]    [employer]ist: Denominational     Spiritual Concerns: Not identified     Goals of Care: Comfort [  ] Rehabilitation [  ] Curative [  ] Life Prolonging [ X  ]    Previous Services: [facility]     ADVANCE DIRECTIVES: Full Code     Anticipated D/C Plan: to be further determined                      Summary:    This SW spoke with pts son in law/HCP Adrien via phone to discuss goals of care, assist with planning and provide supportive counseling. Pt. known to our team from a previous admission where pt. was previously alert and able to make her own decisions. Pt. currently does not have capacity. Pt. has been residing home with her daughter and son in law who are her primary care takers. Pts son in  reports that pt. was doing well and making progress at home. She had [facility] coming for her wounds and PT.     Goals of care and advanced directives discussed. Pt. shared that pt. came in with a infection and her current state is not her baseline. Pts son in  is HCP and a copy is in [facility] content. We discussed how pt. previously had a DNR/DNI that she had put in place in the past however, it was voided. Pts son in  reports that he felt pt. was pushed into signing DNR/DNI at that time and after it was explained again to the pt. prior to having surgery and she revoked DNR/DNI and said she wanted to be full resuscitation. Pts son in  reports that pt. is still Full Code and they do not wish to put any limits in place at this time. He inquired if needed in the future if resuscitation was nto successful or pt. was declining further if as HCP he could make the decision to not continue or do those interventions. This SW explained that if pt. did not have capacity to make those decision she could put limits in place if he chose to. Pts son in law is clear that at this time however, pt. remains Full Resuscitation. Feelings explored and support provided.     Plan at this time to be further determined. No limits set currently. Emotional support provided. Our team will continue to follow.
Spoke with Adrien (HCP) and other family members, told them about her declining health status and a high possibility of death during this hospitalization, I also discussed options including continued aggressive care, transitioning to comfort care, and DNR etc.     They agreed to DNR but not comfort care, they understand that she may die despite aggressive measures.

## 2024-05-31 NOTE — PROCEDURE NOTE - NSPROCDETAILS_GEN_ALL_CORE
guidewire recovered/lumen(s) aspirated and flushed/sterile dressing applied/sterile technique, catheter placed/ultrasound guidance with use of sterile gel and probe cove
nasogastric
patient pre-oxygenated, tube inserted, placement confirmed

## 2024-05-31 NOTE — DISCHARGE NOTE FOR THE EXPIRED PATIENT - HOSPITAL COURSE
89F hx afib on Xarelto, HFpEF on torsemide, HTN, GERD, DM, 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 Hypothermic, ill appearing.  Large sacral decub w/wound vac - likely source  ICU consult called for hypotension    : Large, deep decub noted, seen by surgery. Pending CT, workup. IVF, pressors, abx. Son in law at bedside.   : MITUL with several runs NSVT. Takem amiodarone in the past. Hypotensive, pressors may be needed.   : Intubated. Declining; shock state, started on pressors. DNR/I, no escalation. , TOD .     ROS: Unable to obtain 2/2 Pt condition (eltahrgic)

## 2024-05-31 NOTE — PROGRESS NOTE ADULT - PROVIDER SPECIALTY LIST ADULT
Electrophysiology
Palliative Care
Surgery
Surgery
Cardiology
Critical Care
Palliative Care
Critical Care

## 2024-05-31 NOTE — PROGRESS NOTE ADULT - RESPIRATORY
good air movement/respirations non-labored
no wheezes/no respiratory distress/rales
airway patent/good air movement/respirations non-labored

## 2024-05-31 NOTE — PROCEDURE NOTE - ADDITIONAL PROCEDURE DETAILS
Patient in acute hypoxic respiratory failure with SO2 80s, increased work of breathing, and rhonchorous bilateral sounds. Patient risk of respiratory arrest and unable to clear secretions putting patient of aspiration risk. Son in law, healthcare proxy, Adrien Bland called for verbal consent as patient high risk for intubation. Adrien agreed to intubation if necessary and confirmed full code status.
Dx/indications: Critical illness, septic shock  Procedure:    CVC insertion  No complications  Dynamic US guidance throughout, image obtained
Dx/indication: Need for PO feeding and meds, dysphagia  Details:    !st pass, no difficulities  Phonated well throughout, no choking  Bridle placed to avoid inadvertent patient removal   CXR ordered for confirmation
13-Aug-2020 09:56

## 2024-05-31 NOTE — PROGRESS NOTE ADULT - NUTRITIONAL ASSESSMENT
This patient has been assessed with a concern for Malnutrition and has been determined to have a diagnosis/diagnoses of Severe protein-calorie malnutrition.    This patient is being managed with:   Diet NPO-  Except Medications     Special Instructions for Nursing:  Except Medications  Entered: May 27 2024  2:52PM  
This patient has been assessed with a concern for Malnutrition and has been determined to have a diagnosis/diagnoses of Severe protein-calorie malnutrition.    This patient is being managed with:   Diet Consistent Carbohydrate w/Evening Snack-  Entered: May 29 2024 11:17AM  
This patient has been assessed with a concern for Malnutrition and has been determined to have a diagnosis/diagnoses of Severe protein-calorie malnutrition.    This patient is being managed with:   Diet NPO with Tube Feed-  Tube Feeding Modality: Nasogastric  Glucerna 1.5 Gerry (GLUCERNA1.5)  Total Volume for 24 Hours (mL): 1440  Continuous  Starting Tube Feed Rate {mL per Hour}: 20  Increase Tube Feed Rate by (mL): 10     Every 6 hours  Until Goal Tube Feed Rate (mL per Hour): 60  Tube Feed Duration (in Hours): 24  Tube Feed Start Time: 00:00  Free Water Flush  Free Water Flush Instructions:  Free water flusehs per MD to maintain hydration  Rafa(7 Gm Arginine/7 Gm Glut/1.2 Gm HMB     Qty per Day:  2  No Carb Prosource TF     Qty per Day:  1  Entered: May 30 2024 10:14AM  
This patient has been assessed with a concern for Malnutrition and has been determined to have a diagnosis/diagnoses of Severe protein-calorie malnutrition.    This patient is being managed with:   Diet Consistent Carbohydrate w/Evening Snack-  Entered: May 29 2024 11:17AM  
This patient has been assessed with a concern for Malnutrition and has been determined to have a diagnosis/diagnoses of Severe protein-calorie malnutrition.    This patient is being managed with:   Diet NPO with Tube Feed-  Tube Feeding Modality: Nasogastric  Glucerna 1.5 Gerry (GLUCERNA1.5)  Total Volume for 24 Hours (mL): 1440  Continuous  Starting Tube Feed Rate {mL per Hour}: 20  Increase Tube Feed Rate by (mL): 10     Every 6 hours  Until Goal Tube Feed Rate (mL per Hour): 60  Tube Feed Duration (in Hours): 24  Tube Feed Start Time: 00:00  Free Water Flush  Free Water Flush Instructions:  Free water flusehs per MD to maintain hydration  Rafa(7 Gm Arginine/7 Gm Glut/1.2 Gm HMB     Qty per Day:  2  No Carb Prosource TF     Qty per Day:  1  Entered: May 30 2024 10:14AM  
This patient has been assessed with a concern for Malnutrition and has been determined to have a diagnosis/diagnoses of Severe protein-calorie malnutrition.    This patient is being managed with:   Diet NPO with Tube Feed-  Tube Feeding Modality: Nasogastric  Glucerna 1.5 Gerry (GLUCERNA1.5)  Total Volume for 24 Hours (mL): 1440  Continuous  Starting Tube Feed Rate {mL per Hour}: 20  Increase Tube Feed Rate by (mL): 10     Every 6 hours  Until Goal Tube Feed Rate (mL per Hour): 60  Tube Feed Duration (in Hours): 24  Tube Feed Start Time: 00:00  Free Water Flush  Free Water Flush Instructions:  Free water flusehs per MD to maintain hydration  Rafa(7 Gm Arginine/7 Gm Glut/1.2 Gm HMB     Qty per Day:  2  No Carb Prosource TF     Qty per Day:  1  Entered: May 30 2024 10:14AM    This patient has been assessed with a concern for Malnutrition and has been determined to have a diagnosis/diagnoses of Severe protein-calorie malnutrition.    This patient is being managed with:   Diet NPO with Tube Feed-  Tube Feeding Modality: Nasogastric  Glucerna 1.5 Gerry (GLUCERNA1.5)  Total Volume for 24 Hours (mL): 1440  Continuous  Starting Tube Feed Rate {mL per Hour}: 20  Increase Tube Feed Rate by (mL): 10     Every 6 hours  Until Goal Tube Feed Rate (mL per Hour): 60  Tube Feed Duration (in Hours): 24  Tube Feed Start Time: 00:00  Free Water Flush  Free Water Flush Instructions:  Free water flusehs per MD to maintain hydration  Rafa(7 Gm Arginine/7 Gm Glut/1.2 Gm HMB     Qty per Day:  2  No Carb Prosource TF     Qty per Day:  1  Entered: May 30 2024 10:14AM  
This patient has been assessed with a concern for Malnutrition and has been determined to have a diagnosis/diagnoses of Severe protein-calorie malnutrition.    This patient is being managed with:   Diet Consistent Carbohydrate w/Evening Snack-  Entered: May 29 2024 11:17AM

## 2024-05-31 NOTE — PROGRESS NOTE ADULT - SUBJECTIVE AND OBJECTIVE BOX
CCU Progress Note    S:    Pt seen and examined  89F hx afib on Xarelto, HFpEF on torsemide, HTN, GERD, DM, 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 Hypothermic, ill appearing.  Large sacral decub w/wound vac - likely source  ICU consult called for hypotension    5/27: Large, deep decub noted, seen by surgery. Pending CT, workup. IVF, pressors, abx. Son in law at bedside.   5/30: MITUL with several runs NSVT. Takem amiodarone in the past. Hypotensive, pressors may be needed.   5/31: Intubated O/N. Lined up. Pressors. Worsening condition, deteriorating rapidly despite fully aggressive management.     ROS: Unable to obtain 2/2 Pt condition (eltahrgic)          Allergies    penicillin (Swelling)    Intolerances    Metoprolol Tartrate (Other; Sedation/Somnol)  metformin (Diarrhea)      MEDICATIONS  (STANDING):  aMIOdarone    Tablet 200 milliGRAM(s) Oral daily  ascorbic acid 500 milliGRAM(s) Oral daily  atorvastatin 80 milliGRAM(s) Oral at bedtime  bisacodyl 5 milliGRAM(s) Oral daily  cefepime  Injectable. 2000 milliGRAM(s) IV Push every 12 hours  chlorhexidine 0.12% Liquid 15 milliLiter(s) Oral Mucosa every 12 hours  chlorhexidine 4% Liquid 1 Application(s) Topical <User Schedule>  collagenase Ointment 1 Application(s) Topical daily  Dakins Solution - 1/4 Strength 1 Application(s) Topical daily  dextrose 10% Bolus 125 milliLiter(s) IV Bolus once  dextrose 5%. 1000 milliLiter(s) (100 mL/Hr) IV Continuous <Continuous>  dextrose 5%. 1000 milliLiter(s) (50 mL/Hr) IV Continuous <Continuous>  dextrose 50% Injectable 25 Gram(s) IV Push once  dextrose 50% Injectable 12.5 Gram(s) IV Push once  folic acid 1 milliGRAM(s) Oral daily  glucagon  Injectable 1 milliGRAM(s) IntraMuscular once  heparin  Infusion.  Unit(s)/Hr (13 mL/Hr) IV Continuous <Continuous>  hydrocortisone sodium succinate Injectable 100 milliGRAM(s) IV Push every 8 hours  insulin lispro (ADMELOG) corrective regimen sliding scale   SubCutaneous every 6 hours  lactated ringers. 1000 milliLiter(s) (75 mL/Hr) IV Continuous <Continuous>  multivitamin/minerals 1 Tablet(s) Oral daily  pantoprazole  Injectable 40 milliGRAM(s) IV Push daily  phenylephrine    Infusion 0.3 MICROgram(s)/kG/Min (7.62 mL/Hr) IV Continuous <Continuous>  polyethylene glycol 3350 17 Gram(s) Oral two times a day  propofol Infusion 10 MICROgram(s)/kG/Min (4.06 mL/Hr) IV Continuous <Continuous>  senna 2 Tablet(s) Oral at bedtime  timolol 0.5% Solution 1 Drop(s) Both EYES daily  zinc sulfate 220 milliGRAM(s) Oral daily    MEDICATIONS  (PRN):  acetaminophen     Tablet .. 650 milliGRAM(s) Oral every 6 hours PRN Temp greater or equal to 38C (100.4F), Mild Pain (1 - 3), Moderate Pain (4 - 6)  dextrose Oral Gel 15 Gram(s) Oral once PRN Blood Glucose LESS THAN 70 milliGRAM(s)/deciliter  heparin   Injectable 3000 Unit(s) IV Push every 6 hours PRN For aPTT between 40 - 57  heparin   Injectable 6000 Unit(s) IV Push every 6 hours PRN For aPTT less than 40  ondansetron Injectable 4 milliGRAM(s) IV Push every 6 hours PRN Nausea and/or Vomiting  sodium chloride 0.9% lock flush 10 milliLiter(s) IV Push every 1 hour PRN Pre/post blood products, medications, blood draw, and to maintain line patency      Drug Dosing Weight  Height (cm): 160 (27 May 2024 12:50)  Weight (kg): 74.1 (31 May 2024 12:00)  BMI (kg/m2): 28.9 (31 May 2024 12:00)  BSA (m2): 1.77 (31 May 2024 12:00)      PAST MEDICAL & SURGICAL HISTORY:  Temporal arteritis      Atrial fibrillation      Acute CHF          FAMILY HISTORY:        REVIEW OF SYSTEMS    UNLESS OTHERWISE NOTED IN HPI above:    Constitutional:  No Weight Change, No Fever, No Chills, No Night Sweats, No Fatigue, No Malaise  ENT/Mouth:  No Hearing Changes, No Ear Pain, No Nasal Congestion, No  Sinus Pain, No Hoarseness, No sore throat, No Rhinorrhea, No Swallowing  Difficulty  Eyes:  No Eye Pain, No Swelling, No Redness, No Foreign Body, No Discharge, No Vision Changes  Cardiovascular:  No Chest Pain, No SOB, No PND, No Dyspnea on Exertion,  No Orthopnea, No Claudication, No Edema, No Palpitations  Respiratory:  No Cough, No Sputum, No Wheezing, No Smoke Exposure, No Dyspnea  Gastrointestinal:  No Nausea, No Vomiting, No Diarrhea, No  Constipation, No Pain, No Heartburn, No Anorexia, No Dysphagia, No  Hematochezia, No Melena, No Flatulence, No Jaundice  Genitourinary:  No Dysmenorrhea, No DUB, No Dyspareunia, No Dysuria, No  Urinary Frequency, No Hematuria, No Urinary Incontinence, No Urgency,  No Flank Pain, No Urinary Flow Changes, No Hesitancy  Musculoskeletal:  No Arthralgias, No Myalgias, No Joint Swelling, No  Joint Stiffness, No Back Pain, No Neck Pain, No Injury History  Skin:  No Skin Lesions, No Pruritis, No Hair Changes, No Breast/Skin Changes, No Nipple Discharge  Neuro:  No Weakness, No Numbness, No Paresthesias, No Loss of  Consciousness, No Syncope, No Dizziness, No Headache, No Coordination  Changes, No Recent Falls  Psych:  No Anxiety/Panic, No Depression, No Insomnia, No Personality  Changes, No Delusions, No Rumination, No SI/HI/AH/VH, No Social Issues,  No Memory Changes, No Violence/Abuse Hx., No Eating Concerns  Heme/Lymph:  No Bruising, No Bleeding, No Transfusions History, No Lymphadenopathy  Endocrine:  No Polyuria, No Polydipsia, No Temperature Intolerance    O:    ICU Vital Signs Last 24 Hrs  T(C): 37 (31 May 2024 15:38), Max: 37 (31 May 2024 15:38)  T(F): 98.6 (31 May 2024 15:38), Max: 98.6 (31 May 2024 15:38)  HR: 0 (31 May 2024 16:40) (0 - 132)  BP: 40/25 (31 May 2024 16:00) (40/25 - 117/76)  BP(mean): 31 (31 May 2024 16:00) (31 - 90)  ABP: --  ABP(mean): --  RR: 0 (31 May 2024 16:40) (0 - 36)  SpO2: 92% (31 May 2024 16:40) (86% - 100%)        ABG - ( 31 May 2024 06:34 )  pH, Arterial: 7.29  pH, Blood: x     /  pCO2: 37    /  pO2: 224   / HCO3: 18    / Base Excess: -8.1  /  SaO2: 99                  I&O's Detail    30 May 2024 07:01  -  31 May 2024 07:00  --------------------------------------------------------  IN:    Glucerna: 100 mL    IV PiggyBack: 50 mL    Lactated Ringers Bolus: 2500 mL    Phenylephrine: 225 mL    sodium chloride 0.9%: 1150 mL  Total IN: 4025 mL    OUT:    Indwelling Catheter - Urethral (mL): 300 mL  Total OUT: 300 mL    Total NET: 3725 mL          Mode: BV  RR (machine): 22  TV (machine): 400  FiO2: 40  PEEP: 6  ITime: 0.8  PIP: 29      O:    Adult lying in bed  Appears acute on chronically ill  No JVD trachea midline  Normocephalic, atraumatic  S1S2+  CTA B/L  Abd soft NTND  + anasarca  Awake, confused  + large stage 4 sacral decubitus ulcer noted  + purple bottom of feet and toes, appears demarcated    LABS:    CBC Full  -  ( 31 May 2024 08:52 )  WBC Count : 16.85 K/uL  RBC Count : 3.43 M/uL  Hemoglobin : 11.1 g/dL  Hematocrit : 36.0 %  Platelet Count - Automated : 165 K/uL  Mean Cell Volume : 105.0 fl  Mean Cell Hemoglobin : 32.4 pg  Mean Cell Hemoglobin Concentration : 30.8 gm/dL  Auto Neutrophil # : x  Auto Lymphocyte # : x  Auto Monocyte # : x  Auto Eosinophil # : x  Auto Basophil # : x  Auto Neutrophil % : x  Auto Lymphocyte % : x  Auto Monocyte % : x  Auto Eosinophil % : x  Auto Basophil % : x    05-31    141  |  113<H>  |  68<H>  ----------------------------<  185<H>  4.3   |  20<L>  |  1.85<H>    Ca    7.6<L>      31 May 2024 08:52  Phos  3.3     05-31  Mg     1.9     05-31    TPro  4.3<L>  /  Alb  1.6<L>  /  TBili  0.6  /  DBili  x   /  AST  63<H>  /  ALT  43  /  AlkPhos  69  05-31    PTT - ( 31 May 2024 10:00 )  PTT:45.5 sec  Urinalysis Basic - ( 31 May 2024 08:52 )    Color: x / Appearance: x / SG: x / pH: x  Gluc: 185 mg/dL / Ketone: x  / Bili: x / Urobili: x   Blood: x / Protein: x / Nitrite: x   Leuk Esterase: x / RBC: x / WBC x   Sq Epi: x / Non Sq Epi: x / Bacteria: x      CAPILLARY BLOOD GLUCOSE      POCT Blood Glucose.: 172 mg/dL (31 May 2024 11:24)  POCT Blood Glucose.: 206 mg/dL (31 May 2024 05:59)  POCT Blood Glucose.: 218 mg/dL (30 May 2024 23:35)  POCT Blood Glucose.: 190 mg/dL (30 May 2024 17:33)        LIVER FUNCTIONS - ( 31 May 2024 08:52 )  Alb: 1.6 g/dL / Pro: 4.3 gm/dL / ALK PHOS: 69 U/L / ALT: 43 U/L / AST: 63 U/L / GGT: x

## 2024-06-01 LAB
CULTURE RESULTS: SIGNIFICANT CHANGE UP
CULTURE RESULTS: SIGNIFICANT CHANGE UP
SPECIMEN SOURCE: SIGNIFICANT CHANGE UP
SPECIMEN SOURCE: SIGNIFICANT CHANGE UP

## 2024-06-04 DIAGNOSIS — Z66 DO NOT RESUSCITATE: ICD-10-CM

## 2024-06-04 DIAGNOSIS — R68.0 HYPOTHERMIA, NOT ASSOCIATED WITH LOW ENVIRONMENTAL TEMPERATURE: ICD-10-CM

## 2024-06-04 DIAGNOSIS — E43 UNSPECIFIED SEVERE PROTEIN-CALORIE MALNUTRITION: ICD-10-CM

## 2024-06-04 DIAGNOSIS — E87.20 ACIDOSIS, UNSPECIFIED: ICD-10-CM

## 2024-06-04 DIAGNOSIS — I50.32 CHRONIC DIASTOLIC (CONGESTIVE) HEART FAILURE: ICD-10-CM

## 2024-06-04 DIAGNOSIS — F03.90 UNSPECIFIED DEMENTIA, UNSPECIFIED SEVERITY, WITHOUT BEHAVIORAL DISTURBANCE, PSYCHOTIC DISTURBANCE, MOOD DISTURBANCE, AND ANXIETY: ICD-10-CM

## 2024-06-04 DIAGNOSIS — N17.0 ACUTE KIDNEY FAILURE WITH TUBULAR NECROSIS: ICD-10-CM

## 2024-06-04 DIAGNOSIS — I48.0 PAROXYSMAL ATRIAL FIBRILLATION: ICD-10-CM

## 2024-06-04 DIAGNOSIS — E86.0 DEHYDRATION: ICD-10-CM

## 2024-06-04 DIAGNOSIS — L89.154 PRESSURE ULCER OF SACRAL REGION, STAGE 4: ICD-10-CM

## 2024-06-04 DIAGNOSIS — Z74.01 BED CONFINEMENT STATUS: ICD-10-CM

## 2024-06-04 DIAGNOSIS — I47.20 VENTRICULAR TACHYCARDIA, UNSPECIFIED: ICD-10-CM

## 2024-06-04 DIAGNOSIS — Z79.01 LONG TERM (CURRENT) USE OF ANTICOAGULANTS: ICD-10-CM

## 2024-06-04 DIAGNOSIS — Z88.0 ALLERGY STATUS TO PENICILLIN: ICD-10-CM

## 2024-06-04 DIAGNOSIS — G93.41 METABOLIC ENCEPHALOPATHY: ICD-10-CM

## 2024-06-04 DIAGNOSIS — E87.6 HYPOKALEMIA: ICD-10-CM

## 2024-06-04 DIAGNOSIS — Z79.84 LONG TERM (CURRENT) USE OF ORAL HYPOGLYCEMIC DRUGS: ICD-10-CM

## 2024-06-04 DIAGNOSIS — E86.1 HYPOVOLEMIA: ICD-10-CM

## 2024-06-04 DIAGNOSIS — R65.21 SEVERE SEPSIS WITH SEPTIC SHOCK: ICD-10-CM

## 2024-06-04 DIAGNOSIS — M31.6 OTHER GIANT CELL ARTERITIS: ICD-10-CM

## 2024-06-04 DIAGNOSIS — R00.1 BRADYCARDIA, UNSPECIFIED: ICD-10-CM

## 2024-06-04 DIAGNOSIS — A41.9 SEPSIS, UNSPECIFIED ORGANISM: ICD-10-CM

## 2024-06-04 DIAGNOSIS — E11.9 TYPE 2 DIABETES MELLITUS WITHOUT COMPLICATIONS: ICD-10-CM

## 2024-06-04 DIAGNOSIS — I11.0 HYPERTENSIVE HEART DISEASE WITH HEART FAILURE: ICD-10-CM

## 2024-06-04 DIAGNOSIS — J96.01 ACUTE RESPIRATORY FAILURE WITH HYPOXIA: ICD-10-CM

## 2024-06-04 DIAGNOSIS — K21.9 GASTRO-ESOPHAGEAL REFLUX DISEASE WITHOUT ESOPHAGITIS: ICD-10-CM

## 2024-08-18 NOTE — CONSULT NOTE ADULT - CONSULT REQUESTED BY NAME
"Calm, cooperative, intermittently visible on unit. Reports feeling \"very tired\" and states \"if nobody would've woke me up today I'd still be in bed\". After coffee break, patient returned to room to nap. Reports anxiety and depression are present but manageable. Patient eager to get nail polish switched to purple upon discharge. Denies SI/HI/AVH and encouraged to approach staff if ideations occur. Medication compliant. @08:22 blood glucose= 110; no insulin coverage necessary. Yesterday, patient c/o severe headache following seizure; however, today reports no headache. Denies any other physical symptoms. Plan of care ongoing. Denies unmet needs.   "
ED
enrique
Dr. Mares
CCU

## 2024-09-12 NOTE — PATIENT PROFILE ADULT - STATED REASON FOR ADMISSION
Returned call to patient. She states that she have Veltassa at home and will continue to take it every other day as prescribed.   [employer]

## 2024-12-15 NOTE — PHYSICAL THERAPY INITIAL EVALUATION ADULT - ORIENTATION, REHAB EVAL
Problem: Chronic Conditions and Co-morbidities  Goal: Patient's chronic conditions and co-morbidity symptoms are monitored and maintained or improved  Outcome: Progressing  Flowsheets (Taken 12/15/2024 1630)  Care Plan - Patient's Chronic Conditions and Co-Morbidity Symptoms are Monitored and Maintained or Improved: Monitor and assess patient's chronic conditions and comorbid symptoms for stability, deterioration, or improvement     Problem: Skin/Tissue Integrity  Goal: Absence of new skin breakdown  Description: 1.  Monitor for areas of redness and/or skin breakdown  2.  Assess vascular access sites hourly  3.  Every 4-6 hours minimum:  Change oxygen saturation probe site  4.  Every 4-6 hours:  If on nasal continuous positive airway pressure, respiratory therapy assess nares and determine need for appliance change or resting period.  Outcome: Progressing     Problem: Safety - Adult  Goal: Free from fall injury  Outcome: Progressing      person/place

## 2025-01-07 NOTE — ED PROVIDER NOTE - PROGRESS NOTE DETAILS
Detail Level: Zone Otc Regimen: Use physical/ mineral sunscreen daily spf+30. Re-apply every two hours if necessary. Liane Miranda for ED attending, Dr. Lucero: 87 y/o female with PMHx of, a-fib, GERD, temporal arteritis on steroids DM recently started metformin 2 days ago presents to the ED c/o diarrhea over the past 2 days, non-bloody. Pt reports she had some abdominal pain and some rectal pain initially but not at this time. Pt has not had diarrhea since arriving to the ED. Pt denies recent antibiotics use, was recently admitted to  for a-fib. Liane Miranda for ED attending, Dr. Lucero: 89 y/o female with PMHx of CHF, a-fib, GERD, temporal arteritis on steroids DM recently started metformin 2 days ago presents to the ED c/o diarrhea over the past 2 days, non-bloody. Pt reports she had some abdominal pain and some rectal pain initially but not at this time. Pt has not had diarrhea since arriving to the ED. Pt denies recent antibiotics use, was recently admitted to  for a-fib. Liane Miranda for ED attending, Dr. Lucero: due to concern for CHF, 30 cc/kg bolus not given

## 2025-05-14 NOTE — PRE-OP CHECKLIST - LATEX ALLERGY
1000: Patient refusing to let this RN do neuro checks and a full assessment. MD made aware.     1015: Admission documentation remains incomplete due to patients orientation status and refusing to answer questions.     1215: Patient refusing blood sugar checks and vitals. MD made aware.   no